# Patient Record
Sex: MALE | Race: WHITE | NOT HISPANIC OR LATINO | Employment: FULL TIME | URBAN - NONMETROPOLITAN AREA
[De-identification: names, ages, dates, MRNs, and addresses within clinical notes are randomized per-mention and may not be internally consistent; named-entity substitution may affect disease eponyms.]

---

## 2017-01-12 ENCOUNTER — OFFICE VISIT (OUTPATIENT)
Dept: PSYCHIATRY | Facility: CLINIC | Age: 47
End: 2017-01-12

## 2017-01-12 VITALS
BODY MASS INDEX: 31.22 KG/M2 | SYSTOLIC BLOOD PRESSURE: 128 MMHG | HEIGHT: 71 IN | HEART RATE: 64 BPM | WEIGHT: 223 LBS | DIASTOLIC BLOOD PRESSURE: 84 MMHG

## 2017-01-12 DIAGNOSIS — F41.1 GENERALIZED ANXIETY DISORDER: ICD-10-CM

## 2017-01-12 DIAGNOSIS — F33.1 MAJOR DEPRESSIVE DISORDER, RECURRENT EPISODE, MODERATE (HCC): Primary | ICD-10-CM

## 2017-01-12 PROCEDURE — 99213 OFFICE O/P EST LOW 20 MIN: CPT

## 2017-01-12 RX ORDER — GABAPENTIN 300 MG/1
300 CAPSULE ORAL 3 TIMES DAILY
Qty: 90 CAPSULE | Refills: 1 | Status: SHIPPED | OUTPATIENT
Start: 2017-01-12 | End: 2017-02-09

## 2017-01-12 RX ORDER — VENLAFAXINE HYDROCHLORIDE 150 MG/1
150 CAPSULE, EXTENDED RELEASE ORAL DAILY
Qty: 30 CAPSULE | Refills: 1 | Status: SHIPPED | OUTPATIENT
Start: 2017-01-12 | End: 2017-03-01 | Stop reason: SDUPTHER

## 2017-01-12 NOTE — MR AVS SNAPSHOT
Amish Win   1/12/2017 3:30 PM   Office Visit    Dept Phone:  [phone]   Encounter #:  [id number]    Provider:  Hero Meléndez MD   Department:  [facility] BE[facility]AVIORAL HEALTH                Your Full Care Plan              Today's Medication Changes          These changes are accurate as of: 1/12/17  4:54 PM.  If you have any questions, ask your nurse or doctor.               New Medication(s)Ordered:     gabapentin 300 MG capsule   Commonly known as:  NEURONTIN   Take 1 capsule by mouth 3 (Three) Times a Day.         Medication(s)that have changed:     venlafaxine  MG 24 hr capsule   Commonly known as:  EFFEXOR XR   Take 1 capsule by mouth Daily.   What changed:    - medication strength  - how much to take  - how to take this  - when to take this  - additional instructions            Where to Get Your Medications      These medications were sent to [facility] [address] [place] [address] [phone] [place] [phone] [place] [id number]     Phone:  [phone]     gabapentin 300 MG capsule    venlafaxine  MG 24 hr capsule                  Your Updated Medication List          This list is accurate as of: 1/12/17  4:54 PM.  Always use your most recent med list.                atenolol 50 MG tablet   Commonly known as:  TENORMIN       gabapentin 300 MG capsule   Commonly known as:  NEURONTIN   Take 1 capsule by mouth 3 (Three) Times a Day.       LORazepam 1 MG tablet   Commonly known as:  ATIVAN   Take 1 tablet by mouth As Needed for anxiety.       simvastatin 20 MG tablet   Commonly known as:  ZOCOR       venlafaxine  MG 24 hr capsule   Commonly known as:  EFFEXOR XR   Take 1 capsule by mouth Daily.               You Were Diagnosed With        Codes Comments    Major depressive disorder, recurrent episode, moderate    -  Primary ICD-10-CM: F33.1  ICD-9-CM: 296.32     Generalized anxiety disorder      ICD-10-CM: F41.1  ICD-9-CM: 300.02       Instructions     None    Patient Instructions History      Upcoming Appointments     Visit Type Date Time Department    MEDICINE CHECK 2017  3:30 PM [facility]    PSYCHOTHERAPY 2017  3:30 PM [facility]    PSYCHOTHERAPY 2017  3:30 PM [facility]    MEDICINE CHECK 2017  1:30 PM [facility]    PSYCHOTHERAPY 2017  7:45 AM [facility]    PSYCHOTHERAPY 3/7/2017  7:45 AM [facility]      MyChart Signup     [facility] [employer] allows you to send messages to your doctor, view your test results, renew your prescriptions, schedule appointments, and more. To sign up, go to [employer] and click on the Sign Up Now link in the New User? box. Enter your [employer] Activation Code exactly as it appears below along with the last four digits of your Social Security Number and your Date of Birth () to complete the sign-up process. If you do not sign up before the expiration date, you must request a new code.    [employer] Activation Code: [email or web address]  Expires: 2017  4:54 PM    If you have questions, you can email [email or web address] or call [phone] to talk to our [employer] staff. Remember, [employer] is NOT to be used for urgent needs. For medical emergencies, dial 911.               Other Info from Your Visit           Your Appointments     2017  3:30 PM EST   Psychotherapy with Izaiah Tinoco LCSW   [facility] (--)    [address]  [place] [address]   [phone]            2017  3:30 PM EST   Psychotherapy with Izaiah Tinoco LCSW   [facility] (--)    [address]  [place] [address]   [phone]            2017  1:30 PM EST   Medicine Check with Hero Meléndez MD   [facility] (--)    [address]  [place] [address]   [phone]            2017  7:45 AM EST  "  Psychotherapy with Izaiah Tinoco LCSW   [facility] (--)    [address] [facility] [address]   [phone]            Mar 07, 2017  7:45 AM EST   [facility]   [facility] (--)    [address] [facility] [address]   [phone]              Allergies     No Known Allergies      Vital Signs     Blood Pressure Pulse Height Weight Body Mass Index       128/84 64 71\" (180.3 cm) 223 lb (101 kg) 31.1 kg/m2       Problems and Diagnoses Noted     Mood problem    -  Primary    Generalized anxiety disorder            "

## 2017-01-12 NOTE — PROGRESS NOTES
"Subjective   Amish Win is a 46 y.o. male who is here today for medication management follow up.    Chief Complaint:  Depression and anxiety    HPI:  History of Present Illness    Since last visit, we have talked on the phone about his anxiety and depression. He says it is affecting his performance at work. He has lost 16 lbs unintentionally from stomach feeling nervous and queasy with the anxiety. Very stressed about his autistic son who is back from residential treatment. He is struggling with depressed mood, occasional crying spells, low energy, hopelessness, severe anxiety, anhedonia, frequent panic attacks (still using Ativan sparingly, but a little more frequently than usual, maybe 4 times a week). He feels like the Effexor is starting to help just a little, but still struggling terribly with anxiety \"the worst month of my life as far as anxiety goes.\" Sleeping probably 6-7 interrupted hours of sleep.     The following portions of the patient's history were reviewed and updated as appropriate: allergies, current medications, past family history, past medical history, past social history, past surgical history and problem list.    Review of Systems   Constitutional: Positive for fatigue. Negative for appetite change, chills, diaphoresis, fever and unexpected weight change.   HENT: Negative for hearing loss, sore throat, trouble swallowing and voice change.    Eyes: Negative for photophobia and visual disturbance.   Respiratory: Negative for cough, chest tightness and shortness of breath.    Cardiovascular: Negative for chest pain and palpitations.   Gastrointestinal: Positive for nausea. Negative for abdominal pain, constipation and vomiting.   Endocrine: Negative for cold intolerance and heat intolerance.   Genitourinary: Negative for dysuria and frequency.   Musculoskeletal: Negative for arthralgias, back pain, joint swelling and neck stiffness.   Skin: Negative for color change and wound. " "  Allergic/Immunologic: Negative for environmental allergies and immunocompromised state.   Neurological: Negative for dizziness, tremors, seizures, syncope, weakness, light-headedness and headaches.   Hematological: Negative for adenopathy. Does not bruise/bleed easily.       Objective   Physical Exam   Constitutional: He appears well-developed and well-nourished. No distress.   Neurological: He is alert. Coordination and gait normal.   Vitals reviewed.    Blood pressure 128/84, pulse 64, height 71\" (180.3 cm), weight 223 lb (101 kg).    No Known Allergies    Current Medications:   Current Outpatient Prescriptions   Medication Sig Dispense Refill   • atenolol (TENORMIN) 50 MG tablet Take 50 mg by mouth daily.     • gabapentin (NEURONTIN) 300 MG capsule Take 1 capsule by mouth 3 (Three) Times a Day. 90 capsule 1   • LORazepam (ATIVAN) 1 MG tablet Take 1 tablet by mouth As Needed for anxiety. 15 tablet 1   • simvastatin (ZOCOR) 20 MG tablet Take 20 mg by mouth daily.     • venlafaxine XR (EFFEXOR-XR) 150 MG 24 hr capsule Take 1 capsule by mouth Daily. 30 capsule 1     No current facility-administered medications for this visit.        Mental Status Exam:   Appearance: Neatly, casually dressed, good hygeine.   Cooperation: Cooperative  Orientation: Ox4  Gait and station stable.   Psychomotor: No psychomotor agitation/retardation, No EPS, No motor tics  Speech: normal rate, amount.  Mood \"stressed\"   Affect: congruent/appropriate.  Thought Content: goal directed, no delusional material present  Thought process: linear, organized.  Suicidality: No SI  Homicidality: No HI  Perception: No AH/VH. No overt psychosis  Memory is intact for recent and remote events  Concentration: good  Impulse control: good  Insight: fair   Judgement: fair    Assessment/Plan   Problems Addressed this Visit     None      Visit Diagnoses     Major depressive disorder, recurrent episode, moderate    -  Primary    Relevant Medications    " venlafaxine XR (EFFEXOR-XR) 150 MG 24 hr capsule    Generalized anxiety disorder        Relevant Medications    venlafaxine XR (EFFEXOR-XR) 150 MG 24 hr capsule          · Start gabapentin 300mg TID for anxiety.   · Continue Effexor XR 150mg daily for depression and anxiety.   · Continue Ativan 1mg PRN anxiety. He still has several so he doesn't need a new Rx today.  · Continue psychotherapy with Izaiah Tinoco.     We discussed risks, benefits, and side effects of the above medication and the patient was agreeable with the plan.    Return in about 4 weeks (around 2/9/2017) for Next scheduled follow up.  The patient was instructed to call clinic as needed or go to ER if in crisis.

## 2017-01-24 ENCOUNTER — OFFICE VISIT (OUTPATIENT)
Dept: PSYCHIATRY | Facility: CLINIC | Age: 47
End: 2017-01-24

## 2017-01-24 DIAGNOSIS — F41.1 GENERALIZED ANXIETY DISORDER: ICD-10-CM

## 2017-01-24 DIAGNOSIS — Z63.0 PARTNER RELATIONAL PROBLEM: ICD-10-CM

## 2017-01-24 DIAGNOSIS — F33.1 MAJOR DEPRESSIVE DISORDER, RECURRENT EPISODE, MODERATE (HCC): Primary | ICD-10-CM

## 2017-01-24 PROCEDURE — 90834 PSYTX W PT 45 MINUTES: CPT | Performed by: SOCIAL WORKER

## 2017-01-24 SDOH — SOCIAL STABILITY - SOCIAL INSECURITY: PROBLEMS IN RELATIONSHIP WITH SPOUSE OR PARTNER: Z63.0

## 2017-01-24 NOTE — MR AVS SNAPSHOT
Amish Azlacey   2017 3:30 PM   Appointment    Dept Phone:  [phone]   Encounter #:  [id number]    Provider:  Izaiah Tinoco LCSW   Department:  [facility]                Your Full Care Plan              Your Updated Medication List          This list is accurate as of: 17  4:54 PM.  Always use your most recent med list.                atenolol 50 MG tablet   Commonly known as:  TENORMIN       gabapentin 300 MG capsule   Commonly known as:  NEURONTIN   Take 1 capsule by mouth 3 (Three) Times a Day.       LORazepam 1 MG tablet   Commonly known as:  ATIVAN   Take 1 tablet by mouth As Needed for anxiety.       simvastatin 20 MG tablet   Commonly known as:  ZOCOR       venlafaxine  MG 24 hr capsule   Commonly known as:  EFFEXOR XR   Take 1 capsule by mouth Daily.               Instructions     None    Patient Instructions History      Upcoming Appointments     Visit Type Date Time Department    PSYCHOTHERAPY 2017  3:30 PM [facility]    PSYCHOTHERAPY 2017  3:30 PM [facility]    MEDICINE CHECK 2017  1:30 PM [facility]    NEW PATIENT 2017  4:00 PM [facility]    PSYCHOTHERAPY 2017  7:45 AM [facility]    PSYCHOTHERAPY 3/7/2017  7:45 AM [facility]      MyChart Signup     [facility] [employer] allows you to send messages to your doctor, view your test results, renew your prescriptions, schedule appointments, and more. To sign up, go to [employer] and click on the Sign Up Now link in the New User? box. Enter your [employer] Activation Code exactly as it appears below along with the last four digits of your Social Security Number and your Date of Birth () to complete the sign-up process. If you do not sign up before the expiration date, you must request a new code.    [employer] Activation Code: [email or web address]  Expires: 2017  4:54 PM    If you have questions, you can  email [email or web address] or call [phone] to talk to our MyChart staff. Remember, MyChart is NOT to be used for urgent needs. For medical emergencies, dial 911.               Other Info from Your Visit           Your Appointments     Feb 07, 2017  3:30 PM EST   [facility]   [facility] (--)    [address]  [place] [address]   [phone]            Feb 09, 2017  1:30 PM EST   Medicine Check with Hero Meléndez MD   [facility] (--)    [address]  [place] [address]   [phone]            Feb 09, 2017  4:00 PM EST   New Patient with Arnulfo Magaña III, MD   [facility] (--)    [address] [facility] [address]  [place] [address]   [phone]           Please bring a list of all current medications and arrive 30 minutes early to complete paperwork.            Feb 21, 2017  7:45 AM EST   [facility]   [facility] (--)    [address]  [place] [address]   [phone]            Mar 07, 2017  7:45 AM EST   [facility]zaiah Tin[facility]   [facility] (--)    [address]  [place] [address]   [phone]              Allergies     No Known Allergies

## 2017-01-26 NOTE — PROGRESS NOTES
"Amish Win : 1970    MULTIDISCIPLINARY PROGRESS NOTE    Date of Service: 2017   Time In: 4:05 PM   Time Out: 4:45 PM     DATA: Patient met 1:1 with Izaiah Tinoco LCSW, LCADC for scheduled individual outpatient psychotherapy session at the [facility] for follow-up.  Patient reports his son, who has autism, has been back in the hospital [facility] due to outbursts at school and his behavior.  Patient reports his wife picked him up on this date and states although he is extremely happy to have him home he is somewhat anxious as to how things will go.  Patient reports he and his wife continue to be extremely distant but states he has decided not to worry about the issue at this time and to focus on his son.  He should also reports he continues to struggle at work and states he has been moved to swing shifts which is making it difficult at work and at home.  Patient also discusses his recent visit with Dr. Meléndez and states he believes his symptoms have decreased since beginning gabapentin.  He states he believes he almost \"crashed\" in December and states he has never been in a state of mind such as this in his life.    HPI: Patient reports he is feeling somewhat better since beginning gabapentin but continues to report symptoms including depressed mood, periodic crying spells, feelings of hopelessness and hopelessness, difficulty concentrating, becoming frustrated, feeling overwhelmed, feeling on edge, becoming agitated, feeling lonely, obsessive worry, and a periodic sense of impending doom. He rates current symptoms of depression at a 7 and anxiety at 7on a scale of 1-10 with 10 being the most severe.  He reports he continues to adhere to medication regimen as prescribed with no side effects. Patient adamantly and convincingly denies current suicidal or homicidal ideation or perceptual disturbance.  Patient denies any illicit substance use including alcohol.    CLNICAL " MANEUVERING/INTERVENTION:  allowed patient to discuss his feelings and fears concerning the recent hospitalization of his son and his fear of how things will go once he returns.  Validated the patient's feelings and worries.  Also allowed the patient to discuss his decision to put his relationship issues to the side for the time being and validated his decision.  Also reminded the patient any relationship requires a participation of both parties and cautioned him not to assume all the responsibility for the difficulties in his marriage.  Discussed the concept of things we can control and things we cannot control and encouraged the patient to make a concerted effort to stop obsessively worrying over things he has little to no control over including his job schedule.  Assisted the patient in identifying and verbalizing coping skills he has found to be effective in the past in reducing frequency and severity of symptoms including avoiding social isolation by remaining physically active, consciously monitoring his attitude, and finding an enjoyable activity he can engage in on a regular basis.  Provided unconditional positive regard in a safe, supportive environment.      Allowed patient to freely discuss issues without interruption or judgment. Provided safe, confidential environment to facilitate the development of positive therapeutic relationship and encourage open, honest communication. Assisted patient in identifying risk factors which would indicate the need for higher level of care including thoughts to harm self or others and/or self-harming behavior and encouraged patient to contact this office, call 911, or present to the nearest emergency room should any of these events occur. Discussed crisis intervention services and means to access.      ASSESSMENT: Patient presents for session on time, clean and casually dressed with depressed  mood and sad affect. No evidence of intoxication, withdrawal, or perceptual  disturbance. Associations intact, abstraction intact. Thought process is linear and logical.  thought content normal.  No psychomotor agitation/retardation.  Speech is clear and coherent. Patient is oriented to person, place, and time. Attention and concentration fair. Insight and judgment fair. Patient reports no current suicidal or homicidal ideation. Patient appears cooperative and agreeable to treatment and appears to continue to develop rapport. Patient does not appear to be malingering.   the patient continues to struggle with significant symptoms of depression and anxiety which appear to currently be exacerbated by the ongoing difficulties of raising a special needs child.  In addition, the patient continues to struggle with other daily stressors including his work schedule.  Patient does appear to be doing somewhat better since medication adjustment but would likely be at significantly increased risk for decompensation without continued treatment.     Diagnosis:   F33.1 Major Depressive Disorder, Recurrent, Moderate   F41.1 Generalized Anxiety Disorder   Z63.0 Marital/Partner Relational Problems    PLAN: Patient will continue in biweekly to monthly individual outpatient psychotherapy sessions and pharmacotherapy as scheduled at the [facility].  Patient will adhere to medication regimen as prescribed and report any side effects. Patient will contact this office, call 911 or present to the nearest emergency room should suicidal ideations occur. Provide Cognitive Behavioral Therapy and Solution Focused Therapy to improve functioning, maintain stability, and avoid decompensation and the need for higher level of care.     Izaiah Tinoco, FAUSTO, [facility]

## 2017-02-07 ENCOUNTER — OFFICE VISIT (OUTPATIENT)
Dept: PSYCHIATRY | Facility: CLINIC | Age: 47
End: 2017-02-07

## 2017-02-07 DIAGNOSIS — F41.1 GENERALIZED ANXIETY DISORDER: ICD-10-CM

## 2017-02-07 DIAGNOSIS — Z63.0 MARITAL/PARTNER RELATIONAL PROBLEM: ICD-10-CM

## 2017-02-07 DIAGNOSIS — F33.1 MAJOR DEPRESSIVE DISORDER, RECURRENT EPISODE, MODERATE (HCC): Primary | ICD-10-CM

## 2017-02-07 PROCEDURE — 90834 PSYTX W PT 45 MINUTES: CPT | Performed by: SOCIAL WORKER

## 2017-02-07 SDOH — SOCIAL STABILITY - SOCIAL INSECURITY: PROBLEMS IN RELATIONSHIP WITH SPOUSE OR PARTNER: Z63.0
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
## 2017-02-09 ENCOUNTER — OFFICE VISIT (OUTPATIENT)
Dept: PSYCHIATRY | Facility: CLINIC | Age: 47
End: 2017-02-09

## 2017-02-09 VITALS
HEART RATE: 67 BPM | WEIGHT: 220 LBS | DIASTOLIC BLOOD PRESSURE: 88 MMHG | SYSTOLIC BLOOD PRESSURE: 145 MMHG | HEIGHT: 71 IN | BODY MASS INDEX: 30.8 KG/M2

## 2017-02-09 DIAGNOSIS — F41.1 GENERALIZED ANXIETY DISORDER: ICD-10-CM

## 2017-02-09 DIAGNOSIS — F33.1 MAJOR DEPRESSIVE DISORDER, RECURRENT EPISODE, MODERATE (HCC): Primary | ICD-10-CM

## 2017-02-09 PROCEDURE — 99214 OFFICE O/P EST MOD 30 MIN: CPT

## 2017-02-09 RX ORDER — GABAPENTIN 600 MG/1
600 TABLET ORAL 3 TIMES DAILY
Qty: 90 TABLET | Refills: 1 | Status: SHIPPED | OUTPATIENT
Start: 2017-02-09 | End: 2017-03-01 | Stop reason: SDUPTHER

## 2017-02-09 NOTE — PROGRESS NOTES
Subjective   Amish Win is a 46 y.o. male who is here today for medication management follow up.    Chief Complaint:  Depression and anxiety    HPI:  History of Present Illness    At last visit, we started gabapentin 300 mg 3 times daily for severe anxiety.  Today he reports that he really didn't feel any affect at all from the gabapentin, except for maybe sleeping a little more soundly and less restlessly at night, about 6-7 hours.  Still feels anxious constantly, in the context of high stress at work and home.  The major stressor since his last visit is that his autistic son was admitted to the hospital again for aggressive behaviors and just came home last night.  The anxiety continues to affect his performance at work. He has lost 19 lbs unintentionally from stomach feeling nervous and queasy associated with the anxiety. He is struggling with depressed mood, occasional crying spells, low energy, hopelessness, severe anxiety, anhedonia, frequent panic attacks (still using Ativan sparingly, only once since his last visit here with me 4 weeks ago).     The following portions of the patient's history were reviewed and updated as appropriate: allergies, current medications, past family history, past medical history, past social history, past surgical history and problem list.    Review of Systems   Constitutional: Positive for fatigue. Negative for appetite change, chills, diaphoresis, fever and unexpected weight change.   HENT: Negative for hearing loss, sore throat, trouble swallowing and voice change.    Eyes: Negative for photophobia and visual disturbance.   Respiratory: Negative for cough, chest tightness and shortness of breath.    Cardiovascular: Negative for chest pain and palpitations.   Gastrointestinal: Positive for nausea. Negative for abdominal pain, constipation and vomiting.   Endocrine: Negative for cold intolerance and heat intolerance.   Genitourinary: Negative for dysuria and frequency.  "  Musculoskeletal: Negative for arthralgias, back pain, joint swelling and neck stiffness.   Skin: Negative for color change and wound.   Allergic/Immunologic: Negative for environmental allergies and immunocompromised state.   Neurological: Negative for dizziness, tremors, seizures, syncope, weakness, light-headedness and headaches.   Hematological: Negative for adenopathy. Does not bruise/bleed easily.       Objective   Physical Exam   Constitutional: He appears well-developed and well-nourished. No distress.   Neurological: He is alert. Coordination and gait normal.   Vitals reviewed.    Blood pressure 145/88, pulse 67, height 71\" (180.3 cm), weight 220 lb (99.8 kg).    Allergies   Allergen Reactions   • Penicillins        Current Medications:   Current Outpatient Prescriptions   Medication Sig Dispense Refill   • atenolol (TENORMIN) 50 MG tablet Take 50 mg by mouth daily.     • LORazepam (ATIVAN) 1 MG tablet Take 1 tablet by mouth As Needed for anxiety. 15 tablet 1   • simvastatin (ZOCOR) 20 MG tablet Take 20 mg by mouth daily.     • venlafaxine XR (EFFEXOR-XR) 150 MG 24 hr capsule Take 1 capsule by mouth Daily. 30 capsule 1   • gabapentin (NEURONTIN) 600 MG tablet Take 1 tablet by mouth 3 (Three) Times a Day. 90 tablet 1     No current facility-administered medications for this visit.        Mental Status Exam:   Appearance: Neatly, casually dressed, good hygeine.   Cooperation: Cooperative  Orientation: Ox4  Gait and station stable.   Psychomotor: No psychomotor agitation/retardation, No EPS, No motor tics  Speech: normal rate, amount.  Mood \"stressed\"   Affect: congruent/appropriate.  Thought Content: goal directed, no delusional material present  Thought process: linear, organized.  Suicidality: No SI  Homicidality: No HI  Perception: No AH/VH. No overt psychosis  Memory is intact for recent and remote events  Concentration: good  Impulse control: good  Insight: fair   Judgement: fair  Language: Intact  No " looseness of associations  Fund of knowledge above average    Assessment/Plan   Problems Addressed this Visit     None      Visit Diagnoses     Major depressive disorder, recurrent episode, moderate    -  Primary    Generalized anxiety disorder              · Increase gabapentin up to 600 mg TID for anxiety.   · Continue Effexor XR 150mg daily for depression and anxiety.   · Continue Ativan 1mg PRN anxiety. He still has several so he doesn't need a new Rx today.  · Continue psychotherapy with Izaiah Tinoco.     We discussed risks, benefits, and side effects of the above medication and the patient was agreeable with the plan.    Return in about 4 weeks (around 3/9/2017) for Next scheduled follow up.  The patient was instructed to call clinic as needed or go to ER if in crisis.

## 2017-02-27 ENCOUNTER — TELEPHONE (OUTPATIENT)
Dept: PSYCHIATRY | Facility: CLINIC | Age: 47
End: 2017-02-27

## 2017-02-27 NOTE — TELEPHONE ENCOUNTER
Patient called to let you know the Gabapentin isn't helping and he feels as though he is in a state of crisis most of the time.  He would like for you to return his call if possible.

## 2017-03-01 ENCOUNTER — TELEPHONE (OUTPATIENT)
Dept: PSYCHIATRY | Facility: CLINIC | Age: 47
End: 2017-03-01

## 2017-03-01 ENCOUNTER — OFFICE VISIT (OUTPATIENT)
Dept: PSYCHIATRY | Facility: CLINIC | Age: 47
End: 2017-03-01

## 2017-03-01 VITALS
DIASTOLIC BLOOD PRESSURE: 86 MMHG | SYSTOLIC BLOOD PRESSURE: 152 MMHG | HEART RATE: 73 BPM | BODY MASS INDEX: 30.1 KG/M2 | WEIGHT: 215 LBS | HEIGHT: 71 IN

## 2017-03-01 DIAGNOSIS — F41.1 GENERALIZED ANXIETY DISORDER: ICD-10-CM

## 2017-03-01 DIAGNOSIS — F33.1 MAJOR DEPRESSIVE DISORDER, RECURRENT EPISODE, MODERATE (HCC): Primary | ICD-10-CM

## 2017-03-01 DIAGNOSIS — F41.0 PANIC DISORDER WITHOUT AGORAPHOBIA: ICD-10-CM

## 2017-03-01 PROCEDURE — 99214 OFFICE O/P EST MOD 30 MIN: CPT

## 2017-03-01 RX ORDER — GABAPENTIN 800 MG/1
800 TABLET ORAL 4 TIMES DAILY PRN
Qty: 120 TABLET | Refills: 1 | Status: SHIPPED | OUTPATIENT
Start: 2017-03-01 | End: 2017-03-15 | Stop reason: SDUPTHER

## 2017-03-01 RX ORDER — LORAZEPAM 1 MG/1
1 TABLET ORAL AS NEEDED
Qty: 15 TABLET | Refills: 1
Start: 2017-03-01 | End: 2017-03-09

## 2017-03-01 RX ORDER — ATENOLOL 50 MG/1
50 TABLET ORAL 2 TIMES DAILY
Qty: 60 TABLET | Refills: 1 | Status: SHIPPED | OUTPATIENT
Start: 2017-03-01 | End: 2017-06-29 | Stop reason: SDUPTHER

## 2017-03-01 RX ORDER — VENLAFAXINE HYDROCHLORIDE 150 MG/1
150 CAPSULE, EXTENDED RELEASE ORAL DAILY
Qty: 30 CAPSULE | Refills: 1 | Status: SHIPPED | OUTPATIENT
Start: 2017-03-01 | End: 2017-03-09 | Stop reason: SDUPTHER

## 2017-03-01 NOTE — PROGRESS NOTES
"Subjective   Amish Win is a 46 y.o. male who is here today for medication management follow up.    Chief Complaint:  Depression and anxiety    HPI:  History of Present Illness    At last visit, we increased gabapentin to 600 mg 3 times daily for severe anxiety.  Today he reports that he noticed a mild decrease in anxiety \"maybe from a 9 down to an 8.\"  He has had a couple of panic attacks over the past week, which responded partially to Ativan.  He came in today for an urgent appointment because this morning he had his second panic attack of the week and he didn't feel he could wait until his regularly scheduled appointment next week.  His blood pressure is up again today, and he has been worried about his blood pressure for the past month or so since it has been elevated at his last couple of visits.  He went to his primary care physician but they did not adjust his blood pressure medicine, he does have a return visit with them next week so they can recheck his blood pressure but he is planning to go in tomorrow for an urgent visit.  I recommended going up on his beta blocker because I think this could help with both anxiety and his blood pressure and he said he would relay this recommendation to his nurse practitioner at his PCP office.      Sleeping restlessly at night, about 6 hours.  Still feels anxious constantly, in the context of high stress at work and home.  The major stressors include caring for his autistic son and stressful work environment.  The anxiety continues to affect his performance at work. He has lost 24 lbs unintentionally from stomach feeling nervous and queasy associated with the anxiety. He is struggling with depressed mood, occasional crying spells, low energy, hopelessness, severe anxiety, anhedonia, frequent panic attacks (but trying to use the Ativan sparingly, still has 10 of the 15 tablets left from his last prescription).     The following portions of the patient's history were " "reviewed and updated as appropriate: allergies, current medications, past family history, past medical history, past social history, past surgical history and problem list.    Review of Systems   Constitutional: Positive for fatigue. Negative for appetite change, chills, diaphoresis, fever and unexpected weight change.   HENT: Negative for hearing loss, sore throat, trouble swallowing and voice change.    Eyes: Negative for photophobia and visual disturbance.   Respiratory: Negative for cough, chest tightness and shortness of breath.    Cardiovascular: Negative for chest pain and palpitations.   Gastrointestinal: Positive for nausea. Negative for abdominal pain, constipation and vomiting.   Endocrine: Negative for cold intolerance and heat intolerance.   Genitourinary: Negative for dysuria and frequency.   Musculoskeletal: Negative for arthralgias, back pain, joint swelling and neck stiffness.   Skin: Negative for color change and wound.   Allergic/Immunologic: Negative for environmental allergies and immunocompromised state.   Neurological: Negative for dizziness, tremors, seizures, syncope, weakness, light-headedness and headaches.   Hematological: Negative for adenopathy. Does not bruise/bleed easily.       Objective   Physical Exam   Constitutional: He appears well-developed and well-nourished. No distress.   Neurological: He is alert. Coordination and gait normal.   Vitals reviewed.    Blood pressure 152/86, pulse 73, height 71\" (180.3 cm), weight 215 lb (97.5 kg).    Allergies   Allergen Reactions   • Penicillins        Current Medications:   Current Outpatient Prescriptions   Medication Sig Dispense Refill   • atenolol (TENORMIN) 50 MG tablet Take 50 mg by mouth daily.     • gabapentin (NEURONTIN) 800 MG tablet Take 1 tablet by mouth 4 (Four) Times a Day As Needed (anxiety). 120 tablet 1   • LORazepam (ATIVAN) 1 MG tablet Take 1 tablet by mouth As Needed for anxiety. 15 tablet 1   • simvastatin (ZOCOR) 20 MG " "tablet Take 20 mg by mouth daily.     • venlafaxine XR (EFFEXOR-XR) 150 MG 24 hr capsule Take 1 capsule by mouth Daily. 30 capsule 1     No current facility-administered medications for this visit.        Mental Status Exam:   Appearance: Neatly, casually dressed, good hygeine.   Cooperation: Cooperative  Orientation: Ox4  Gait and station stable.   Psychomotor: No psychomotor agitation/retardation, No EPS, No motor tics  Speech: normal rate, amount.  Mood \"stressed\"   Affect: congruent/appropriate.  Thought Content: goal directed, no delusional material present  Thought process: linear, organized.  Suicidality: No SI  Homicidality: No HI  Perception: No AH/VH. No overt psychosis  Memory is intact for recent and remote events  Concentration: good  Impulse control: good  Insight: fair   Judgement: fair  Language: Intact  No looseness of associations  Fund of knowledge above average    Assessment/Plan   Problems Addressed this Visit     None      Visit Diagnoses     Major depressive disorder, recurrent episode, moderate    -  Primary    Relevant Medications    venlafaxine XR (EFFEXOR-XR) 150 MG 24 hr capsule    LORazepam (ATIVAN) 1 MG tablet    Generalized anxiety disorder        Relevant Medications    venlafaxine XR (EFFEXOR-XR) 150 MG 24 hr capsule    LORazepam (ATIVAN) 1 MG tablet    Panic disorder without agoraphobia        Relevant Medications    venlafaxine XR (EFFEXOR-XR) 150 MG 24 hr capsule    LORazepam (ATIVAN) 1 MG tablet          · Increase gabapentin up to 800 mg QID for severe anxiety.   · Continue Effexor XR 150mg daily for depression and anxiety.   · Continue Ativan 1mg PRN anxiety, gave 15 tablets for the month.   · Continue psychotherapy with Izaiah Tinoco.   · He called back shortly after he left his visit, saying that he tried to get into see his nurse practitioner but they were not able to work him in.  He wants his blood pressure medicine adjusted right away.  Will increase his atenolol to 50 mg " twice daily.  We'll follow-up next week about this issue.    We discussed risks, benefits, and side effects of the above medication and the patient was agreeable with the plan.    No Follow-up on file.  he already has an appointment on file for next week, 3/9/17 at 3 PM  The patient was instructed to call clinic as needed or go to ER if in crisis.

## 2017-03-01 NOTE — TELEPHONE ENCOUNTER
OK thanks. I have been out of the office since last Thursday so I hadn't seen your message from 2/27 until this morning.

## 2017-03-01 NOTE — TELEPHONE ENCOUNTER
Patient is called with complaints of anxiety and no improved with the increase of his medication.  He requested a return phone call or a office visit. I schedule him to come in today and have tried to return his call several times with no answer and unable to leave voice mail.  I also called his work number and he is no longer working there.   Therefore, I will cancel the appointment I made and try to contact him again later today.

## 2017-03-07 ENCOUNTER — OFFICE VISIT (OUTPATIENT)
Dept: PSYCHIATRY | Facility: CLINIC | Age: 47
End: 2017-03-07

## 2017-03-07 DIAGNOSIS — F41.0 PANIC DISORDER WITHOUT AGORAPHOBIA: ICD-10-CM

## 2017-03-07 DIAGNOSIS — F41.1 GENERALIZED ANXIETY DISORDER: ICD-10-CM

## 2017-03-07 DIAGNOSIS — F33.1 MAJOR DEPRESSIVE DISORDER, RECURRENT EPISODE, MODERATE (HCC): Primary | ICD-10-CM

## 2017-03-07 PROCEDURE — 90834 PSYTX W PT 45 MINUTES: CPT | Performed by: SOCIAL WORKER

## 2017-03-07 NOTE — PROGRESS NOTES
Amish Win : 1970    MULTIDISCIPLINARY PROGRESS NOTE    Date of Service: 3/07/2017   Time In: 7:45 AM   Time Out: 8:30 AM    DATA: Patient met 1:1 with Izaiah Tinoco LCSW, LCADC for scheduled individual outpatient psychotherapy session at the [facility] for follow-up.  Patient reports things continue to be stressful at home as his 16-year-old autistic son continues to act out on a regular basis and requires constant care from the patient and his wife.  In fact, he reports he and his wife recently submitted an application for a long-term treatment facility in [facility] and hopes to hear back from them in the coming days.  The patient reports he simply doesn't feel it is sustainable to continue to care for him at the current levels.  Patient also reports this past weekend he got to the point he almost consider coming to the ER for the purposes of being hospitalized.  Patient reports he saw Dr. Meléndez for an unscheduled appointment and states he feels it was somewhat helpful his atenolol was increased which seems to have helped his hypertension significantly.      HPI: Patient reports  he feels he continues to be in the midst of a 3 month decompensation and continues to report symptoms including depressed mood, periodic crying spells, feelings of hopelessness and hopelessness, difficulty concentrating, becoming frustrated, feeling overwhelmed, feeling on edge,difficulty breathing, a feeling of losing control,  becoming agitated, feeling lonely, obsessive worry, and a periodic sense of impending doom. He rates current symptoms of depression at a 7 and anxiety at 7 on a scale of 1-10 with 10 being the most severe.  however, the patient rates symptoms of anxiety at a 10 at their worst.   He reports he continues to adhere to medication regimen as  prescribed, he reports he feels tired from the increase and atenolol but states he is confident his body will adjust.  Patient adamantly and convincingly  denies current suicidal or homicidal ideation or perceptual disturbance.  Patient denies any illicit substance use including alcohol.    CLNICAL MANEUVERING/INTERVENTION:  Allowed the patient to discuss/vent his feelings and fears concerning the ongoing difficulties in his life including the significant levels of stress at his home and his job.  Also allowed the patient to discuss the decision to consider long-term treatment facility for their son and the emotional difficulty of making such a decision and validated his feelings.  Assisted the patient in identifying and verbalizing positive coping skills he is found to be effective in reducing the severity and frequency of symptoms including positive self talk, relaxation techniques including deep breathing exercises, and finding enjoyable activities he can engage in and look forward to on a regular basis.  Challenged the patient to find an enjoyable hobbies he can engage in on a regular basis to give him something to look forward to and to decrease his stress levels.  Also encouraged the patient to plan an enjoyable activity for his upcoming vacation in April.  Provided unconditional positive regard in a safe, supportive environment.      Allowed patient to freely discuss issues without interruption or judgment. Provided safe, confidential environment to facilitate the development of positive therapeutic relationship and encourage open, honest communication. Assisted patient in identifying risk factors which would indicate the need for higher level of care including thoughts to harm self or others and/or self-harming behavior and encouraged patient to contact this office, call 911, or present to the nearest emergency room should any of these events occur. Discussed crisis intervention services and means to access.      ASSESSMENT: Patient presents for session on time, clean and casually dressed with depressed  mood and sad affect. No evidence of intoxication,  withdrawal, or perceptual disturbance. Associations intact, abstraction intact. Thought process is linear and logical.   thought content normal.  patient presents with psychomotor retardation as he appears to be extremely tired which is likely a side effect of the recent increase of his atenolol.   Speech is clear and coherent. Patient is oriented to person, place, and time. Attention and concentration fair. Insight and judgment fair. Patient reports no current suicidal or homicidal ideation. Patient appears cooperative and agreeable to treatment and appears to continue to develop rapport. Patient does not appear to be malingering.     the patient continues to struggle with significant symptoms of depression and anxiety which appear to continue to be exacerbated by the stress in his home including caring for special needs child and high levels of stress at his workplace.  The patient would likely be at increased risk for decompensation without continued treatment.      PLAN: Patient will continue in biweekly to monthly individual outpatient psychotherapy sessions and pharmacotherapy as scheduled at the [facility].  Patient will adhere to medication regimen as prescribed and report any side effects. Patient will contact this office, call 911 or present to the nearest emergency room should suicidal ideations occur. Provide Cognitive Behavioral Therapy and Solution Focused Therapy to improve functioning, maintain stability, and avoid decompensation and the need for higher level of care.     Izaiah Tinoco, FAUSTO, [facility]

## 2017-03-09 ENCOUNTER — OFFICE VISIT (OUTPATIENT)
Dept: PSYCHIATRY | Facility: CLINIC | Age: 47
End: 2017-03-09

## 2017-03-09 VITALS
HEIGHT: 71 IN | SYSTOLIC BLOOD PRESSURE: 128 MMHG | DIASTOLIC BLOOD PRESSURE: 84 MMHG | HEART RATE: 64 BPM | BODY MASS INDEX: 30.38 KG/M2 | WEIGHT: 217 LBS

## 2017-03-09 DIAGNOSIS — F41.1 GENERALIZED ANXIETY DISORDER: ICD-10-CM

## 2017-03-09 DIAGNOSIS — F41.0 PANIC DISORDER WITHOUT AGORAPHOBIA: ICD-10-CM

## 2017-03-09 DIAGNOSIS — F33.1 MAJOR DEPRESSIVE DISORDER, RECURRENT EPISODE, MODERATE (HCC): Primary | ICD-10-CM

## 2017-03-09 PROCEDURE — 99214 OFFICE O/P EST MOD 30 MIN: CPT

## 2017-03-09 RX ORDER — CLONAZEPAM 0.5 MG/1
0.5 TABLET ORAL DAILY
Qty: 30 TABLET | Refills: 1
Start: 2017-03-09 | End: 2017-03-15 | Stop reason: SDUPTHER

## 2017-03-09 RX ORDER — VENLAFAXINE HYDROCHLORIDE 75 MG/1
225 CAPSULE, EXTENDED RELEASE ORAL DAILY
Qty: 90 CAPSULE | Refills: 1 | Status: SHIPPED | OUTPATIENT
Start: 2017-03-09 | End: 2017-03-30

## 2017-03-09 NOTE — PROGRESS NOTES
Subjective   Amish Win is a 46 y.o. male who is here today for medication management follow up.    Chief Complaint:  Depression and anxiety    HPI:  History of Present Illness    Today he continues to report nearly intractable anxiety.  On a positive note, the physical sensations of anxiety has decreased with the gabapentin and increase in atenolol.  Blood pressure and heart rate look much better today.  However he continues to report that his mind races with constant anxiety which is interfering with his functioning at work.  He is overwhelmed with all the stress of caring for his autistic son who is not doing well in public school and goes in and out of inpatient treatment.  He is actively searching for the placement for him at the current time.  He says over the weekend he seriously thought about checking himself in to the psychiatric hospital because he felt completely overwhelmed and felt like he could not function with his current level of anxiety and depression.  However he decided tried to make it to this outpatient appointment instead of going into the hospital.  Over the past year and a half he has used Ativan very sparingly, oftentimes using only 1 or 2 tablets for the entire month, but he has used it every day over the past week because he has been so overwhelmed with anxiety.  He does find the Ativan effective for anxiety but it only lasts for 3-4 hours.  He did not go up on the gabapentin, continues to be on 600 mg because he wanted to see what effect the increase in atenolol would make over the past week.      Sleeping better at night, about 6 hours.  Still feels anxious constantly, in the context of high stress at work and home.  The major stressors include caring for his autistic son and stressful work environment.  The anxiety continues to affect his performance at work. He has lost 24 lbs unintentionally from stomach feeling nervous and queasy associated with the anxiety. He is struggling  "with depressed mood, occasional crying spells, low energy, hopelessness, severe anxiety, anhedonia, frequent panic attacks.     The following portions of the patient's history were reviewed and updated as appropriate: allergies, current medications, past family history, past medical history, past social history, past surgical history and problem list.    Review of Systems   Constitutional: Positive for fatigue. Negative for appetite change, chills, diaphoresis, fever and unexpected weight change.   HENT: Negative for hearing loss, sore throat, trouble swallowing and voice change.    Eyes: Negative for photophobia and visual disturbance.   Respiratory: Negative for cough, chest tightness and shortness of breath.    Cardiovascular: Negative for chest pain and palpitations.   Gastrointestinal: Positive for nausea. Negative for abdominal pain, constipation and vomiting.   Endocrine: Negative for cold intolerance and heat intolerance.   Genitourinary: Negative for dysuria and frequency.   Musculoskeletal: Negative for arthralgias, back pain, joint swelling and neck stiffness.   Skin: Negative for color change and wound.   Allergic/Immunologic: Negative for environmental allergies and immunocompromised state.   Neurological: Negative for dizziness, tremors, seizures, syncope, weakness, light-headedness and headaches.   Hematological: Negative for adenopathy. Does not bruise/bleed easily.       Objective   Physical Exam   Constitutional: He appears well-developed and well-nourished. No distress.   Neurological: He is alert. Coordination and gait normal.   Vitals reviewed.    Blood pressure 128/84, pulse 64, height 71\" (180.3 cm), weight 217 lb (98.4 kg).    Allergies   Allergen Reactions   • Penicillins        Current Medications:   Current Outpatient Prescriptions   Medication Sig Dispense Refill   • atenolol (TENORMIN) 50 MG tablet Take 1 tablet by mouth 2 (Two) Times a Day. 60 tablet 1   • gabapentin (NEURONTIN) 800 MG " "tablet Take 1 tablet by mouth 4 (Four) Times a Day As Needed (anxiety). 120 tablet 1   • simvastatin (ZOCOR) 20 MG tablet Take 20 mg by mouth daily.     • venlafaxine XR (EFFEXOR-XR) 75 MG 24 hr capsule Take 3 capsules by mouth Daily. 90 capsule 1   • clonazePAM (KLONOPIN) 0.5 MG tablet Take 1 tablet by mouth Daily. 30 tablet 1     No current facility-administered medications for this visit.        Mental Status Exam:   Appearance: Neatly, casually dressed, good hygeine.   Cooperation: Cooperative  Orientation: Ox4  Gait and station stable.   Psychomotor: No psychomotor agitation/retardation, No EPS, No motor tics  Speech: normal rate, amount.  Mood \"stressed\"   Affect: congruent/appropriate.  Thought Content: goal directed, no delusional material present  Thought process: linear, organized.  Suicidality: No SI  Homicidality: No HI  Perception: No AH/VH. No overt psychosis  Memory is intact for recent and remote events  Concentration: good  Impulse control: good  Insight: fair   Judgement: fair  Language: Intact  No looseness of associations  Fund of knowledge above average    Assessment/Plan   Problems Addressed this Visit     None      Visit Diagnoses     Major depressive disorder, recurrent episode, moderate    -  Primary    Relevant Medications    venlafaxine XR (EFFEXOR-XR) 75 MG 24 hr capsule    Generalized anxiety disorder        Relevant Medications    venlafaxine XR (EFFEXOR-XR) 75 MG 24 hr capsule    Panic disorder without agoraphobia        Relevant Medications    venlafaxine XR (EFFEXOR-XR) 75 MG 24 hr capsule          · Increase gabapentin up to 800 mg QID for severe anxiety.   · Increase Effexor XR up to 225mg daily for depression and anxiety.   · Replace Ativan with clonazepam 0.5 mg daily for severe anxiety.  We chose clonazepam because of its longer half-life.   · Continue atenolol 50 mg twice daily for blood pressure and anxiety.  · Continue psychotherapy with Izaiah Tinoco.     We discussed risks, " benefits, and side effects of the above medication and the patient was agreeable with the plan.    Return in about 3 weeks (around 3/30/2017).  he already has an appointment on file for next week, 3/9/17 at 3 PM  The patient was instructed to call clinic as needed or go to ER if in crisis.

## 2017-03-14 ENCOUNTER — OFFICE VISIT (OUTPATIENT)
Dept: PSYCHIATRY | Facility: CLINIC | Age: 47
End: 2017-03-14

## 2017-03-14 DIAGNOSIS — F41.1 GENERALIZED ANXIETY DISORDER: ICD-10-CM

## 2017-03-14 DIAGNOSIS — F41.0 PANIC DISORDER: ICD-10-CM

## 2017-03-14 DIAGNOSIS — F33.1 MAJOR DEPRESSIVE DISORDER, RECURRENT EPISODE, MODERATE (HCC): Primary | ICD-10-CM

## 2017-03-14 PROCEDURE — 90832 PSYTX W PT 30 MINUTES: CPT | Performed by: SOCIAL WORKER

## 2017-03-14 NOTE — PROGRESS NOTES
"Amish Win : 1970    MULTIDISCIPLINARY PROGRESS NOTE    Date of Service: 3/14/2017   Time In: 4:50 PM  Time Out: 5:20 PM    DATA: Patient met 1:1 with Izaiah Tinoco LCSW, LCADC for impromptu individual outpatient psychotherapy session at the [facility] at the request of the patient.  Patient reports he has been struggling more as of late and states he feels his symptoms have increased since his most recent appointment with Dr. Meléndez and his medication adjustment.  The patient states \"I feel like I'm at the end of my rope\".  The patient states he has been considering requesting inpatient treatment for approximately 2-1/2 weeks and requests the undersigned consult with colleagues concerning this matter.    HPI: Patient reports symptoms including feeling extremely overwhelmed, loss of control, increased heart rate, deficiency in immediate and short-term memory, fractured thought process, anergia, decreased motivation, difficulty concentrating, decreased appetite, and a sense of impending doom.  The patient reports periods where he loses focus and states [place].  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  The patient reports he feels he simply cannot longer cope.  He rates current symptoms at 9/10 on a scale of 1-10 with 10 being most severe.  He reports he continues to adhere to medication regimen as prescribed with no side effects.  However, he states he doesn't feel the change from Ativan to Klonopin has been effective.  Patient adamantly and convincingly denies current suicidal or homicidal ideation or perceptual disturbance.  However, he does report he has been nervously hitting and picking at himself and shows the undersigned a bruise on his left chest that appears to be at least a couple of days old.  Patient denies any illicit substance " use.    CLNICAL MANEUVERING/INTERVENTION:  Allowed the patient to discuss/vent his frustration with his ongoing difficulties and validated his feelings.  Encouraged the patient to consider some of his difficulties could be as a result of his medication change and encouraged him to continue to take as prescribed.  Assisted the patient in identifying and verbalizing coping skills he has found to be effective in reducing the severity and frequency of symptoms including positive self talk, relaxation techniques including deep breathing exercises, and reminding himself these feelings will pass.  The undersigned informed the patient that Dr. Meléndez is out for the week and agreed to consult with other colleagues concerning the patient's issues and available options.  Provided positive feedback and unconditional positive regard in a safe, supportive environment.    Allowed patient to freely discuss issues without interruption or judgment. Provided safe, confidential environment to facilitate the development of positive therapeutic relationship and encourage open, honest communication. Assisted patient in identifying risk factors which would indicate the need for higher level of care including thoughts to harm self or others and/or self-harming behavior and encouraged patient to contact this office, call 911, or present to the nearest emergency room should any of these events occur. Discussed crisis intervention services and means to access.      ASSESSMENT: Patient presents for session on time, clean and casually dressed with depressed  mood and sad/blunted affect. No evidence of intoxication, withdrawal, or perceptual disturbance. Associations intact, abstraction intact. Thought process is linear and logical.  thought content normal.  Patient appears to present with psychomotor retardation as he moves slowly.  Speech is clear and coherent. Patient is oriented to person, place, and time. Attention and concentration fair.  Insight and judgment fair. Patient reports no current suicidal or homicidal ideation. Patient appears cooperative and agreeable to treatment and appears to continue to develop rapport. Patient does not appear to be malingering.  Patient appears to be struggling with significantly increased symptoms which continue to cause significant impairment in important areas of functioning.  There is no evidence of imminent danger.  However, the patient would likely be at significantly increased risk for decompensation without ongoing treatment.    PLAN: The undersigned will attempt to contact Dr. Meléndez or will consult with other colleagues concerning the possibility of medication adjustment or inpatient treatment.  Patient will continue in biweekly to monthly individual outpatient psychotherapy sessions and pharmacotherapy as scheduled at the [facility].  Patient will adhere to medication regimen as prescribed and report any side effects. Patient will contact this office, call 911 or present to the nearest emergency room should suicidal ideations occur. Provide Cognitive Behavioral Therapy and Solution Focused Therapy to improve functioning, maintain stability, and avoid decompensation and the need for higher level of care.     Izaiah Tinoco, FAUSTO, [facility]

## 2017-03-15 ENCOUNTER — OFFICE VISIT (OUTPATIENT)
Dept: PSYCHIATRY | Facility: CLINIC | Age: 47
End: 2017-03-15

## 2017-03-15 VITALS
BODY MASS INDEX: 30.38 KG/M2 | HEIGHT: 71 IN | DIASTOLIC BLOOD PRESSURE: 79 MMHG | WEIGHT: 217 LBS | SYSTOLIC BLOOD PRESSURE: 122 MMHG | HEART RATE: 67 BPM

## 2017-03-15 DIAGNOSIS — F33.1 MODERATE EPISODE OF RECURRENT MAJOR DEPRESSIVE DISORDER (HCC): Primary | ICD-10-CM

## 2017-03-15 DIAGNOSIS — F41.1 GENERALIZED ANXIETY DISORDER: ICD-10-CM

## 2017-03-15 PROCEDURE — 99215 OFFICE O/P EST HI 40 MIN: CPT | Performed by: PSYCHIATRY & NEUROLOGY

## 2017-03-15 RX ORDER — GABAPENTIN 800 MG/1
400 TABLET ORAL 4 TIMES DAILY PRN
Qty: 120 TABLET | Refills: 1
Start: 2017-03-15 | End: 2017-05-24

## 2017-03-15 RX ORDER — CLONAZEPAM 0.5 MG/1
1 TABLET ORAL DAILY
Qty: 30 TABLET | Refills: 1
Start: 2017-03-15 | End: 2017-03-30

## 2017-03-15 RX ORDER — MIRTAZAPINE 15 MG/1
15 TABLET, FILM COATED ORAL NIGHTLY
Qty: 30 TABLET | Refills: 0 | Status: SHIPPED | OUTPATIENT
Start: 2017-03-15 | End: 2017-03-30 | Stop reason: SDUPTHER

## 2017-03-15 NOTE — PROGRESS NOTES
"  CC: depression    HX: Amish was seen today as an emergency appointment.  The patient also asked for an emergency department visit with his therapist yesterday.  He is usually seen by Dr. Meléndez who is sick and his therapist Izaiah Tinoco requested that he be seen for possible admission to the hospital.  The patient is complaining of being extremely depressed, decreased sleep, poor concentration, low energy, decreased appetite with a 10-15 pound weight loss.  In terms of sleep he is getting approximately 3-4 hours of continuous sleep.  This has been worsening since December and over the last week, the patient describes being \"at the end of my rope.\"  When asked about suicidal thoughts he said \"I have too much to live for.\"  He denied suicidal or homicidal thoughts and denies a history of suicide attempts.  He has also been extremely anxious, having panic-like symptoms.  He is dealing with a very stressful job as a , working the fourth p.m. to 11 PM shift which usually results in getting home by 2 AM, marital strife, and a severely autistic son.  I have treated his son Gianfranco in the past and he is required multiple hospitalizations lately in and out of the state.  They are now looking to send him to a residential hospital in [place].     The patient has had several recent medication changes and has had varying responses.  He was changed from Ativan 1 mg daily to Klonopin 0.5 mg daily after which he's experienced a slight increase in anxiety.  His Neurontin was increased from 600 mg 4 times a day to 800 mg 4 times a day.  Atenolol was also increased to 50 mg twice a day.  Earlier this year Effexor was increased to 225 mg.  He has seen an improvement in his blood pressure; however otherwise is seeing little improvement in depression and anxiety.  He has also noticed that he is having more difficulty with short-term memory and concentration.  After finishing the shift last week, while [place] " "[place] lost focus and skidded to the point of being perpendicular to the flow of traffic.  Luckily, no other cars were on the road.  The patient has also had an increase of repeatedly rubbing himself in the epigastric area, at times even hitting himself repeatedly in this area as well as the left clavicular area.  He says this is to the point of leaving bruises.  He felt that this was a self soothing technique.      He had a prior history of alcohol use until age 26; however he denies any current alcohol or other substance abuse.    I reviewed the patient's therapy note from yesterday as well as each of Dr. Meléndez's notes from December.  I have reviewed pt's allergies and current medications.     Review of Systems   Constitutional: Positive for appetite change and fatigue.   HENT: Negative.    Eyes: Negative.    Respiratory: Negative.    Cardiovascular: Negative.    Gastrointestinal: Negative.    Musculoskeletal: Negative.    Neurological: Negative.      Visit Vitals   • /79   • Pulse 67   • Ht 71\" (180.3 cm)   • Wt 217 lb (98.4 kg)   • BMI 30.27 kg/m2       EXAM: Casually dressed, good hygeine. Gait and station stable. Slightly restless. No motor tics Speech is normal rate, amount. Associations intact. Mood \"overwhelmed\" affect dysphoric. TC-goal directed.  Denies SI/HI/VH/AH. TP-linear.  Attention and concentration are fair. Memory is intact for recent and remote events. Insight-fair, judgement-fair      Encounter Diagnoses   Name Primary?   • Generalized anxiety disorder    • Moderate episode of recurrent major depressive disorder Yes       Current Outpatient Prescriptions   Medication Sig Dispense Refill   • atenolol (TENORMIN) 50 MG tablet Take 1 tablet by mouth 2 (Two) Times a Day. 60 tablet 1   • clonazePAM (KLONOPIN) 0.5 MG tablet Take 2 tablets by mouth Daily. 30 tablet 1   • gabapentin (NEURONTIN) 800 MG tablet Take 0.5 tablets by mouth 4 (Four) Times a Day As Needed " (anxiety). 120 tablet 1   • simvastatin (ZOCOR) 20 MG tablet Take 20 mg by mouth daily.     • venlafaxine XR (EFFEXOR-XR) 75 MG 24 hr capsule Take 3 capsules by mouth Daily. 90 capsule 1   • mirtazapine (REMERON) 15 MG tablet Take 1 tablet by mouth Every Night. 30 tablet 0     No current facility-administered medications for this visit.      Risk assessment:  Protective factors include no history of suicide attempts, no perceptual disturbances or psychosis, no current suicidal thoughts, employment, , no chronic pain syndrome, future oriented  Risk factors include worsening depression and anxiety, stress with severely impaired child and family conflict, male gender    Plan:  1.  We discussed the possibility of hospitalization; however at this point he does not meet criteria for acute inpatient psychiatric treatment.  We discussed a referral to the local crisis stabilization unit.  He plans on returning to work this afternoon and will discuss with his employer about rearranging the schedule so that he can try to get into the crisis stabilization unit tomorrow.  I have notified our staff to contact the [facility] and make this referral.  We also discussed that if symptoms worsen or he begins having suicidal thoughts to immediately come to the emergency department or call 911.  2.  Begin Remeron 15 mg nightly for sleep.   3.  Increase Klonopin to 1 mg daily.  I reviewed the possibility of increased sedation be cautious before driving or operating machinery  4.  Decrease Neurontin to 400 mg 4 times a day.  I discussed that this may be the culprit for his short-term memory loss as well as may be worsening fatigue  5.  Continue atenolol and Effexor XR at current dose  6.  Follow-up with Dr. Meléndez early next week if possible and follow up with Izaiah Tinoco as scheduled      The risks, benefits, and treatment alternatives were discussed with the patient.     TIME IN:3:35P  TIME OUT:4:23PM

## 2017-03-16 ENCOUNTER — DOCUMENTATION (OUTPATIENT)
Dept: PSYCHIATRY | Facility: HOSPITAL | Age: 47
End: 2017-03-16

## 2017-03-16 NOTE — PROGRESS NOTES
Therapist assisted Dr Blackmon with completing a referral to [facility] on this date. Therapist completed the referral and contacted [facility] who reports that they have availability and will accept him. Therapist phoned the Patient and he reports that he is unable to go at this time, that he is with his autistic son who doesn't have school on this date. Therapist encouraged him to talk with his wife and make an effort to go tomorrow if possible, he was agreeable. Therapist phoned [facility] who reports that they can accept him tomorrow. The Patient plans to phone the Clinic this evening and talk with Gracie to let us know if he will be able to go to [facility] tomorrow.

## 2017-03-20 ENCOUNTER — TELEPHONE (OUTPATIENT)
Dept: PSYCHIATRY | Facility: CLINIC | Age: 47
End: 2017-03-20

## 2017-03-20 NOTE — TELEPHONE ENCOUNTER
Amish has called back stating that his wife does not want him going to [facility] but he is not doing good  so he has decided that he wants to come on in here and be admitted to the [facility].

## 2017-03-21 ENCOUNTER — TELEPHONE (OUTPATIENT)
Dept: PSYCHIATRY | Facility: CLINIC | Age: 47
End: 2017-03-21

## 2017-03-21 ENCOUNTER — DOCUMENTATION (OUTPATIENT)
Dept: PSYCHIATRY | Facility: CLINIC | Age: 47
End: 2017-03-21

## 2017-03-21 NOTE — TELEPHONE ENCOUNTER
Pt called stating that he is not doing well and would like to speak with you. [phone]  See previous messages.

## 2017-03-23 ENCOUNTER — HOSPITAL ENCOUNTER (EMERGENCY)
Facility: HOSPITAL | Age: 47
Discharge: HOME | End: 2017-03-23
Attending: EMERGENCY MEDICINE | Admitting: EMERGENCY MEDICINE

## 2017-03-23 ENCOUNTER — OFFICE VISIT (OUTPATIENT)
Dept: FAMILY MEDICINE CLINIC | Facility: CLINIC | Age: 47
End: 2017-03-23

## 2017-03-23 ENCOUNTER — APPOINTMENT (OUTPATIENT)
Dept: GENERAL RADIOLOGY | Facility: HOSPITAL | Age: 47
End: 2017-03-23

## 2017-03-23 VITALS
WEIGHT: 211 LBS | RESPIRATION RATE: 16 BRPM | BODY MASS INDEX: 29.54 KG/M2 | OXYGEN SATURATION: 99 % | DIASTOLIC BLOOD PRESSURE: 89 MMHG | HEART RATE: 64 BPM | HEIGHT: 71 IN | TEMPERATURE: 97.4 F | SYSTOLIC BLOOD PRESSURE: 143 MMHG

## 2017-03-23 VITALS
DIASTOLIC BLOOD PRESSURE: 89 MMHG | SYSTOLIC BLOOD PRESSURE: 137 MMHG | WEIGHT: 211 LBS | OXYGEN SATURATION: 99 % | BODY MASS INDEX: 30.21 KG/M2 | HEIGHT: 70 IN | HEART RATE: 70 BPM

## 2017-03-23 DIAGNOSIS — K21.9 GASTROESOPHAGEAL REFLUX DISEASE WITHOUT ESOPHAGITIS: ICD-10-CM

## 2017-03-23 DIAGNOSIS — I10 ESSENTIAL HYPERTENSION: ICD-10-CM

## 2017-03-23 DIAGNOSIS — E78.2 MIXED HYPERLIPIDEMIA: ICD-10-CM

## 2017-03-23 DIAGNOSIS — F32.A DEPRESSION, UNSPECIFIED DEPRESSION TYPE: ICD-10-CM

## 2017-03-23 DIAGNOSIS — R94.31 ABNORMAL EKG: Primary | ICD-10-CM

## 2017-03-23 DIAGNOSIS — F41.9 ANXIETY: ICD-10-CM

## 2017-03-23 DIAGNOSIS — R00.2 PALPITATIONS: Primary | ICD-10-CM

## 2017-03-23 DIAGNOSIS — F41.8 DEPRESSION WITH ANXIETY: ICD-10-CM

## 2017-03-23 LAB
A-A DO2: 6.7 MMHG (ref 0–300)
ALBUMIN SERPL-MCNC: 4.5 G/DL (ref 3.5–5)
ALBUMIN/GLOB SERPL: 1.3 G/DL (ref 1.5–2.5)
ALP SERPL-CCNC: 90 U/L (ref 40–129)
ALT SERPL W P-5'-P-CCNC: 23 U/L (ref 10–44)
ANION GAP SERPL CALCULATED.3IONS-SCNC: 7.9 MMOL/L (ref 3.6–11.2)
ARTERIAL PATENCY WRIST A: ABNORMAL
AST SERPL-CCNC: 27 U/L (ref 10–34)
ATMOSPHERIC PRESS: 735 MMHG
BASE EXCESS BLDA CALC-SCNC: 2 MMOL/L
BASOPHILS # BLD AUTO: 0.01 10*3/MM3 (ref 0–0.3)
BASOPHILS NFR BLD AUTO: 0.1 % (ref 0–2)
BDY SITE: ABNORMAL
BILIRUB SERPL-MCNC: 1.8 MG/DL (ref 0.2–1.8)
BODY TEMPERATURE: 98.6 C
BUN BLD-MCNC: 6 MG/DL (ref 7–21)
BUN/CREAT SERPL: 7.3 (ref 7–25)
CALCIUM SPEC-SCNC: 9.5 MG/DL (ref 7.7–10)
CHLORIDE SERPL-SCNC: 99 MMOL/L (ref 99–112)
CO2 SERPL-SCNC: 29.1 MMOL/L (ref 24.3–31.9)
COHGB MFR BLD: 1.3 % (ref 0–5)
CREAT BLD-MCNC: 0.82 MG/DL (ref 0.43–1.29)
DEPRECATED RDW RBC AUTO: 36.4 FL (ref 37–54)
EOSINOPHIL # BLD AUTO: 0.15 10*3/MM3 (ref 0–0.7)
EOSINOPHIL NFR BLD AUTO: 1.6 % (ref 0–5)
ERYTHROCYTE [DISTWIDTH] IN BLOOD BY AUTOMATED COUNT: 12.2 % (ref 11.5–14.5)
GFR SERPL CREATININE-BSD FRML MDRD: 100 ML/MIN/1.73
GLOBULIN UR ELPH-MCNC: 3.4 GM/DL
GLUCOSE BLD-MCNC: 107 MG/DL (ref 70–110)
HCO3 BLDA-SCNC: 25.5 MMOL/L (ref 22–26)
HCT VFR BLD AUTO: 42.5 % (ref 42–52)
HCT VFR BLD CALC: 46 % (ref 42–52)
HGB BLD-MCNC: 14.5 G/DL (ref 14–18)
HGB BLDA-MCNC: 15.6 G/DL (ref 12–16)
HOROWITZ INDEX BLD+IHG-RTO: 21 %
IMM GRANULOCYTES # BLD: 0.02 10*3/MM3 (ref 0–0.03)
IMM GRANULOCYTES NFR BLD: 0.2 % (ref 0–0.5)
LYMPHOCYTES # BLD AUTO: 1.23 10*3/MM3 (ref 1–3)
LYMPHOCYTES NFR BLD AUTO: 13.1 % (ref 21–51)
MCH RBC QN AUTO: 28.7 PG (ref 27–33)
MCHC RBC AUTO-ENTMCNC: 34.1 G/DL (ref 33–37)
MCV RBC AUTO: 84 FL (ref 80–94)
METHGB BLD QL: 0.4 % (ref 0–3)
MODALITY: ABNORMAL
MONOCYTES # BLD AUTO: 0.91 10*3/MM3 (ref 0.1–0.9)
MONOCYTES NFR BLD AUTO: 9.7 % (ref 0–10)
NEUTROPHILS # BLD AUTO: 7.07 10*3/MM3 (ref 1.4–6.5)
NEUTROPHILS NFR BLD AUTO: 75.3 % (ref 30–70)
OSMOLALITY SERPL CALC.SUM OF ELEC: 270 MOSM/KG (ref 273–305)
OXYHGB MFR BLDV: 95.6 % (ref 85–100)
PCO2 BLDA: 36.3 MM HG (ref 35–45)
PH BLDA: 7.46 PH UNITS (ref 7.35–7.45)
PLATELET # BLD AUTO: 188 10*3/MM3 (ref 130–400)
PMV BLD AUTO: 9.1 FL (ref 6–10)
PO2 BLDA: 94.3 MM HG (ref 80–100)
POTASSIUM BLD-SCNC: 4.2 MMOL/L (ref 3.5–5.3)
PROT SERPL-MCNC: 7.9 G/DL (ref 6–8)
RBC # BLD AUTO: 5.06 10*6/MM3 (ref 4.7–6.1)
SAO2 % BLDCOA: 97.3 % (ref 90–100)
SODIUM BLD-SCNC: 136 MMOL/L (ref 135–153)
TROPONIN I SERPL-MCNC: <0.006 NG/ML
TROPONIN I SERPL-MCNC: <0.006 NG/ML
WBC NRBC COR # BLD: 9.39 10*3/MM3 (ref 4.5–12.5)

## 2017-03-23 PROCEDURE — 85025 COMPLETE CBC W/AUTO DIFF WBC: CPT | Performed by: EMERGENCY MEDICINE

## 2017-03-23 PROCEDURE — 93000 ELECTROCARDIOGRAM COMPLETE: CPT | Performed by: FAMILY MEDICINE

## 2017-03-23 PROCEDURE — 36600 WITHDRAWAL OF ARTERIAL BLOOD: CPT | Performed by: EMERGENCY MEDICINE

## 2017-03-23 PROCEDURE — 99205 OFFICE O/P NEW HI 60 MIN: CPT | Performed by: FAMILY MEDICINE

## 2017-03-23 PROCEDURE — 80053 COMPREHEN METABOLIC PANEL: CPT | Performed by: EMERGENCY MEDICINE

## 2017-03-23 PROCEDURE — 36415 COLL VENOUS BLD VENIPUNCTURE: CPT

## 2017-03-23 PROCEDURE — 93005 ELECTROCARDIOGRAM TRACING: CPT | Performed by: EMERGENCY MEDICINE

## 2017-03-23 PROCEDURE — 84484 ASSAY OF TROPONIN QUANT: CPT | Performed by: EMERGENCY MEDICINE

## 2017-03-23 PROCEDURE — 71010 XR CHEST 1 VW: CPT | Performed by: RADIOLOGY

## 2017-03-23 PROCEDURE — 93010 ELECTROCARDIOGRAM REPORT: CPT | Performed by: INTERNAL MEDICINE

## 2017-03-23 PROCEDURE — 71010 HC CHEST PA OR AP: CPT

## 2017-03-23 PROCEDURE — 82375 ASSAY CARBOXYHB QUANT: CPT | Performed by: EMERGENCY MEDICINE

## 2017-03-23 PROCEDURE — 83050 HGB METHEMOGLOBIN QUAN: CPT | Performed by: EMERGENCY MEDICINE

## 2017-03-23 PROCEDURE — 99284 EMERGENCY DEPT VISIT MOD MDM: CPT

## 2017-03-23 PROCEDURE — 82805 BLOOD GASES W/O2 SATURATION: CPT | Performed by: EMERGENCY MEDICINE

## 2017-03-23 RX ORDER — LANSOPRAZOLE 30 MG/1
30 CAPSULE, DELAYED RELEASE ORAL DAILY
COMMUNITY
Start: 2017-02-24 | End: 2017-03-30 | Stop reason: SDUPTHER

## 2017-03-23 RX ORDER — ASPIRIN 81 MG/1
81 TABLET ORAL DAILY
Status: ON HOLD | COMMUNITY
End: 2017-08-15

## 2017-03-23 RX ORDER — ASPIRIN 325 MG
325 TABLET ORAL ONCE
Status: DISCONTINUED | OUTPATIENT
Start: 2017-03-23 | End: 2017-04-12

## 2017-03-23 RX ORDER — SODIUM CHLORIDE 0.9 % (FLUSH) 0.9 %
10 SYRINGE (ML) INJECTION AS NEEDED
Status: DISCONTINUED | OUTPATIENT
Start: 2017-03-23 | End: 2017-03-23 | Stop reason: HOSPADM

## 2017-03-23 RX ORDER — ERGOCALCIFEROL 1.25 MG/1
50000 CAPSULE ORAL WEEKLY
COMMUNITY
End: 2018-06-12 | Stop reason: ALTCHOICE

## 2017-03-23 NOTE — NURSING NOTE
Discharge and follow up informations given. Verbalized understanding and plans to see if Dr Meléndez can see him this week. Pt returned all belongings at this time

## 2017-03-23 NOTE — NURSING NOTE
Intake assessment complete. Pt now stating he feels better and feels safe to go home. Would like to follow up with Dr phelps. Presented clinicals to Dr Sifuentes, new orders to discharge patient and follow up outpatient.

## 2017-03-23 NOTE — ED PROVIDER NOTES
Subjective   History of Present Illness  46-year-old white male sent from [facility] primary care clinic for abnormal EKG.  The patient presented there today after having palpitations last night.  He has a history of severe anxiety and has been working with the psychiatrist on this.  He thinks the symptoms may have been due to panic attack.  He denies any previous cardiac history.  He denied any shortness of breath, nausea, or radiation of the pain.  He states that he did feel hot, but did not have diaphoresis.  He also complains of depression, but is equivocal on any current suicidal ideation.  States she may need to be in the [facility].  Review of Systems   All other systems reviewed and are negative.      Past Medical History:   Diagnosis Date   • Anxiety    • Depression    • GERD (gastroesophageal reflux disease)    • Hyperlipidemia    • Hypertension    • Hypertension    • Low back pain        Allergies   Allergen Reactions   • Penicillins        Past Surgical History:   Procedure Laterality Date   • CLAVICLE SURGERY         Family History   Problem Relation Age of Onset   • Depression Father    • Anxiety disorder Father    • Alcohol abuse Father    • No Known Problems Daughter    • No Known Problems Son        Social History     Social History   • Marital status:      Spouse name: N/A   • Number of children: N/A   • Years of education: N/A     Social History Main Topics   • Smoking status: Never Smoker   • Smokeless tobacco: Never Used   • Alcohol use No   • Drug use: No   • Sexual activity: Not Asked     Other Topics Concern   • None     Social History Narrative           Objective   Physical Exam   Constitutional: He is oriented to person, place, and time. He appears well-developed and well-nourished. No distress.   HENT:   Head: Normocephalic and atraumatic.   Cardiovascular: Normal rate, regular rhythm and normal heart sounds.  Exam reveals no gallop and no friction rub.    No murmur  heard.  Pulmonary/Chest: Effort normal and breath sounds normal. No respiratory distress. He has no wheezes. He has no rales.   Abdominal: Soft. Bowel sounds are normal. He exhibits no distension. There is no tenderness.   Musculoskeletal: Normal range of motion.   Neurological: He is alert and oriented to person, place, and time.   Skin: Skin is warm and dry.   Psychiatric: He has a normal mood and affect.   Vitals reviewed.      Procedures         ED Course  ED Course   Comment By Time   No chest pain at this time.  He does state that he is anxious.  We will plan [facility] intake evaluation. Jhoan Gay MD 03/23 4172   Has been seen by [facility] intake nurse.  Per psychiatrist, patient can have further treatment as outpatient. Jhoan Gay MD 03/23 6334            HEART Score  History: Slightly suspicious (+0)  ECG: Normal (+0)  Age: Less than 45 (+0)  Risk Factors: 1 - 2 risk factors (+1)  Troponin: Normal limit or lower (+0)  Total: 1         MDM  Number of Diagnoses or Management Options  Anxiety:   Depression, unspecified depression type:   Palpitations:      Amount and/or Complexity of Data Reviewed  Clinical lab tests: reviewed and ordered  Tests in the radiology section of CPT®: reviewed and ordered    Risk of Complications, Morbidity, and/or Mortality  Presenting problems: high  Diagnostic procedures: high  Management options: high        Final diagnoses:   Palpitations   Anxiety   Depression, unspecified depression type            Jhoan Gay MD  03/23/17 1136

## 2017-03-24 ENCOUNTER — TELEPHONE (OUTPATIENT)
Dept: FAMILY MEDICINE CLINIC | Facility: CLINIC | Age: 47
End: 2017-03-24

## 2017-03-24 ENCOUNTER — TELEPHONE (OUTPATIENT)
Dept: PSYCHIATRY | Facility: CLINIC | Age: 47
End: 2017-03-24

## 2017-03-24 RX ORDER — CETIRIZINE HYDROCHLORIDE 10 MG/1
10 TABLET ORAL DAILY
Qty: 14 TABLET | Refills: 0 | Status: ON HOLD | OUTPATIENT
Start: 2017-03-24 | End: 2017-08-15

## 2017-03-24 RX ORDER — DOXYCYCLINE HYCLATE 100 MG/1
100 TABLET, DELAYED RELEASE ORAL 2 TIMES DAILY
Qty: 14 TABLET | Refills: 0 | Status: SHIPPED | OUTPATIENT
Start: 2017-03-24 | End: 2017-04-12

## 2017-03-24 NOTE — TELEPHONE ENCOUNTER
Can you call? Any fever or chills? Any rhinorrhea or cough? What color? I may just send in some flonase and/or antihistamine if it sounds viral. Thanks.Let him know I am glad he is feeling better and it was just anxiety. You can never be too sure, but the labs are negative.

## 2017-03-24 NOTE — TELEPHONE ENCOUNTER
Let him know we usually don't do antibiotics over the phone, but since I saw him yesterday, it's ok. Doxycycline 100 mg orally BID x 7 days along with zyrtec daily x 14 days. I already placed in the orders. Just let him know. tim.

## 2017-03-24 NOTE — TELEPHONE ENCOUNTER
Patient called wanting Dr. Cooper and staff to know he appreciated his care yesterday.  He indicated all tests were normal at the hospital and he has returned home and is resting comfortably.  He has decided to take a week off work.    In the meantime, he states he meant to mention to Dr Cooper he has had some sinus congestion and would like something called in to [facility] in [place] for infection.

## 2017-03-24 NOTE — TELEPHONE ENCOUNTER
No fever or chills just a lot of head congestion with cough, he stated that the color was green and yellow.

## 2017-03-24 NOTE — TELEPHONE ENCOUNTER
Amish calls as he was in the ER last night and they wanted him to get in to see you sooner then his appointment next Thursday. There is no place to add him. They thought he needs med increase or change, He went in thinking he was having a heart attack. Please advise.

## 2017-03-27 ENCOUNTER — TELEPHONE (OUTPATIENT)
Dept: FAMILY MEDICINE CLINIC | Facility: CLINIC | Age: 47
End: 2017-03-27

## 2017-03-27 RX ORDER — BUSPIRONE HYDROCHLORIDE 10 MG/1
10 TABLET ORAL 2 TIMES DAILY
Qty: 60 TABLET | Refills: 2 | Status: SHIPPED | OUTPATIENT
Start: 2017-03-27 | End: 2017-04-12 | Stop reason: SDUPTHER

## 2017-03-29 ENCOUNTER — TELEPHONE (OUTPATIENT)
Dept: FAMILY MEDICINE CLINIC | Facility: CLINIC | Age: 47
End: 2017-03-29

## 2017-03-29 DIAGNOSIS — R00.2 PALPITATIONS: Primary | ICD-10-CM

## 2017-03-29 LAB
ALBUMIN SERPL-MCNC: 4.8 G/DL (ref 3.5–5)
ALBUMIN/GLOB SERPL: 1.5 G/DL (ref 1.5–2.5)
ALP SERPL-CCNC: 86 U/L (ref 40–129)
ALT SERPL W P-5'-P-CCNC: 35 U/L (ref 10–44)
ANION GAP SERPL CALCULATED.3IONS-SCNC: 6.6 MMOL/L (ref 3.6–11.2)
AST SERPL-CCNC: 31 U/L (ref 10–34)
BASOPHILS # BLD AUTO: 0.02 10*3/MM3 (ref 0–0.3)
BASOPHILS NFR BLD AUTO: 0.3 % (ref 0–2)
BILIRUB SERPL-MCNC: 0.9 MG/DL (ref 0.2–1.8)
BUN BLD-MCNC: 5 MG/DL (ref 7–21)
BUN/CREAT SERPL: 6.6 (ref 7–25)
CALCIUM SPEC-SCNC: 9.7 MG/DL (ref 7.7–10)
CHLORIDE SERPL-SCNC: 103 MMOL/L (ref 99–112)
CO2 SERPL-SCNC: 32.4 MMOL/L (ref 24.3–31.9)
CREAT BLD-MCNC: 0.76 MG/DL (ref 0.43–1.29)
DEPRECATED RDW RBC AUTO: 36.4 FL (ref 37–54)
EOSINOPHIL # BLD AUTO: 0.23 10*3/MM3 (ref 0–0.7)
EOSINOPHIL NFR BLD AUTO: 3.8 % (ref 0–5)
ERYTHROCYTE [DISTWIDTH] IN BLOOD BY AUTOMATED COUNT: 12 % (ref 11.5–14.5)
GFR SERPL CREATININE-BSD FRML MDRD: 109 ML/MIN/1.73
GLOBULIN UR ELPH-MCNC: 3.3 GM/DL
GLUCOSE BLD-MCNC: 101 MG/DL (ref 70–110)
HCT VFR BLD AUTO: 44.1 % (ref 42–52)
HGB BLD-MCNC: 14.9 G/DL (ref 14–18)
IMM GRANULOCYTES # BLD: 0.01 10*3/MM3 (ref 0–0.03)
IMM GRANULOCYTES NFR BLD: 0.2 % (ref 0–0.5)
LYMPHOCYTES # BLD AUTO: 1.13 10*3/MM3 (ref 1–3)
LYMPHOCYTES NFR BLD AUTO: 18.9 % (ref 21–51)
MAGNESIUM SERPL-MCNC: 2.2 MG/DL (ref 1.7–2.6)
MCH RBC QN AUTO: 28.3 PG (ref 27–33)
MCHC RBC AUTO-ENTMCNC: 33.8 G/DL (ref 33–37)
MCV RBC AUTO: 83.8 FL (ref 80–94)
MONOCYTES # BLD AUTO: 0.38 10*3/MM3 (ref 0.1–0.9)
MONOCYTES NFR BLD AUTO: 6.3 % (ref 0–10)
NEUTROPHILS # BLD AUTO: 4.22 10*3/MM3 (ref 1.4–6.5)
NEUTROPHILS NFR BLD AUTO: 70.5 % (ref 30–70)
OSMOLALITY SERPL CALC.SUM OF ELEC: 280.5 MOSM/KG (ref 273–305)
PLATELET # BLD AUTO: 276 10*3/MM3 (ref 130–400)
PMV BLD AUTO: 9.2 FL (ref 6–10)
POTASSIUM BLD-SCNC: 4.4 MMOL/L (ref 3.5–5.3)
PROT SERPL-MCNC: 8.1 G/DL (ref 6–8)
RBC # BLD AUTO: 5.26 10*6/MM3 (ref 4.7–6.1)
SODIUM BLD-SCNC: 142 MMOL/L (ref 135–153)
T4 FREE SERPL-MCNC: 1.18 NG/DL (ref 0.89–1.76)
TSH SERPL DL<=0.05 MIU/L-ACNC: 1.29 MIU/ML (ref 0.55–4.78)
WBC NRBC COR # BLD: 5.99 10*3/MM3 (ref 4.5–12.5)

## 2017-03-29 PROCEDURE — 36415 COLL VENOUS BLD VENIPUNCTURE: CPT | Performed by: FAMILY MEDICINE

## 2017-03-29 PROCEDURE — 85025 COMPLETE CBC W/AUTO DIFF WBC: CPT | Performed by: FAMILY MEDICINE

## 2017-03-29 PROCEDURE — 84439 ASSAY OF FREE THYROXINE: CPT | Performed by: FAMILY MEDICINE

## 2017-03-29 PROCEDURE — 84443 ASSAY THYROID STIM HORMONE: CPT | Performed by: FAMILY MEDICINE

## 2017-03-29 PROCEDURE — 83735 ASSAY OF MAGNESIUM: CPT | Performed by: FAMILY MEDICINE

## 2017-03-29 PROCEDURE — 80053 COMPREHEN METABOLIC PANEL: CPT | Performed by: FAMILY MEDICINE

## 2017-03-29 NOTE — TELEPHONE ENCOUNTER
"Patient called reported he was having palpatations again more like he feels like it \"skips\" beats denies any chest pain or SOB states he was cleared at the hospital & told it was due to his anxiety & depression. Spoke with Dr. Cooper and relayed above info. She instructed me to inform him that she is going to refer to cardiology for a 30 day monitor & for him to come in for additional labs,also instructed him if palpatations increase or if he has any chest pain to go back to the ER immediately & he verbalized understanding.  "

## 2017-03-30 ENCOUNTER — TELEPHONE (OUTPATIENT)
Dept: FAMILY MEDICINE CLINIC | Facility: CLINIC | Age: 47
End: 2017-03-30

## 2017-03-30 ENCOUNTER — OFFICE VISIT (OUTPATIENT)
Dept: PSYCHIATRY | Facility: CLINIC | Age: 47
End: 2017-03-30

## 2017-03-30 VITALS
HEIGHT: 71 IN | HEART RATE: 73 BPM | BODY MASS INDEX: 28.98 KG/M2 | DIASTOLIC BLOOD PRESSURE: 89 MMHG | WEIGHT: 207 LBS | SYSTOLIC BLOOD PRESSURE: 129 MMHG

## 2017-03-30 DIAGNOSIS — F41.1 GENERALIZED ANXIETY DISORDER: Primary | ICD-10-CM

## 2017-03-30 DIAGNOSIS — F41.0 PANIC DISORDER: ICD-10-CM

## 2017-03-30 DIAGNOSIS — F33.1 MODERATE EPISODE OF RECURRENT MAJOR DEPRESSIVE DISORDER (HCC): ICD-10-CM

## 2017-03-30 PROCEDURE — 99214 OFFICE O/P EST MOD 30 MIN: CPT

## 2017-03-30 RX ORDER — MIRTAZAPINE 15 MG/1
15 TABLET, FILM COATED ORAL NIGHTLY
Qty: 30 TABLET | Refills: 0 | Status: SHIPPED | OUTPATIENT
Start: 2017-03-30 | End: 2017-04-20 | Stop reason: SDUPTHER

## 2017-03-30 RX ORDER — LORAZEPAM 2 MG/1
2 TABLET ORAL 2 TIMES DAILY
Qty: 60 TABLET | Refills: 0
Start: 2017-03-30 | End: 2017-05-24 | Stop reason: SDUPTHER

## 2017-03-30 RX ORDER — ESCITALOPRAM OXALATE 20 MG/1
20 TABLET ORAL DAILY
Qty: 30 TABLET | Refills: 2 | Status: SHIPPED | OUTPATIENT
Start: 2017-03-30 | End: 2017-05-24 | Stop reason: SDUPTHER

## 2017-03-30 RX ORDER — LANSOPRAZOLE 30 MG/1
30 CAPSULE, DELAYED RELEASE ORAL DAILY
Qty: 30 CAPSULE | Refills: 5 | Status: ON HOLD | OUTPATIENT
Start: 2017-03-30 | End: 2017-08-15

## 2017-03-30 NOTE — TELEPHONE ENCOUNTER
----- Message from Dayna Cooper MD sent at 3/29/2017  5:42 PM EDT -----  Please call and let him know labs are good. We will call him to schedule followup with cardiology. Thanks.      Spoke with patient & he verbalized understanding.

## 2017-03-30 NOTE — PROGRESS NOTES
Subjective   Amish Win is a 47 y.o. male who is here today for medication management follow up.    Chief Complaint:  Depression and anxiety    HPI:  History of Present Illness    He continues to feel depressed with almost intractable anxiety. Since his last visit here he had a visit to the emergency room where he thought he was having a heart attack.  Fortunately it turned out that he was not. He also had an emergent visit with Dr. Blackmon while I was out sick, who made the following changes: started Remeron 15mg Qhs, decreased gabapentin in half to 400 mg 4 times daily, and increased clonazepam to 1 mg daily.  He says the Remeron was very helpful for sleep and it makes him feel very relaxed in the evening and the relaxation actually lasts until the morning.  However it wears off midmorning.  He feels that the clonazepam is not effective at all for the nearly daily panic attacks so he stopped taking it and went back to the Ativan which he was taking before.  Dr. Blackmon had recommended that he go to [facility].  However the patient's wife would not agree to him going.  He went to the emergency room to be admitted to the [facility] but the on-call doctor would not admit him because he was not suicidal.  He called earlier this week asking for a medication change and I added BuSpar 10 mg twice daily which has not yet had time to take effect.      The following portions of the patient's history were reviewed and updated as appropriate: allergies, current medications, past family history, past medical history, past social history, past surgical history and problem list.    Review of Systems   Constitutional: Positive for fatigue. Negative for appetite change, chills, diaphoresis, fever and unexpected weight change.   HENT: Negative for hearing loss, sore throat, trouble swallowing and voice change.    Eyes: Negative for photophobia and visual disturbance.   Respiratory: Negative for cough, chest tightness and  "shortness of breath.    Cardiovascular: Negative for chest pain and palpitations.   Gastrointestinal: Positive for nausea. Negative for abdominal pain, constipation and vomiting.   Endocrine: Negative for cold intolerance and heat intolerance.   Genitourinary: Negative for dysuria and frequency.   Musculoskeletal: Negative for arthralgias, back pain, joint swelling and neck stiffness.   Skin: Negative for color change and wound.   Allergic/Immunologic: Negative for environmental allergies and immunocompromised state.   Neurological: Negative for dizziness, tremors, seizures, syncope, weakness, light-headedness and headaches.   Hematological: Negative for adenopathy. Does not bruise/bleed easily.       Objective   Physical Exam   Constitutional: He appears well-developed and well-nourished. No distress.   Neurological: He is alert. Coordination and gait normal.   Vitals reviewed.    Blood pressure 129/89, pulse 73, height 71\" (180.3 cm), weight 207 lb (93.9 kg).    Allergies   Allergen Reactions   • Penicillins        Current Medications:   Current Outpatient Prescriptions   Medication Sig Dispense Refill   • aspirin 81 MG EC tablet Take 81 mg by mouth Daily.     • atenolol (TENORMIN) 50 MG tablet Take 1 tablet by mouth 2 (Two) Times a Day. 60 tablet 1   • busPIRone (BUSPAR) 10 MG tablet Take 1 tablet by mouth 2 (Two) Times a Day. 60 tablet 2   • doxycycline (DORYX) 100 MG enteric coated tablet Take 1 tablet by mouth 2 (Two) Times a Day. 14 tablet 0   • gabapentin (NEURONTIN) 800 MG tablet Take 0.5 tablets by mouth 4 (Four) Times a Day As Needed (anxiety). 120 tablet 1   • lansoprazole (PREVACID) 30 MG capsule Take 1 capsule by mouth Daily. 30 capsule 5   • mirtazapine (REMERON) 15 MG tablet Take 1 tablet by mouth Every Night. 30 tablet 0   • Multiple Vitamin (MULTI VITAMIN PO) Take  by mouth.     • simvastatin (ZOCOR) 20 MG tablet Take 20 mg by mouth daily.     • vitamin D (ERGOCALCIFEROL) 66324 UNITS capsule " "capsule Take 50,000 Units by mouth 1 (One) Time Per Week.     • cetirizine (zyrTEC) 10 MG tablet Take 1 tablet by mouth Daily. 14 tablet 0     Current Facility-Administered Medications   Medication Dose Route Frequency Provider Last Rate Last Dose   • aspirin tablet 325 mg  325 mg Oral Once Dayna Cooper MD           Mental Status Exam:   Appearance: Neatly, casually dressed, good hygeine.   Cooperation: Cooperative  Orientation: Ox4  Gait and station stable.   Psychomotor: No psychomotor agitation/retardation, No EPS, No motor tics  Speech: normal rate, amount.  Mood \"stressed\"   Affect: congruent/appropriate.  Thought Content: goal directed, no delusional material present  Thought process: linear, organized.  Suicidality: No SI  Homicidality: No HI  Perception: No AH/VH. No overt psychosis  Memory is intact for recent and remote events  Concentration: good  Impulse control: good  Insight: fair   Judgement: fair  Language: Intact  No looseness of associations  Fund of knowledge above average    Assessment/Plan   Problems Addressed this Visit     None          · Continue gabapentin 400 mg QID for severe anxiety.   · Switch Effexor to Lexapro 20mg daily for depression and anxiety.   · Continue BuSpar 10 mg twice daily for anxiety.  · Continue Remeron 15 mg at bedtime for sleep.  · Increase Ativan to 2mg up to twice daily for severe anxiety.     · Continue atenolol 50 mg twice daily for blood pressure and anxiety.  · Continue psychotherapy with Izaiah Tinoco.     We discussed risks, benefits, and side effects of the above medication and the patient was agreeable with the plan.    Return in about 2 weeks (around 4/12/2017) for Next scheduled follow up.    The patient was instructed to call clinic as needed or go to ER if in crisis.           "

## 2017-03-31 ENCOUNTER — TELEPHONE (OUTPATIENT)
Dept: PSYCHIATRY | Facility: CLINIC | Age: 47
End: 2017-03-31

## 2017-04-04 ENCOUNTER — OFFICE VISIT (OUTPATIENT)
Dept: PSYCHIATRY | Facility: CLINIC | Age: 47
End: 2017-04-04

## 2017-04-04 DIAGNOSIS — F41.1 GENERALIZED ANXIETY DISORDER: Primary | ICD-10-CM

## 2017-04-04 DIAGNOSIS — F41.0 PANIC DISORDER: ICD-10-CM

## 2017-04-04 DIAGNOSIS — F33.1 MAJOR DEPRESSIVE DISORDER, RECURRENT EPISODE, MODERATE (HCC): ICD-10-CM

## 2017-04-04 PROCEDURE — 90832 PSYTX W PT 30 MINUTES: CPT | Performed by: SOCIAL WORKER

## 2017-04-04 NOTE — PROGRESS NOTES
AzpeterAmish fitch : 1970    MULTIDISCIPLINARY PROGRESS NOTE    Date of Service: 2017  Time In: 7:45 AM  Time Out: 8:20 AM     DATA: Patient met 1:1 with Izaiah Tinoco LCSW, LCADC for scheduled individual outpatient psychotherapy session at the [facility] for follow-up.  The patient reports he has been off work since his recent visit to the emergency room and states he is scheduled to return on 2017.  The patient reports things continue to be hectic at home and states his wife even became frustrated with his increased symptoms and ask him to leave the home this past Saturday.  He states he simply went out and drove around for some time and returned home and things and calm down.  He also reports they continue to struggle with their 15-year-old son who has autism and states no one told him happy birthday on 3/30/2019.     HPI: Patient reports he is feeling somewhat better since his most recent medication adjustment and states he has had decreased crying spells.  He reports he continues to struggle with feeling on edge, trembling, heart palpitations, nausea, sweating, decreased appetite, and a sense of impending doom.  Patient also reports weight loss of approximately 30 pounds over the past couple of months.  She rates current symptoms of anxiety at a 7 on a scale of 1-10 with 10 being most severe.  Patient reports he continues to struggle with depression as evidenced by depressed mood, difficulty concentrating, feelings of hopelessness and helplessness, anhedonia, decreased energy and motivation, low self-esteem, and decreased physical activity.  He rates current symptoms of depression at 6 on a scale of 1-10 with 10 being the most severe.  He reports he continues to adhere to medication regimen as prescribed with no side effects. Patient adamantly and convincingly denies current suicidal or homicidal ideation or perceptual disturbance.  Patient denies any illicit substance use including  alcohol.    CLNICAL MANEUVERING/INTERVENTION: Allowed the patient to discuss/vent his feelings, fears, and frustration with his marriage and the difficulty of caring for his son.  Validated the patient's feelings.  Also encourage the patient to consider it is perfectly normal to feel hurt by the fact no one in his household wished him a happy birthday.  Also encouraged the patient to consider no amount of treatment Will change the environment at his home and encouraged him to ask himself what would need to change for things to improve.  However, discussed things we can control and things we cannot control and encouraged the patient to focus on things he is able to have an impact on to improve his symptoms and outlook.  Assisted the patient in identifying and acknowledging coping skills he is found to be helpful in reducing the severity and frequency of symptoms including positive self talk, relaxation techniques, and finding enjoyable activities he can engage in regular basis.  Allow the patient to discuss/vent his level of anxiety concerning return to work and encouraged him to work on maintaining a positive attitude and remind himself he can only do as much as he can.  Also informed the patient of the possibility of FMLA if necessary.  Provide unconditional positive regard in a safe, supportive environment.    Allowed patient to freely discuss issues without interruption or judgment. Provided safe, confidential environment to facilitate the development of positive therapeutic relationship and encourage open, honest communication. Assisted patient in identifying risk factors which would indicate the need for higher level of care including thoughts to harm self or others and/or self-harming behavior and encouraged patient to contact this office, call 911, or present to the nearest emergency room should any of these events occur. Discussed crisis intervention services and means to access.      ASSESSMENT: Patient  presents for session on time, somewhat disheveled and unshaven with anxious/depressed mood and somewhat blunted affect. No evidence of intoxication, withdrawal, or perceptual disturbance. Associations intact, abstraction intact. Thought process is linear and logical.  Thought content normal.  Patient presents with moderate psychomotor retardation as he appears to be very tired with minimal movement..  Speech is clear and coherent. Patient is oriented to person, place, and time. Attention and concentration fair. Insight and judgment fair. Patient reports no current suicidal or homicidal ideation. Patient appears cooperative and agreeable to treatment and appears to continue to develop rapport. Patient does not appear to be malingering.   The patient continues to struggle with significant mood instability including anxiety and depression which continues to be exacerbated by the high levels of stress in his home environment and at work.  The patient's symptoms continue to cause significant impairment in important areas of functioning and he would likely be at increased risk for decompensation without continued treatment.    PLAN: Patient will continue in biweekly  individual outpatient psychotherapy sessions and pharmacotherapy as scheduled at the [facility].  Patient will adhere to medication regimen as prescribed and report any side effects. Patient will contact this office, call 911 or present to the nearest emergency room should suicidal ideations occur. Provide Cognitive Behavioral Therapy and Solution Focused Therapy to improve functioning, maintain stability, and avoid decompensation and the need for higher level of care.     Izaiah Tinoco LCSW, [facility]

## 2017-04-12 ENCOUNTER — OFFICE VISIT (OUTPATIENT)
Dept: PSYCHIATRY | Facility: CLINIC | Age: 47
End: 2017-04-12

## 2017-04-12 VITALS
HEIGHT: 71 IN | DIASTOLIC BLOOD PRESSURE: 87 MMHG | BODY MASS INDEX: 29.12 KG/M2 | SYSTOLIC BLOOD PRESSURE: 132 MMHG | HEART RATE: 69 BPM | WEIGHT: 208 LBS

## 2017-04-12 DIAGNOSIS — F33.1 MAJOR DEPRESSIVE DISORDER, RECURRENT EPISODE, MODERATE (HCC): ICD-10-CM

## 2017-04-12 DIAGNOSIS — F41.0 PANIC DISORDER: ICD-10-CM

## 2017-04-12 DIAGNOSIS — F41.1 GENERALIZED ANXIETY DISORDER: Primary | ICD-10-CM

## 2017-04-12 PROCEDURE — 99214 OFFICE O/P EST MOD 30 MIN: CPT

## 2017-04-12 PROCEDURE — 90833 PSYTX W PT W E/M 30 MIN: CPT

## 2017-04-12 RX ORDER — BUSPIRONE HYDROCHLORIDE 10 MG/1
10 TABLET ORAL 3 TIMES DAILY
Qty: 90 TABLET | Refills: 2 | Status: SHIPPED | OUTPATIENT
Start: 2017-04-12 | End: 2017-05-24 | Stop reason: SDUPTHER

## 2017-04-12 NOTE — PROGRESS NOTES
"Subjective   Amish Win is a 47 y.o. male who is here today for medication management follow up.    Chief Complaint:  Depression and anxiety    HPI:  History of Present Illness    Today he reports that he has very slight minimal improvement in his anxiety and depression \"maybe 1 or 2% improvement.\"  However, he continues to feel depressed with almost intractable anxiety.  He has his cardiologist appointment on Friday to rule out any problems with his heart.  It seems that right now they're leaning toward anxiety as the cause of his palpitations but we are awaiting results of a full workup.  At last visit we increased his Ativan temporarily up to 2 mg which she can take up to twice daily for anxiety, which has been slightly helpful although he had panic attacks 2 days in a row when he returned to work last week.  He is sleeping well on his current medications.  At last visit we switched over his Effexor to Lexapro, since he never gotten much benefit from the Effexor at all.  He has almost finished the cross taper, he has 2 more days on low-dose Effexor and is already on the full dose of Lexapro 20 mg.  He has had no problems or side effects with the cross taper.  He is noticing now that anxiety seems to be worse around suppertime, which would be about retirement between his morning and evening doses of Ativan.  Nausea is somewhat improved and he is eating better.  His weight is slowly increasing after he lost daily 20 pounds from feeling so depressed and anxious.    ** 25 Minutes spent on psychotherapy today above and beyond time spent on E/M service.** I attempted to use both supportive and CBT techniques to help him deal with his anxiety.      The following portions of the patient's history were reviewed and updated as appropriate: allergies, current medications, past family history, past medical history, past social history, past surgical history and problem list.    Review of Systems   Constitutional: Positive " "for fatigue. Negative for appetite change, chills, diaphoresis, fever and unexpected weight change.   HENT: Negative for hearing loss, sore throat, trouble swallowing and voice change.    Eyes: Negative for photophobia and visual disturbance.   Respiratory: Negative for cough, chest tightness and shortness of breath.    Cardiovascular: Negative for chest pain and palpitations.   Gastrointestinal: Positive for nausea. Negative for abdominal pain, constipation and vomiting.   Endocrine: Negative for cold intolerance and heat intolerance.   Genitourinary: Negative for dysuria and frequency.   Musculoskeletal: Negative for arthralgias, back pain, joint swelling and neck stiffness.   Skin: Negative for color change and wound.   Allergic/Immunologic: Negative for environmental allergies and immunocompromised state.   Neurological: Negative for dizziness, tremors, seizures, syncope, weakness, light-headedness and headaches.   Hematological: Negative for adenopathy. Does not bruise/bleed easily.       Objective   Physical Exam   Constitutional: He appears well-developed and well-nourished. No distress.   Neurological: He is alert. Coordination and gait normal.   Vitals reviewed.    Blood pressure 132/87, pulse 69, height 71\" (180.3 cm), weight 208 lb (94.3 kg).    Allergies   Allergen Reactions   • Penicillins        Current Medications:   Current Outpatient Prescriptions   Medication Sig Dispense Refill   • aspirin 81 MG EC tablet Take 81 mg by mouth Daily.     • atenolol (TENORMIN) 50 MG tablet Take 1 tablet by mouth 2 (Two) Times a Day. 60 tablet 1   • busPIRone (BUSPAR) 10 MG tablet Take 1 tablet by mouth 3 (Three) Times a Day. 90 tablet 2   • cetirizine (zyrTEC) 10 MG tablet Take 1 tablet by mouth Daily. 14 tablet 0   • escitalopram (LEXAPRO) 20 MG tablet Take 1 tablet by mouth Daily. 30 tablet 2   • gabapentin (NEURONTIN) 800 MG tablet Take 0.5 tablets by mouth 4 (Four) Times a Day As Needed (anxiety). 120 tablet 1 " "  • lansoprazole (PREVACID) 30 MG capsule Take 1 capsule by mouth Daily. 30 capsule 5   • LORazepam (ATIVAN) 2 MG tablet Take 1 tablet by mouth 2 (Two) Times a Day. 60 tablet 0   • mirtazapine (REMERON) 15 MG tablet Take 1 tablet by mouth Every Night. 30 tablet 0   • Multiple Vitamin (MULTI VITAMIN PO) Take  by mouth.     • simvastatin (ZOCOR) 20 MG tablet Take 20 mg by mouth daily.     • vitamin D (ERGOCALCIFEROL) 34790 UNITS capsule capsule Take 50,000 Units by mouth 1 (One) Time Per Week.       No current facility-administered medications for this visit.        Mental Status Exam:   Appearance: Neatly, casually dressed, good hygeine.   Cooperation: Cooperative  Orientation: Ox4  Gait and station stable.   Psychomotor: No psychomotor agitation/retardation, No EPS, No motor tics  Speech: normal rate, amount.  Mood \"stressed\"   Affect: congruent/appropriate.  Thought Content: goal directed, no delusional material present  Thought process: linear, organized.  Suicidality: No SI  Homicidality: No HI  Perception: No AH/VH. No overt psychosis  Memory is intact for recent and remote events  Concentration: good  Impulse control: good  Insight: fair   Judgement: fair  Language: Intact  No looseness of associations  Fund of knowledge above average    Assessment/Plan   Problems Addressed this Visit     None          · Start a taper off the gabapentin, should be off it in the next week or so, since it has been ineffective for anxiety. Currently at 400 mg QID for severe anxiety.   · Continue Lexapro 20mg daily for depression and anxiety.   · Increase BuSpar 10 mg, now 3 times daily for anxiety.  · Continue Remeron 15 mg at bedtime for sleep.  · Continue Ativan 2mg up to twice daily for severe anxiety.     · Continue atenolol 50 mg twice daily for blood pressure and anxiety.  · Continue psychotherapy with Izaiah Tinoco.   · ** 25 Minutes spent on psychotherapy today above and beyond time spent on E/M service.**    We discussed " risks, benefits, and side effects of the above medication and the patient was agreeable with the plan.    Return in 2 weeks (on 4/26/2017) for Next scheduled follow up.    The patient was instructed to call clinic as needed or go to ER if in crisis.

## 2017-04-14 ENCOUNTER — OFFICE VISIT (OUTPATIENT)
Dept: CARDIOLOGY | Facility: CLINIC | Age: 47
End: 2017-04-14

## 2017-04-14 DIAGNOSIS — R07.9 CHEST PAIN, UNSPECIFIED TYPE: Primary | ICD-10-CM

## 2017-04-14 DIAGNOSIS — I10 ESSENTIAL HYPERTENSION: ICD-10-CM

## 2017-04-14 DIAGNOSIS — E78.2 MIXED HYPERLIPIDEMIA: ICD-10-CM

## 2017-04-14 DIAGNOSIS — R94.31 ABNORMAL EKG: ICD-10-CM

## 2017-04-14 PROCEDURE — 99204 OFFICE O/P NEW MOD 45 MIN: CPT | Performed by: INTERNAL MEDICINE

## 2017-04-14 NOTE — PROGRESS NOTES
Subjective   Chief Complaint   Patient presents with   • Palpitations   • Hypertension       History of Present Illness  Patient is 47 years old white male who has severe generalized anxiety disorder and depression.  He has been having some panic attacks also.  He presented to the emergency room complaining of palpitations and mild vague chest pain.  Pain was quite atypical.  Not related to exertion a primary felt more like nervousness and palpitations. There was no  shortness of breath nausea or vomiting.  Patient was seen by Dr. Gay.  He was found to have abnormal EKG and was sent here for further evaluation.  Lab work showed normal comprehensive metabolic panel, troponin was normal.  ABGs were also normal with normal chest x-ray.    Patient is here for evaluation of abnormal EKG.  He does have history of hyperlipidemia and mild obesity.    Now patient states that he has no chest discomfort whatsoever.    Patient Active Problem List   Diagnosis   • Abnormal EKG, early repolarization abnormality   • Mixed hyperlipidemia   • GERD (gastroesophageal reflux disease)   • Essential hypertension   • Anxiety and depression         Social History   Substance Use Topics   • Smoking status: Never Smoker   • Smokeless tobacco: Never Used   • Alcohol use No           The following portions of the patient's history were reviewed and updated as appropriate: allergies, current medications, past family history, past medical history, past social history, past surgical history and problem list.    Allergies   Allergen Reactions   • Penicillins          Current Outpatient Prescriptions:   •  aspirin 81 MG EC tablet, Take 81 mg by mouth Daily., Disp: , Rfl:   •  atenolol (TENORMIN) 50 MG tablet, Take 1 tablet by mouth 2 (Two) Times a Day., Disp: 60 tablet, Rfl: 1  •  busPIRone (BUSPAR) 10 MG tablet, Take 1 tablet by mouth 3 (Three) Times a Day., Disp: 90 tablet, Rfl: 2  •  cetirizine (zyrTEC) 10 MG tablet, Take 1 tablet by mouth  "Daily., Disp: 14 tablet, Rfl: 0  •  escitalopram (LEXAPRO) 20 MG tablet, Take 1 tablet by mouth Daily., Disp: 30 tablet, Rfl: 2  •  gabapentin (NEURONTIN) 800 MG tablet, Take 0.5 tablets by mouth 4 (Four) Times a Day As Needed (anxiety)., Disp: 120 tablet, Rfl: 1  •  lansoprazole (PREVACID) 30 MG capsule, Take 1 capsule by mouth Daily., Disp: 30 capsule, Rfl: 5  •  mirtazapine (REMERON) 15 MG tablet, Take 1 tablet by mouth Every Night., Disp: 30 tablet, Rfl: 0  •  Multiple Vitamin (MULTI VITAMIN PO), Take  by mouth., Disp: , Rfl:   •  simvastatin (ZOCOR) 20 MG tablet, Take 20 mg by mouth daily., Disp: , Rfl:   •  vitamin D (ERGOCALCIFEROL) 07770 UNITS capsule capsule, Take 50,000 Units by mouth 1 (One) Time Per Week., Disp: , Rfl:   •  LORazepam (ATIVAN) 2 MG tablet, Take 1 tablet by mouth 2 (Two) Times a Day., Disp: 60 tablet, Rfl: 0    Review of Systems   Constitution: Negative.   HENT: Negative.    Eyes: Negative.    Cardiovascular: Negative.    Respiratory: Negative.    Hematologic/Lymphatic: Negative.    Musculoskeletal: Negative.    Gastrointestinal: Positive for heartburn.   Neurological: Negative.    Psychiatric/Behavioral: Positive for depression. The patient is nervous/anxious.         Objective      /90  Pulse 61  Ht 71\" (180.3 cm)  Wt 207 lb 3.2 oz (94 kg)  SpO2 97%  BMI 28.9 kg/m2    Physical Exam   Constitutional: He appears well-developed and well-nourished.   HENT:   Head: Normocephalic and atraumatic.   Mouth/Throat: Oropharynx is clear and moist.   Eyes: Conjunctivae and EOM are normal. Pupils are equal, round, and reactive to light. No scleral icterus.   Neck: Normal range of motion. Neck supple. No JVD present. No tracheal deviation present. No thyromegaly present.   Cardiovascular: Normal rate, regular rhythm, normal heart sounds and intact distal pulses.  Exam reveals no friction rub.    No murmur heard.  Pulmonary/Chest: Effort normal and breath sounds normal. No respiratory " distress. He has no wheezes. He has no rales. He exhibits no tenderness.   Abdominal: Soft. Bowel sounds are normal. He exhibits no distension and no mass. There is no tenderness. There is no rebound and no guarding.   Musculoskeletal: Normal range of motion. He exhibits no edema, tenderness or deformity.   Lymphadenopathy:     He has no cervical adenopathy.   Neurological: He is alert. He has normal reflexes. No cranial nerve deficit. He exhibits normal muscle tone. Coordination normal.   Skin: Skin is warm and dry.   Psychiatric: He has a normal mood and affect. His behavior is normal. Judgment and thought content normal.       Lab Review:    Lab Results   Component Value Date     03/29/2017    K 4.4 03/29/2017     03/29/2017    BUN 5 (L) 03/29/2017    CREATININE 0.76 03/29/2017    GLUCOSE 101 03/29/2017    CALCIUM 9.7 03/29/2017    ALT 35 03/29/2017    ALKPHOS 86 03/29/2017    LABIL2 1.5 03/29/2017     No results found for: CKTOTAL  Lab Results   Component Value Date    WBC 5.99 03/29/2017    HGB 14.9 03/29/2017    HCT 44.1 03/29/2017     03/29/2017     No results found for: INR  Lab Results   Component Value Date    MG 2.2 03/29/2017     No results found for: CHLPL, TRIG, HDL, VLDL, LDL  No results found for: PSA, CHOL, TRIG, HDL, LDLCALC, VLDL, LDLHDL  No results found for: BNP      ECG 12 Lead  Date/Time: 4/15/2017 2:12 PM  Performed by: AMAN LY  Authorized by: AMAN LY   Comparison: compared with previous ECG from 3/23/2017  Rhythm: sinus rhythm  Rate: normal  Conduction: conduction normal  QRS axis: normal  Other findings: early repolarization  Clinical impression: normal ECG  Comments: Normal EKG with early repolarization abnormality            I reviewed the patient's new clinical results.  I personally viewed and interpreted the patient's EKG/lab data        Assessment:   Diagnosis Plan   1. Chest pain, unspecified type  ECG 12 Lead   2. Abnormal EKG, early  repolarization abnormality     3. Essential hypertension     4. Mixed hyperlipidemia            Plan:  Lengthy discussion with the patient regarding the options.  He does have multiple cardiac risk factors and further cardiac workup should be done.  A stress test was recommended and possible Holter monitor because of palpitations.    She is primarily concerned with abnormality of the EKG.  It was explained to him the EKG abnormality is insignificant .  Hyperlipidemia repolarization abnormality is a normal variant.  No further workup is needed just for this abnormality.    Patient feels that this information is satisfactory to him and he does not wish any further workup.  He will is an aggressive risk factor modification which was discussed with him.    He feels that if he has any further symptoms he'll be willing to undergo stress test but at this time he does not wish to pursue.  He will follow closely with his physician      Return if symptoms worsen or fail to improve.

## 2017-04-15 VITALS
SYSTOLIC BLOOD PRESSURE: 140 MMHG | HEIGHT: 71 IN | DIASTOLIC BLOOD PRESSURE: 90 MMHG | BODY MASS INDEX: 29.01 KG/M2 | WEIGHT: 207.2 LBS | OXYGEN SATURATION: 97 % | HEART RATE: 61 BPM

## 2017-04-15 PROBLEM — K21.9 GERD (GASTROESOPHAGEAL REFLUX DISEASE): Status: ACTIVE | Noted: 2017-04-15

## 2017-04-15 PROBLEM — F41.9 ANXIETY AND DEPRESSION: Status: ACTIVE | Noted: 2017-04-15

## 2017-04-15 PROBLEM — R94.31 ABNORMAL EKG: Status: ACTIVE | Noted: 2017-04-15

## 2017-04-15 PROBLEM — E78.2 MIXED HYPERLIPIDEMIA: Status: ACTIVE | Noted: 2017-04-15

## 2017-04-15 PROBLEM — F32.A ANXIETY AND DEPRESSION: Status: ACTIVE | Noted: 2017-04-15

## 2017-04-15 PROBLEM — I10 ESSENTIAL HYPERTENSION: Status: ACTIVE | Noted: 2017-04-15

## 2017-04-15 PROCEDURE — 93000 ELECTROCARDIOGRAM COMPLETE: CPT | Performed by: INTERNAL MEDICINE

## 2017-04-18 ENCOUNTER — OFFICE VISIT (OUTPATIENT)
Dept: PSYCHIATRY | Facility: CLINIC | Age: 47
End: 2017-04-18

## 2017-04-18 DIAGNOSIS — F41.0 PANIC DISORDER: ICD-10-CM

## 2017-04-18 DIAGNOSIS — F41.1 GENERALIZED ANXIETY DISORDER: Primary | ICD-10-CM

## 2017-04-18 DIAGNOSIS — F33.1 MAJOR DEPRESSIVE DISORDER, RECURRENT EPISODE, MODERATE (HCC): ICD-10-CM

## 2017-04-18 PROCEDURE — 90834 PSYTX W PT 45 MINUTES: CPT | Performed by: SOCIAL WORKER

## 2017-04-18 NOTE — PROGRESS NOTES
"Amish Win : 1970    MULTIDISCIPLINARY PROGRESS NOTE    Date of Service: 2017  Time In: 7:50 AM   Time Out: 8:30 AM     DATA: Patient met 1:1 with Izaiah Tinoco LCSW, LCADC for scheduled individual outpatient psychotherapy session at the [facility] for follow-up.  Patient reports his home life continues to be stressful primarily due to the difficulty of caring for his special needs child and the significant strain in his marriage.  In addition, he states he continues to struggle at work as he is working the late shift and struggles to get adequate sleep which results in fatigue.  The patient reports he continues to have significant struggles with his marriage as his wife continues to be very cold and continues to make derogatory remarks.  Patient states \"I know things will never change and my marriage\".  He also reports he has reached a place where he realizes there are no longer able to care for their 15-year-old autistic son but states his wife continues to resist looking at other options.  Patient states he is routinely reminded by his wife he is not important in the family and everything is about his son.    HPI: Patient reports he feels he is \"on the upswing\" but states he continues to struggle with depressed mood, difficulty concentrating, feelings of hopelessness and helplessness, anhedonia, significant fatigue, low self-esteem, and decreased physical activity.  He rates current symptoms of depression at 5 on a scale of 1-10 with 10 being the most severe.  He also reports he continues to struggle with feeling on edge, feeling overwhelmed, difficulty concentrating, increased heart rate, muscle tension, periods of nausea, and periods of sense of impending doom.  He rates current symptoms of anxiety at a 5 on a scale of 1-10 with 10 being most severe.  He reports he continues to adhere to medication regimen as prescribed with no side effects. Patient adamantly and convincingly denies current " suicidal or homicidal ideation or perceptual disturbance.  Patient denies any illicit substance use including alcohol.    CLNICAL MANEUVERING/INTERVENTION: Allowed the patient to discuss/vent his feelings, frustration, and pain concerning ongoing strain in his relationship and the difficulty of caring for his special needs son and validated the patient's feelings.  Also assisted the patient in identifying and acknowledging the progress he has made over the past weeks and encouraged him to continue to build on his momentum.  Assisted the patient in identifying steps he has taken to decrease severity and frequency of his symptoms including increasing his physical activity by taking a walk 3-4 days weekly, getting adequate sleep, and becoming determined to improve his life and be happy.  Also validated the patient's belief he has a right to be happy and challenged his feelings he is being selfish.  Challenge the patient to acknowledge he cannot repair his marriage if his wife is not willing and discussed the concept of things we can't change and things we cannot change.  Also challenged the patient to consider the situation will likely not change unless he takes definite steps to make positive changes in his life.  Discussed the concept of radical acceptance and encourage the patient to consider whether it would be prudent to except the fact things will likely not change in his marriage and consider his options going forward.  Provided unconditional positive regard safe, supportive environment.    Allowed patient to freely discuss issues without interruption or judgment. Provided safe, confidential environment to facilitate the development of positive therapeutic relationship and encourage open, honest communication. Assisted patient in identifying risk factors which would indicate the need for higher level of care including thoughts to harm self or others and/or self-harming behavior and encouraged patient to contact  this office, call 911, or present to the nearest emergency room should any of these events occur. Discussed crisis intervention services and means to access.      ASSESSMENT: Patient presents for session on time, somewhat disheveled and unshaven with anxious/depressed mood and congruent affect. No evidence of intoxication, withdrawal, or perceptual disturbance. Associations intact, abstraction intact. Thought process is linear and logical.  Thought content normal.  Patient presents with moderate psychomotor retardation as he appears to be very lethargic with minimal movement..  Speech is clear and coherent. Patient is oriented to person, place, and time. Attention and concentration fair. Insight and judgment fair. Patient reports no current suicidal or homicidal ideation. Patient appears cooperative and agreeable to treatment and appears to continue to develop rapport. Patient does not appear to be malingering.  Patient appears to have achieved minimal progress but continues to struggle with significant symptoms of depression and anxiety which continue to be exacerbated by the strain in his marriage and the difficulty of caring for a special needs child.  The patient's symptoms continue to cause significant impairment in important areas of functioning and he would likely be at increased risk for decompensation without continued treatment.    PLAN: Patient will continue in biweekly  individual outpatient psychotherapy sessions and pharmacotherapy as scheduled at the [facility].  Patient will adhere to medication regimen as prescribed and report any side effects. Patient will contact this office, call 911 or present to the nearest emergency room should suicidal ideations occur. Provide Cognitive Behavioral Therapy and Solution Focused Therapy to improve functioning, maintain stability, and avoid decompensation and the need for higher level of care.     Izaiah Tinoco LCSW, [facility]

## 2017-04-20 RX ORDER — MIRTAZAPINE 15 MG/1
15 TABLET, FILM COATED ORAL NIGHTLY
Qty: 30 TABLET | Refills: 0 | Status: SHIPPED | OUTPATIENT
Start: 2017-04-20 | End: 2017-05-02 | Stop reason: SDUPTHER

## 2017-05-02 ENCOUNTER — OFFICE VISIT (OUTPATIENT)
Dept: PSYCHIATRY | Facility: CLINIC | Age: 47
End: 2017-05-02

## 2017-05-02 VITALS
DIASTOLIC BLOOD PRESSURE: 85 MMHG | HEIGHT: 71 IN | BODY MASS INDEX: 28.98 KG/M2 | SYSTOLIC BLOOD PRESSURE: 132 MMHG | WEIGHT: 207 LBS | HEART RATE: 57 BPM

## 2017-05-02 DIAGNOSIS — F41.0 PANIC DISORDER: ICD-10-CM

## 2017-05-02 DIAGNOSIS — F41.1 GENERALIZED ANXIETY DISORDER: Primary | ICD-10-CM

## 2017-05-02 DIAGNOSIS — F33.1 MAJOR DEPRESSIVE DISORDER, RECURRENT EPISODE, MODERATE (HCC): ICD-10-CM

## 2017-05-02 PROCEDURE — 99214 OFFICE O/P EST MOD 30 MIN: CPT

## 2017-05-02 RX ORDER — MIRTAZAPINE 30 MG/1
30 TABLET, FILM COATED ORAL NIGHTLY
Qty: 30 TABLET | Refills: 1 | Status: SHIPPED | OUTPATIENT
Start: 2017-05-02 | End: 2017-05-24 | Stop reason: SDUPTHER

## 2017-05-10 ENCOUNTER — OFFICE VISIT (OUTPATIENT)
Dept: PSYCHIATRY | Facility: CLINIC | Age: 47
End: 2017-05-10

## 2017-05-10 DIAGNOSIS — F41.0 PANIC DISORDER: ICD-10-CM

## 2017-05-10 DIAGNOSIS — F41.1 GENERALIZED ANXIETY DISORDER: Primary | ICD-10-CM

## 2017-05-10 DIAGNOSIS — F33.1 MAJOR DEPRESSIVE DISORDER, RECURRENT EPISODE, MODERATE (HCC): ICD-10-CM

## 2017-05-10 PROCEDURE — 90834 PSYTX W PT 45 MINUTES: CPT | Performed by: SOCIAL WORKER

## 2017-05-16 ENCOUNTER — OFFICE VISIT (OUTPATIENT)
Dept: PSYCHIATRY | Facility: CLINIC | Age: 47
End: 2017-05-16

## 2017-05-16 DIAGNOSIS — F41.1 GENERALIZED ANXIETY DISORDER: Primary | ICD-10-CM

## 2017-05-16 DIAGNOSIS — F41.0 PANIC DISORDER: ICD-10-CM

## 2017-05-16 DIAGNOSIS — F33.1 MAJOR DEPRESSIVE DISORDER, RECURRENT EPISODE, MODERATE (HCC): ICD-10-CM

## 2017-05-16 PROCEDURE — 90834 PSYTX W PT 45 MINUTES: CPT | Performed by: SOCIAL WORKER

## 2017-05-24 ENCOUNTER — OFFICE VISIT (OUTPATIENT)
Dept: PSYCHIATRY | Facility: CLINIC | Age: 47
End: 2017-05-24

## 2017-05-24 DIAGNOSIS — F33.1 MAJOR DEPRESSIVE DISORDER, RECURRENT EPISODE, MODERATE (HCC): ICD-10-CM

## 2017-05-24 DIAGNOSIS — F41.1 GENERALIZED ANXIETY DISORDER: Primary | ICD-10-CM

## 2017-05-24 DIAGNOSIS — F41.0 PANIC DISORDER: ICD-10-CM

## 2017-05-24 PROCEDURE — 90833 PSYTX W PT W E/M 30 MIN: CPT

## 2017-05-24 PROCEDURE — 99214 OFFICE O/P EST MOD 30 MIN: CPT

## 2017-05-24 RX ORDER — BUSPIRONE HYDROCHLORIDE 10 MG/1
10 TABLET ORAL 3 TIMES DAILY
Qty: 90 TABLET | Refills: 1 | Status: SHIPPED | OUTPATIENT
Start: 2017-05-24 | End: 2017-06-29 | Stop reason: SDUPTHER

## 2017-05-24 RX ORDER — ESCITALOPRAM OXALATE 20 MG/1
20 TABLET ORAL DAILY
Qty: 30 TABLET | Refills: 1 | Status: SHIPPED | OUTPATIENT
Start: 2017-05-24 | End: 2017-06-29 | Stop reason: SDUPTHER

## 2017-05-24 RX ORDER — MIRTAZAPINE 30 MG/1
30 TABLET, FILM COATED ORAL NIGHTLY
Qty: 30 TABLET | Refills: 1 | Status: SHIPPED | OUTPATIENT
Start: 2017-05-24 | End: 2017-06-29 | Stop reason: SDUPTHER

## 2017-05-24 RX ORDER — LORAZEPAM 2 MG/1
2 TABLET ORAL 2 TIMES DAILY
Qty: 60 TABLET | Refills: 1
Start: 2017-05-24 | End: 2017-06-29 | Stop reason: SDUPTHER

## 2017-05-24 RX ORDER — ATENOLOL 25 MG/1
TABLET ORAL
COMMUNITY
Start: 2017-05-08 | End: 2017-05-24 | Stop reason: DRUGHIGH

## 2017-06-13 ENCOUNTER — TELEPHONE (OUTPATIENT)
Dept: PSYCHIATRY | Facility: CLINIC | Age: 47
End: 2017-06-13

## 2017-06-27 ENCOUNTER — OFFICE VISIT (OUTPATIENT)
Dept: PSYCHIATRY | Facility: CLINIC | Age: 47
End: 2017-06-27

## 2017-06-27 DIAGNOSIS — F41.0 PANIC DISORDER: ICD-10-CM

## 2017-06-27 DIAGNOSIS — F33.1 MAJOR DEPRESSIVE DISORDER, RECURRENT EPISODE, MODERATE (HCC): ICD-10-CM

## 2017-06-27 DIAGNOSIS — F41.1 GENERALIZED ANXIETY DISORDER: Primary | ICD-10-CM

## 2017-06-27 PROCEDURE — 90834 PSYTX W PT 45 MINUTES: CPT | Performed by: SOCIAL WORKER
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
## 2017-06-29 ENCOUNTER — OFFICE VISIT (OUTPATIENT)
Dept: PSYCHIATRY | Facility: CLINIC | Age: 47
End: 2017-06-29

## 2017-06-29 VITALS
DIASTOLIC BLOOD PRESSURE: 84 MMHG | SYSTOLIC BLOOD PRESSURE: 128 MMHG | HEIGHT: 71 IN | HEART RATE: 58 BPM | WEIGHT: 204 LBS | BODY MASS INDEX: 28.56 KG/M2

## 2017-06-29 DIAGNOSIS — F41.1 GENERALIZED ANXIETY DISORDER: Primary | ICD-10-CM

## 2017-06-29 DIAGNOSIS — F40.01 PANIC DISORDER WITH AGORAPHOBIA: ICD-10-CM

## 2017-06-29 DIAGNOSIS — F33.1 MAJOR DEPRESSIVE DISORDER, RECURRENT EPISODE, MODERATE (HCC): ICD-10-CM

## 2017-06-29 PROCEDURE — 90833 PSYTX W PT W E/M 30 MIN: CPT | Performed by: NURSE PRACTITIONER

## 2017-06-29 PROCEDURE — 99213 OFFICE O/P EST LOW 20 MIN: CPT | Performed by: NURSE PRACTITIONER

## 2017-06-29 RX ORDER — LORAZEPAM 2 MG/1
2 TABLET ORAL 2 TIMES DAILY
Qty: 60 TABLET | Refills: 1 | Status: SHIPPED | OUTPATIENT
Start: 2017-06-29 | End: 2017-07-26

## 2017-06-29 RX ORDER — ATENOLOL 50 MG/1
50 TABLET ORAL 2 TIMES DAILY
Qty: 60 TABLET | Refills: 1 | Status: ON HOLD | OUTPATIENT
Start: 2017-06-29 | End: 2017-08-15

## 2017-06-29 RX ORDER — MIRTAZAPINE 30 MG/1
30 TABLET, FILM COATED ORAL NIGHTLY
Qty: 30 TABLET | Refills: 1 | Status: SHIPPED | OUTPATIENT
Start: 2017-06-29 | End: 2017-07-26 | Stop reason: SDUPTHER

## 2017-06-29 RX ORDER — ATENOLOL 25 MG/1
TABLET ORAL
COMMUNITY
Start: 2017-06-07 | End: 2017-06-29 | Stop reason: DRUGHIGH

## 2017-06-29 RX ORDER — BUSPIRONE HYDROCHLORIDE 15 MG/1
15 TABLET ORAL 3 TIMES DAILY
Qty: 90 TABLET | Refills: 1 | Status: SHIPPED | OUTPATIENT
Start: 2017-06-29 | End: 2017-07-26 | Stop reason: SDUPTHER

## 2017-06-29 RX ORDER — ESCITALOPRAM OXALATE 20 MG/1
20 TABLET ORAL DAILY
Qty: 30 TABLET | Refills: 1 | Status: SHIPPED | OUTPATIENT
Start: 2017-06-29 | End: 2017-07-26 | Stop reason: SDUPTHER

## 2017-06-29 NOTE — PROGRESS NOTES
"Subjective   Amish Win is a 47 y.o. male who is here today for medication management follow up.    Chief Complaint:  Depression and anxiety    History of Present Illness He states that he doesn't believe that the ativan is working as well as it had previously, he believes that he has been on it too long.  He states that he had been at the ER in March for a suspected hear attack but there is no indication of any damage.  He shares that he is not having any SE or problems with the medications, he states that he has indigestion has caused him problems.  He states that he rates his depression 7/10 with 10 being the worse because he relates it to his son and work.  He shares that he rates his anxiety 7/10, shares that he tries to leave work there.  He states that he sees pattern in kyler but no hallucinations, denies any SI/HI.  He states that he wants to be back where he was \"happy\".       The following portions of the patient's history were reviewed and updated as appropriate: allergies, current medications, past family history, past medical history, past social history, past surgical history and problem list.    Review of Systems   Constitutional: Positive for fatigue. Negative for appetite change, chills, diaphoresis, fever and unexpected weight change.   HENT: Negative for hearing loss, sore throat, trouble swallowing and voice change.    Eyes: Negative for photophobia and visual disturbance.   Respiratory: Negative for cough, chest tightness and shortness of breath.    Cardiovascular: Negative for chest pain and palpitations.   Gastrointestinal: Positive for nausea. Negative for abdominal pain, constipation and vomiting.   Endocrine: Negative for cold intolerance and heat intolerance.   Genitourinary: Negative for dysuria and frequency.   Musculoskeletal: Negative for arthralgias, back pain, joint swelling and neck stiffness.   Skin: Negative for color change and wound.   Allergic/Immunologic: Negative for " "environmental allergies and immunocompromised state.   Neurological: Negative for dizziness, tremors, seizures, syncope, weakness, light-headedness and headaches.   Hematological: Negative for adenopathy. Does not bruise/bleed easily.       Objective   Physical Exam   Constitutional: He appears well-developed and well-nourished. No distress.   Neurological: He is alert. Coordination and gait normal.   Vitals reviewed.    Blood pressure 128/84, pulse 58, height 71\" (180.3 cm), weight 204 lb (92.5 kg).    Allergies   Allergen Reactions   • Penicillins        Current Medications:   Current Outpatient Prescriptions   Medication Sig Dispense Refill   • aspirin 81 MG EC tablet Take 81 mg by mouth Daily.     • atenolol (TENORMIN) 50 MG tablet Take 1 tablet by mouth 2 (Two) Times a Day. 60 tablet 1   • busPIRone (BUSPAR) 15 MG tablet Take 1 tablet by mouth 3 (Three) Times a Day. 90 tablet 1   • cetirizine (zyrTEC) 10 MG tablet Take 1 tablet by mouth Daily. 14 tablet 0   • escitalopram (LEXAPRO) 20 MG tablet Take 1 tablet by mouth Daily. 30 tablet 1   • lansoprazole (PREVACID) 30 MG capsule Take 1 capsule by mouth Daily. 30 capsule 5   • LORazepam (ATIVAN) 2 MG tablet Take 1 tablet by mouth 2 (Two) Times a Day. 60 tablet 1   • mirtazapine (REMERON) 30 MG tablet Take 1 tablet by mouth Every Night. 30 tablet 1   • Multiple Vitamin (MULTI VITAMIN PO) Take  by mouth.     • simvastatin (ZOCOR) 20 MG tablet Take 20 mg by mouth daily.     • vitamin D (ERGOCALCIFEROL) 57058 UNITS capsule capsule Take 50,000 Units by mouth 1 (One) Time Per Week.       No current facility-administered medications for this visit.        Mental Status Exam:   Appearance: Neatly, casually dressed, good hygeine.   Cooperation: Cooperative  Orientation: Ox4  Gait and station stable.   Psychomotor: No psychomotor agitation/retardation, No EPS, No motor tics  Speech: normal rate, amount.  Mood \"a little bit better\"   Affect: congruent/appropriate.  Thought " Content: goal directed, no delusional material present  Thought process: linear, organized.  Suicidality: No SI  Homicidality: No HI  Perception: No AH/VH. No overt psychosis  Memory is intact for recent and remote events  Concentration: good  Impulse control: good  Insight: fair   Judgement: fair  Language: Intact  No looseness of associations  Fund of knowledge above average    Assessment/Plan   Problems Addressed this Visit     None      Visit Diagnoses     Generalized anxiety disorder    -  Primary    Relevant Medications    busPIRone (BUSPAR) 15 MG tablet    escitalopram (LEXAPRO) 20 MG tablet    LORazepam (ATIVAN) 2 MG tablet    mirtazapine (REMERON) 30 MG tablet    Major depressive disorder, recurrent episode, moderate        Relevant Medications    busPIRone (BUSPAR) 15 MG tablet    escitalopram (LEXAPRO) 20 MG tablet    LORazepam (ATIVAN) 2 MG tablet    mirtazapine (REMERON) 30 MG tablet    Panic disorder with agoraphobia        Relevant Medications    busPIRone (BUSPAR) 15 MG tablet    escitalopram (LEXAPRO) 20 MG tablet    LORazepam (ATIVAN) 2 MG tablet    mirtazapine (REMERON) 30 MG tablet          · Continue Lexapro 20mg daily for depression and anxiety.   · Increase  BuSpar 15 mg 3 times daily for anxiety.   · Continue Remeron 30 mg at bedtime for sleep and depression.  · Continue Ativan 2mg up to twice daily for severe anxiety.     · Continue atenolol 50 mg twice daily for blood pressure and anxiety.  · Continue psychotherapy with Izaiah Tinoco.   · Return to clinic in 4-6 weeks for medication management with one of our nurse practitioners.  *34 Minutes spent on psychotherapy today above and beyond time spent on E/M service.     We discussed risks, benefits, and side effects of the above medication and the patient was agreeable with the plan.    No Follow-up on file.    The patient was instructed to call clinic as needed or go to ER if in crisis.

## 2017-07-18 ENCOUNTER — TELEPHONE (OUTPATIENT)
Dept: PSYCHIATRY | Facility: CLINIC | Age: 47
End: 2017-07-18

## 2017-07-18 NOTE — TELEPHONE ENCOUNTER
Amish called to let you know his medications aren't working.  He is having panic attacks and they seem to be worsening.  Can you make medication changes?

## 2017-07-18 NOTE — TELEPHONE ENCOUNTER
He is already on ativan, please make sure he knows to take it only as needed and he is suppose to use that when he believes that he is going to have one. Otherwise, we can increase buspar 30mg bid.  Thank you

## 2017-07-20 NOTE — TELEPHONE ENCOUNTER
Tried again today to contact patient.  I called both numbers, there was no voice mail to leave a message.  If he calls back we will address at that time.

## 2017-07-26 ENCOUNTER — OFFICE VISIT (OUTPATIENT)
Dept: PSYCHIATRY | Facility: CLINIC | Age: 47
End: 2017-07-26

## 2017-07-26 VITALS
HEART RATE: 52 BPM | WEIGHT: 202 LBS | SYSTOLIC BLOOD PRESSURE: 123 MMHG | BODY MASS INDEX: 28.28 KG/M2 | HEIGHT: 71 IN | DIASTOLIC BLOOD PRESSURE: 81 MMHG

## 2017-07-26 DIAGNOSIS — F40.01 PANIC DISORDER WITH AGORAPHOBIA: ICD-10-CM

## 2017-07-26 DIAGNOSIS — F33.1 MAJOR DEPRESSIVE DISORDER, RECURRENT EPISODE, MODERATE (HCC): ICD-10-CM

## 2017-07-26 DIAGNOSIS — F41.1 GENERALIZED ANXIETY DISORDER: Primary | ICD-10-CM

## 2017-07-26 PROCEDURE — 99213 OFFICE O/P EST LOW 20 MIN: CPT | Performed by: NURSE PRACTITIONER

## 2017-07-26 RX ORDER — MIRTAZAPINE 30 MG/1
30 TABLET, FILM COATED ORAL NIGHTLY
Qty: 30 TABLET | Refills: 1 | Status: ON HOLD | OUTPATIENT
Start: 2017-07-26 | End: 2017-08-21

## 2017-07-26 RX ORDER — DIAZEPAM 5 MG/1
5 TABLET ORAL 2 TIMES DAILY
Qty: 60 TABLET | Refills: 0 | Status: SHIPPED | OUTPATIENT
Start: 2017-07-26 | End: 2017-08-23 | Stop reason: SDUPTHER

## 2017-07-26 RX ORDER — ESCITALOPRAM OXALATE 20 MG/1
20 TABLET ORAL DAILY
Qty: 30 TABLET | Refills: 1 | Status: SHIPPED | OUTPATIENT
Start: 2017-07-26 | End: 2017-09-20 | Stop reason: SDUPTHER

## 2017-07-26 RX ORDER — BUSPIRONE HYDROCHLORIDE 30 MG/1
30 TABLET ORAL 2 TIMES DAILY
Qty: 60 TABLET | Refills: 1 | Status: SHIPPED | OUTPATIENT
Start: 2017-07-26 | End: 2017-08-02 | Stop reason: DRUGHIGH

## 2017-07-26 NOTE — PROGRESS NOTES
"Subjective   Amish Win is a 47 y.o. male who is here today for medication management follow up.    Chief Complaint:  Depression and anxiety    History of Present Illness He states that he is doing ok but not doing \"great\", he feels like symptoms of his depression and anxiety have not improved.  He feels worried, sick at stomach, worries about his job security.  Son has been home tearing up stuff and it hasn't gotten better.  He shares that he is having lower energy levels; has a fear of doing too much or too little.  Worries about his health especially his heart, went to cardiologist and that he was cleared.  He shares that his sleep is good with the remeron, getting at least 6 hours of sleep with no NM.  Decreased appetite with another 2 pound weight loss.  Denies any new illness but shares that he continues to have nervous stomach.  Denies any AV hallucination but shares that he sees patterns, denies any SI/HI- tic with hitting his shoulder when stressed.       The following portions of the patient's history were reviewed and updated as appropriate: allergies, current medications, past family history, past medical history, past social history, past surgical history and problem list.    Review of Systems   Constitutional: Positive for fatigue. Negative for appetite change, chills, diaphoresis, fever and unexpected weight change.   HENT: Negative for hearing loss, sore throat, trouble swallowing and voice change.    Eyes: Negative for photophobia and visual disturbance.   Respiratory: Negative for cough, chest tightness and shortness of breath.    Cardiovascular: Negative for chest pain and palpitations.   Gastrointestinal: Positive for nausea. Negative for abdominal pain, constipation and vomiting.   Endocrine: Negative for cold intolerance and heat intolerance.   Genitourinary: Negative for dysuria and frequency.   Musculoskeletal: Negative for arthralgias, back pain, joint swelling and neck stiffness. " "  Skin: Negative for color change and wound.   Allergic/Immunologic: Negative for environmental allergies and immunocompromised state.   Neurological: Negative for dizziness, tremors, seizures, syncope, weakness, light-headedness and headaches.   Hematological: Negative for adenopathy. Does not bruise/bleed easily.       Objective   Physical Exam   Constitutional: He appears well-developed and well-nourished. No distress.   Neurological: He is alert. Coordination and gait normal.   Vitals reviewed.    Blood pressure 123/81, pulse 52, height 71\" (180.3 cm), weight 202 lb (91.6 kg).    Allergies   Allergen Reactions   • Penicillins        Current Medications:   Current Outpatient Prescriptions   Medication Sig Dispense Refill   • aspirin 81 MG EC tablet Take 81 mg by mouth Daily.     • atenolol (TENORMIN) 50 MG tablet Take 1 tablet by mouth 2 (Two) Times a Day. 60 tablet 1   • busPIRone (BUSPAR) 30 MG tablet Take 1 tablet by mouth 2 (Two) Times a Day. 60 tablet 1   • cetirizine (zyrTEC) 10 MG tablet Take 1 tablet by mouth Daily. 14 tablet 0   • escitalopram (LEXAPRO) 20 MG tablet Take 1 tablet by mouth Daily. 30 tablet 1   • lansoprazole (PREVACID) 30 MG capsule Take 1 capsule by mouth Daily. 30 capsule 5   • mirtazapine (REMERON) 30 MG tablet Take 1 tablet by mouth Every Night. 30 tablet 1   • Multiple Vitamin (MULTI VITAMIN PO) Take  by mouth.     • simvastatin (ZOCOR) 20 MG tablet Take 20 mg by mouth daily.     • vitamin D (ERGOCALCIFEROL) 39074 UNITS capsule capsule Take 50,000 Units by mouth 1 (One) Time Per Week.     • diazePAM (VALIUM) 5 MG tablet Take 1 tablet by mouth 2 (Two) Times a Day. 60 tablet 0     No current facility-administered medications for this visit.        Mental Status Exam:   Appearance: Neatly, casually dressed, good hygeine.   Cooperation: Cooperative  Orientation: Ox4  Gait and station stable.   Psychomotor: No psychomotor agitation/retardation, No EPS, No motor tics  Speech: normal rate, " "amount.  Mood \"a little bit better\"   Affect: congruent/appropriate.  Thought Content: goal directed, no delusional material present  Thought process: linear, organized.  Suicidality: No SI  Homicidality: No HI  Perception: No AH/VH. No overt psychosis  Memory is intact for recent and remote events  Concentration: good  Impulse control: good  Insight: fair   Judgement: fair  Language: Intact  No looseness of associations  Fund of knowledge above average    Assessment/Plan   Problems Addressed this Visit     None      Visit Diagnoses     Generalized anxiety disorder    -  Primary    Relevant Medications    mirtazapine (REMERON) 30 MG tablet    escitalopram (LEXAPRO) 20 MG tablet    busPIRone (BUSPAR) 30 MG tablet    diazePAM (VALIUM) 5 MG tablet    Major depressive disorder, recurrent episode, moderate        Relevant Medications    mirtazapine (REMERON) 30 MG tablet    escitalopram (LEXAPRO) 20 MG tablet    busPIRone (BUSPAR) 30 MG tablet    diazePAM (VALIUM) 5 MG tablet    Panic disorder with agoraphobia        Relevant Medications    mirtazapine (REMERON) 30 MG tablet    escitalopram (LEXAPRO) 20 MG tablet    busPIRone (BUSPAR) 30 MG tablet    diazePAM (VALIUM) 5 MG tablet          · Continue Lexapro 20mg daily for depression and anxiety.   · Increase  BuSpar 30 mg 3 times daily for anxiety.   · Continue Remeron 30 mg at bedtime for sleep and depression.  · Discontinue the ativan, begin diazepam 5mg bid prn  · Discontinue atenolol- recommended to get RX from PCP for HTN  · Continue psychotherapy with Izaiah Tinoco.   · Return to clinic in 4 weeks or as needed.       We discussed risks, benefits, and side effects of the above medication and the patient was agreeable with the plan.    No Follow-up on file.    The patient was instructed to call clinic as needed or go to ER if in crisis.           "

## 2017-07-28 ENCOUNTER — TELEPHONE (OUTPATIENT)
Dept: PSYCHIATRY | Facility: CLINIC | Age: 47
End: 2017-07-28

## 2017-07-29 ENCOUNTER — HOSPITAL ENCOUNTER (EMERGENCY)
Facility: HOSPITAL | Age: 47
Discharge: HOME | End: 2017-07-29
Attending: EMERGENCY MEDICINE | Admitting: EMERGENCY MEDICINE

## 2017-07-29 VITALS
RESPIRATION RATE: 18 BRPM | HEART RATE: 50 BPM | TEMPERATURE: 98 F | DIASTOLIC BLOOD PRESSURE: 78 MMHG | BODY MASS INDEX: 28.28 KG/M2 | SYSTOLIC BLOOD PRESSURE: 170 MMHG | WEIGHT: 202 LBS | OXYGEN SATURATION: 99 % | HEIGHT: 71 IN

## 2017-07-29 DIAGNOSIS — F41.1 GAD (GENERALIZED ANXIETY DISORDER): Primary | ICD-10-CM

## 2017-07-29 DIAGNOSIS — F33.1 MODERATE EPISODE OF RECURRENT MAJOR DEPRESSIVE DISORDER (HCC): ICD-10-CM

## 2017-07-29 LAB
6-ACETYL MORPHINE: NEGATIVE
ALBUMIN SERPL-MCNC: 4.9 G/DL (ref 3.5–5)
ALBUMIN/GLOB SERPL: 1.6 G/DL (ref 1.5–2.5)
ALP SERPL-CCNC: 71 U/L (ref 40–129)
ALT SERPL W P-5'-P-CCNC: 25 U/L (ref 10–44)
AMPHET+METHAMPHET UR QL: NEGATIVE
ANION GAP SERPL CALCULATED.3IONS-SCNC: 7 MMOL/L (ref 3.6–11.2)
AST SERPL-CCNC: 30 U/L (ref 10–34)
BACTERIA UR QL AUTO: ABNORMAL /HPF
BARBITURATES UR QL SCN: NEGATIVE
BASOPHILS # BLD AUTO: 0.03 10*3/MM3 (ref 0–0.3)
BASOPHILS NFR BLD AUTO: 0.4 % (ref 0–2)
BENZODIAZ UR QL SCN: POSITIVE
BILIRUB SERPL-MCNC: 1.9 MG/DL (ref 0.2–1.8)
BILIRUB UR QL STRIP: NEGATIVE
BUN BLD-MCNC: 7 MG/DL (ref 7–21)
BUN/CREAT SERPL: 7.8 (ref 7–25)
BUPRENORPHINE SERPL-MCNC: NEGATIVE NG/ML
CALCIUM SPEC-SCNC: 9.6 MG/DL (ref 7.7–10)
CANNABINOIDS SERPL QL: NEGATIVE
CHLORIDE SERPL-SCNC: 103 MMOL/L (ref 99–112)
CLARITY UR: CLEAR
CO2 SERPL-SCNC: 31 MMOL/L (ref 24.3–31.9)
COCAINE UR QL: NEGATIVE
COLOR UR: YELLOW
CREAT BLD-MCNC: 0.9 MG/DL (ref 0.43–1.29)
DEPRECATED RDW RBC AUTO: 36.9 FL (ref 37–54)
EOSINOPHIL # BLD AUTO: 0.16 10*3/MM3 (ref 0–0.7)
EOSINOPHIL NFR BLD AUTO: 2.3 % (ref 0–5)
ERYTHROCYTE [DISTWIDTH] IN BLOOD BY AUTOMATED COUNT: 12.1 % (ref 11.5–14.5)
ETHANOL BLD-MCNC: <10 MG/DL
ETHANOL UR QL: <0.01 %
GFR SERPL CREATININE-BSD FRML MDRD: 90 ML/MIN/1.73
GLOBULIN UR ELPH-MCNC: 3 GM/DL
GLUCOSE BLD-MCNC: 105 MG/DL (ref 70–110)
GLUCOSE UR STRIP-MCNC: NEGATIVE MG/DL
HCT VFR BLD AUTO: 42.7 % (ref 42–52)
HGB BLD-MCNC: 14.8 G/DL (ref 14–18)
HGB UR QL STRIP.AUTO: ABNORMAL
HYALINE CASTS UR QL AUTO: ABNORMAL /LPF
IMM GRANULOCYTES # BLD: 0 10*3/MM3 (ref 0–0.03)
IMM GRANULOCYTES NFR BLD: 0 % (ref 0–0.5)
KETONES UR QL STRIP: NEGATIVE
LEUKOCYTE ESTERASE UR QL STRIP.AUTO: NEGATIVE
LYMPHOCYTES # BLD AUTO: 1.23 10*3/MM3 (ref 1–3)
LYMPHOCYTES NFR BLD AUTO: 17.3 % (ref 21–51)
MCH RBC QN AUTO: 29 PG (ref 27–33)
MCHC RBC AUTO-ENTMCNC: 34.7 G/DL (ref 33–37)
MCV RBC AUTO: 83.7 FL (ref 80–94)
METHADONE UR QL SCN: NEGATIVE
MONOCYTES # BLD AUTO: 0.63 10*3/MM3 (ref 0.1–0.9)
MONOCYTES NFR BLD AUTO: 8.9 % (ref 0–10)
NEUTROPHILS # BLD AUTO: 5.04 10*3/MM3 (ref 1.4–6.5)
NEUTROPHILS NFR BLD AUTO: 71.1 % (ref 30–70)
NITRITE UR QL STRIP: NEGATIVE
OPIATES UR QL: NEGATIVE
OSMOLALITY SERPL CALC.SUM OF ELEC: 279.6 MOSM/KG (ref 273–305)
OXYCODONE UR QL SCN: NEGATIVE
PCP UR QL SCN: NEGATIVE
PH UR STRIP.AUTO: 6.5 [PH] (ref 5–8)
PLATELET # BLD AUTO: 172 10*3/MM3 (ref 130–400)
PMV BLD AUTO: 9.9 FL (ref 6–10)
POTASSIUM BLD-SCNC: 4.4 MMOL/L (ref 3.5–5.3)
PROT SERPL-MCNC: 7.9 G/DL (ref 6–8)
PROT UR QL STRIP: NEGATIVE
RBC # BLD AUTO: 5.1 10*6/MM3 (ref 4.7–6.1)
RBC # UR: ABNORMAL /HPF
REF LAB TEST METHOD: ABNORMAL
SODIUM BLD-SCNC: 141 MMOL/L (ref 135–153)
SP GR UR STRIP: 1.01 (ref 1–1.03)
SQUAMOUS #/AREA URNS HPF: ABNORMAL /HPF
UROBILINOGEN UR QL STRIP: ABNORMAL
WBC NRBC COR # BLD: 7.09 10*3/MM3 (ref 4.5–12.5)
WBC UR QL AUTO: ABNORMAL /HPF

## 2017-07-29 PROCEDURE — 80307 DRUG TEST PRSMV CHEM ANLYZR: CPT | Performed by: PHYSICIAN ASSISTANT

## 2017-07-29 PROCEDURE — 80053 COMPREHEN METABOLIC PANEL: CPT | Performed by: PHYSICIAN ASSISTANT

## 2017-07-29 PROCEDURE — 85025 COMPLETE CBC W/AUTO DIFF WBC: CPT | Performed by: PHYSICIAN ASSISTANT

## 2017-07-29 PROCEDURE — 99284 EMERGENCY DEPT VISIT MOD MDM: CPT

## 2017-07-29 PROCEDURE — 81001 URINALYSIS AUTO W/SCOPE: CPT | Performed by: PHYSICIAN ASSISTANT

## 2017-07-29 RX ORDER — MIRTAZAPINE 15 MG/1
15 TABLET, FILM COATED ORAL NIGHTLY
Qty: 30 TABLET | Refills: 0 | Status: ON HOLD | OUTPATIENT
Start: 2017-07-29 | End: 2017-08-15

## 2017-07-31 ENCOUNTER — TELEPHONE (OUTPATIENT)
Dept: PSYCHIATRY | Facility: HOSPITAL | Age: 47
End: 2017-07-31

## 2017-08-01 ENCOUNTER — OFFICE VISIT (OUTPATIENT)
Dept: PSYCHIATRY | Facility: CLINIC | Age: 47
End: 2017-08-01

## 2017-08-01 DIAGNOSIS — F41.1 GENERALIZED ANXIETY DISORDER: Primary | ICD-10-CM

## 2017-08-01 DIAGNOSIS — F41.0 PANIC DISORDER: ICD-10-CM

## 2017-08-01 DIAGNOSIS — F33.1 MAJOR DEPRESSIVE DISORDER, RECURRENT EPISODE, MODERATE (HCC): ICD-10-CM

## 2017-08-01 PROCEDURE — 90837 PSYTX W PT 60 MINUTES: CPT | Performed by: SOCIAL WORKER

## 2017-08-02 ENCOUNTER — TELEPHONE (OUTPATIENT)
Dept: PSYCHIATRY | Facility: CLINIC | Age: 47
End: 2017-08-02

## 2017-08-02 ENCOUNTER — OFFICE VISIT (OUTPATIENT)
Dept: PSYCHIATRY | Facility: CLINIC | Age: 47
End: 2017-08-02

## 2017-08-02 VITALS
SYSTOLIC BLOOD PRESSURE: 151 MMHG | HEART RATE: 62 BPM | WEIGHT: 192 LBS | BODY MASS INDEX: 26.88 KG/M2 | HEIGHT: 71 IN | DIASTOLIC BLOOD PRESSURE: 92 MMHG

## 2017-08-02 DIAGNOSIS — F41.9 ANXIETY AND DEPRESSION: Primary | ICD-10-CM

## 2017-08-02 DIAGNOSIS — F32.A ANXIETY AND DEPRESSION: Primary | ICD-10-CM

## 2017-08-02 PROCEDURE — 99213 OFFICE O/P EST LOW 20 MIN: CPT | Performed by: NURSE PRACTITIONER

## 2017-08-02 RX ORDER — BUSPIRONE HYDROCHLORIDE 15 MG/1
30 TABLET ORAL 2 TIMES DAILY
COMMUNITY
Start: 2017-07-29 | End: 2017-08-15

## 2017-08-02 RX ORDER — PANTOPRAZOLE SODIUM 40 MG/1
40 TABLET, DELAYED RELEASE ORAL DAILY
COMMUNITY
Start: 2017-07-29 | End: 2020-03-30 | Stop reason: SDUPTHER

## 2017-08-02 RX ORDER — RISPERIDONE 1 MG/1
TABLET ORAL
Qty: 30 TABLET | Refills: 0 | Status: SHIPPED | OUTPATIENT
Start: 2017-08-02 | End: 2017-08-21 | Stop reason: HOSPADM

## 2017-08-02 NOTE — PROGRESS NOTES
"PROGRESS NOTE  Data:  Amish Win is a 47 y.o. male who met 1:1 with Izaiah Tinoco LCSW,JOAQUIN for impromptu psychotherapy session per the patient's request at the [facility] on 08/01/2017 from 5:10 PM to 6:00 PM.  Patient reports he experienced significantly increased symptoms of anxiety which resulted in him taking a week's vacation from work.  He also reports he presented at the emergency room on 7/29/2017 but states he was not admitted.  Patient reports ongoing significant stress at home and states his teenage son is scheduled to be released from [facility] the day following this session from and admittance due to the patient's ongoing agitation and violent behavior.  The patient states he is fearful his life will unravel and states he will likely lose his job if he is unable to return the following week.    HPI: Patient states \"I feel like I'm losing it\".  Patient continues to struggle with significant anxiety and panic disorder and has had a recent exacerbation of symptoms.  The patient reports he continues to lose weight and states he has lost approximately 40 pounds in the last 6-8 months.  He reports he continues to have multiple panic attacks weekly and states on his last day of work he had one every hour.  Patient also reports significant obsessive worry concerning his health and states he is always worried about having a heart attack although he was recently cleared by a cardiologist.  Patient also presents with self harming behavior as he impulsively hits himself and picks at his skin including pulling hair out of his arms and legs.  Patient states he also reports he His Collarbone Which in Some Way Reassures Him He Is Okay.  The patient reveals a relatively large bruise in the Center-left part of his chest and states this is due to repeatedly hitting himself.  Patient states he has a specific fear of having a heart attack.  Patient also reports frequent crying spells and " becomes tearful multiple times throughout the session.  The patient also reports he fears having to tell his wife he has an appointment today following this session and she will become angry that he is unable to travel with her to [place] to  her son.  The patient also continues to struggle with depressed mood, anhedonia, anergia, feeling hopeless and helpless, feeling useless, low self-esteem, and increased social isolation.  Patient reports he continues to feel extremely overwhelmed, increased heart rate, feeling lightheaded, mind goes blank, muscle tension, a sense of being hot, sweating, and ongoing crying spells.  The patient states he feels he is losing it which is reinforced by his wife's comments on a regular basis.  Patient rates current symptoms of depression at 7 and anxiety at a 9 on a scale of 1-10 with 10 being most severe.  Patient reports he continues to adhere to medication regimen as prescribed.  The patient denies any overt suicidal ideation, however, he continues to compulsively engage in self harming behavior.  The patient vehemently denies any substance use.    Clinical Maneuvering/Intervention:   Assisted patient in processing above session content; acknowledged and normalized patient’s thoughts, feelings, and concerns.  Allowed the patient to discuss/vent his feelings and fears concerning his current issues and validated his feelings.  Also assisted the patient in identifying and utilizing positive coping mechanisms to decrease severity and frequency of symptoms including deep breathing exercises, relaxation techniques, thought blocking techniques, and finding activities he can engage in to avoid obsessive thoughts.  Also utilize motivational interviewing techniques including complex reflections to assist the patient in recognizing there is likely fundamental structural change in his life that is required to make substantial positive impact.  Encouraged the patient to keep his  appointment the following day with KEAGAN Vasquez.  Also assisted the patient in developing specific plan to decrease his symptoms throughout the night until his appointment including spending time with his family, finding and activity he can engage in to keep his mind occupied including playing a game on his phone, and trying to get as much sleep as possible.  The undersigned also sent a message to KEAGAN Vasquez concerning the patient's current condition.  Provided unconditional positive regard in a safe, supportive environment.    Allowed patient to freely discuss issues without interruption or judgment. Provided safe, confidential environment to facilitate the development of positive therapeutic relationship and encourage open, honest communication. Assisted patient in identifying risk factors which would indicate the need for higher level of care including thoughts to harm self or others and/or self-harming behavior and encouraged patient to contact this office, call 911, or present to the nearest emergency room should any of these events occur. Discussed crisis intervention services and means to access.  Patient adamantly and convincingly denies current suicidal or homicidal ideation or perceptual disturbance.    Assessment     Diagnoses and all orders for this visit:    Generalized anxiety disorder    Panic disorder    Major depressive disorder, recurrent episode, moderate                 Mental Status Exam  Hygiene:  fair  Dress:  disheveled  Attitude:  Cooperative  Motor Activity:  Restless  Speech:  Rambling  Mood:  anxious and depressed  Affect:  depressed, anxious and tearful  Thought Processes:  Obsessive   Thought Content:  tangential  Suicidal Thoughts:  denies  Homicidal Thoughts:  denies  Crisis Safety Plan: yes, to come to the emergency room.  Hallucinations:  denies    Patient's Support Network Includes:  significant other and children    Functional assessment: Patient is struggling  with significantly increased symptoms which is causing significant impairment in important areas of functioning including work, interpersonal, and social domains.  As a result, the patient would likely be at increased risk for decompensation and further negative outcomes without ongoing treatment.    Prognosis: Fair with ongoing treatment    Plan         The patient will keep his appointment on 8/2/2017 with KEAGAN Vasquez and follow recommendations.  Patient will continue in individual outpatient psychotherapy sessions every 1-2 weeks at the [facility].  Patient will adhere to medication regimen as prescribed and report any side effects. Patient will contact this office, call 911 or present to the nearest emergency room should suicidal or homicidal ideations occur. Provide Cognitive Behavioral Therapy and Solution Focused Therapy to improve functioning, maintain stability, and avoid decompensation and the need for higher level of care.          Return in about 1 week (around 08/08/2017) for Next scheduled follow up.    Izaiah Tinoco, FAUSTO,[facility]

## 2017-08-02 NOTE — PROGRESS NOTES
"  Subjective   Amish Win is a 47 y.o. male is here for an urgent visit after being referred by his therapist for increased anxiety.     Chief Complaint: Anxiety      History of Present Illness  He states he has been beating and biting self, he states that he hasn't been sleeping.  He states that he has had a bunch of phobias, afraid that he is dying with heart attack although he has had a cardiac workup with no significant issues reported.  He states that he feels at a loss, don't want to watch TV anymore, picking at self.  He states that he keeps seeing in his subconscious a casket moving by him, he had a friend dye over the weekend.  He states that has more anxiety than crying spells.  He has not been able to eat, lost 10 pounds since last week.  He states that his stomach and indigestion has been so bad that he can't eat.  He states that he took off work because he hade been hitting self in front of customers. He states that he feels like he has gotten to a breaking point.  He shares that he has been having problems with sleep, his remeron was decreased but he has not been able to stay asleep, getting between 4-6 hours of sleep per night, reports NM of past events.  He states that he has been seeing images in the carpet.  He shares that he is taking the diazepam but is short-lived, feels ok for about an hour but then anxiety has returned.  Maximized dosing of buspar, lexapro, - shares that his taking the lower dose of remeron.  He states that he has anxiety 9/10 and depression 7/10 with 10 being the worse.  No history of suicide attempts, no family history of suicide.  He denies any active SI/HI, shares that he wants to just hurt self to make self feel better.  He states that he is afraid of what he will do to himself \"beyond this\" (displaying his arms) but again denies any current SI/HI.  Discussed case with Dr Blackmon who recommended that patient be referred to [facility] as the less restrictive " "environment.     The following portions of the patient's history were reviewed and updated as appropriate: allergies, current medications, past family history, past medical history, past social history, past surgical history and problem list.    Review of Systems   Constitutional: Positive for appetite change. Negative for chills, diaphoresis, fatigue, fever and unexpected weight change.   HENT: Negative for hearing loss, sore throat, trouble swallowing and voice change.    Eyes: Negative for photophobia and visual disturbance.   Respiratory: Negative for cough, chest tightness and shortness of breath.    Cardiovascular: Negative for chest pain and palpitations.   Gastrointestinal: Negative for abdominal pain, constipation, nausea and vomiting.        \"gastric distress, rumbling, grumbling\".    Endocrine: Negative for cold intolerance and heat intolerance.   Genitourinary: Negative for dysuria and frequency.   Musculoskeletal: Negative for arthralgias, back pain, joint swelling and neck stiffness.   Skin: Negative for color change and wound.        Self inflicted bite marks/lacs to left arm. Self inflicted bruising to chest area.   Allergic/Immunologic: Negative for environmental allergies and immunocompromised state.   Neurological: Negative for dizziness, tremors, seizures, syncope, weakness, light-headedness and headaches.   Hematological: Negative for adenopathy. Does not bruise/bleed easily.   Psychiatric/Behavioral: Positive for decreased concentration, dysphoric mood, hallucinations, self-injury, sleep disturbance and suicidal ideas. The patient is nervous/anxious.        Objective   Physical Exam   Constitutional: He appears well-developed and well-nourished. No distress.   Neurological: He is alert. Coordination and gait normal.   Vitals reviewed.    Blood pressure 151/92, pulse 62, height 71\" (180.3 cm), weight 192 lb (87.1 kg).    Medication List:   Current Outpatient Prescriptions   Medication Sig " Dispense Refill   • aspirin 81 MG EC tablet Take 81 mg by mouth Daily.     • atenolol (TENORMIN) 50 MG tablet Take 1 tablet by mouth 2 (Two) Times a Day. 60 tablet 1   • busPIRone (BUSPAR) 15 MG tablet 30 mg 2 (Two) Times a Day.     • cetirizine (zyrTEC) 10 MG tablet Take 1 tablet by mouth Daily. 14 tablet 0   • diazePAM (VALIUM) 5 MG tablet Take 1 tablet by mouth 2 (Two) Times a Day. 60 tablet 0   • escitalopram (LEXAPRO) 20 MG tablet Take 1 tablet by mouth Daily. 30 tablet 1   • lansoprazole (PREVACID) 30 MG capsule Take 1 capsule by mouth Daily. 30 capsule 5   • mirtazapine (REMERON) 15 MG tablet Take 1 tablet by mouth Every Night. 30 tablet 0   • mirtazapine (REMERON) 30 MG tablet Take 1 tablet by mouth Every Night. 30 tablet 1   • Multiple Vitamin (MULTI VITAMIN PO) Take  by mouth.     • pantoprazole (PROTONIX) 40 MG EC tablet      • simvastatin (ZOCOR) 20 MG tablet Take 20 mg by mouth daily.     • vitamin D (ERGOCALCIFEROL) 59835 UNITS capsule capsule Take 50,000 Units by mouth 1 (One) Time Per Week.     • risperiDONE (RISPERDAL) 1 MG tablet Take 1/2 tablet by mouth twice daily 30 tablet 0     No current facility-administered medications for this visit.        Mental Status Exam:   Hygiene:   fair  Cooperation:  Cooperative  Eye Contact:  Fair  Psychomotor Behavior:  Restless  Affect:  Restricted  Hopelessness: 7  Speech:  Monotone and Rambling  Thought Process:  Goal directed and Linear  Thought Content:  Mood congurent  Suicidal:  Death wish  Homicidal:  None  Hallucinations:  Visual  Delusion:  Paranoid  Memory:  Intact  Orientation:  Person, Place, Time and Situation  Reliability:  poor  Insight:  Poor  Judgement:  Poor  Impulse Control:  Poor  Physical/Medical Issues:  Yes GERD, self inflicted markings    Assessment/Plan   Diagnoses and all orders for this visit:    Anxiety and depression    Other orders  -     risperiDONE (RISPERDAL) 1 MG tablet; Take 1/2 tablet by mouth twice daily      Discussed course  of treatment with Dr. Blackmon who feels admission to hospital is not appropriate at this time. Contacted [facility] for possible evaluation and has opening for a bed tomorrow morning and is available to meet with patient tomorrow morning at 10am to be admitted. Discussed medication options. Continue all prescribed medications and will add low dose of Risperdal to help with anxiety related symptoms and hallucinations. Reviewed the risks, benefits, and side effects of the medications; patient acknowledged and verbally consented.  Patient is agreeable to call the [facility].  Patient is aware to call 911 or go to the nearest ER should begin having SI/HI.

## 2017-08-07 ENCOUNTER — HOSPITAL ENCOUNTER (EMERGENCY)
Facility: HOSPITAL | Age: 47
Discharge: HOME | End: 2017-08-07
Admitting: EMERGENCY MEDICINE

## 2017-08-07 ENCOUNTER — TELEPHONE (OUTPATIENT)
Dept: PSYCHIATRY | Facility: CLINIC | Age: 47
End: 2017-08-07

## 2017-08-07 VITALS
HEART RATE: 70 BPM | TEMPERATURE: 97.8 F | OXYGEN SATURATION: 99 % | WEIGHT: 197 LBS | BODY MASS INDEX: 27.58 KG/M2 | SYSTOLIC BLOOD PRESSURE: 152 MMHG | DIASTOLIC BLOOD PRESSURE: 98 MMHG | RESPIRATION RATE: 16 BRPM | HEIGHT: 71 IN

## 2017-08-07 DIAGNOSIS — R07.9 CHEST PAIN, UNSPECIFIED TYPE: Primary | ICD-10-CM

## 2017-08-07 LAB
ALBUMIN SERPL-MCNC: 4.7 G/DL (ref 3.5–5)
ALBUMIN/GLOB SERPL: 1.4 G/DL (ref 1.5–2.5)
ALP SERPL-CCNC: 73 U/L (ref 40–129)
ALT SERPL W P-5'-P-CCNC: 20 U/L (ref 10–44)
ANION GAP SERPL CALCULATED.3IONS-SCNC: 2.7 MMOL/L (ref 3.6–11.2)
AST SERPL-CCNC: 28 U/L (ref 10–34)
BACTERIA UR QL AUTO: ABNORMAL /HPF
BASOPHILS # BLD AUTO: 0.01 10*3/MM3 (ref 0–0.3)
BASOPHILS NFR BLD AUTO: 0.1 % (ref 0–2)
BILIRUB SERPL-MCNC: 0.9 MG/DL (ref 0.2–1.8)
BILIRUB UR QL STRIP: NEGATIVE
BUN BLD-MCNC: 9 MG/DL (ref 7–21)
BUN/CREAT SERPL: 10.6 (ref 7–25)
CALCIUM SPEC-SCNC: 9.9 MG/DL (ref 7.7–10)
CHLORIDE SERPL-SCNC: 103 MMOL/L (ref 99–112)
CLARITY UR: CLEAR
CO2 SERPL-SCNC: 34.3 MMOL/L (ref 24.3–31.9)
COLOR UR: YELLOW
CREAT BLD-MCNC: 0.85 MG/DL (ref 0.43–1.29)
DEPRECATED RDW RBC AUTO: 37.3 FL (ref 37–54)
EOSINOPHIL # BLD AUTO: 0.23 10*3/MM3 (ref 0–0.7)
EOSINOPHIL NFR BLD AUTO: 3.2 % (ref 0–5)
ERYTHROCYTE [DISTWIDTH] IN BLOOD BY AUTOMATED COUNT: 12.1 % (ref 11.5–14.5)
GFR SERPL CREATININE-BSD FRML MDRD: 96 ML/MIN/1.73
GLOBULIN UR ELPH-MCNC: 3.3 GM/DL
GLUCOSE BLD-MCNC: 115 MG/DL (ref 70–110)
GLUCOSE UR STRIP-MCNC: NEGATIVE MG/DL
HCT VFR BLD AUTO: 43.5 % (ref 42–52)
HGB BLD-MCNC: 14.8 G/DL (ref 14–18)
HGB UR QL STRIP.AUTO: ABNORMAL
HYALINE CASTS UR QL AUTO: ABNORMAL /LPF
IMM GRANULOCYTES # BLD: 0.02 10*3/MM3 (ref 0–0.03)
IMM GRANULOCYTES NFR BLD: 0.3 % (ref 0–0.5)
KETONES UR QL STRIP: NEGATIVE
LEUKOCYTE ESTERASE UR QL STRIP.AUTO: NEGATIVE
LYMPHOCYTES # BLD AUTO: 1.36 10*3/MM3 (ref 1–3)
LYMPHOCYTES NFR BLD AUTO: 18.9 % (ref 21–51)
MCH RBC QN AUTO: 28.9 PG (ref 27–33)
MCHC RBC AUTO-ENTMCNC: 34 G/DL (ref 33–37)
MCV RBC AUTO: 85 FL (ref 80–94)
MONOCYTES # BLD AUTO: 0.63 10*3/MM3 (ref 0.1–0.9)
MONOCYTES NFR BLD AUTO: 8.8 % (ref 0–10)
NEUTROPHILS # BLD AUTO: 4.93 10*3/MM3 (ref 1.4–6.5)
NEUTROPHILS NFR BLD AUTO: 68.7 % (ref 30–70)
NITRITE UR QL STRIP: NEGATIVE
OSMOLALITY SERPL CALC.SUM OF ELEC: 279 MOSM/KG (ref 273–305)
PH UR STRIP.AUTO: 6.5 [PH] (ref 5–8)
PLATELET # BLD AUTO: 164 10*3/MM3 (ref 130–400)
PMV BLD AUTO: 9.8 FL (ref 6–10)
POTASSIUM BLD-SCNC: 3.8 MMOL/L (ref 3.5–5.3)
PROT SERPL-MCNC: 8 G/DL (ref 6–8)
PROT UR QL STRIP: NEGATIVE
RBC # BLD AUTO: 5.12 10*6/MM3 (ref 4.7–6.1)
RBC # UR: ABNORMAL /HPF
REF LAB TEST METHOD: ABNORMAL
SODIUM BLD-SCNC: 140 MMOL/L (ref 135–153)
SP GR UR STRIP: 1.01 (ref 1–1.03)
SQUAMOUS #/AREA URNS HPF: ABNORMAL /HPF
TROPONIN I SERPL-MCNC: <0.006 NG/ML
UROBILINOGEN UR QL STRIP: ABNORMAL
WBC NRBC COR # BLD: 7.18 10*3/MM3 (ref 4.5–12.5)
WBC UR QL AUTO: ABNORMAL /HPF

## 2017-08-07 PROCEDURE — 93010 ELECTROCARDIOGRAM REPORT: CPT | Performed by: INTERNAL MEDICINE

## 2017-08-07 PROCEDURE — 85025 COMPLETE CBC W/AUTO DIFF WBC: CPT | Performed by: NURSE PRACTITIONER

## 2017-08-07 PROCEDURE — 93005 ELECTROCARDIOGRAM TRACING: CPT | Performed by: NURSE PRACTITIONER

## 2017-08-07 PROCEDURE — 81003 URINALYSIS AUTO W/O SCOPE: CPT | Performed by: NURSE PRACTITIONER

## 2017-08-07 PROCEDURE — 36415 COLL VENOUS BLD VENIPUNCTURE: CPT

## 2017-08-07 PROCEDURE — 84484 ASSAY OF TROPONIN QUANT: CPT | Performed by: NURSE PRACTITIONER

## 2017-08-07 PROCEDURE — 99283 EMERGENCY DEPT VISIT LOW MDM: CPT

## 2017-08-07 PROCEDURE — 81001 URINALYSIS AUTO W/SCOPE: CPT | Performed by: NURSE PRACTITIONER

## 2017-08-07 PROCEDURE — 80053 COMPREHEN METABOLIC PANEL: CPT | Performed by: NURSE PRACTITIONER

## 2017-08-07 NOTE — TELEPHONE ENCOUNTER
I have only seen this patient twice, I really don't know his history.  I did write him an excuse for 2 days last week and sent him to [facility].  Please see if Izaiah is agreeable to write an excuse since he is more familiar with the case.

## 2017-08-07 NOTE — ED PROVIDER NOTES
Subjective   Patient is a 47 y.o. male presenting with chest pain.   History provided by:  Patient   used: No    Chest Pain   Chest pain location: Patient denies pain. States he has had some episodes of what feels like palpitations.  Pain quality: not aching, not burning, not crushing, not dull, not hot, no pressure, not radiating, not sharp, not shooting, not stabbing, not tearing, not throbbing and no tightness    Pain radiates to:  Does not radiate  Pain severity:  Mild  Onset quality:  Sudden  Duration:  1 day  Timing:  Constant  Progression:  Waxing and waning  Chronicity:  New  Context: not breathing, not drug use, not eating, not intercourse, not lifting, not movement, not at rest, not stress and not trauma    Relieved by:  Nothing  Worsened by:  Nothing  Ineffective treatments:  None tried  Associated symptoms: no abdominal pain, no AICD problem, no altered mental status, no anorexia, no back pain, no claudication, no cough, no diaphoresis, no dizziness, no dysphagia, no fatigue, no fever, no heartburn, no lower extremity edema, no near-syncope, no numbness, no orthopnea, no palpitations, no shortness of breath, no syncope, no vomiting and no weakness    Risk factors: no aortic disease, no birth control, no high cholesterol, no hypertension, not obese and not pregnant        Review of Systems   Constitutional: Negative.  Negative for diaphoresis, fatigue and fever.   HENT: Negative.  Negative for trouble swallowing.    Eyes: Negative.    Respiratory: Negative.  Negative for cough and shortness of breath.    Cardiovascular: Positive for chest pain. Negative for palpitations, orthopnea, claudication, syncope and near-syncope.   Gastrointestinal: Negative.  Negative for abdominal pain, anorexia, heartburn and vomiting.   Endocrine: Negative.    Genitourinary: Negative.    Musculoskeletal: Negative.  Negative for back pain.   Skin: Negative.    Allergic/Immunologic: Negative.    Neurological:  Negative.  Negative for dizziness, weakness and numbness.   Hematological: Negative.    Psychiatric/Behavioral: Negative.        Past Medical History:   Diagnosis Date   • Anxiety    • Depression    • GERD (gastroesophageal reflux disease)    • Hyperlipidemia    • Hypertension    • Hypertension    • Low back pain    • Self-injurious behavior        Allergies   Allergen Reactions   • Penicillins        Past Surgical History:   Procedure Laterality Date   • CLAVICLE SURGERY         Family History   Problem Relation Age of Onset   • Depression Father    • Anxiety disorder Father    • Alcohol abuse Father    • No Known Problems Daughter    • No Known Problems Son        Social History     Social History   • Marital status:      Spouse name: N/A   • Number of children: N/A   • Years of education: N/A     Social History Main Topics   • Smoking status: Never Smoker   • Smokeless tobacco: Never Used   • Alcohol use No   • Drug use: No   • Sexual activity: Yes     Partners: Female     Other Topics Concern   • None     Social History Narrative           Objective   Physical Exam   Constitutional: He is oriented to person, place, and time. He appears well-developed and well-nourished.   HENT:   Head: Normocephalic.   Right Ear: External ear normal.   Left Ear: External ear normal.   Mouth/Throat: Oropharynx is clear and moist.   Eyes: EOM are normal. Pupils are equal, round, and reactive to light.   Neck: Normal range of motion. Neck supple.   Cardiovascular: Normal rate and regular rhythm.    Pulmonary/Chest: Effort normal and breath sounds normal.   Abdominal: Soft. Bowel sounds are normal.   Musculoskeletal: Normal range of motion.   Neurological: He is alert and oriented to person, place, and time.   Skin: Skin is warm and dry.   Psychiatric: He has a normal mood and affect. His behavior is normal.   Nursing note and vitals reviewed.      Procedures         ED Course  ED Course                  MDM    Final  diagnoses:   Chest pain, unspecified type            Neville Fernandez, APRN  08/13/17 [id number]

## 2017-08-09 ENCOUNTER — OFFICE VISIT (OUTPATIENT)
Dept: PSYCHIATRY | Facility: CLINIC | Age: 47
End: 2017-08-09

## 2017-08-09 DIAGNOSIS — F33.1 MAJOR DEPRESSIVE DISORDER, RECURRENT EPISODE, MODERATE (HCC): ICD-10-CM

## 2017-08-09 DIAGNOSIS — F41.0 PANIC DISORDER: Primary | ICD-10-CM

## 2017-08-09 DIAGNOSIS — F41.1 GENERALIZED ANXIETY DISORDER: ICD-10-CM

## 2017-08-09 PROCEDURE — 90837 PSYTX W PT 60 MINUTES: CPT | Performed by: SOCIAL WORKER

## 2017-08-09 NOTE — PROGRESS NOTES
"PROGRESS NOTE  Data:  Amish Win is a 47 y.o. male who met 1:1 [facility] scheduled [facility]otherapy session at the [facility] on 08/09/2017 from 7:45 AM to 8:40 AM.  Patient reports he was released from the [facility] 2 days ago and states he feels although it was somewhat helpful he has not improved significantly.  He reports ongoing stress at home and states his wife told him if he was not able to return to work he needed to leave.  Patient also reports his son was recently released from [facility] in [place] and was immediately readmitted to a facility in [facility] due to significant autism.  Patient reports although he isn't sure he is able to return to work he feels he needs to attend to return this coming Saturday, 8/12/2017.     HPI: Patient reports he continues to struggle with significant symptoms of anxiety including feeling on edge, increased heart rate, palpitations, trembling, a general feeling of fear, crying spells, muscle tension, and a sense of impending doom.  The patient also reports he has a distinct fear of having a heart attack and states 2 days ago his primary care provider referred him to the emergency department as a result of an irregularity in his EKG.  However, he states he was told there was no indication of cardiac issues and sent home.  Patient reports he struggles with obsessive worry concerning his health and is adamant his blood pressure is fluctuating from normal to what he describes as \"very high\".  Patient also continues to struggle with nervous tics and taps himself on his collarbone she states his to reassure himself he is okay.  Patient also reports he continues to struggle with insomnia and states he normally wakes in the early hours of the morning and is unable to fall back asleep due to racing thoughts and a general sense of fear.  Patient reports he continues to adhere to medication regimen as prescribed.  The " patient adamantly and convincingly denies suicidal ideation and vehemently denies any substance use.      Clinical Maneuvering/Intervention:  Assisted patient in processing above session content; acknowledged and normalized patient’s thoughts, feelings, and concerns.  Allow the patient to discuss/vent his feelings and fears concerning his physical health and validated his feelings.  However, utilize rational thought process to assist the patient in identifying and acknowledging significant evidence disputing is consistent worry concerning his cardiac health including recently bleeding cleared by a local cardiologist and 2 emergency room visits with no significant results.  Also assisted the patient in identifying appropriate coping mechanisms to decrease symptoms of anxiety including positive self talk, relaxation techniques, thought blocking techniques, and finding activities he can engage in to reduce idle time and social isolation.  Also challenged the patient to consider the possibility that a great deal of distress is caused by his home environment.  Also encouraged the patient to contact his primary care provider to assess and address his hypertension.  Provided unconditional positive regard a safe, supportive environment.    Allowed patient to freely discuss issues without interruption or judgment. Provided safe, confidential environment to facilitate the development of positive therapeutic relationship and encourage open, honest communication. Assisted patient in identifying risk factors which would indicate the need for higher level of care including thoughts to harm self or others and/or self-harming behavior and encouraged patient to contact this office, call 911, or present to the nearest emergency room should any of these events occur. Discussed crisis intervention services and means to access.  Patient adamantly and convincingly denies current suicidal or homicidal ideation or perceptual  disturbance.    Assessment     Diagnoses and all orders for this visit:    Panic disorder    Generalized anxiety disorder    Major depressive disorder, recurrent episode, moderate               Mental Status Exam  Hygiene:  fair  Dress:  disheveled  Attitude:  Cooperative  Motor Activity:  Restless  Speech:  Normal  Mood:  anxious  Affect:  very anxious  Thought Processes:  Obsessive  Thought Content:  normal  Suicidal Thoughts:  denies  Homicidal Thoughts:  denies  Crisis Safety Plan: yes, to come to the emergency room.  Hallucinations:  denies    Patient's Support Network Includes:  children    Progress toward goal: The patient continues to struggle with significant symptoms as evidence by his inability to work at this time    Functional assessment: The patient's symptomology continues to cause significant impairment important areas of functioning as evidenced by his inability to maintain employment at this time.  As a result, the patient would likely be at increased risk for decompensation without ongoing treatment.    Prognosis: Guarded    Plan         The patient will continue in individual outpatient psychotherapy sessions at [facility] every 2-3 weeks and pharmacotherapy as scheduled.  Patient will contact his primary care provider to schedule appointment to assess and treat his ongoing perceived hypertension.  Patient will adhere to medication regimen as prescribed and report any side effects. Patient will contact this office, call 911 or present to the nearest emergency room should suicidal or homicidal ideations occur. Provide Cognitive Behavioral Therapy and Solution Focused Therapy to improve functioning, maintain stability, and avoid decompensation and the need for higher level of care.          Return in about 3 weeks (around 08/30/2017) for Next scheduled follow up.    Izaiah Tinoco LCSW,[facility]

## 2017-08-15 ENCOUNTER — HOSPITAL ENCOUNTER (INPATIENT)
Facility: HOSPITAL | Age: 47
LOS: 6 days | Discharge: HOME | End: 2017-08-21
Attending: PSYCHIATRY & NEUROLOGY | Admitting: PSYCHIATRY & NEUROLOGY

## 2017-08-15 ENCOUNTER — HOSPITAL ENCOUNTER (EMERGENCY)
Facility: HOSPITAL | Age: 47
Discharge: PSYCHIATRIC OR SUBSTANCE USE FACILITY | End: 2017-08-15
Attending: FAMILY MEDICINE | Admitting: FAMILY MEDICINE

## 2017-08-15 VITALS
HEART RATE: 66 BPM | TEMPERATURE: 97.4 F | RESPIRATION RATE: 16 BRPM | OXYGEN SATURATION: 100 % | DIASTOLIC BLOOD PRESSURE: 81 MMHG | HEIGHT: 71 IN | WEIGHT: 192 LBS | BODY MASS INDEX: 26.88 KG/M2 | SYSTOLIC BLOOD PRESSURE: 141 MMHG

## 2017-08-15 DIAGNOSIS — R45.851 DEPRESSION WITH SUICIDAL IDEATION: Primary | ICD-10-CM

## 2017-08-15 DIAGNOSIS — F32.A DEPRESSION WITH SUICIDAL IDEATION: Primary | ICD-10-CM

## 2017-08-15 PROBLEM — F32.9 MDD (MAJOR DEPRESSIVE DISORDER): Status: ACTIVE | Noted: 2017-08-15

## 2017-08-15 LAB
6-ACETYL MORPHINE: NEGATIVE
ALBUMIN SERPL-MCNC: 4.7 G/DL (ref 3.5–5)
ALBUMIN/GLOB SERPL: 1.4 G/DL (ref 1.5–2.5)
ALP SERPL-CCNC: 77 U/L (ref 40–129)
ALT SERPL W P-5'-P-CCNC: 24 U/L (ref 10–44)
AMPHET+METHAMPHET UR QL: NEGATIVE
ANION GAP SERPL CALCULATED.3IONS-SCNC: 6.7 MMOL/L (ref 3.6–11.2)
AST SERPL-CCNC: 25 U/L (ref 10–34)
BACTERIA UR QL AUTO: ABNORMAL /HPF
BARBITURATES UR QL SCN: NEGATIVE
BASOPHILS # BLD AUTO: 0.03 10*3/MM3 (ref 0–0.3)
BASOPHILS NFR BLD AUTO: 0.5 % (ref 0–2)
BENZODIAZ UR QL SCN: POSITIVE
BILIRUB SERPL-MCNC: 1.3 MG/DL (ref 0.2–1.8)
BILIRUB UR QL STRIP: NEGATIVE
BUN BLD-MCNC: 7 MG/DL (ref 7–21)
BUN/CREAT SERPL: 8.2 (ref 7–25)
BUPRENORPHINE SERPL-MCNC: NEGATIVE NG/ML
CALCIUM SPEC-SCNC: 9.8 MG/DL (ref 7.7–10)
CANNABINOIDS SERPL QL: NEGATIVE
CHLORIDE SERPL-SCNC: 100 MMOL/L (ref 99–112)
CLARITY UR: CLEAR
CO2 SERPL-SCNC: 31.3 MMOL/L (ref 24.3–31.9)
COCAINE UR QL: NEGATIVE
COLOR UR: YELLOW
CREAT BLD-MCNC: 0.85 MG/DL (ref 0.43–1.29)
DEPRECATED RDW RBC AUTO: 36.3 FL (ref 37–54)
EOSINOPHIL # BLD AUTO: 0.43 10*3/MM3 (ref 0–0.7)
EOSINOPHIL NFR BLD AUTO: 7.7 % (ref 0–5)
ERYTHROCYTE [DISTWIDTH] IN BLOOD BY AUTOMATED COUNT: 12 % (ref 11.5–14.5)
ETHANOL BLD-MCNC: <10 MG/DL
ETHANOL UR QL: <0.01 %
GFR SERPL CREATININE-BSD FRML MDRD: 96 ML/MIN/1.73
GLOBULIN UR ELPH-MCNC: 3.3 GM/DL
GLUCOSE BLD-MCNC: 205 MG/DL (ref 70–110)
GLUCOSE UR STRIP-MCNC: NEGATIVE MG/DL
HCT VFR BLD AUTO: 41.9 % (ref 42–52)
HGB BLD-MCNC: 14.2 G/DL (ref 14–18)
HGB UR QL STRIP.AUTO: ABNORMAL
HYALINE CASTS UR QL AUTO: ABNORMAL /LPF
IMM GRANULOCYTES # BLD: 0.01 10*3/MM3 (ref 0–0.03)
IMM GRANULOCYTES NFR BLD: 0.2 % (ref 0–0.5)
KETONES UR QL STRIP: NEGATIVE
LEUKOCYTE ESTERASE UR QL STRIP.AUTO: NEGATIVE
LYMPHOCYTES # BLD AUTO: 1.13 10*3/MM3 (ref 1–3)
LYMPHOCYTES NFR BLD AUTO: 20.1 % (ref 21–51)
MCH RBC QN AUTO: 28.5 PG (ref 27–33)
MCHC RBC AUTO-ENTMCNC: 33.9 G/DL (ref 33–37)
MCV RBC AUTO: 84 FL (ref 80–94)
METHADONE UR QL SCN: NEGATIVE
MONOCYTES # BLD AUTO: 0.28 10*3/MM3 (ref 0.1–0.9)
MONOCYTES NFR BLD AUTO: 5 % (ref 0–10)
NEUTROPHILS # BLD AUTO: 3.74 10*3/MM3 (ref 1.4–6.5)
NEUTROPHILS NFR BLD AUTO: 66.5 % (ref 30–70)
NITRITE UR QL STRIP: NEGATIVE
OPIATES UR QL: NEGATIVE
OSMOLALITY SERPL CALC.SUM OF ELEC: 279.6 MOSM/KG (ref 273–305)
OXYCODONE UR QL SCN: NEGATIVE
PCP UR QL SCN: NEGATIVE
PH UR STRIP.AUTO: 7.5 [PH] (ref 5–8)
PLATELET # BLD AUTO: 180 10*3/MM3 (ref 130–400)
PMV BLD AUTO: 9.5 FL (ref 6–10)
POTASSIUM BLD-SCNC: 3.7 MMOL/L (ref 3.5–5.3)
PROT SERPL-MCNC: 8 G/DL (ref 6–8)
PROT UR QL STRIP: NEGATIVE
RBC # BLD AUTO: 4.99 10*6/MM3 (ref 4.7–6.1)
RBC # UR: ABNORMAL /HPF
REF LAB TEST METHOD: ABNORMAL
SODIUM BLD-SCNC: 138 MMOL/L (ref 135–153)
SP GR UR STRIP: 1.01 (ref 1–1.03)
SQUAMOUS #/AREA URNS HPF: ABNORMAL /HPF
UROBILINOGEN UR QL STRIP: ABNORMAL
WBC NRBC COR # BLD: 5.62 10*3/MM3 (ref 4.5–12.5)
WBC UR QL AUTO: ABNORMAL /HPF

## 2017-08-15 RX ORDER — ALUMINA, MAGNESIA, AND SIMETHICONE 2400; 2400; 240 MG/30ML; MG/30ML; MG/30ML
15 SUSPENSION ORAL EVERY 6 HOURS PRN
Status: DISCONTINUED | OUTPATIENT
Start: 2017-08-15 | End: 2017-08-21 | Stop reason: HOSPADM

## 2017-08-15 RX ORDER — DIAZEPAM 5 MG/1
5 TABLET ORAL 2 TIMES DAILY
Status: DISCONTINUED | OUTPATIENT
Start: 2017-08-15 | End: 2017-08-21 | Stop reason: HOSPADM

## 2017-08-15 RX ORDER — IBUPROFEN 400 MG/1
400 TABLET ORAL EVERY 6 HOURS PRN
Status: DISCONTINUED | OUTPATIENT
Start: 2017-08-15 | End: 2017-08-21 | Stop reason: HOSPADM

## 2017-08-15 RX ORDER — BUSPIRONE HYDROCHLORIDE 15 MG/1
15 TABLET ORAL 3 TIMES DAILY
COMMUNITY
End: 2017-09-20 | Stop reason: SDUPTHER

## 2017-08-15 RX ORDER — THIAMINE HCL 50 MG
100 TABLET ORAL DAILY
Status: DISCONTINUED | OUTPATIENT
Start: 2017-08-15 | End: 2017-08-21 | Stop reason: HOSPADM

## 2017-08-15 RX ORDER — BENZONATATE 100 MG/1
100 CAPSULE ORAL 3 TIMES DAILY PRN
Status: DISCONTINUED | OUTPATIENT
Start: 2017-08-15 | End: 2017-08-21 | Stop reason: HOSPADM

## 2017-08-15 RX ORDER — ECHINACEA PURPUREA EXTRACT 125 MG
2 TABLET ORAL AS NEEDED
Status: DISCONTINUED | OUTPATIENT
Start: 2017-08-15 | End: 2017-08-21 | Stop reason: HOSPADM

## 2017-08-15 RX ORDER — ASPIRIN 81 MG/1
81 TABLET ORAL 2 TIMES DAILY
COMMUNITY

## 2017-08-15 RX ORDER — RISPERIDONE 0.25 MG/1
0.5 TABLET ORAL 2 TIMES DAILY
Status: DISCONTINUED | OUTPATIENT
Start: 2017-08-15 | End: 2017-08-16

## 2017-08-15 RX ORDER — ATORVASTATIN CALCIUM 10 MG/1
10 TABLET, FILM COATED ORAL DAILY
Status: DISCONTINUED | OUTPATIENT
Start: 2017-08-15 | End: 2017-08-15

## 2017-08-15 RX ORDER — ONDANSETRON 4 MG/1
4 TABLET, FILM COATED ORAL EVERY 6 HOURS PRN
Status: DISCONTINUED | OUTPATIENT
Start: 2017-08-15 | End: 2017-08-21 | Stop reason: HOSPADM

## 2017-08-15 RX ORDER — LOPERAMIDE HYDROCHLORIDE 2 MG/1
2 CAPSULE ORAL 4 TIMES DAILY PRN
Status: DISCONTINUED | OUTPATIENT
Start: 2017-08-15 | End: 2017-08-21 | Stop reason: HOSPADM

## 2017-08-15 RX ORDER — TRAZODONE HYDROCHLORIDE 50 MG/1
50 TABLET ORAL NIGHTLY PRN
Status: DISCONTINUED | OUTPATIENT
Start: 2017-08-15 | End: 2017-08-21 | Stop reason: HOSPADM

## 2017-08-15 RX ORDER — PANTOPRAZOLE SODIUM 40 MG/1
40 TABLET, DELAYED RELEASE ORAL EVERY MORNING
Status: DISCONTINUED | OUTPATIENT
Start: 2017-08-16 | End: 2017-08-21 | Stop reason: HOSPADM

## 2017-08-15 RX ORDER — ASPIRIN 81 MG/1
81 TABLET ORAL 2 TIMES DAILY
Status: DISCONTINUED | OUTPATIENT
Start: 2017-08-15 | End: 2017-08-21 | Stop reason: HOSPADM

## 2017-08-15 RX ORDER — MIRTAZAPINE 15 MG/1
30 TABLET, FILM COATED ORAL NIGHTLY
Status: DISCONTINUED | OUTPATIENT
Start: 2017-08-15 | End: 2017-08-16

## 2017-08-15 RX ORDER — ESCITALOPRAM OXALATE 10 MG/1
20 TABLET ORAL DAILY
Status: DISCONTINUED | OUTPATIENT
Start: 2017-08-15 | End: 2017-08-21 | Stop reason: HOSPADM

## 2017-08-15 RX ORDER — METOPROLOL TARTRATE 50 MG/1
50 TABLET, FILM COATED ORAL 2 TIMES DAILY WITH MEALS
COMMUNITY
End: 2020-03-30 | Stop reason: SDUPTHER

## 2017-08-15 RX ORDER — HYDROXYZINE 50 MG/1
50 TABLET, FILM COATED ORAL EVERY 6 HOURS PRN
Status: DISCONTINUED | OUTPATIENT
Start: 2017-08-15 | End: 2017-08-21 | Stop reason: HOSPADM

## 2017-08-15 RX ORDER — FAMOTIDINE 20 MG/1
20 TABLET, FILM COATED ORAL 2 TIMES DAILY PRN
Status: DISCONTINUED | OUTPATIENT
Start: 2017-08-15 | End: 2017-08-21 | Stop reason: HOSPADM

## 2017-08-15 RX ORDER — ATORVASTATIN CALCIUM 10 MG/1
10 TABLET, FILM COATED ORAL NIGHTLY
Status: DISCONTINUED | OUTPATIENT
Start: 2017-08-15 | End: 2017-08-21 | Stop reason: HOSPADM

## 2017-08-15 RX ORDER — METOPROLOL TARTRATE 50 MG/1
50 TABLET, FILM COATED ORAL EVERY 12 HOURS SCHEDULED
Status: DISCONTINUED | OUTPATIENT
Start: 2017-08-15 | End: 2017-08-21 | Stop reason: HOSPADM

## 2017-08-15 RX ORDER — ATENOLOL 50 MG/1
50 TABLET ORAL 2 TIMES DAILY
Status: CANCELLED | OUTPATIENT
Start: 2017-08-15 | End: 2018-06-29

## 2017-08-15 RX ADMIN — BUSPIRONE HYDROCHLORIDE 15 MG: 10 TABLET ORAL at 21:07

## 2017-08-15 RX ADMIN — ESCITALOPRAM 20 MG: 10 TABLET, FILM COATED ORAL at 17:12

## 2017-08-15 RX ADMIN — BUSPIRONE HYDROCHLORIDE 15 MG: 10 TABLET ORAL at 17:12

## 2017-08-15 RX ADMIN — ATORVASTATIN CALCIUM 10 MG: 10 TABLET, FILM COATED ORAL at 21:07

## 2017-08-15 RX ADMIN — MIRTAZAPINE 30 MG: 15 TABLET, FILM COATED ORAL at 21:07

## 2017-08-15 RX ADMIN — OFLOXACIN 50000 UNITS: 300 TABLET, COATED ORAL at 17:13

## 2017-08-15 RX ADMIN — THIAMINE HCL (VITAMIN B1) 50 MG TABLET 100 MG: at 17:12

## 2017-08-15 RX ADMIN — RISPERIDONE 0.5 MG: 0.25 TABLET ORAL at 21:07

## 2017-08-15 RX ADMIN — ASPIRIN 81 MG: 81 TABLET ORAL at 21:07

## 2017-08-15 RX ADMIN — DIAZEPAM 5 MG: 5 TABLET ORAL at 21:07

## 2017-08-15 RX ADMIN — METOPROLOL TARTRATE 50 MG: 50 TABLET, FILM COATED ORAL at 21:07

## 2017-08-15 NOTE — NURSING NOTE
Intake assessment completed. Patient reports anxiety and depression both a 10/10. Patient reports that he suffers from extreme panic disorder and has tried outpt, meds and therapy and is not getting better and is at the point that it is so extreme that he is suicidial. When asked about a plan pt reports that today he contemplated just taking all his valium and ending it all. Patient also reports to staff that it is a good thing he does not have guns or knives at home because he would have used them. (tearfully). Pt reports that he called the hotline today when he kept having these overwhelming feelings over and over in his mind. Emotional support provided to patient. Security present and asst with pt search pt denies any etoh or drug use hx patient reports poor sleep, no desire to do anything, and says he is so bad that he cant work or be around people and is just tired of being like this. Patient pockets emptied. Search completed with two staff members present. Items logged and placed in cabinet in intake area. Pt placed in safe room for evaluation. Will continue to monitor pt status. Waiting for ED provider to clear pt medically.

## 2017-08-15 NOTE — DISCHARGE INSTRUCTIONS

## 2017-08-15 NOTE — ED PROVIDER NOTES
Subjective   Patient is a 47 y.o. male presenting with mental health disorder.   Mental Health Problem   Presenting symptoms: depression and suicidal thoughts    Degree of incapacity (severity):  Severe  Onset quality:  Sudden  Duration:  2 hours  Timing:  Constant  Progression:  Worsening  Chronicity:  Recurrent  Context: not alcohol use and not drug abuse    Relieved by:  Nothing  Worsened by:  Nothing  Associated symptoms: anxiety and feelings of worthlessness    Associated symptoms: no abdominal pain and no chest pain        Review of Systems   Constitutional: Negative.  Negative for fever.   HENT: Negative.    Respiratory: Negative.    Cardiovascular: Negative.  Negative for chest pain.   Gastrointestinal: Negative.  Negative for abdominal pain.   Endocrine: Negative.    Genitourinary: Negative.  Negative for dysuria.   Skin: Negative.    Neurological: Negative.    Psychiatric/Behavioral: Positive for suicidal ideas. The patient is nervous/anxious.    All other systems reviewed and are negative.      Past Medical History:   Diagnosis Date   • Anxiety    • Depression    • GERD (gastroesophageal reflux disease)    • Hyperlipidemia    • Hypertension    • Hypertension    • Low back pain    • Panic disorder    • Self-injurious behavior        Allergies   Allergen Reactions   • Penicillins        Past Surgical History:   Procedure Laterality Date   • CLAVICLE SURGERY         Family History   Problem Relation Age of Onset   • Depression Father    • Anxiety disorder Father    • Alcohol abuse Father    • No Known Problems Daughter    • No Known Problems Son        Social History     Social History   • Marital status:      Spouse name: N/A   • Number of children: N/A   • Years of education: N/A     Social History Main Topics   • Smoking status: Never Smoker   • Smokeless tobacco: Never Used   • Alcohol use No   • Drug use: No      Comment: pt says that he dont do anything wrong.    • Sexual activity: Yes      Partners: Female     Other Topics Concern   • Not on file     Social History Narrative           Objective   Physical Exam   Constitutional: He is oriented to person, place, and time. He appears well-developed and well-nourished. No distress.   HENT:   Head: Normocephalic and atraumatic.   Right Ear: External ear normal.   Left Ear: External ear normal.   Nose: Nose normal.   Eyes: Conjunctivae and EOM are normal. Pupils are equal, round, and reactive to light.   Neck: Normal range of motion. Neck supple. No JVD present. No tracheal deviation present.   Cardiovascular: Normal rate, regular rhythm and normal heart sounds.    No murmur heard.  Pulmonary/Chest: Effort normal and breath sounds normal. No respiratory distress. He has no wheezes.   Abdominal: Soft. Bowel sounds are normal. There is no tenderness.   Musculoskeletal: Normal range of motion. He exhibits no edema or deformity.   Neurological: He is alert and oriented to person, place, and time. No cranial nerve deficit.   Skin: Skin is warm and dry. No rash noted. He is not diaphoretic. No erythema. No pallor.   Psychiatric: He has a normal mood and affect. His behavior is normal. Thought content normal.   Nursing note and vitals reviewed.      Procedures         ED Course  ED Course                  MDM  Number of Diagnoses or Management Options  established and worsening     Amount and/or Complexity of Data Reviewed  Clinical lab tests: ordered and reviewed  Discuss the patient with other providers: yes    Risk of Complications, Morbidity, and/or Mortality  Presenting problems: moderate        Final diagnoses:   Depression with suicidal ideation            PAUL Tripathi  08/15/17 7369

## 2017-08-16 PROCEDURE — 99223 1ST HOSP IP/OBS HIGH 75: CPT | Performed by: PSYCHIATRY & NEUROLOGY

## 2017-08-16 RX ORDER — ARIPIPRAZOLE 10 MG/1
5 TABLET ORAL DAILY
Status: DISCONTINUED | OUTPATIENT
Start: 2017-08-16 | End: 2017-08-21 | Stop reason: HOSPADM

## 2017-08-16 RX ORDER — MIRTAZAPINE 15 MG/1
15 TABLET, FILM COATED ORAL NIGHTLY
Status: DISCONTINUED | OUTPATIENT
Start: 2017-08-16 | End: 2017-08-21 | Stop reason: HOSPADM

## 2017-08-16 RX ADMIN — BUSPIRONE HYDROCHLORIDE 15 MG: 10 TABLET ORAL at 16:46

## 2017-08-16 RX ADMIN — ASPIRIN 81 MG: 81 TABLET ORAL at 08:45

## 2017-08-16 RX ADMIN — MIRTAZAPINE 15 MG: 15 TABLET, FILM COATED ORAL at 20:30

## 2017-08-16 RX ADMIN — BUSPIRONE HYDROCHLORIDE 15 MG: 10 TABLET ORAL at 08:46

## 2017-08-16 RX ADMIN — ATORVASTATIN CALCIUM 10 MG: 10 TABLET, FILM COATED ORAL at 20:30

## 2017-08-16 RX ADMIN — METOPROLOL TARTRATE 50 MG: 50 TABLET, FILM COATED ORAL at 20:30

## 2017-08-16 RX ADMIN — METOPROLOL TARTRATE 50 MG: 50 TABLET, FILM COATED ORAL at 08:46

## 2017-08-16 RX ADMIN — DIAZEPAM 5 MG: 5 TABLET ORAL at 08:45

## 2017-08-16 RX ADMIN — ARIPIPRAZOLE 5 MG: 10 TABLET ORAL at 18:17

## 2017-08-16 RX ADMIN — PANTOPRAZOLE SODIUM 40 MG: 40 TABLET, DELAYED RELEASE ORAL at 06:05

## 2017-08-16 RX ADMIN — ASPIRIN 81 MG: 81 TABLET ORAL at 20:34

## 2017-08-16 RX ADMIN — THIAMINE HCL (VITAMIN B1) 50 MG TABLET 100 MG: at 08:46

## 2017-08-16 RX ADMIN — DIAZEPAM 5 MG: 5 TABLET ORAL at 20:34

## 2017-08-16 RX ADMIN — BUSPIRONE HYDROCHLORIDE 15 MG: 10 TABLET ORAL at 20:30

## 2017-08-16 RX ADMIN — ESCITALOPRAM 20 MG: 10 TABLET, FILM COATED ORAL at 08:46

## 2017-08-16 RX ADMIN — RISPERIDONE 0.5 MG: 0.25 TABLET ORAL at 08:46

## 2017-08-16 NOTE — PLAN OF CARE
Problem: BH Patient Care Overview (Adult)  Goal: Plan of Care Review  Outcome: Ongoing (interventions implemented as appropriate)  PT. SLEPT 6 HOURS RATES ANX/.DEP 6 DENIES WANTING TO HURT SELF TODAY VERBALIZES HAD YESTERDAY TO TAKE VALIUM CALM COOPERATIVE MANNER     08/16/17 1721   Coping/Psychosocial Response Interventions   Plan Of Care Reviewed With patient   Coping/Psychosocial   Patient Agreement with Plan of Care agrees   Patient Care Overview   Progress improving         Problem: BH Overarching Goals  Goal: Adheres to Safety Considerations for Self and Others  Outcome: Ongoing (interventions implemented as appropriate)  Goal: Optimized Coping Skills in Response to Life Stressors  Outcome: Ongoing (interventions implemented as appropriate)  Goal: Develops/Participates in Therapeutic [place] to Support Successful Transition  Outcome: Ongoing (interventions implemented as appropriate)

## 2017-08-16 NOTE — PLAN OF CARE
Problem: BH Patient Care Overview (Adult)  Goal: Plan of Care Review  Outcome: Ongoing (interventions implemented as appropriate)    08/15/17 2352   Coping/Psychosocial Response Interventions   Plan Of Care Reviewed With patient   Coping/Psychosocial   Patient Agreement with Plan of Care agrees   Patient Care Overview   Progress no change       Goal: Individualization and Mutuality  Outcome: Ongoing (interventions implemented as appropriate)    08/16/17 1420   Behavioral Health Screens   Patient Personal Strengths expressive of emotions;expressive of needs;motivated for treatment;intellectual cognitive skills   Patient Vulnerabilities anxiety, depression       Goal: Discharge Needs Assessment  Outcome: Ongoing (interventions implemented as appropriate)    08/16/17 1431   Discharge Needs Assessment   Concerns To Be Addressed coping/stress concerns;mental health concerns;suicidal concerns   Readmission Within The Last 30 Days no previous admission in last 30 days   Community Agency [facility]   Current Discharge Risk psychiatric illness   Discharge Planning Comments Patient has insurance for medication, is employed and reports no issues with transportation.   Discharge Needs Assessment   Outpatient/Agency/Support Group Needs outpatient counseling;outpatient medication management;outpatient psychiatric care (specify)   Anticipated Discharge Disposition home with family      DATA: Met with patient initially to complete initial assessment, social history, integrated summary, review care planning and disposition discussion. Patient is a 47 year old ,  male residing near [facility]. Patient presents with suicidal ideation and thoughts to overdose.  Patient reports having obsessive thinking, excessive anxiety and depression.  Patient is a manager at [employer] and reports that when he is feeling well enjoys his job but has been unable to maintain recently at work.  He reports that he went to the  [facility] and reports that there were no medication changes so he did well while he was there but then tried to attend work on Monday which was a struggle all day.  He reported an acute increase in his panic attacks and inability to return to work.  Patient reports that his wife has been a patient here in the past and was leveled on medications.  He reports that he believes that he can feel better possibly with some medication adjustments.  He reports that he felt ok in the past when he was seeing Dr. Melédnez and is hopeful that he can return to that level of functioning.  Patient is a patient in the [facility] and sees Izaiah Tinoco LCSW and Caridad Schwartz for medication management.  Patient reports that he is hopeful to stabilize and return home with his wife as well as return to work with [employer].     ASSESSMENT:  Patient presents with suicidal ideation and thoughts to overdose.   Patient reports acute increase in depression and anxiety.  Patient is a danger to self and requires further hospitalization for stabilization of symptoms.     PLAN:  Patient will continue stabilization.  Patient will engage in individual and group therapy to address coping and review crisis safety planning as well as appropriate disposition.  Patient is verbalizing a plan currently to return home upon stabilization.  Patient will follow up in the [facility] where patient is established with Izaiah Tinoco LCSW and with Caridad BOOGIE.
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
## 2017-08-17 PROBLEM — F41.1 GAD (GENERALIZED ANXIETY DISORDER): Status: ACTIVE | Noted: 2017-04-15

## 2017-08-17 PROCEDURE — 99233 SBSQ HOSP IP/OBS HIGH 50: CPT | Performed by: PSYCHIATRY & NEUROLOGY

## 2017-08-17 RX ADMIN — BUSPIRONE HYDROCHLORIDE 15 MG: 10 TABLET ORAL at 08:21

## 2017-08-17 RX ADMIN — DIAZEPAM 5 MG: 5 TABLET ORAL at 20:20

## 2017-08-17 RX ADMIN — BUSPIRONE HYDROCHLORIDE 15 MG: 10 TABLET ORAL at 16:06

## 2017-08-17 RX ADMIN — THIAMINE HCL (VITAMIN B1) 50 MG TABLET 100 MG: at 08:21

## 2017-08-17 RX ADMIN — MIRTAZAPINE 15 MG: 15 TABLET, FILM COATED ORAL at 22:35

## 2017-08-17 RX ADMIN — DIAZEPAM 5 MG: 5 TABLET ORAL at 08:20

## 2017-08-17 RX ADMIN — ESCITALOPRAM 20 MG: 10 TABLET, FILM COATED ORAL at 08:20

## 2017-08-17 RX ADMIN — METOPROLOL TARTRATE 50 MG: 50 TABLET, FILM COATED ORAL at 20:20

## 2017-08-17 RX ADMIN — METOPROLOL TARTRATE 50 MG: 50 TABLET, FILM COATED ORAL at 08:21

## 2017-08-17 RX ADMIN — ASPIRIN 81 MG: 81 TABLET ORAL at 08:21

## 2017-08-17 RX ADMIN — ATORVASTATIN CALCIUM 10 MG: 10 TABLET, FILM COATED ORAL at 20:20

## 2017-08-17 RX ADMIN — BUSPIRONE HYDROCHLORIDE 15 MG: 10 TABLET ORAL at 20:20

## 2017-08-17 RX ADMIN — ASPIRIN 81 MG: 81 TABLET ORAL at 20:20

## 2017-08-17 RX ADMIN — PANTOPRAZOLE SODIUM 40 MG: 40 TABLET, DELAYED RELEASE ORAL at 07:32

## 2017-08-17 RX ADMIN — ARIPIPRAZOLE 5 MG: 10 TABLET ORAL at 08:20

## 2017-08-17 NOTE — PLAN OF CARE
Problem:  Patient Care Overview (Adult)  Goal: Plan of Care Review  Outcome: Ongoing (interventions implemented as appropriate)    08/17/17 0339   Coping/Psychosocial Response Interventions   Plan Of Care Reviewed With patient   Coping/Psychosocial   Patient Agreement with Plan of Care agrees   Patient Care Overview   Progress improving   Outcome Evaluation   Outcome Summary/Follow up Plan Patient calm and cooperative this shift. Rates anxiety and depression both a 5/10. Denies SI/HI or hallucinations.          Problem:  Overarching Goals  Goal: Adheres to Safety Considerations for Self and Others  Outcome: Ongoing (interventions implemented as appropriate)  Goal: Optimized Coping Skills in Response to Life Stressors  Outcome: Ongoing (interventions implemented as appropriate)  Goal: Develops/Participates in Therapeutic [place] to Support Successful Transition  Outcome: Ongoing (interventions implemented as appropriate)

## 2017-08-17 NOTE — PLAN OF CARE
Problem:  Patient Care Overview (Adult)  Goal: Interdisciplinary Rounds/Family Conference  Outcome: Ongoing (interventions implemented as appropriate)    08/17/17 1049   Interdisciplinary Rounds/Family Conf   Summary Discussed patient during staffing with Dr. Sifuentes and met with patient out in the day room   Participants social work;patient;psychiatrist;nursing      DATA: Discussed patient during staffing with Dr. Sifuentes, discussed patients history as well as medication changes.  Discussed that patient is seen in the [facility] for medication management and outpatient behavioral health therapy.  Discussed that patients medication was changed yesterday.  Discussed that patient reported this morning that he is hopeful that the medication change will help him to begin feeling much better soon.  Patient reported that the first night he was here that he slept all night but last night he did not sleep well.     ASSESSMENT:  Patient is denying suicidal ideation today and denying homicidal ideation today.  Patient reports ongoing depression and anxiety today.     PLAN:  Patient will continue stabilization.  Patient will return home upon stabilization.  Patient will follow up in the [facility].

## 2017-08-17 NOTE — PROGRESS NOTES
"      Inpatient Psy Progress Note     Admission Date: 8/15/2017   08/17/17    Behavioral Health Treatment Plan and Problem List: I have reviewed and approved the Behavioral Health Treatment Plan and Problem list.    Allergies  Allergies   Allergen Reactions   • Penicillins Hives       Hospital Day: 2 days      Assessment completed within view of staff    History  CC: inpatient followup  Interval HPI: Patient seen and evaluated by me.  Chart reviewed. The patient states that he is not feeling good today. States that he woke up feeling bad but he doesn't know why he is feeling like this. He acknowledged that he doesn't do well when things are uncertain, and at this time he doesn't know what will happen. He states that his biggest fear is having a heart attack although he has been cleared by his cardiologist and that he doesn't have any heart problems.    Interval Review of Systems:   General ROS: negative for - fever or malaise  Endocrine ROS: negative for - palpitations  Respiratory ROS: no cough, shortness of breath, or wheezing  Cardiovascular ROS: no chest pain or dyspnea on exertion  Gastrointestinal ROS: no abdominal pain,no black or bloody stools    /72  Pulse 61  Temp 98.2 °F (36.8 °C) (Temporal Artery )   Resp 18  Ht 71\" (180.3 cm)  Wt 189 lb 8 oz (86 kg)  SpO2 100%  BMI 26.43 kg/m2    Mental Status Exam  Mood: depressed  Affect: constricted  Thought Processes: linear, logical, and goal directed  Thought Content:  Normal  Hallucinations: no  Suicidal Thoughts:  none  Suicidal Plan/Intent:none  Hopelesness: no  Homicidal Thoughts:  absent      Medical Decision Making:   Labs:     Lab Results (last 24 hours)     ** No results found for the last 24 hours. **            Radiology:     Imaging Results (last 24 hours)     ** No results found for the last 24 hours. **            EKG:     ECG/EMG Results (most recent)     None           Medications:     ARIPiprazole 5 mg Oral Daily   aspirin 81 mg Oral " BID   atorvastatin 10 mg Oral Nightly   busPIRone 15 mg Oral TID   cholecalciferol 50,000 Units Oral Weekly   diazePAM 5 mg Oral BID   escitalopram 20 mg Oral Daily   metoprolol tartrate 50 mg Oral Q12H   mirtazapine 15 mg Oral Nightly   pantoprazole 40 mg Oral QAM   vitamin B-1 100 mg Oral Daily          All medications reviewed.      Assessment/Plan:    Active Problems:       MDD (major depressive disorder): Continue Lexapro, Abilify       BOZENA: Continue Diazepam, Buspar      GERD: Continue Protonix      HTN: Continue Metoprolol      Hyperlipidemia: Continue Lipitor      Provided supportive and insight oriented psychotherapy. Encouraged patient to address his fears and insecurities. He admitted that he never held a grudge against anyone, but in the process blamed himself for everything that went wrong around him, and has been his own worst critic. He acknowledged that he hid his hurt and pain from the world and tried to make everyone happy around him, and he started tearing up talking about it, and was apologetic about his tears, but was encouraged to express himself freely and not be embarrassed about his feelings. He agreed to make an effort to make his expectations more realistic and not be afraid to say no, if he feels he cannot do. Advised him that initially addressing these feelings will make him feel worse before it gets better.

## 2017-08-18 PROCEDURE — 99232 SBSQ HOSP IP/OBS MODERATE 35: CPT | Performed by: PSYCHIATRY & NEUROLOGY

## 2017-08-18 RX ADMIN — METOPROLOL TARTRATE 50 MG: 50 TABLET, FILM COATED ORAL at 21:17

## 2017-08-18 RX ADMIN — ARIPIPRAZOLE 5 MG: 10 TABLET ORAL at 08:04

## 2017-08-18 RX ADMIN — METOPROLOL TARTRATE 50 MG: 50 TABLET, FILM COATED ORAL at 08:05

## 2017-08-18 RX ADMIN — MIRTAZAPINE 15 MG: 15 TABLET, FILM COATED ORAL at 21:17

## 2017-08-18 RX ADMIN — DIAZEPAM 5 MG: 5 TABLET ORAL at 21:17

## 2017-08-18 RX ADMIN — BUSPIRONE HYDROCHLORIDE 15 MG: 10 TABLET ORAL at 08:04

## 2017-08-18 RX ADMIN — DIAZEPAM 5 MG: 5 TABLET ORAL at 10:42

## 2017-08-18 RX ADMIN — ATORVASTATIN CALCIUM 10 MG: 10 TABLET, FILM COATED ORAL at 21:17

## 2017-08-18 RX ADMIN — THIAMINE HCL (VITAMIN B1) 50 MG TABLET 100 MG: at 08:04

## 2017-08-18 RX ADMIN — PANTOPRAZOLE SODIUM 40 MG: 40 TABLET, DELAYED RELEASE ORAL at 07:14

## 2017-08-18 RX ADMIN — ASPIRIN 81 MG: 81 TABLET ORAL at 08:05

## 2017-08-18 RX ADMIN — ASPIRIN 81 MG: 81 TABLET ORAL at 21:17

## 2017-08-18 RX ADMIN — BUSPIRONE HYDROCHLORIDE 15 MG: 10 TABLET ORAL at 21:17

## 2017-08-18 RX ADMIN — ESCITALOPRAM 20 MG: 10 TABLET, FILM COATED ORAL at 08:04

## 2017-08-18 RX ADMIN — BUSPIRONE HYDROCHLORIDE 15 MG: 10 TABLET ORAL at 16:02

## 2017-08-18 NOTE — PLAN OF CARE
Problem:  Patient Care Overview (Adult)  Goal: Discharge Needs Assessment  Outcome: Ongoing (interventions implemented as appropriate)    08/15/17 1421 08/16/17 1431 08/18/17 1511   Discharge Needs Assessment   Concerns To Be Addressed --  --  coping/stress concerns;mental health concerns   Readmission Within The Last 30 Days --  no previous admission in last 30 days --    Community Agency Name(S) --  [facility] --    Current Discharge Risk --  psychiatric illness --    Discharge Planning Comments --  Patient has insurance for medication, is employed and reports no issues with transportation. --    Discharge Needs Assessment   Outpatient/Agency/Support Group Needs --  outpatient counseling;outpatient medication management;outpatient psychiatric care (specify) --    Anticipated Discharge Disposition --  home with family --    Discharge Disposition --  --  home with family   Living Environment   Transportation Available car  (2 cars) --  --       DATA: Met with patient briefly today and he reported that he may be discharged tomorrow or Sunday.  He reported that the new medication is making him feel a bit sleepy but not to the point of sedation.  He reports that he will return home and hopefully return to work.  Patient reports that he is working on decreasing his anxiety by breaking things down and changing the way he thinks about things.  He is also doing some deep breathing to help him with his anxiety.     ASSESSMENT:  Patient is denying suicidal ideation today but reports he does not feel safe today to return home.       PLAN: Patient is planned for discharge tomorrow if his symptoms have improved.  Patient is scheduled on Wednesday 8/23/2017 in the [facility] for aftercare.

## 2017-08-18 NOTE — PLAN OF CARE
Problem:  Patient Care Overview (Adult)  Goal: Plan of Care Review  Outcome: Ongoing (interventions implemented as appropriate)    08/18/17 0329   Coping/Psychosocial Response Interventions   Plan Of Care Reviewed With patient   Coping/Psychosocial   Patient Agreement with Plan of Care agrees   Patient Care Overview   Progress no change   Outcome Evaluation   Outcome Summary/Follow up Plan Patient calm and cooperative this shift. Rates anxiety and depression both a 6/10. Denies SI/HI.          Problem:  Overarching Goals  Goal: Adheres to Safety Considerations for Self and Others  Outcome: Ongoing (interventions implemented as appropriate)  Goal: Optimized Coping Skills in Response to Life Stressors  Outcome: Ongoing (interventions implemented as appropriate)  Goal: Develops/Participates in Therapeutic [place] to Support Successful Transition  Outcome: Ongoing (interventions implemented as appropriate)

## 2017-08-18 NOTE — PLAN OF CARE
Problem: BH Patient Care Overview (Adult)  Goal: Plan of Care Review  Outcome: Ongoing (interventions implemented as appropriate)  Rates anxiety 7 and depression 6. Denies suicidal thoughts. Denies hallucinations.   Goal: Interdisciplinary Rounds/Family Conference  Outcome: Ongoing (interventions implemented as appropriate)  Goal: Individualization and Mutuality  Outcome: Ongoing (interventions implemented as appropriate)  Goal: Discharge Needs Assessment  Outcome: Ongoing (interventions implemented as appropriate)    Problem:  Overarching Goals  Goal: Adheres to Safety Considerations for Self and Others  Outcome: Ongoing (interventions implemented as appropriate)  Goal: Optimized Coping Skills in Response to Life Stressors  Outcome: Ongoing (interventions implemented as appropriate)  Goal: Develops/Participates in Therapeutic [place] to Support Successful Transition  Outcome: Ongoing (interventions implemented as appropriate)    Problem: Coping, Compromised Individual (Adult,Obstetrics,Pediatric)  Goal: Effective Coping  Outcome: Ongoing (interventions implemented as appropriate)    Problem: Depression  Goal: Refrain from harming self  Outcome: Ongoing (interventions implemented as appropriate)    Problem: Social Isolation (Adult)  Goal: Socialization Enhancement  Outcome: Ongoing (interventions implemented as appropriate)

## 2017-08-18 NOTE — PROGRESS NOTES
"      Inpatient Psy Progress Note     Admission Date: 8/15/2017   08/18/17    Behavioral Health Treatment Plan and Problem List: I have reviewed and approved the Behavioral Health Treatment Plan and Problem list.    Allergies  Allergies   Allergen Reactions   • Penicillins Hives       Hospital Day: 3 days      Assessment completed within view of staff    History  CC: inpatient followup  Interval HPI: Patient seen and evaluated by me.  Chart reviewed. The patient states that he had a rough night last night and was feeling very shaky this morning. He states that he is not feeling good today and doesn't feel ready to go home.    Interval Review of Systems:   General ROS: negative for - fever or malaise  Endocrine ROS: negative for - palpitations  Respiratory ROS: no cough, shortness of breath, or wheezing  Cardiovascular ROS: no chest pain or dyspnea on exertion  Gastrointestinal ROS: no abdominal pain,no black or bloody stools    /91  Pulse 68  Temp 97.2 °F (36.2 °C) (Temporal Artery )   Resp 18  Ht 71\" (180.3 cm)  Wt 189 lb 8 oz (86 kg)  SpO2 98%  BMI 26.43 kg/m2    Mental Status Exam  Mood: depressed  Affect: constricted  Thought Processes: linear, logical, and goal directed  Thought Content:  Normal  Hallucinations: no  Suicidal Thoughts:  none  Suicidal Plan/Intent:none  Hopelesness: no  Homicidal Thoughts:  absent      Medical Decision Making:   Labs:     Lab Results (last 24 hours)     ** No results found for the last 24 hours. **            Radiology:     Imaging Results (last 24 hours)     ** No results found for the last 24 hours. **            EKG:     ECG/EMG Results (most recent)     None           Medications:       ARIPiprazole 5 mg Oral Daily   aspirin 81 mg Oral BID   atorvastatin 10 mg Oral Nightly   busPIRone 15 mg Oral TID   cholecalciferol 50,000 Units Oral Weekly   diazePAM 5 mg Oral BID   escitalopram 20 mg Oral Daily   metoprolol tartrate 50 mg Oral Q12H   mirtazapine 15 mg Oral " Nightly   pantoprazole 40 mg Oral QAM   vitamin B-1 100 mg Oral Daily          All medications reviewed.      Assessment/Plan:    Active Problems:       MDD (major depressive disorder): Continue Lexapro, Abilify       BOZENA: Continue Diazepam, Buspar      GERD: Continue Protonix      HTN: Continue Metoprolol      Hyperlipidemia: Continue Lipitor      The patient is making progress but is still feeling very anxious. He may be able to go home tomorrow if he feels better. He states that he is making an effort to set more realistic goals and not give up at the first sign of trouble.

## 2017-08-19 PROCEDURE — 99232 SBSQ HOSP IP/OBS MODERATE 35: CPT | Performed by: PSYCHIATRY & NEUROLOGY

## 2017-08-19 RX ADMIN — BUSPIRONE HYDROCHLORIDE 15 MG: 10 TABLET ORAL at 16:03

## 2017-08-19 RX ADMIN — BUSPIRONE HYDROCHLORIDE 15 MG: 10 TABLET ORAL at 20:43

## 2017-08-19 RX ADMIN — DIAZEPAM 5 MG: 5 TABLET ORAL at 08:22

## 2017-08-19 RX ADMIN — THIAMINE HCL (VITAMIN B1) 50 MG TABLET 100 MG: at 08:22

## 2017-08-19 RX ADMIN — ATORVASTATIN CALCIUM 10 MG: 10 TABLET, FILM COATED ORAL at 20:43

## 2017-08-19 RX ADMIN — ARIPIPRAZOLE 5 MG: 10 TABLET ORAL at 08:22

## 2017-08-19 RX ADMIN — ASPIRIN 81 MG: 81 TABLET ORAL at 08:22

## 2017-08-19 RX ADMIN — DIAZEPAM 5 MG: 5 TABLET ORAL at 17:41

## 2017-08-19 RX ADMIN — ESCITALOPRAM 20 MG: 10 TABLET, FILM COATED ORAL at 08:22

## 2017-08-19 RX ADMIN — BUSPIRONE HYDROCHLORIDE 15 MG: 10 TABLET ORAL at 08:22

## 2017-08-19 RX ADMIN — METOPROLOL TARTRATE 50 MG: 50 TABLET, FILM COATED ORAL at 20:43

## 2017-08-19 RX ADMIN — METOPROLOL TARTRATE 50 MG: 50 TABLET, FILM COATED ORAL at 08:22

## 2017-08-19 RX ADMIN — ASPIRIN 81 MG: 81 TABLET ORAL at 17:41

## 2017-08-19 RX ADMIN — MIRTAZAPINE 15 MG: 15 TABLET, FILM COATED ORAL at 20:43

## 2017-08-19 RX ADMIN — PANTOPRAZOLE SODIUM 40 MG: 40 TABLET, DELAYED RELEASE ORAL at 06:29

## 2017-08-19 NOTE — PLAN OF CARE
Problem: BH Overarching Goals  Goal: Adheres to Safety Considerations for Self and Others  Outcome: Ongoing (interventions implemented as appropriate)  Goal: Optimized Coping Skills in Response to Life Stressors  Outcome: Ongoing (interventions implemented as appropriate)  Goal: Develops/Participates in Therapeutic [place] to Support Successful Transition  Outcome: Ongoing (interventions implemented as appropriate)

## 2017-08-19 NOTE — PLAN OF CARE
Problem: BH Patient Care Overview (Adult)  Goal: Plan of Care Review  Outcome: Ongoing (interventions implemented as appropriate)  Rates anxiety 7 and depression 7. Denies suicidal thoughts.   Goal: Interdisciplinary Rounds/Family Conference  Outcome: Ongoing (interventions implemented as appropriate)  Goal: Individualization and Mutuality  Outcome: Ongoing (interventions implemented as appropriate)  Goal: Discharge Needs Assessment  Outcome: Ongoing (interventions implemented as appropriate)    Problem:  Overarching Goals  Goal: Adheres to Safety Considerations for Self and Others  Outcome: Ongoing (interventions implemented as appropriate)  Goal: Optimized Coping Skills in Response to Life Stressors  Outcome: Ongoing (interventions implemented as appropriate)  Goal: Develops/Participates in Therapeutic [place] to Support Successful Transition  Outcome: Ongoing (interventions implemented as appropriate)    Problem: Depression  Goal: Refrain from harming self  Outcome: Ongoing (interventions implemented as appropriate)

## 2017-08-19 NOTE — PLAN OF CARE
Problem:  Patient Care Overview (Adult)  Goal: Plan of Care Review  Outcome: Ongoing (interventions implemented as appropriate)  Patient states was glad to have room change because hopes to sleep better tonight.  Out watching TV some and went to bed.  Anxiety 6, depression 7, denies S/I, H/I & A/V/H.  States doing some better but still having concerns with having heart problems.    08/18/17 4307   Coping/Psychosocial Response Interventions   Plan Of Care Reviewed With patient   Coping/Psychosocial   Patient Agreement with Plan of Care agrees   Patient Care Overview   Progress improving

## 2017-08-19 NOTE — PROGRESS NOTES
"      Inpatient Psy Progress Note   Clinician: Sacha Villareal MD  Admission Date: 8/15/2017  10:37 AM 08/19/17    Behavioral Health Treatment Plan and Problem List: I have reviewed and approved the Behavioral Health Treatment Plan and Problem list.    Allergies  Allergies   Allergen Reactions   • Penicillins Hives       Hospital Day: 4 days      Assessment completed within view of staff    History  CC: inpatient followup  Interval HPI: Patient seen and evaluated by me.  Chart reviewed.  He has taken a step back in terms of how he is feeling physically and mentally.  Depression has worsened somewhat.       Interval Review of Systems:   General ROS: negative for - fever or malaise. Poor appetite.   Endocrine ROS: negative for - palpitations  Respiratory ROS: no cough, shortness of breath, or wheezing  Cardiovascular ROS: no chest pain or dyspnea on exertion  Gastrointestinal ROS: no abdominal pain,no black or bloody stools. Stomach cramps.    /80 (BP Location: Left arm, Patient Position: Lying)  Pulse 55  Temp 98.2 °F (36.8 °C) (Temporal Artery )   Resp 16  Ht 71\" (180.3 cm)  Wt 189 lb 8 oz (86 kg)  SpO2 98%  BMI 26.43 kg/m2    Mental Status Exam  Mood: depressed  Affect: constricted  Thought Processes: linear, logical, and goal directed  Thought Content:  Negativistic  Hallucinations: no  Suicidal Thoughts:  slight  Suicidal Plan/Intent:none  Hopelesness: yes  Homicidal Thoughts:  absent      Medical Decision Making:   Labs:     Lab Results (last 24 hours)     ** No results found for the last 24 hours. **            Radiology:     Imaging Results (last 24 hours)     ** No results found for the last 24 hours. **            EKG:     ECG/EMG Results (most recent)     None           Medications:     ARIPiprazole 5 mg Oral Daily   aspirin 81 mg Oral BID   atorvastatin 10 mg Oral Nightly   busPIRone 15 mg Oral TID   cholecalciferol 50,000 Units Oral Weekly   diazePAM 5 mg Oral BID   escitalopram 20 mg Oral " Daily   metoprolol tartrate 50 mg Oral Q12H   mirtazapine 15 mg Oral Nightly   pantoprazole 40 mg Oral QAM   vitamin B-1 100 mg Oral Daily          All medications reviewed.      MDD (major depressive disorder): Continue Lexapro, Abilify        BOZENA: Continue Diazepam, Buspar       GERD: Continue Protonix       HTN: Continue Metoprolol       Hyperlipidemia: Continue Lipitor    Patient does not feel like he is ready for discharge, so will continue admission through the day today and possibly through the weekend for patient safety.

## 2017-08-20 PROCEDURE — 99232 SBSQ HOSP IP/OBS MODERATE 35: CPT | Performed by: PSYCHIATRY & NEUROLOGY

## 2017-08-20 RX ADMIN — BUSPIRONE HYDROCHLORIDE 15 MG: 10 TABLET ORAL at 20:37

## 2017-08-20 RX ADMIN — ATORVASTATIN CALCIUM 10 MG: 10 TABLET, FILM COATED ORAL at 20:37

## 2017-08-20 RX ADMIN — THIAMINE HCL (VITAMIN B1) 50 MG TABLET 100 MG: at 08:35

## 2017-08-20 RX ADMIN — ARIPIPRAZOLE 5 MG: 10 TABLET ORAL at 08:35

## 2017-08-20 RX ADMIN — BUSPIRONE HYDROCHLORIDE 15 MG: 10 TABLET ORAL at 08:35

## 2017-08-20 RX ADMIN — PANTOPRAZOLE SODIUM 40 MG: 40 TABLET, DELAYED RELEASE ORAL at 06:14

## 2017-08-20 RX ADMIN — METOPROLOL TARTRATE 50 MG: 50 TABLET, FILM COATED ORAL at 08:35

## 2017-08-20 RX ADMIN — ASPIRIN 81 MG: 81 TABLET ORAL at 20:37

## 2017-08-20 RX ADMIN — ASPIRIN 81 MG: 81 TABLET ORAL at 08:35

## 2017-08-20 RX ADMIN — DIAZEPAM 5 MG: 5 TABLET ORAL at 20:36

## 2017-08-20 RX ADMIN — DIAZEPAM 5 MG: 5 TABLET ORAL at 08:34

## 2017-08-20 RX ADMIN — MIRTAZAPINE 15 MG: 15 TABLET, FILM COATED ORAL at 20:37

## 2017-08-20 RX ADMIN — BUSPIRONE HYDROCHLORIDE 15 MG: 10 TABLET ORAL at 16:16

## 2017-08-20 RX ADMIN — ESCITALOPRAM 20 MG: 10 TABLET, FILM COATED ORAL at 08:35

## 2017-08-20 RX ADMIN — METOPROLOL TARTRATE 50 MG: 50 TABLET, FILM COATED ORAL at 20:37

## 2017-08-20 NOTE — PROGRESS NOTES
"      Inpatient Psy Progress Note   Clinician: Sacha Villareal MD  Admission Date: 8/15/2017  11:24 AM 08/20/17    Behavioral Health Treatment Plan and Problem List: I have reviewed and approved the Behavioral Health Treatment Plan and Problem list.    Allergies  Allergies   Allergen Reactions   • Penicillins Hives       Hospital Day: 5 days      Assessment completed within view of staff    History  CC: inpatient followup  Interval HPI: Patient seen and evaluated by me.  Chart reviewed. \"I woke up and was not feeling well at all, but starting to feel better after took my meds.\"  He is still hoping to go home tomorrow.  He rates his current level of depression at a 5-6/10.   Denies SI.    Interval Review of Systems:   General ROS: negative for - fever or malaise  Endocrine ROS: negative for - palpitations  Respiratory ROS: no cough, shortness of breath, or wheezing  Cardiovascular ROS: no chest pain or dyspnea on exertion  Gastrointestinal ROS: no abdominal pain,no black or bloody stools    /95  Pulse 92  Temp 97.6 °F (36.4 °C) (Temporal Artery )   Resp 18  Ht 71\" (180.3 cm)  Wt 189 lb 8 oz (86 kg)  SpO2 99%  BMI 26.43 kg/m2    Mental Status Exam  Mood: depressed  Affect: mood-congruent  Thought Processes: linear, logical, and goal directed  Thought Content:  Normal  Hallucinations: no  Suicidal Thoughts:  none  Suicidal Plan/Intent:none  Hopelesness: no  Homicidal Thoughts:  absent      Medical Decision Making:   Labs:     Lab Results (last 24 hours)     ** No results found for the last 24 hours. **            Radiology:     Imaging Results (last 24 hours)     ** No results found for the last 24 hours. **            EKG:     ECG/EMG Results (most recent)     None           Medications:     ARIPiprazole 5 mg Oral Daily   aspirin 81 mg Oral BID   atorvastatin 10 mg Oral Nightly   busPIRone 15 mg Oral TID   cholecalciferol 50,000 Units Oral Weekly   diazePAM 5 mg Oral BID   escitalopram 20 mg Oral Daily "   metoprolol tartrate 50 mg Oral Q12H   mirtazapine 15 mg Oral Nightly   pantoprazole 40 mg Oral QAM   vitamin B-1 100 mg Oral Daily          All medications reviewed.      Assessment:    Active Problems:    MDD (major depressive disorder)    Mixed hyperlipidemia    GERD (gastroesophageal reflux disease)    Essential hypertension            Treatment Plan: Continue hospitalization for safety and stabilization.  Continue current level of Special Precautions.   Continue lexapro 20mg and Abilify 5mg for depression. Anticipate discharge tomorrow.

## 2017-08-20 NOTE — PLAN OF CARE
Problem: BH Patient Care Overview (Adult)  Goal: Plan of Care Review  Outcome: Ongoing (interventions implemented as appropriate)  Rates anxiety 7 and depression 5 1/2- 6. Denies suicidal thoughts. Denies hallucinations.   Goal: Interdisciplinary Rounds/Family Conference  Outcome: Ongoing (interventions implemented as appropriate)  Goal: Individualization and Mutuality  Outcome: Ongoing (interventions implemented as appropriate)  Goal: Discharge Needs Assessment  Outcome: Ongoing (interventions implemented as appropriate)    Problem: BH Overarching Goals  Goal: Adheres to Safety Considerations for Self and Others  Outcome: Ongoing (interventions implemented as appropriate)  Goal: Optimized Coping Skills in Response to Life Stressors  Outcome: Ongoing (interventions implemented as appropriate)  Goal: Develops/Participates in Therapeutic [place] to Support Successful Transition  Outcome: Ongoing (interventions implemented as appropriate)    Problem: Depression  Goal: Verbalize thoughts and feelings associated with:  Outcome: Ongoing (interventions implemented as appropriate)

## 2017-08-21 VITALS
BODY MASS INDEX: 26.53 KG/M2 | WEIGHT: 189.5 LBS | OXYGEN SATURATION: 98 % | HEART RATE: 79 BPM | HEIGHT: 71 IN | TEMPERATURE: 97.4 F | DIASTOLIC BLOOD PRESSURE: 87 MMHG | RESPIRATION RATE: 18 BRPM | SYSTOLIC BLOOD PRESSURE: 141 MMHG

## 2017-08-21 PROCEDURE — 99238 HOSP IP/OBS DSCHRG MGMT 30/<: CPT | Performed by: PSYCHIATRY & NEUROLOGY

## 2017-08-21 RX ORDER — MIRTAZAPINE 15 MG/1
15 TABLET, FILM COATED ORAL NIGHTLY
Qty: 30 TABLET | Refills: 0 | Status: SHIPPED | OUTPATIENT
Start: 2017-08-21 | End: 2017-09-20 | Stop reason: SDUPTHER

## 2017-08-21 RX ORDER — ARIPIPRAZOLE 5 MG/1
5 TABLET ORAL DAILY
Qty: 30 TABLET | Refills: 0 | Status: SHIPPED | OUTPATIENT
Start: 2017-08-21 | End: 2017-09-20 | Stop reason: SDUPTHER

## 2017-08-21 RX ADMIN — BUSPIRONE HYDROCHLORIDE 15 MG: 10 TABLET ORAL at 09:28

## 2017-08-21 RX ADMIN — PANTOPRAZOLE SODIUM 40 MG: 40 TABLET, DELAYED RELEASE ORAL at 06:00

## 2017-08-21 RX ADMIN — METOPROLOL TARTRATE 50 MG: 50 TABLET, FILM COATED ORAL at 09:28

## 2017-08-21 RX ADMIN — ARIPIPRAZOLE 5 MG: 10 TABLET ORAL at 09:27

## 2017-08-21 RX ADMIN — DIAZEPAM 5 MG: 5 TABLET ORAL at 09:27

## 2017-08-21 RX ADMIN — ESCITALOPRAM 20 MG: 10 TABLET, FILM COATED ORAL at 09:28

## 2017-08-21 RX ADMIN — ASPIRIN 81 MG: 81 TABLET ORAL at 09:28

## 2017-08-21 RX ADMIN — THIAMINE HCL (VITAMIN B1) 50 MG TABLET 100 MG: at 09:28

## 2017-08-21 NOTE — DISCHARGE SUMMARY
Date of Discharge:  8/21/2017    Discharge Diagnosis:Active Problems:       MDD (major depressive disorder)   BOZENA (generalized anxiety disorder)   Mixed hyperlipidemia   GERD (gastroesophageal reflux disease)   Essential hypertension        Presenting Problem/History of Present Illness  MDD (major depressive disorder) [F32.9]     Hospital Course  Patient is a 47 y.o. male presented with severe depression and suicidal ideations. The patient was admitted to the [facility] unit for safety, further evaluation and treatment.  The patient reported feeling overwhelmed by all the stressors in his life. His medications were adjusted. Risperdal was stopped and Abilify was added. The patient reported that the medication change was helpful.  The patient was also able to take part in individual and group counseling sessions and work on appropriate coping skills. He was encouraged to make an effort to let go of things he didn't have any control over. He was able to process some of the worries and fears and he admitted that he was his own worse critic and was afraid of making a mistake or failing and didn't want to look bad in front of any one and harder he tried to please people and make everyone happy, the more inadequate he felt and he got to a point in his life where he couldn't go on feeling like that. He was encouraged to consider that he was not responsible for other people's happiness and all he could do was to do the right thing and some may like it and others may not and that would be fine.  The patient made steady improvement in his mood and expressed feeling more positive and hopeful about future. Sleep and appetite were improved.  The day of discharge the patient was calm, cooperative and pleasant. Mood was reported to be good, and denied SI/HI/AVH. Also reported no medication side effects.        Procedures Performed         Consults:   Consults     No orders found from 7/17/2017 to 8/16/2017.           Pertinent Test Results:   Lab Results (last 7 days)     ** No results found for the last 168 hours. **            Condition on Discharge:  stable    Vital Signs  Temp:  [97 °F (36.1 °C)-97.7 °F (36.5 °C)] 97.4 °F (36.3 °C)  Heart Rate:  [58-79] 79  Resp:  [18] 18  BP: (121-141)/(79-87) 141/87      Discharge Disposition  Home or Self Care    Discharge Medications   Amish Win   Home Medication Instructions GREG:[id number]    Printed on:08/21/17 7618   Medication Information                      ARIPiprazole (ABILIFY) 5 MG tablet  Take 1 tablet by mouth Daily.             aspirin 81 MG EC tablet  Take 81 mg by mouth 2 (Two) Times a Day.             busPIRone (BUSPAR) 15 MG tablet  Take 15 mg by mouth 3 (Three) Times a Day.             diazePAM (VALIUM) 5 MG tablet  Take 1 tablet by mouth 2 (Two) Times a Day.             escitalopram (LEXAPRO) 20 MG tablet  Take 1 tablet by mouth Daily.             metoprolol tartrate (LOPRESSOR) 50 MG tablet  Take 50 mg by mouth 2 (Two) Times a Day With Meals.             mirtazapine (REMERON) 15 MG tablet  Take 1 tablet by mouth Every Night.             pantoprazole (PROTONIX) 40 MG EC tablet  Take 40 mg by mouth Daily.             simvastatin (ZOCOR) 20 MG tablet  Take 20 mg by mouth daily.             vitamin D (ERGOCALCIFEROL) 53484 UNITS capsule capsule  Take 50,000 Units by mouth 1 (One) Time Per Week.                 Discharge Diet: Regular    Activity at Discharge: As tolerated    Follow-up Appointments  Future Appointments  Date Time Provider Department Center   8/23/2017 7:45 AM Izaiah Tinoco LCSW [facility] None   8/23/2017 8:30 AM KEAGAN Katz [facility] None   9/5/2017 7:45 AM Izaiah Tinoco LCSW [facility] None   9/19/2017 7:45 AM Izaiah Tinoco LCSW [facility] None   10/3/2017 7:45 AM Izaiah Tinoco LCSW [facility] None         Test Results Pending at Discharge       Shelbi Sifuentes MD  08/21/17  12:53 PM

## 2017-08-21 NOTE — PLAN OF CARE
Problem: BH Patient Care Overview (Adult)  Goal: Plan of Care Review  Outcome: Ongoing (interventions implemented as appropriate)  NO PROBLEMS OR NEW ORDERS NOTED    08/20/17 7395   Coping/Psychosocial Response Interventions   Plan Of Care Reviewed With patient   Coping/Psychosocial   Patient Agreement with Plan of Care agrees   Patient Care Overview   Progress no change       Goal: Interdisciplinary Rounds/Family Conference  Outcome: Ongoing (interventions implemented as appropriate)  Goal: Individualization and Mutuality  Outcome: Ongoing (interventions implemented as appropriate)  Goal: Discharge Needs Assessment  Outcome: Ongoing (interventions implemented as appropriate)    Problem: BH Overarching Goals  Goal: Adheres to Safety Considerations for Self and Others  Outcome: Ongoing (interventions implemented as appropriate)  Goal: Optimized Coping Skills in Response to Life Stressors  Outcome: Ongoing (interventions implemented as appropriate)  Goal: Develops/Participates in Therapeutic [place] to Support Successful Transition  Outcome: Ongoing (interventions implemented as appropriate)

## 2017-08-23 ENCOUNTER — OFFICE VISIT (OUTPATIENT)
Dept: PSYCHIATRY | Facility: CLINIC | Age: 47
End: 2017-08-23

## 2017-08-23 VITALS
HEIGHT: 71 IN | WEIGHT: 202 LBS | HEART RATE: 57 BPM | DIASTOLIC BLOOD PRESSURE: 91 MMHG | SYSTOLIC BLOOD PRESSURE: 138 MMHG | BODY MASS INDEX: 28.28 KG/M2

## 2017-08-23 DIAGNOSIS — F33.1 MAJOR DEPRESSIVE DISORDER, RECURRENT EPISODE, MODERATE (HCC): Primary | ICD-10-CM

## 2017-08-23 DIAGNOSIS — F41.1 GENERALIZED ANXIETY DISORDER: ICD-10-CM

## 2017-08-23 PROCEDURE — 90834 PSYTX W PT 45 MINUTES: CPT | Performed by: SOCIAL WORKER

## 2017-08-23 PROCEDURE — 99213 OFFICE O/P EST LOW 20 MIN: CPT | Performed by: NURSE PRACTITIONER

## 2017-08-23 RX ORDER — DIAZEPAM 5 MG/1
5 TABLET ORAL 2 TIMES DAILY
Qty: 60 TABLET | Refills: 0 | Status: SHIPPED | OUTPATIENT
Start: 2017-08-23 | End: 2017-09-20 | Stop reason: SDUPTHER

## 2017-08-23 NOTE — PROGRESS NOTES
Date of Service: 8/23/2017  Time In: 7:40 AM              Time Out: 8:30 AM    IDENTIFYING  INFORMATION:   The patient is a 47 y.o. male meeting 1:1 with Izaiah Tinoco LCSW, LCADC at the [facility] for follow-up following his recent psychiatric hospitalization.  The patient is currently off work until 8/20/7/2017 and reports he continues to live in the home with his wife and children.    The undersigned agrees with history and physical completed on 8/15/2017.     INTERVAL HISTORY: The patient returned home following his release from the [facility] on 8/21/2017.  He reports he feels the addition of Abilify has been helpful.  However, the patient reports ongoing symptoms of depression including feeling hopeless, anhedonia, anergia, decreased motivation, a general sense of uncertainty, insomnia, and social isolation.  Patient rates current symptoms of depression at a 5 on a scale of 1-10 with 10 being most severe.  Patient also reports ongoing symptoms of anxiety including feeling on edge, a distinct fear of cardiac failure, trembling, feeling extremely overwhelmed, sweating, increased heart rate, a feeling of losing control, and a distinct sense of impending doom.  Patient states symptoms have improved somewhat but continues to rate symptoms at 6 on a scale 1-10 with 10 being most severe.  Patient reports he continues to adhere to medication regimen as prescribed with no side effects.  The patient adamantly and convincingly denies suicidal ideation and vehemently denies any substance use.    Clinical Maneuvering/Intervention: Assisted patient in processing his recent hospitalization and validated his feelings.  Also utilized cognitive behavioral therapy to assist the patient in identifying in recognizing his tendency toward automatic negative thoughts and catastrophize them.  Assisted the patient in developing appropriate coping mechanisms to decrease the severity and frequency of symptoms of depression  and anxiety including positive self talk, daily affirmations, relaxation techniques, regular physical activity, and actively seeking enjoyable activities he can engage in a regular basis to reduce idle time and social isolation.  Also challenged the patient to focus on the task at hand and to avoid obsessive worry about multiple life issues which could lead to increased feelings of being overwhelmed.  Also discussed healthy skills of daily living including eating a healthy diet, regular physical activity, and getting adequate sleep.  Provide unconditional positive regard in a safe, supportive environment.    Allowed patient to freely discuss issues without interruption or judgment. Provided safe, confidential environment to facilitate the development and maintenance of positive therapeutic relationship. Assisted patient in identifying increased risk factors which would indicate the need for higher level of care including thoughts to harm self or others and/or self-harming behavior and encouraged patient to contact this office, call 911, or present to nearest emergency room should either event occur. Discussed crisis intervention services and means to access.      STRENGTHS: Willingness to seek treatment; Positive therapeutic relationship; sense of responsibility to family    WEAKNESSES: Chronic mental illness; Current stress at home including having a special needs child      PROBLEM LIST:   Depression  Anxiety    SHORT-TERM GOALS:   Patient will keep all scheduled appointments.   Patient will adhere to medication regimen as prescribed and report any side effects.  Patient will report decrease in severity and frequency of symptoms and will be able to successfully return to work on 8/27/2017.  Patient will maintain safety and will call 911 or present to the nearest emergency room if suicidal ideation occur.      LONG-TERM GOALS: Patient will have cessation of symptoms and be able to function at optimal levels without  the need for continued treatment.     PLAN: Patient will continue in weekly outpatient psychotherapy sessions and pharmacotherapy as scheduled.       Izaiah Tinoco, FAUSTO, [facility]

## 2017-08-23 NOTE — PROGRESS NOTES
"  Subjective   Amish Win is a 47 y.o. male who is here today for medication management follow up.     Chief Complaint: Anxiety      History of Present Illness He states that he is doing better since being discharged from the hospital, he was started on Abilify and shares that he is doing slightly better.  He was admitted because of increased anxiety and had negative thoughts of what he would do about his anxiety and depression.  He states that his depression 5/10 and anxiety 6/10 with 10 being worse.  He shares that he continues to have worry but is no longer hitting self.  He is not currently working and has been taken off by the hospital until next week.  He shares that he hasn't been sleeping that well, he woke up throughout the night and received about 5 hours per night with no current NM- states that he did have while hospitalized.  He shares that he has been eating better with weight increase.  He shares that he is not having any physical health issues.  He worries about going back to work.  Denies any AV hallucinations, denies any SI/HI.      The following portions of the patient's history were reviewed and updated as appropriate: allergies, current medications, past family history, past medical history, past social history, past surgical history and problem list.    Review of Systems   Constitutional: Positive for appetite change. Negative for chills, diaphoresis, fatigue, fever and unexpected weight change.   HENT: Negative for hearing loss, sore throat, trouble swallowing and voice change.    Eyes: Negative for photophobia and visual disturbance.   Respiratory: Negative for cough, chest tightness and shortness of breath.    Cardiovascular: Negative for chest pain and palpitations.   Gastrointestinal: Negative for abdominal pain, constipation, nausea and vomiting.        \"gastric distress, rumbling, grumbling\".    Endocrine: Negative for cold intolerance and heat intolerance.   Genitourinary: Negative " "for dysuria and frequency.   Musculoskeletal: Negative for arthralgias, back pain, joint swelling and neck stiffness.   Skin: Negative for color change and wound.        Self inflicted bite marks/lacs to left arm. Self inflicted bruising to chest area.   Allergic/Immunologic: Negative for environmental allergies and immunocompromised state.   Neurological: Negative for dizziness, tremors, seizures, syncope, weakness, light-headedness and headaches.   Hematological: Negative for adenopathy. Does not bruise/bleed easily.   Psychiatric/Behavioral: Positive for decreased concentration, dysphoric mood, hallucinations, self-injury, sleep disturbance and suicidal ideas. The patient is nervous/anxious.        Objective   Physical Exam   Constitutional: He appears well-developed and well-nourished. No distress.   Neurological: He is alert. Coordination and gait normal.   Vitals reviewed.    Blood pressure 138/91, pulse 57, height 71\" (180.3 cm), weight 202 lb (91.6 kg).    Medication List:   Current Outpatient Prescriptions   Medication Sig Dispense Refill   • ARIPiprazole (ABILIFY) 5 MG tablet Take 1 tablet by mouth Daily. 30 tablet 0   • aspirin 81 MG EC tablet Take 81 mg by mouth 2 (Two) Times a Day.     • busPIRone (BUSPAR) 15 MG tablet Take 15 mg by mouth 3 (Three) Times a Day.     • diazePAM (VALIUM) 5 MG tablet Take 1 tablet by mouth 2 (Two) Times a Day. 60 tablet 0   • escitalopram (LEXAPRO) 20 MG tablet Take 1 tablet by mouth Daily. 30 tablet 1   • metoprolol tartrate (LOPRESSOR) 50 MG tablet Take 50 mg by mouth 2 (Two) Times a Day With Meals.     • mirtazapine (REMERON) 15 MG tablet Take 1 tablet by mouth Every Night. 30 tablet 0   • pantoprazole (PROTONIX) 40 MG EC tablet Take 40 mg by mouth Daily.     • simvastatin (ZOCOR) 20 MG tablet Take 20 mg by mouth daily.     • vitamin D (ERGOCALCIFEROL) 99511 UNITS capsule capsule Take 50,000 Units by mouth 1 (One) Time Per Week.       No current facility-administered " medications for this visit.        Mental Status Exam:   Hygiene:   fair  Cooperation:  Cooperative  Eye Contact:  Fair  Psychomotor Behavior:  Restless  Affect:  Restricted  Hopelessness: 7  Speech:  Monotone and Rambling  Thought Process:  Goal directed and Linear  Thought Content:  Mood congurent  Suicidal:  Death wish  Homicidal:  None  Hallucinations:  Visual  Delusion:  Paranoid  Memory:  Intact  Orientation:  Person, Place, Time and Situation  Reliability:  poor  Insight:  Poor  Judgement:  Poor  Impulse Control:  Poor  Physical/Medical Issues:  Yes GERD, self inflicted markings    Assessment/Plan   Diagnoses and all orders for this visit:    Major depressive disorder, recurrent episode, moderate    Generalized anxiety disorder    Other orders  -     diazePAM (VALIUM) 5 MG tablet; Take 1 tablet by mouth 2 (Two) Times a Day.    Continue buspar for anxiety, remeron for depression and sleep, and lexapro 20mg for depression and anxiety.        Discussed medication options. Continue all prescribed medications. to help with anxiety related symptoms and hallucinations. Reviewed the risks, benefits, and side effects of the medications; patient acknowledged and verbally consented.  Patient is agreeable to call the [facility].  Patient is aware to call 911 or go to the nearest ER should begin having SI/HI.     Return in 4 weeks

## 2017-09-05 ENCOUNTER — OFFICE VISIT (OUTPATIENT)
Dept: PSYCHIATRY | Facility: CLINIC | Age: 47
End: 2017-09-05

## 2017-09-05 DIAGNOSIS — F41.1 GENERALIZED ANXIETY DISORDER: Primary | ICD-10-CM

## 2017-09-05 DIAGNOSIS — F33.1 MAJOR DEPRESSIVE DISORDER, RECURRENT EPISODE, MODERATE (HCC): ICD-10-CM

## 2017-09-05 DIAGNOSIS — F41.0 PANIC DISORDER: ICD-10-CM

## 2017-09-05 PROCEDURE — 90837 PSYTX W PT 60 MINUTES: CPT | Performed by: SOCIAL WORKER

## 2017-09-05 NOTE — PROGRESS NOTES
"Date of Service: September 5, 2017  Time In: 7:50 AM  Time Out: 8:45 AM      PROGRESS NOTE  Data:  Amish Win is a 47 y.o. male who met 1:1 with Izaiah Tinoco LCSW,JOAQUIN for regularly scheduled individual outpatient psychotherapy session at the [facility] for follow-up of depression and and anxiety.  Patient reports he returned to work approximately one week ago and states it was a struggle.  The patient states \"I'm just not 100%\".  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  The patient states a coworker had text to his manager and told him it was taking approximately 30 minutes to get the food to the customer which upset the manager.  The patient states the manager was also upset that the patient had been seen eating a [employer] salazar earlier in the day.  He also reports the manager told him to \"do whatever you need to to get straightened out\".  He further reports the manager stated \"either get off the pills or get some that will fix you\".  The patient states he doesn't get lunch break and has to find various times throughout the day for snacking.  Patient also reports ongoing stressors at home and states his wife told him he either needed to be at the [facility] or at work.  Patient reports he and his wife have agreed to allowing the state to take guardianship of their son who has autism so he will have a better traits of being placed in an assisted-living home.  He states they have agonized over this decision but are confident it is the right thing to do for their son.     HPI: The patient continues to struggle with significant symptoms of anxiety as evidenced by feeling on edge, feeling overwhelmed, feeling vulnerable, increased heart rate, trembling, feeling of weakness, and a sense of impending doom.  Patient also reports he has begun pinching and hitting himself over the past few days and agrees this is increased during times of increased " stress.  Patient shows the undersigned to small bruises on his left wrist and forearm and one significant bruise on his sternum.  Patient rates current symptoms of anxiety at a 10 on a scale of 1-10 with 10 being most severe.  He also reports ongoing symptoms of depression as evidenced by depressed mood, hopelessness and helplessness, anhedonia, anergia, low self-esteem, feeling useless, obsessive worry, and ongoing social isolation.  Patient rates current symptoms of depression at an 8 on a scale of 1-10 with 10 being most severe.  The patient adamantly convincingly denies suicidal ideation and vehemently denies any substance use.      Clinical Maneuvering/Intervention:  Assisted patient in processing above session content; acknowledged and normalized patient’s thoughts, feelings, and concerns.  Allow the patient to discuss/vent his feelings, fears, and frustration concerning his ongoing difficulty in the negative impact on his life and validated his feelings.  Utilize motivational interviewing techniques including complex reflections to assist the patient in recognizing significant stress associated with his job and challenged him to ask himself if this job is sustainable long-term.  The undersigned agreed to take the patient off work the following week and encouraged him to remind himself he is not simply doing this for no reason.  Also challenged him to attempt not to allow his wife's behavior and statements to bother him although this is not completely possible.  Utilize cognitive behavioral therapy to assist the patient in challenging faulty, irrational fears concerning his health and reminded him he has been given a clean bill of health by several providers.  Also assisted patient in developing appropriate coping mechanisms to decrease severity and frequency of symptoms and his likelihood to self-harm including positive self talk, deep breathing exercises, guided imagery, and actively seeking enjoyable  activities he can engage in a regular basis to reduce idle time and social isolation.  Also challenged the patient to begin considering looking for other employment opportunities.  Provided unconditional positive regard in a safe, supportive environment.    Allowed patient to freely discuss issues without interruption or judgment. Provided safe, confidential environment to facilitate the development of positive therapeutic relationship and encourage open, honest communication. Assisted patient in identifying risk factors which would indicate the need for higher level of care including thoughts to harm self or others and/or self-harming behavior and encouraged patient to contact this office, call 911, or present to the nearest emergency room should any of these events occur. Discussed crisis intervention services and means to access.  Patient adamantly and convincingly denies current suicidal or homicidal ideation or perceptual disturbance.    Assessment    Patient continues to struggle with significant symptoms of depression, anxiety, and panic which continues to be exacerbated by ongoing employment stressors and ongoing stress at home.  As a result, the patient can be reasonably expected to continue to benefit from treatment and would likely be at increased risk for decompensation otherwise including the possibility of higher level of care.    Diagnoses and all orders for this visit:    Generalized anxiety disorder    Panic disorder    Major depressive disorder, recurrent episode, moderate               Mental Status Exam  Hygiene:  fair  Dress:  disheveled  Attitude:  Cooperative  Motor Activity:  Restless  Speech:  Normal  Mood:  anxious and depressed  Affect:  very depressed  Thought Processes:  Goal directed  Thought Content:  normal  Suicidal Thoughts:  denies  Homicidal Thoughts:  denies  Crisis Safety Plan: yes, to come to the emergency room.  Hallucinations:  denies    Patient's Support Network Includes:   wife and children    Progress toward goal: Not at goal    Functional Status: Moderate impairment     Prognosis: Fair with Ongoing Treatment     Plan         Patient will continue in individual outpatient psychotherapy sessions weekly at the [facility].  Patient will continue in pharmacotherapy with KEAGAN Vasquez and adhere to medication regimen as prescribed and report any side effects. Patient will contact this office, call 911 or present to the nearest emergency room should suicidal or homicidal ideations occur. Provide Cognitive Behavioral Therapy and Solution Focused Therapy along with motivational interviewing techniques to improve functioning, maintain stability, and avoid decompensation and the need for higher level of care.          Return in about 1 week (around 09/12/2017) for Next scheduled follow up.    Izaiah Tinoco, FAUSTO,[facility]

## 2017-09-12 ENCOUNTER — OFFICE VISIT (OUTPATIENT)
Dept: PSYCHIATRY | Facility: CLINIC | Age: 47
End: 2017-09-12

## 2017-09-12 DIAGNOSIS — F41.0 PANIC DISORDER: ICD-10-CM

## 2017-09-12 DIAGNOSIS — F33.1 MAJOR DEPRESSIVE DISORDER, RECURRENT EPISODE, MODERATE (HCC): ICD-10-CM

## 2017-09-12 DIAGNOSIS — F41.1 GENERALIZED ANXIETY DISORDER: Primary | ICD-10-CM

## 2017-09-12 PROCEDURE — 90834 PSYTX W PT 45 MINUTES: CPT | Performed by: SOCIAL WORKER
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
## 2017-09-20 ENCOUNTER — OFFICE VISIT (OUTPATIENT)
Dept: PSYCHIATRY | Facility: CLINIC | Age: 47
End: 2017-09-20

## 2017-09-20 VITALS
DIASTOLIC BLOOD PRESSURE: 82 MMHG | HEART RATE: 57 BPM | SYSTOLIC BLOOD PRESSURE: 132 MMHG | WEIGHT: 193 LBS | BODY MASS INDEX: 27.02 KG/M2 | HEIGHT: 71 IN

## 2017-09-20 DIAGNOSIS — F33.1 MAJOR DEPRESSIVE DISORDER, RECURRENT EPISODE, MODERATE (HCC): Primary | ICD-10-CM

## 2017-09-20 DIAGNOSIS — F41.1 GENERALIZED ANXIETY DISORDER: ICD-10-CM

## 2017-09-20 PROCEDURE — 99213 OFFICE O/P EST LOW 20 MIN: CPT | Performed by: NURSE PRACTITIONER

## 2017-09-20 RX ORDER — MIRTAZAPINE 15 MG/1
15 TABLET, FILM COATED ORAL NIGHTLY
Qty: 30 TABLET | Refills: 0 | Status: SHIPPED | OUTPATIENT
Start: 2017-09-20 | End: 2017-11-14 | Stop reason: SDUPTHER

## 2017-09-20 RX ORDER — DIAZEPAM 5 MG/1
5 TABLET ORAL 2 TIMES DAILY
Qty: 60 TABLET | Refills: 0 | Status: SHIPPED | OUTPATIENT
Start: 2017-09-20 | End: 2017-11-01 | Stop reason: SDUPTHER

## 2017-09-20 RX ORDER — ESCITALOPRAM OXALATE 20 MG/1
20 TABLET ORAL DAILY
Qty: 30 TABLET | Refills: 0 | Status: SHIPPED | OUTPATIENT
Start: 2017-09-20 | End: 2017-10-17 | Stop reason: HOSPADM

## 2017-09-20 RX ORDER — ARIPIPRAZOLE 10 MG/1
10 TABLET ORAL DAILY
Qty: 30 TABLET | Refills: 0 | Status: SHIPPED | OUTPATIENT
Start: 2017-09-20 | End: 2017-10-17 | Stop reason: HOSPADM

## 2017-09-20 RX ORDER — BUSPIRONE HYDROCHLORIDE 15 MG/1
15 TABLET ORAL 3 TIMES DAILY
Qty: 90 TABLET | Refills: 0 | Status: SHIPPED | OUTPATIENT
Start: 2017-09-20 | End: 2017-11-06 | Stop reason: SDUPTHER

## 2017-09-20 NOTE — PROGRESS NOTES
Subjective   Amish Win is a 47 y.o. male who is here today for medication management follow up at the [facility], he presents to his appointment on time.     Chief Complaint: Anxiety      History of Present Illness He states that he is doing fair since he discharge from the [facility] last month.  Actually, he states that he has not seen much improvement.  States that the last 7 days has been horrific because he has been working, states that he doesn't like the way he feels and feels like he is not performing per standards at work.  He states that he isn't feeling any worse, rates his depression 8/10, anxiety 7/10 but rates that is is worse when he has to work.  He shares that his relationship with his wife is ok, conflictual when he doesn't work.  He states that he just wants to get back to self.  He shares that he hasn't been taking the remeron because he has to wake up only a couple of hours from taking it to get the children off to school, shares that he has trouble sleeping during the day- he works until 2am in the morning.  Recommended that he start taking it again, and try to nap during the day when his children are at school and his wife is working.  He states that he is stressed about not being able to perform at work.  He shares that he almost had to call the crisis line because of anxiety related to worries about his heart.  He is aware that his heart is healthy but still gets concerned when other people have heart problems.  He denies any other health issues.  Appetite is decreased with no significant weight changes.  Denies any AV hallucinations, denies any SI/HI.     He states that he is doing better since being discharged from the hospital, he was started on Abilify and shares that he is doing slightly better.  He was admitted because of increased anxiety and had negative thoughts of what he would do about his anxiety and depression.  He states that his depression 5/10 and anxiety 6/10  with 10 being worse.  He shares that he continues to have worry but is no longer hitting self.  He is not currently working and has been taken off by the hospital until next week.  He shares that he hasn't been sleeping that well, he woke up throughout the night and received about 5 hours per night with no current NM- states that he did have while hospitalized.  He shares that he has been eating better with weight increase.  He shares that he is not having any physical health issues.  He worries about going back to work.  Denies any AV hallucinations, denies any SI/HI.      The following portions of the patient's history were reviewed and updated as appropriate: allergies, current medications, past family history, past medical history, past social history, past surgical history and problem list.    Review of Systems   Constitutional: Negative for appetite change, chills, diaphoresis, fatigue, fever and unexpected weight change.   HENT: Negative for hearing loss, sore throat, trouble swallowing and voice change.    Eyes: Negative for photophobia and visual disturbance.   Respiratory: Negative for cough, chest tightness and shortness of breath.    Cardiovascular: Negative for chest pain and palpitations.   Gastrointestinal: Negative for abdominal pain, constipation, nausea and vomiting.   Endocrine: Negative for cold intolerance and heat intolerance.   Genitourinary: Negative for dysuria and frequency.   Musculoskeletal: Negative for arthralgias, back pain, joint swelling and neck stiffness.   Skin: Negative for color change and wound.   Allergic/Immunologic: Negative for environmental allergies and immunocompromised state.   Neurological: Negative for dizziness, tremors, seizures, syncope, weakness, light-headedness and headaches.   Hematological: Negative for adenopathy. Does not bruise/bleed easily.   Psychiatric/Behavioral: Negative for decreased concentration, dysphoric mood, hallucinations, self-injury, sleep  "disturbance and suicidal ideas. The patient is not nervous/anxious.        Objective   Physical Exam   Constitutional: He appears well-developed and well-nourished. No distress.   Neurological: He is alert. Coordination and gait normal.   Vitals reviewed.    Blood pressure 132/82, pulse 57, height 71\" (180.3 cm), weight 193 lb (87.5 kg).    Medication List:   Current Outpatient Prescriptions   Medication Sig Dispense Refill   • ARIPiprazole (ABILIFY) 10 MG tablet Take 1 tablet by mouth Daily. 30 tablet 0   • aspirin 81 MG EC tablet Take 81 mg by mouth 2 (Two) Times a Day.     • busPIRone (BUSPAR) 15 MG tablet Take 1 tablet by mouth 3 (Three) Times a Day. 90 tablet 0   • diazePAM (VALIUM) 5 MG tablet Take 1 tablet by mouth 2 (Two) Times a Day. 60 tablet 0   • escitalopram (LEXAPRO) 20 MG tablet Take 1 tablet by mouth Daily. 30 tablet 0   • metoprolol tartrate (LOPRESSOR) 50 MG tablet Take 50 mg by mouth 2 (Two) Times a Day With Meals.     • mirtazapine (REMERON) 15 MG tablet Take 1 tablet by mouth Every Night. 30 tablet 0   • pantoprazole (PROTONIX) 40 MG EC tablet Take 40 mg by mouth Daily.     • simvastatin (ZOCOR) 20 MG tablet Take 20 mg by mouth daily.     • vitamin D (ERGOCALCIFEROL) 77407 UNITS capsule capsule Take 50,000 Units by mouth 1 (One) Time Per Week.       No current facility-administered medications for this visit.        Mental Status Exam:   Hygiene:   fair  Cooperation:  Cooperative  Eye Contact:  Fair  Psychomotor Behavior:  Appropriate  Affect:  Blunted  Hopelessness: 3  Speech:  Monotone  Thought Process:  Goal directed and Linear  Thought Content:  Mood congurent  Suicidal:  None and Death wish  Homicidal:  None  Hallucinations:  None  Delusion:  None  Memory:  Intact  Orientation:  Person, Place, Time and Situation  Reliability:  poor  Insight:  Poor  Judgement:  Poor  Impulse Control:  Poor  Physical/Medical Issues:  No     Assessment/Plan   Diagnoses and all orders for this visit:    Major " depressive disorder, recurrent episode, moderate    Generalized anxiety disorder    Other orders  -     mirtazapine (REMERON) 15 MG tablet; Take 1 tablet by mouth Every Night.  -     escitalopram (LEXAPRO) 20 MG tablet; Take 1 tablet by mouth Daily.  -     diazePAM (VALIUM) 5 MG tablet; Take 1 tablet by mouth 2 (Two) Times a Day.  -     busPIRone (BUSPAR) 15 MG tablet; Take 1 tablet by mouth 3 (Three) Times a Day.  -     ARIPiprazole (ABILIFY) 10 MG tablet; Take 1 tablet by mouth Daily.    Discussed medication options. Continue remeron for sleep and depression, lexapro for depression and anxiety, diazepam for anxiety, busapr for anxiety, and increase abilify to further stabilize mood.  Reviewed the risks, benefits, and side effects of the medications; patient acknowledged and verbally consented.  Patient is agreeable to call the [facility].  Patient is aware to call 911 or go to the nearest ER should begin having SI/HI.  Continue therapy.     Return in 4 weeks

## 2017-09-27 ENCOUNTER — OFFICE VISIT (OUTPATIENT)
Dept: PSYCHIATRY | Facility: CLINIC | Age: 47
End: 2017-09-27

## 2017-09-27 DIAGNOSIS — F41.1 GENERALIZED ANXIETY DISORDER: ICD-10-CM

## 2017-09-27 DIAGNOSIS — F33.1 MAJOR DEPRESSIVE DISORDER, RECURRENT EPISODE, MODERATE (HCC): Primary | ICD-10-CM

## 2017-09-27 DIAGNOSIS — F41.0 PANIC DISORDER: ICD-10-CM

## 2017-09-27 PROCEDURE — 90834 PSYTX W PT 45 MINUTES: CPT | Performed by: SOCIAL WORKER

## 2017-09-27 NOTE — PROGRESS NOTES
Date of Service: September 27, 2017  Time In: 3:15 PM  Time Out: 4:00 PM      PROGRESS NOTE  Data:  Amish Win is a 47 y.o. male who met 1:1 with Izaiah Tinoco LCSW, JOAQUIN  for regularly scheduled individual outpatient psychotherapy session at the [facility] for follow-up of depression and anxiety.     HPI: Patient reports he has been unable to maintain his work schedule for the past 2-1/2 weeks but states he continues to struggle with significant symptoms while on the job.  He continues to report depressed mood, anhedonia, anergia, feeling hopeless and helpless, decreased motivation, and an increase in social isolation.  Patient rates current symptoms of depression at 7/8 on a scale of 1-10 with 10 being most severe.  Patient also reports symptoms of anxiety/panic including feeling on edge, feeling overwhelmed, obsessive worry, and urged to escape the situation, increased heart rate, mind goes blank, and a significant sense of impending doom.  Patient rates current symptoms of anxiety/panic had a total scale of 1-10 with 10 being most severe.  Patient also reports he has returned to the compulsive behavior of hitting himself and tapping his collarbone as a way to calm himself and his fears of medical issues.  Patient also reports he continues to struggle with sleep and states he is sleeping approximately 4 hours nightly.  He reports he continues to adhere to medication regimen as prescribed.  The patient adamantly and convincingly denies suicidal ideation and vehemently denies any substance use.      Clinical Maneuvering/Intervention:  Assisted patient in processing above session content; acknowledged and normalized patient’s thoughts, feelings, and concerns.  Utilize cognitive behavioral therapy to focus on thought blocking techniques including putting a rubber band on his wrist and snapping it when he feels himself becoming overwhelmed in attempt to decrease his tendency to engage in self harming  behaviors.  Also utilized cognitive behavioral therapy to assist the patient in becoming consciously aware of when he is catastrophize ending and to refocus himself on the immediate task at hand.  Also discussed the mutually self-perpetuating nature of social isolation and the patient's issues and encouraged him to actively seek enjoyable activities she can engage in a regular basis to decrease idle time and social isolation.  The patient demonstrates some insight and states he will engage in some activity on his next day off and avoid spending all of his time sitting at home.  Provided unconditional positive regard a safe, supportive environment.    Allowed patient to freely discuss issues without interruption or judgment. Provided safe, confidential environment to facilitate the development of positive therapeutic relationship and encourage open, honest communication. Assisted patient in identifying risk factors which would indicate the need for higher level of care including thoughts to harm self or others and/or self-harming behavior and encouraged patient to contact this office, call 911, or present to the nearest emergency room should any of these events occur. Discussed crisis intervention services and means to access.  Patient adamantly and convincingly denies current suicidal or homicidal ideation or perceptual disturbance.    Assessment    The patient continues to struggle with significant symptoms of depression and anxiety which continue to be exacerbated by various social stressors.  As a result, the patient to be reasonably expected to continue to benefit from treatment and would likely be at increased risk for decompensation otherwise.    Diagnoses and all orders for this visit:    Major depressive disorder, recurrent episode, moderate    Generalized anxiety disorder    Panic disorder               Mental Status Exam  Hygiene:  good  Dress:  casual  Attitude:  Cooperative  Motor Activity:   Slow  Speech:  Monotone  Mood:  depressed  Affect:  depressed, anxious and tearful  Thought Processes:  Linear  Thought Content:  normal  Suicidal Thoughts:  denies  Homicidal Thoughts:  denies  Crisis Safety Plan: yes, to come to the emergency room.  Hallucinations:  denies    Patient's Support Network Includes:  wife and children    Progress toward goal: Not at goal    Functional Status: Moderate impairment     Prognosis: Fair with Ongoing Treatment     Plan         Patient will continue in individual outpatient psychotherapy session at the [facility] every 2 weeks and pharmacotherapy as scheduled with KEAGAN Vasquez.  Patient will adhere to medication regimen as prescribed and report any side effects. Patient will contact this office, call 911 or present to the nearest emergency room should suicidal or homicidal ideations occur. Provide Cognitive Behavioral Therapy and Solution Focused Therapy to improve functioning, maintain stability, and avoid decompensation and the need for higher level of care.          Return in about 2 weeks (around 10/11/2017) for Next scheduled follow up.    Izaiah Tinoco, FAUSTO, [facility]

## 2017-10-03 ENCOUNTER — OFFICE VISIT (OUTPATIENT)
Dept: PSYCHIATRY | Facility: CLINIC | Age: 47
End: 2017-10-03

## 2017-10-03 DIAGNOSIS — F33.1 MAJOR DEPRESSIVE DISORDER, RECURRENT EPISODE, MODERATE (HCC): Primary | ICD-10-CM

## 2017-10-03 DIAGNOSIS — F41.1 GENERALIZED ANXIETY DISORDER: ICD-10-CM

## 2017-10-03 DIAGNOSIS — Z63.0 MARITAL/PARTNER RELATIONAL PROBLEM: ICD-10-CM

## 2017-10-03 DIAGNOSIS — F41.0 PANIC DISORDER: ICD-10-CM

## 2017-10-03 PROCEDURE — 90834 PSYTX W PT 45 MINUTES: CPT | Performed by: SOCIAL WORKER

## 2017-10-03 SDOH — SOCIAL STABILITY - SOCIAL INSECURITY: PROBLEMS IN RELATIONSHIP WITH SPOUSE OR PARTNER: Z63.0
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
## 2017-10-13 ENCOUNTER — HOSPITAL ENCOUNTER (INPATIENT)
Facility: HOSPITAL | Age: 47
LOS: 4 days | Discharge: HOME | End: 2017-10-17
Attending: PSYCHIATRY & NEUROLOGY | Admitting: PSYCHIATRY & NEUROLOGY

## 2017-10-13 ENCOUNTER — HOSPITAL ENCOUNTER (EMERGENCY)
Facility: HOSPITAL | Age: 47
Discharge: PSYCHIATRIC OR SUBSTANCE USE FACILITY | End: 2017-10-13
Attending: EMERGENCY MEDICINE | Admitting: EMERGENCY MEDICINE

## 2017-10-13 VITALS
TEMPERATURE: 98.2 F | HEART RATE: 62 BPM | RESPIRATION RATE: 18 BRPM | OXYGEN SATURATION: 95 % | HEIGHT: 71 IN | DIASTOLIC BLOOD PRESSURE: 86 MMHG | WEIGHT: 185 LBS | SYSTOLIC BLOOD PRESSURE: 141 MMHG | BODY MASS INDEX: 25.9 KG/M2

## 2017-10-13 DIAGNOSIS — F33.9 RECURRENT MAJOR DEPRESSIVE DISORDER, REMISSION STATUS UNSPECIFIED (HCC): ICD-10-CM

## 2017-10-13 DIAGNOSIS — F32.A DEPRESSION, UNSPECIFIED DEPRESSION TYPE: Primary | ICD-10-CM

## 2017-10-13 PROBLEM — R45.851 DEPRESSION WITH SUICIDAL IDEATION: Status: ACTIVE | Noted: 2017-10-13

## 2017-10-13 LAB
6-ACETYL MORPHINE: NEGATIVE
ALBUMIN SERPL-MCNC: 4.5 G/DL (ref 3.5–5)
ALBUMIN/GLOB SERPL: 1.5 G/DL (ref 1.5–2.5)
ALP SERPL-CCNC: 68 U/L (ref 40–129)
ALT SERPL W P-5'-P-CCNC: 33 U/L (ref 10–44)
AMPHET+METHAMPHET UR QL: NEGATIVE
ANION GAP SERPL CALCULATED.3IONS-SCNC: 4.1 MMOL/L (ref 3.6–11.2)
AST SERPL-CCNC: 34 U/L (ref 10–34)
BACTERIA UR QL AUTO: NORMAL /HPF
BARBITURATES UR QL SCN: NEGATIVE
BASOPHILS # BLD AUTO: 0.02 10*3/MM3 (ref 0–0.3)
BASOPHILS NFR BLD AUTO: 0.3 % (ref 0–2)
BENZODIAZ UR QL SCN: POSITIVE
BILIRUB SERPL-MCNC: 1.2 MG/DL (ref 0.2–1.8)
BILIRUB UR QL STRIP: NEGATIVE
BUN BLD-MCNC: 7 MG/DL (ref 7–21)
BUN/CREAT SERPL: 8.5 (ref 7–25)
BUPRENORPHINE SERPL-MCNC: NEGATIVE NG/ML
CALCIUM SPEC-SCNC: 9.6 MG/DL (ref 7.7–10)
CANNABINOIDS SERPL QL: NEGATIVE
CHLORIDE SERPL-SCNC: 105 MMOL/L (ref 99–112)
CLARITY UR: CLEAR
CO2 SERPL-SCNC: 32.9 MMOL/L (ref 24.3–31.9)
COCAINE UR QL: NEGATIVE
COLOR UR: YELLOW
CREAT BLD-MCNC: 0.82 MG/DL (ref 0.43–1.29)
DEPRECATED RDW RBC AUTO: 37.7 FL (ref 37–54)
EOSINOPHIL # BLD AUTO: 0.25 10*3/MM3 (ref 0–0.7)
EOSINOPHIL NFR BLD AUTO: 3.8 % (ref 0–5)
ERYTHROCYTE [DISTWIDTH] IN BLOOD BY AUTOMATED COUNT: 12.4 % (ref 11.5–14.5)
ETHANOL BLD-MCNC: <10 MG/DL
ETHANOL UR QL: <0.01 %
GFR SERPL CREATININE-BSD FRML MDRD: 100 ML/MIN/1.73
GLOBULIN UR ELPH-MCNC: 3.1 GM/DL
GLUCOSE BLD-MCNC: 94 MG/DL (ref 70–110)
GLUCOSE UR STRIP-MCNC: NEGATIVE MG/DL
HCT VFR BLD AUTO: 41.3 % (ref 42–52)
HGB BLD-MCNC: 14.1 G/DL (ref 14–18)
HGB UR QL STRIP.AUTO: ABNORMAL
HYALINE CASTS UR QL AUTO: NORMAL /LPF
IMM GRANULOCYTES # BLD: 0 10*3/MM3 (ref 0–0.03)
IMM GRANULOCYTES NFR BLD: 0 % (ref 0–0.5)
KETONES UR QL STRIP: NEGATIVE
LEUKOCYTE ESTERASE UR QL STRIP.AUTO: NEGATIVE
LYMPHOCYTES # BLD AUTO: 1.35 10*3/MM3 (ref 1–3)
LYMPHOCYTES NFR BLD AUTO: 20.5 % (ref 21–51)
MCH RBC QN AUTO: 29.1 PG (ref 27–33)
MCHC RBC AUTO-ENTMCNC: 34.1 G/DL (ref 33–37)
MCV RBC AUTO: 85.3 FL (ref 80–94)
METHADONE UR QL SCN: NEGATIVE
MONOCYTES # BLD AUTO: 0.54 10*3/MM3 (ref 0.1–0.9)
MONOCYTES NFR BLD AUTO: 8.2 % (ref 0–10)
NEUTROPHILS # BLD AUTO: 4.41 10*3/MM3 (ref 1.4–6.5)
NEUTROPHILS NFR BLD AUTO: 67.2 % (ref 30–70)
NITRITE UR QL STRIP: NEGATIVE
OPIATES UR QL: NEGATIVE
OSMOLALITY SERPL CALC.SUM OF ELEC: 280.8 MOSM/KG (ref 273–305)
OXYCODONE UR QL SCN: NEGATIVE
PCP UR QL SCN: NEGATIVE
PH UR STRIP.AUTO: 7.5 [PH] (ref 5–8)
PLATELET # BLD AUTO: 191 10*3/MM3 (ref 130–400)
PMV BLD AUTO: 9.3 FL (ref 6–10)
POTASSIUM BLD-SCNC: 4 MMOL/L (ref 3.5–5.3)
PROT SERPL-MCNC: 7.6 G/DL (ref 6–8)
PROT UR QL STRIP: NEGATIVE
RBC # BLD AUTO: 4.84 10*6/MM3 (ref 4.7–6.1)
RBC # UR: NORMAL /HPF
REF LAB TEST METHOD: NORMAL
SODIUM BLD-SCNC: 142 MMOL/L (ref 135–153)
SP GR UR STRIP: 1.01 (ref 1–1.03)
SQUAMOUS #/AREA URNS HPF: NORMAL /HPF
UROBILINOGEN UR QL STRIP: ABNORMAL
WBC NRBC COR # BLD: 6.57 10*3/MM3 (ref 4.5–12.5)
WBC UR QL AUTO: NORMAL /HPF

## 2017-10-13 PROCEDURE — G0008 ADMIN INFLUENZA VIRUS VAC: HCPCS | Performed by: PSYCHIATRY & NEUROLOGY

## 2017-10-13 PROCEDURE — 90674 CCIIV4 VAC NO PRSV 0.5 ML IM: CPT | Performed by: PSYCHIATRY & NEUROLOGY

## 2017-10-13 PROCEDURE — 93010 ELECTROCARDIOGRAM REPORT: CPT | Performed by: INTERNAL MEDICINE

## 2017-10-13 PROCEDURE — 93005 ELECTROCARDIOGRAM TRACING: CPT | Performed by: PSYCHIATRY & NEUROLOGY

## 2017-10-13 PROCEDURE — 25010000002 INFLUENZA VAC SPLIT QUAD 0.5 ML SUSPENSION PREFILLED SYRINGE: Performed by: PSYCHIATRY & NEUROLOGY

## 2017-10-13 RX ORDER — ALUMINA, MAGNESIA, AND SIMETHICONE 2400; 2400; 240 MG/30ML; MG/30ML; MG/30ML
15 SUSPENSION ORAL EVERY 6 HOURS PRN
Status: DISCONTINUED | OUTPATIENT
Start: 2017-10-13 | End: 2017-10-17 | Stop reason: HOSPADM

## 2017-10-13 RX ORDER — ATORVASTATIN CALCIUM 10 MG/1
10 TABLET, FILM COATED ORAL NIGHTLY
Status: DISCONTINUED | OUTPATIENT
Start: 2017-10-13 | End: 2017-10-17 | Stop reason: HOSPADM

## 2017-10-13 RX ORDER — FAMOTIDINE 20 MG/1
20 TABLET, FILM COATED ORAL 2 TIMES DAILY PRN
Status: DISCONTINUED | OUTPATIENT
Start: 2017-10-13 | End: 2017-10-17 | Stop reason: HOSPADM

## 2017-10-13 RX ORDER — ESCITALOPRAM OXALATE 10 MG/1
20 TABLET ORAL DAILY
Status: DISCONTINUED | OUTPATIENT
Start: 2017-10-14 | End: 2017-10-16

## 2017-10-13 RX ORDER — DIAZEPAM 5 MG/1
5 TABLET ORAL EVERY 12 HOURS
Status: DISCONTINUED | OUTPATIENT
Start: 2017-10-13 | End: 2017-10-17 | Stop reason: HOSPADM

## 2017-10-13 RX ORDER — ERGOCALCIFEROL 1.25 MG/1
50000 CAPSULE ORAL WEEKLY
Status: DISCONTINUED | OUTPATIENT
Start: 2017-10-13 | End: 2017-10-13 | Stop reason: SDUPTHER

## 2017-10-13 RX ORDER — ECHINACEA PURPUREA EXTRACT 125 MG
2 TABLET ORAL AS NEEDED
Status: DISCONTINUED | OUTPATIENT
Start: 2017-10-13 | End: 2017-10-17 | Stop reason: HOSPADM

## 2017-10-13 RX ORDER — HYDROXYZINE 50 MG/1
50 TABLET, FILM COATED ORAL EVERY 6 HOURS PRN
Status: DISCONTINUED | OUTPATIENT
Start: 2017-10-13 | End: 2017-10-17 | Stop reason: HOSPADM

## 2017-10-13 RX ORDER — ASPIRIN 81 MG/1
81 TABLET ORAL EVERY 12 HOURS
Status: DISCONTINUED | OUTPATIENT
Start: 2017-10-13 | End: 2017-10-17 | Stop reason: HOSPADM

## 2017-10-13 RX ORDER — TRAZODONE HYDROCHLORIDE 50 MG/1
100 TABLET ORAL NIGHTLY PRN
Status: DISCONTINUED | OUTPATIENT
Start: 2017-10-13 | End: 2017-10-17 | Stop reason: HOSPADM

## 2017-10-13 RX ORDER — MIRTAZAPINE 15 MG/1
15 TABLET, FILM COATED ORAL NIGHTLY
Status: DISCONTINUED | OUTPATIENT
Start: 2017-10-13 | End: 2017-10-17 | Stop reason: HOSPADM

## 2017-10-13 RX ORDER — BENZTROPINE MESYLATE 1 MG/ML
0.5 INJECTION INTRAMUSCULAR; INTRAVENOUS DAILY PRN
Status: DISCONTINUED | OUTPATIENT
Start: 2017-10-13 | End: 2017-10-17 | Stop reason: HOSPADM

## 2017-10-13 RX ORDER — METOPROLOL TARTRATE 50 MG/1
50 TABLET, FILM COATED ORAL EVERY 12 HOURS SCHEDULED
Status: DISCONTINUED | OUTPATIENT
Start: 2017-10-13 | End: 2017-10-17 | Stop reason: HOSPADM

## 2017-10-13 RX ORDER — PANTOPRAZOLE SODIUM 40 MG/1
40 TABLET, DELAYED RELEASE ORAL DAILY
Status: DISCONTINUED | OUTPATIENT
Start: 2017-10-14 | End: 2017-10-17 | Stop reason: HOSPADM

## 2017-10-13 RX ORDER — BENZONATATE 100 MG/1
100 CAPSULE ORAL 3 TIMES DAILY PRN
Status: DISCONTINUED | OUTPATIENT
Start: 2017-10-13 | End: 2017-10-17 | Stop reason: HOSPADM

## 2017-10-13 RX ORDER — IBUPROFEN 600 MG/1
600 TABLET ORAL EVERY 6 HOURS PRN
Status: DISCONTINUED | OUTPATIENT
Start: 2017-10-13 | End: 2017-10-17 | Stop reason: HOSPADM

## 2017-10-13 RX ORDER — ONDANSETRON 4 MG/1
4 TABLET, FILM COATED ORAL EVERY 6 HOURS PRN
Status: DISCONTINUED | OUTPATIENT
Start: 2017-10-13 | End: 2017-10-17 | Stop reason: HOSPADM

## 2017-10-13 RX ORDER — ARIPIPRAZOLE 10 MG/1
10 TABLET ORAL DAILY
Status: DISCONTINUED | OUTPATIENT
Start: 2017-10-14 | End: 2017-10-14

## 2017-10-13 RX ORDER — LOPERAMIDE HYDROCHLORIDE 2 MG/1
2 CAPSULE ORAL 4 TIMES DAILY PRN
Status: DISCONTINUED | OUTPATIENT
Start: 2017-10-13 | End: 2017-10-17 | Stop reason: HOSPADM

## 2017-10-13 RX ORDER — BENZTROPINE MESYLATE 1 MG/1
1 TABLET ORAL DAILY PRN
Status: DISCONTINUED | OUTPATIENT
Start: 2017-10-13 | End: 2017-10-17 | Stop reason: HOSPADM

## 2017-10-13 RX ADMIN — BUSPIRONE HYDROCHLORIDE 15 MG: 10 TABLET ORAL at 21:42

## 2017-10-13 RX ADMIN — ASPIRIN 81 MG: 81 TABLET ORAL at 21:41

## 2017-10-13 RX ADMIN — OFLOXACIN 50000 UNITS: 300 TABLET, COATED ORAL at 21:42

## 2017-10-13 RX ADMIN — DIAZEPAM 5 MG: 5 TABLET ORAL at 21:41

## 2017-10-13 RX ADMIN — MIRTAZAPINE 15 MG: 15 TABLET, FILM COATED ORAL at 21:42

## 2017-10-13 RX ADMIN — METOPROLOL TARTRATE 50 MG: 50 TABLET, FILM COATED ORAL at 21:42

## 2017-10-13 RX ADMIN — ATORVASTATIN CALCIUM 10 MG: 10 TABLET, FILM COATED ORAL at 21:41

## 2017-10-13 RX ADMIN — INFLUENZA VIRUS VACCINE 0.5 ML: 15; 15; 15; 15 SUSPENSION INTRAMUSCULAR at 18:08

## 2017-10-13 NOTE — NURSING NOTE
Patient presents after he called the help like. Has been struggling with Depression and anxiety and is Afraid he might do something impulsive to hurt himself. This morning he thought of taking more that prescribed Valium but called the help line and was told to go to the ER for an evaluation. Denies Drug and Alcohol abuse. Rates Depression and anxiety both 9/10. Feels his medications has not been working. Poor appetite and sleep. 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 No new stressors but has a continuous stressor of a son that has Autism and currently hospitalized at [facility]. No prior suicide attempts. Has had 1 past hospitalization for Mental illness on 8/15/17 to 8/21/17.

## 2017-10-13 NOTE — PLAN OF CARE
Problem: BH Patient Care Overview (Adult)  Goal: Plan of Care Review  Outcome: Ongoing (interventions implemented as appropriate)  Goal: Interdisciplinary Rounds/Family Conference  Outcome: Ongoing (interventions implemented as appropriate)  Goal: Individualization and Mutuality  Outcome: Ongoing (interventions implemented as appropriate)  Goal: Discharge Needs Assessment  Outcome: Ongoing (interventions implemented as appropriate)    Problem:  Overarching Goals  Goal: Adheres to Safety Considerations for Self and Others  Outcome: Ongoing (interventions implemented as appropriate)  Goal: Optimized Coping Skills in Response to Life Stressors  Outcome: Ongoing (interventions implemented as appropriate)  Goal: Develops/Participates in Therapeutic [place] to Support Successful Transition  Outcome: Ongoing (interventions implemented as appropriate)    Problem: Risk for Self Injury/Neglect  Goal: Treatment Goal: Remain safe during length of stay, learn and adopt new coping skills, and be free of self-injurious ideation, impulses and acts at the time of discharge  Outcome: Ongoing (interventions implemented as appropriate)  Goal: Refrain from harming self  Outcome: Ongoing (interventions implemented as appropriate)

## 2017-10-13 NOTE — ED PROVIDER NOTES
Subjective   Patient is a 47 y.o. male presenting with mental health disorder.   History provided by:  Patient   used: No    Mental Health Problem   Presenting symptoms: depression (Patient reports he was admitted in August and is scrared that he is a danger to himself)    Degree of incapacity (severity):  Moderate  Onset quality:  Gradual  Duration:  2 months  Timing:  Constant  Progression:  Worsening  Chronicity:  Recurrent  Context: recent medication change    Context: not medication, not noncompliant and not stressful life event    Treatment compliance:  All of the time  Time since last dose of psychoactive medication: had meds today.  Relieved by:  Nothing  Worsened by:  Nothing  Ineffective treatments:  None tried  Associated symptoms: anxiety, appetite change and hypersomnia    Associated symptoms: no abdominal pain, no anhedonia, no chest pain, no decreased need for sleep, no feelings of worthlessness, no headaches, no hyperventilation, no insomnia, no irritability and no poor judgment    Risk factors: family hx of mental illness    Risk factors: no hx of mental illness, no hx of suicide attempts, no neurological disease and no recent psychiatric admission        Review of Systems   Constitutional: Positive for appetite change. Negative for irritability.   HENT: Negative.    Eyes: Negative.    Respiratory: Negative.    Cardiovascular: Negative.  Negative for chest pain.   Gastrointestinal: Negative.  Negative for abdominal pain.   Endocrine: Negative.    Genitourinary: Negative.    Musculoskeletal: Negative.    Skin: Negative.    Allergic/Immunologic: Negative.    Neurological: Negative.  Negative for headaches.   Hematological: Negative.    Psychiatric/Behavioral: The patient is nervous/anxious. The patient does not have insomnia.        Past Medical History:   Diagnosis Date   • Anxiety    • Depression    • BOZENA (generalized anxiety disorder)    • GERD (gastroesophageal reflux disease)     • Hyperlipidemia    • Hypertension    • Hypertension    • Low back pain    • Major depressive disorder    • Panic disorder    • Panic disorder    • Self-injurious behavior     slapping self   • Suicidal thoughts        Allergies   Allergen Reactions   • Penicillins Hives       Past Surgical History:   Procedure Laterality Date   • CLAVICLE SURGERY         Family History   Problem Relation Age of Onset   • Depression Father    • Anxiety disorder Father    • Alcohol abuse Father    • No Known Problems Daughter    • No Known Problems Son        Social History     Social History   • Marital status:      Spouse name: N/A   • Number of children: N/A   • Years of education: N/A     Social History Main Topics   • Smoking status: Never Smoker   • Smokeless tobacco: Never Used   • Alcohol use No   • Drug use: No      Comment: pt says that he dont do anything wrong. denies abusing any medicines   • Sexual activity: Yes     Partners: Female     Other Topics Concern   • None     Social History Narrative           Objective   Physical Exam   Constitutional: He is oriented to person, place, and time. He appears well-developed and well-nourished.   HENT:   Head: Normocephalic.   Right Ear: External ear normal.   Left Ear: External ear normal.   Mouth/Throat: Oropharynx is clear and moist.   Eyes: EOM are normal. Pupils are equal, round, and reactive to light.   Neck: Normal range of motion. Neck supple.   Cardiovascular: Normal rate.    Pulmonary/Chest: Effort normal and breath sounds normal.   Abdominal: Soft. Bowel sounds are normal.   Musculoskeletal: Normal range of motion.   Neurological: He is alert and oriented to person, place, and time.   Skin: Skin is warm and dry.   Psychiatric: He exhibits a depressed mood.   Nursing note and vitals reviewed.      Procedures         ED Course  ED Course                  MDM    Final diagnoses:   Depression, unspecified depression type   Recurrent major depressive disorder,  remission status unspecified            Neville Fernandez, APRN  10/13/17 [id number]

## 2017-10-14 PROCEDURE — 99223 1ST HOSP IP/OBS HIGH 75: CPT | Performed by: PSYCHIATRY & NEUROLOGY

## 2017-10-14 RX ORDER — QUETIAPINE FUMARATE 25 MG/1
50 TABLET, FILM COATED ORAL NIGHTLY
Status: DISCONTINUED | OUTPATIENT
Start: 2017-10-14 | End: 2017-10-17 | Stop reason: HOSPADM

## 2017-10-14 RX ADMIN — MIRTAZAPINE 15 MG: 15 TABLET, FILM COATED ORAL at 21:27

## 2017-10-14 RX ADMIN — BUSPIRONE HYDROCHLORIDE 15 MG: 10 TABLET ORAL at 08:19

## 2017-10-14 RX ADMIN — ARIPIPRAZOLE 10 MG: 10 TABLET ORAL at 08:19

## 2017-10-14 RX ADMIN — METOPROLOL TARTRATE 50 MG: 50 TABLET, FILM COATED ORAL at 21:27

## 2017-10-14 RX ADMIN — METOPROLOL TARTRATE 50 MG: 50 TABLET, FILM COATED ORAL at 08:19

## 2017-10-14 RX ADMIN — QUETIAPINE FUMARATE 50 MG: 25 TABLET, FILM COATED ORAL at 21:27

## 2017-10-14 RX ADMIN — BUSPIRONE HYDROCHLORIDE 15 MG: 10 TABLET ORAL at 21:27

## 2017-10-14 RX ADMIN — BUSPIRONE HYDROCHLORIDE 15 MG: 10 TABLET ORAL at 17:00

## 2017-10-14 RX ADMIN — ESCITALOPRAM 20 MG: 10 TABLET, FILM COATED ORAL at 08:19

## 2017-10-14 RX ADMIN — DIAZEPAM 5 MG: 5 TABLET ORAL at 08:19

## 2017-10-14 RX ADMIN — DIAZEPAM 5 MG: 5 TABLET ORAL at 21:27

## 2017-10-14 RX ADMIN — ASPIRIN 81 MG: 81 TABLET ORAL at 21:27

## 2017-10-14 RX ADMIN — ASPIRIN 81 MG: 81 TABLET ORAL at 08:19

## 2017-10-14 RX ADMIN — ATORVASTATIN CALCIUM 10 MG: 10 TABLET, FILM COATED ORAL at 21:27

## 2017-10-14 RX ADMIN — PANTOPRAZOLE SODIUM 40 MG: 40 TABLET, DELAYED RELEASE ORAL at 08:19

## 2017-10-14 NOTE — PLAN OF CARE
Problem: BH Patient Care Overview (Adult)  Goal: Plan of Care Review  Outcome: Ongoing (interventions implemented as appropriate)    10/14/17 0302   Coping/Psychosocial Response Interventions   Plan Of Care Reviewed With patient   Coping/Psychosocial   Patient Agreement with Plan of Care agrees   Patient Care Overview   Progress no change   Outcome Evaluation   Outcome Summary/Follow up Plan Rated anxiety 7 and depression 7. Denies SI/HI, and hallucinations. No questions or concerns.

## 2017-10-14 NOTE — H&P
"      INITIAL PSYCHIATRIC HISTORY & PHYSICAL    Patient Identification:  Name:  Amish Win  Age:  47 y.o.  Sex:  male  :  1970  MRN:  [id number]   Visit Number:  [id number]  Primary Care Physician:  KEAGAN Lyons    SUBJECTIVE    CC: Increased Depression, Suicidal Ideation    HPI: Amish Win is a 47 y.o. male who was admitted on 10/13/2017 with complaints of Increased Depression and Suicidal Ideation.   Patient presented to [facility] after calling the helpline stating he felt he was a danger to himself.  He denies any past suicide attempts. Patient reports struggling with anxiety at work. He reports he experienced racing, intrusive thoughts and anxiousness. Patient has history of self injurious behaviors, pinching self, digging in to skin to relieve stress.  Reports he is often anxious, nervous, feels irritable, restless and \" on edge\". He reports these symptoms are causing problems in his daily life. State he reports he's unable to work or function. Reports worsening low mood, low motivation, constant worry and anhedonia.  He has experienced difficulty with sleep and decreased appetite. He's experienced depressed mood, feelings of hopelessness and helplessness, difficulty concentrating, becoming frustrated, ,feeling overwhelmed, on edge, becoming agitated easily,  feeling lonely, and a periodic sense of impending doom. He admits to seeing faces in the floor at times but denies any auditory hallucinations.  He feels episodes of paranoia.  His 15 y/o son is currently a patient at [facility], has severe autism and has been hospitalized a total of 9 other times, and is very destructive and violent.  The patient has been admitted to the [facility] for safety and symptom stabilization. The patient has been given routine orders and placed on special precautions. The patient will be assigned a Master Level Therapist. The patient will be assessed daily and work with the treatment " team to develop a plan of care.       PAST PSYCHIATRIC HX:  Patient has 1 previous inpatient hospitalization here at the [facility] in August, 2017.  He is treated at the [facility] by Caridad CAMPBELL for outpatient treatment.  He denies any past suicide attempts.     SUBSTANCE USE HX:  UDS is positive for Benzodiazepine.  He is prescribed Valium twice a day and denies misusing this prescription.  He denies any other illicit substance, alcohol, or tobacco use.       SOCIAL HX: Patient was born and raised in [place]. Moved to this area in 1999. He is  or 17 years 3 children 15 y/o son, who has severe autism and has been hospitalized a total of 9 times this year as the son at times is very destructive and violent. He also has 15 y/o son and 11 y/o daughter.  He is currently on leave from [employer].  [employer] in [employer]. Denies history of legal issues. Denies .      Past Medical History:   Diagnosis Date   • Anxiety    • Depression    • BOZENA (generalized anxiety disorder)    • GERD (gastroesophageal reflux disease)    • Hyperlipidemia    • Hypertension    • Hypertension    • Low back pain    • Major depressive disorder    • Panic disorder    • Panic disorder    • Self-injurious behavior     slapping self   • Suicidal thoughts           Past Surgical History:   Procedure Laterality Date   • CLAVICLE SURGERY         Family History   Problem Relation Age of Onset   • Depression Father    • Anxiety disorder Father    • Alcohol abuse Father    • No Known Problems Daughter    • No Known Problems Son          Prescriptions Prior to Admission   Medication Sig Dispense Refill Last Dose   • ARIPiprazole (ABILIFY) 10 MG tablet Take 1 tablet by mouth Daily. 30 tablet 0 10/13/2017 at 1000   • aspirin 81 MG EC tablet Take 81 mg by mouth 2 (Two) Times a Day.   10/13/2017 at 1000   • busPIRone (BUSPAR) 15 MG tablet Take 1 tablet by mouth 3 (Three) Times a Day. 90 tablet 0 10/13/2017 at 1000   •  diazePAM (VALIUM) 5 MG tablet Take 1 tablet by mouth 2 (Two) Times a Day. 60 tablet 0 10/13/2017 at 1000   • escitalopram (LEXAPRO) 20 MG tablet Take 1 tablet by mouth Daily. 30 tablet 0 10/13/2017 at 1000   • metoprolol tartrate (LOPRESSOR) 50 MG tablet Take 50 mg by mouth 2 (Two) Times a Day With Meals.   10/13/2017 at 1000   • mirtazapine (REMERON) 15 MG tablet Take 1 tablet by mouth Every Night. 30 tablet 0 10/12/2017 at PM   • pantoprazole (PROTONIX) 40 MG EC tablet Take 40 mg by mouth Daily.   10/13/2017 at 1000   • simvastatin (ZOCOR) 20 MG tablet Take 20 mg by mouth Every Night.   10/12/2017 at Evening   • vitamin D (ERGOCALCIFEROL) 65361 UNITS capsule capsule Take 50,000 Units by mouth 1 (One) Time Per Week.   10/4/2017 at Unknown         ALLERGIES:  Penicillins    Temp:  [97.1 °F (36.2 °C)-98.2 °F (36.8 °C)] 97.7 °F (36.5 °C)  Heart Rate:  [53-78] 61  Resp:  [18] 18  BP: (128-171)/() 137/89    REVIEW OF SYSTEMS:  Review of Systems   Constitutional: Positive for activity change and fatigue.   HENT: Negative.  Negative for congestion, dental problem, facial swelling, hearing loss and sinus pressure.    Eyes: Negative.  Negative for photophobia, discharge, redness, itching and visual disturbance.   Respiratory: Negative.  Negative for apnea, cough, chest tightness and stridor.    Cardiovascular: Negative for leg swelling.   Gastrointestinal: Negative.  Negative for abdominal distention, abdominal pain, diarrhea and nausea.   Endocrine: Negative for cold intolerance, polydipsia, polyphagia and polyuria.   Genitourinary: Negative.  Negative for enuresis and hematuria.   Musculoskeletal: Negative.  Negative for back pain, myalgias and neck pain.   Skin: Negative.    Allergic/Immunologic: Negative.    Neurological: Negative.  Negative for tremors, seizures, speech difficulty, light-headedness and numbness.   Hematological: Negative.    Psychiatric/Behavioral: Positive for agitation, decreased concentration,  dysphoric mood, hallucinations, self-injury, sleep disturbance and suicidal ideas. The patient is nervous/anxious.         OBJECTIVE    PHYSICAL EXAM:  Physical Exam   Constitutional: He is oriented to person, place, and time. He appears well-developed and well-nourished. No distress.   HENT:   Head: Normocephalic and atraumatic.   Eyes: EOM are normal. Pupils are equal, round, and reactive to light.   Neck: Normal range of motion. No tracheal deviation present. No thyromegaly present.   Cardiovascular: Normal rate, regular rhythm and normal heart sounds.  Exam reveals no gallop and no friction rub.    No murmur heard.  Pulmonary/Chest: Effort normal. No respiratory distress. He has no wheezes. He has no rales. He exhibits no tenderness.   Abdominal: Soft. He exhibits no distension and no mass. There is no tenderness. There is no rebound and no guarding.   Musculoskeletal: He exhibits no edema, tenderness or deformity.   Neurological: He is alert and oriented to person, place, and time. He displays normal reflexes. No cranial nerve deficit. He exhibits normal muscle tone. Coordination normal.   Skin: Skin is warm and dry. No rash noted. He is not diaphoretic. No erythema. No pallor.       MENTAL STATUS EXAM:    Hygiene:   fair  Cooperation:  Cooperative  Eye Contact:  Fair  Psychomotor Behavior:  Restless  Affect:  Blunted  Hopelessness: 7  Speech:  Minimal and Pressured  Thought Progress:  Pressured  Thought Content:  Mood congurent  Suicidal:  None  Homicidal:  None  Hallucinations:  Visual  Delusion:  Paranoid  Memory:  Intact  Orientation:  Person, Place and Situation  Reliability:  fair  Insight:  Fair  Judgement:  Fair  Impulse Control:  Fair  Physical/Medical Issues:  Yes SEE MEDICAL HISTORY      Imaging Results (last 24 hours)     ** No results found for the last 24 hours. **           ECG/EMG Results (most recent)     Procedure Component Value Units Date/Time    ECG 12 Lead [[id number]] Collected:  10/13/17  1640     Updated:  10/13/17 2034    Narrative:       Test Reason : Potential adverse reaction to medications.  Blood Pressure : **/** mmHG  Vent. Rate : 055 BPM     Atrial Rate : 055 BPM     P-R Int : 124 ms          QRS Dur : 122 ms      QT Int : 434 ms       P-R-T Axes : 075 068 071 degrees     QTc Int : 415 ms    Sinus bradycardia with premature atrial complexes  Nonspecific intraventricular conduction delay  ST elevation, consider early repolarization, pericarditis, or injury  Abnormal ECG  When compared with ECG of 07-AUG-2017 17:43,  premature atrial complexes are now present  ST elevation now present in Anterior leads  Confirmed by Grabiel Simeon ([id number]) on 10/13/2017 8:34:44 PM    Referred By:  LESTER           Confirmed By:Grabile Simeon           Lab Results   Component Value Date    GLUCOSE 94 10/13/2017    BUN 7 10/13/2017    CREATININE 0.82 10/13/2017    EGFRIFNONA 100 10/13/2017    BCR 8.5 10/13/2017    CO2 32.9 (H) 10/13/2017    CALCIUM 9.6 10/13/2017    ALBUMIN 4.50 10/13/2017    LABIL2 1.5 10/13/2017    AST 34 10/13/2017    ALT 33 10/13/2017       Lab Results   Component Value Date    WBC 6.57 10/13/2017    HGB 14.1 10/13/2017    HCT 41.3 (L) 10/13/2017    MCV 85.3 10/13/2017     10/13/2017       Last Urine Toxicity     LAST URINE TOXICITY RESULTS Latest Ref Rng & Units 10/13/2017 8/15/2017    AMPHETAMINES SCREEN, URINE Negative Negative Negative    BARBITURATES SCREEN Negative Negative Negative    BENZODIAZEPINE SCREEN, URINE Negative Positive(A) Positive(A)    BUPRENORPHINE Negative Negative Negative    COCAINE SCREEN, URINE Negative Negative Negative    METHADONE SCREEN, URINE Negative Negative Negative          Brief Urine Lab Results  (Last result in the past 365 days)      Color   Clarity   Blood   Leuk Est   Nitrite   Protein   CREAT   Urine HCG        10/13/17 1235 Yellow Clear Trace(A) Negative Negative Negative                   ASSESSMENT & PLAN:      Patient Active Problem List    Diagnosis Code   • Abnormal EKG, early repolarization abnormality R94.31   • Mixed hyperlipidemia E78.2   • GERD (gastroesophageal reflux disease) K21.9   • Essential hypertension I10   • BOZENA (generalized anxiety disorder) F41.1   • MDD (major depressive disorder) F32.9   • Depression with suicidal ideation F32.9, R45.856         The patient has been admitted for safety and stabilization.  Patient will be monitored for suicidality daily and maintained on Suicide precaution Level 3 (q15 min checks) .  The patient will have individual and group therapy with a master's level therapist. A master treatment plan will be developed and agreed upon by the patient and his/her treatment team.  The patient's estimated length of stay in the hospital is 5-7 days.       D/C Abilify as Reporting that he doesn't believe he is benefited much from the medication and there is a very slight possibility that he could be experiencing some symptoms of akathisia contributing to the increased anxiety.  Start trial of Seroquel 50 mg at bedtime with goal of titrating to effect for benefit for depression, anxiety.  Monitor for a.m. Sedation.    Check fasting lipid profile and fasting a.m. BMP.      This note was generated using a scribe, Zahira Dalton RN.  The work documented in this note was completed, reviewed, and approved by the attending psychiatrist as designated Dr. SACHIN Villareal electronic signature.

## 2017-10-14 NOTE — PLAN OF CARE
Problem: BH Patient Care Overview (Adult)  Goal: Plan of Care Review  Outcome: Ongoing (interventions implemented as appropriate)  Rates anxiety 7 and depression 6. Denies hallucinations. Denies suicidal thoughts.   Goal: Interdisciplinary Rounds/Family Conference  Outcome: Ongoing (interventions implemented as appropriate)  Goal: Individualization and Mutuality  Outcome: Ongoing (interventions implemented as appropriate)  Goal: Discharge Needs Assessment  Outcome: Ongoing (interventions implemented as appropriate)    Problem:  Overarching Goals  Goal: Adheres to Safety Considerations for Self and Others  Outcome: Ongoing (interventions implemented as appropriate)  Goal: Optimized Coping Skills in Response to Life Stressors  Outcome: Ongoing (interventions implemented as appropriate)  Goal: Develops/Participates in Therapeutic [place] to Support Successful Transition  Outcome: Ongoing (interventions implemented as appropriate)    Problem: Risk for Self Injury/Neglect  Goal: Treatment Goal: Remain safe during length of stay, learn and adopt new coping skills, and be free of self-injurious ideation, impulses and acts at the time of discharge  Outcome: Ongoing (interventions implemented as appropriate)  Goal: Refrain from harming self  Outcome: Ongoing (interventions implemented as appropriate)  Goal: Attend and participate in unit activities, including therapeutic, recreational, and educational groups  Outcome: Ongoing (interventions implemented as appropriate)

## 2017-10-14 NOTE — PLAN OF CARE
Problem: BH Patient Care Overview (Adult)  Goal: Individualization and Mutuality  Outcome: Ongoing (interventions implemented as appropriate)    10/14/17 1611   Behavioral Health Screens   Patient Personal Strengths expressive of emotions;expressive of needs;family/social support;positive attitude;motivated for recovery;motivated for treatment   Patient Personal Strengths Comment willing to seek treatment   Patient Vulnerabilities ineffective coping    Individualization   Patient Specific Preferences none   Patient Specific Goals mood stabilization    Patient Specific Interventions Individual and group therapy to focus on healthy coping skills and safe disposition planning and schedule follow up care    Mutuality/Individual Preferences   What Anxieties, Fears or Concerns Do You Have About Your Health or Care? none   What Questions Do You Have About Your Health or Care? none   What Information Would Help Us Give You More Personalized Care? none       Goal: Discharge Needs Assessment  Outcome: Ongoing (interventions implemented as appropriate)    10/14/17 1611   Discharge Needs Assessment   Concerns To Be Addressed medication concerns;mental health concerns   Readmission Within The Last 30 Days no previous admission in last 30 days   Community Agency Name(S) [facility], Caridad BOOGIE    Current Discharge Risk psychiatric illness   Discharge Planning Comments Pt is able to obtain his medications    Discharge Needs Assessment   Outpatient/Agency/Support Group Needs outpatient psychiatric care (specify);outpatient medication management;outpatient counseling   Anticipated Discharge Disposition home with family   Discharge Disposition home with family      Met with patient for initial assessment and treatment planning. Introduced self as assigned therapist he was agreeable. Completed PSA treatment plan and integrated summary. Discussed treatment objectives and briefly discussed disposition plans. He reports he is  seen in the [facility] by KEAGAN Vasquez     The patient reports a increase in anxiety and depression for the past week, he feels like his medications aren't working and he was afraid he was going to harm himself. He was having thoughts of hitting himself on the arms. He has been taking more than prescribed valium. Patient has been having thoughts about dying however does not want to die and is afraid to die. Patient lives with his wife of 17 yrs and his 15 yr old son who has Autism. Patients son is currently at [facility], he says he is gone for treatment most of the time and their home is very stressful. He reports poor appetite and sleeping 12 hrs per day.      Treatment team to stabilize patients symptoms provide 24 hr nursing supervision, daily psychiatric evaluation and provide individual and group therapy to focus on healthy coping and safe disposition and schedule follow up care. He will follow up in the [facility] with Caridad BOOGIE

## 2017-10-15 LAB
ANION GAP SERPL CALCULATED.3IONS-SCNC: 4.6 MMOL/L (ref 3.6–11.2)
BUN BLD-MCNC: <5 MG/DL (ref 7–21)
BUN/CREAT SERPL: ABNORMAL (ref 7–25)
CALCIUM SPEC-SCNC: 9.6 MG/DL (ref 7.7–10)
CHLORIDE SERPL-SCNC: 105 MMOL/L (ref 99–112)
CHOLEST SERPL-MCNC: 134 MG/DL (ref 0–200)
CO2 SERPL-SCNC: 33.4 MMOL/L (ref 24.3–31.9)
CREAT BLD-MCNC: 0.92 MG/DL (ref 0.43–1.29)
GFR SERPL CREATININE-BSD FRML MDRD: 87 ML/MIN/1.73
GLUCOSE BLD-MCNC: 90 MG/DL (ref 70–110)
HDLC SERPL-MCNC: 36 MG/DL (ref 60–100)
LDLC SERPL CALC-MCNC: 74 MG/DL (ref 0–100)
LDLC/HDLC SERPL: 2.05 {RATIO}
OSMOLALITY SERPL CALC.SUM OF ELEC: NORMAL MOSM/KG (ref 273–305)
POTASSIUM BLD-SCNC: 4 MMOL/L (ref 3.5–5.3)
SODIUM BLD-SCNC: 143 MMOL/L (ref 135–153)
TRIGL SERPL-MCNC: 121 MG/DL (ref 0–150)
VLDLC SERPL-MCNC: 24.2 MG/DL

## 2017-10-15 PROCEDURE — 99232 SBSQ HOSP IP/OBS MODERATE 35: CPT | Performed by: PSYCHIATRY & NEUROLOGY

## 2017-10-15 PROCEDURE — 80061 LIPID PANEL: CPT | Performed by: PSYCHIATRY & NEUROLOGY

## 2017-10-15 PROCEDURE — 80048 BASIC METABOLIC PNL TOTAL CA: CPT | Performed by: PSYCHIATRY & NEUROLOGY

## 2017-10-15 RX ADMIN — DIAZEPAM 5 MG: 5 TABLET ORAL at 21:20

## 2017-10-15 RX ADMIN — MIRTAZAPINE 15 MG: 15 TABLET, FILM COATED ORAL at 21:20

## 2017-10-15 RX ADMIN — BUSPIRONE HYDROCHLORIDE 15 MG: 10 TABLET ORAL at 16:00

## 2017-10-15 RX ADMIN — DIAZEPAM 5 MG: 5 TABLET ORAL at 08:07

## 2017-10-15 RX ADMIN — METOPROLOL TARTRATE 50 MG: 50 TABLET, FILM COATED ORAL at 08:06

## 2017-10-15 RX ADMIN — ATORVASTATIN CALCIUM 10 MG: 10 TABLET, FILM COATED ORAL at 21:20

## 2017-10-15 RX ADMIN — METOPROLOL TARTRATE 50 MG: 50 TABLET, FILM COATED ORAL at 21:20

## 2017-10-15 RX ADMIN — PANTOPRAZOLE SODIUM 40 MG: 40 TABLET, DELAYED RELEASE ORAL at 08:06

## 2017-10-15 RX ADMIN — ESCITALOPRAM 20 MG: 10 TABLET, FILM COATED ORAL at 08:06

## 2017-10-15 RX ADMIN — QUETIAPINE FUMARATE 50 MG: 25 TABLET, FILM COATED ORAL at 21:20

## 2017-10-15 RX ADMIN — ASPIRIN 81 MG: 81 TABLET ORAL at 08:06

## 2017-10-15 RX ADMIN — BUSPIRONE HYDROCHLORIDE 15 MG: 10 TABLET ORAL at 21:20

## 2017-10-15 RX ADMIN — BUSPIRONE HYDROCHLORIDE 15 MG: 10 TABLET ORAL at 08:07

## 2017-10-15 RX ADMIN — ASPIRIN 81 MG: 81 TABLET ORAL at 21:20

## 2017-10-15 NOTE — PLAN OF CARE
Problem:  Patient Care Overview (Adult)  Goal: Plan of Care Review  Outcome: Ongoing (interventions implemented as appropriate)    10/15/17 0310   Coping/Psychosocial Response Interventions   Plan Of Care Reviewed With patient   Coping/Psychosocial   Patient Agreement with Plan of Care agrees   Patient Care Overview   Progress improving   Outcome Evaluation   Outcome Summary/Follow up Plan Pt cooperative for nursing assessment pm shift 10/14/17. Rates anxiety 7/10, depression 6/10, denies SI/HI/CORNELIO. 10/15/17 0302         Problem:  Overarching Goals  Goal: Adheres to Safety Considerations for Self and Others  Outcome: Ongoing (interventions implemented as appropriate)  Goal: Optimized Coping Skills in Response to Life Stressors  Outcome: Ongoing (interventions implemented as appropriate)  Goal: Develops/Participates in Therapeutic [place] to Support Successful Transition  Outcome: Ongoing (interventions implemented as appropriate)

## 2017-10-15 NOTE — PROGRESS NOTES
"      Inpatient Psy Progress Note   Clinician: Sacha Villareal MD  Admission Date: 10/13/2017  10:02 AM 10/15/17    Behavioral Health Treatment Plan and Problem List: I have reviewed and approved the Behavioral Health Treatment Plan and Problem list.    Allergies  Allergies   Allergen Reactions   • Penicillins Hives       Hospital Day: 2 days      Assessment completed within view of staff    History  CC: inpatient followup  Interval HPI: Patient seen and evaluated by me.  Chart reviewed. He slept well last night until morning labs, then had trouble falling back to sleep.  After that he felt drowsy this morning - unclear if due to side effect of Seroquel vs. trouble going back to sleep.  He rates his current level of depression at a 6/10.  Anxiety 6/10.  Denies SI this morning.    Interval Review of Systems:   General ROS: negative for - fever or malaise  Endocrine ROS: negative for - palpitations  Respiratory ROS: no cough, shortness of breath, or wheezing  Cardiovascular ROS: no chest pain or dyspnea on exertion  Gastrointestinal ROS: no abdominal pain,no black or bloody stools    /82 (BP Location: Right arm, Patient Position: Lying)  Pulse 72  Temp 98.2 °F (36.8 °C) (Temporal Artery )   Resp 18  Ht 71\" (180.3 cm)  Wt 191 lb (86.6 kg)  SpO2 98%  BMI 26.64 kg/m2    Mental Status Exam  Mood: depressed  Affect: mood-congruent  Thought Processes: linear, logical, and goal directed  Thought Content:  Negativistic  Hallucinations: no  Suicidal Thoughts:  denies  Suicidal Plan/Intent:none  Hopelesness: yes  Homicidal Thoughts:  absent      Medical Decision Making:   Labs:     Lab Results (last 24 hours)     Procedure Component Value Units Date/Time    Lipid Panel [[id number]]  (Abnormal) Collected:  10/15/17 0524    Specimen:  Blood Updated:  10/15/17 0624     Total Cholesterol 134 mg/dL      Triglycerides 121 mg/dL      HDL Cholesterol 36 (L) mg/dL      LDL Cholesterol  74 mg/dL      VLDL Cholesterol 24.2 " mg/dL      LDL/HDL Ratio 2.05    Narrative:       Cholesterol Reference Ranges  (U.S. Department of Health and Human Services ATP III Classifications)    Desirable          <200 mg/dL  Borderline High    200-239 mg/dL  High Risk          >240 mg/dL      Triglyceride Reference Ranges  (U.S. Department of Health and Human Services ATP III Classifications)    Normal           <150 mg/dL  Borderline High  150-199 mg/dL  High             200-499 mg/dL  Very High        >500 mg/dL    HDL Reference Ranges  (U.S. Department of Health and Human Services ATP III Classifcations)    Low     <40 mg/dl (major risk factor for CHD)  High    >60 mg/dl ('negative' risk factor for CHD)        LDL Reference Ranges  (U.S. Department of Health and Human Services ATP III Classifcations)    Optimal          <100 mg/dL  Near Optimal     100-129 mg/dL  Borderline High  130-159 mg/dL  High             160-189 mg/dL  Very High        >189 mg/dL    Basic Metabolic Panel [[id number]]  (Abnormal) Collected:  10/15/17 0524    Specimen:  Blood Updated:  10/15/17 0635     Glucose 90 mg/dL      BUN <5 (L) mg/dL      Creatinine 0.92 mg/dL      Sodium 143 mmol/L      Potassium 4.0 mmol/L      Chloride 105 mmol/L      CO2 33.4 (H) mmol/L      Calcium 9.6 mg/dL      eGFR Non African Amer 87 mL/min/1.73      BUN/Creatinine Ratio --      Unable to calculate Bun/Crea Ratio.        Anion Gap 4.6 mmol/L     Narrative:       GFR Normal >60  Chronic Kidney Disease <60  Kidney Failure <15    Osmolality, Calculated [[id number]] Collected:  10/15/17 0524    Specimen:  Blood Updated:  10/15/17 0635     Osmolality Calc -- mOsm/kg       Unable to calculate.               Radiology:     Imaging Results (last 24 hours)     ** No results found for the last 24 hours. **            EKG:     ECG/EMG Results (most recent)     Procedure Component Value Units Date/Time    ECG 12 Lead [[id number]] Collected:  10/13/17 1640     Updated:  10/13/17 2034    Narrative:       Test  Reason : Potential adverse reaction to medications.  Blood Pressure : **/** mmHG  Vent. Rate : 055 BPM     Atrial Rate : 055 BPM     P-R Int : 124 ms          QRS Dur : 122 ms      QT Int : 434 ms       P-R-T Axes : 075 068 071 degrees     QTc Int : 415 ms    Sinus bradycardia with premature atrial complexes  Nonspecific intraventricular conduction delay  ST elevation, consider early repolarization, pericarditis, or injury  Abnormal ECG  When compared with ECG of 07-AUG-2017 17:43,  premature atrial complexes are now present  ST elevation now present in Anterior leads  Confirmed by Grabiel Simeon ([id number]) on 10/13/2017 8:34:44 PM    Referred By:  LESTER           Confirmed By:Grabiel Simeon           Medications:     aspirin 81 mg Oral Q12H   atorvastatin 10 mg Oral Nightly   busPIRone 15 mg Oral TID   cholecalciferol 50,000 Units Oral Weekly   diazePAM 5 mg Oral Q12H   escitalopram 20 mg Oral Daily   metoprolol tartrate 50 mg Oral Q12H   mirtazapine 15 mg Oral Nightly   pantoprazole 40 mg Oral Daily   QUEtiapine 50 mg Oral Nightly          All medications reviewed.      Assessment and Plan:    Active Problems:    Depression with suicidal ideation      - Continue lexapro 20mg Daily and Remeron 15mg QHS. Increase Seroquel to 100mg QHS.  Monitor for AM sedation.      Hypertension      - Continue Metoprolol    Dyslipidemia     - continue atorvastatin      Continue hospitalization for safety and stabilization.  Continue current level of Special Precautions (q15 minute checks).

## 2017-10-15 NOTE — PLAN OF CARE
Problem: BH Patient Care Overview (Adult)  Goal: Plan of Care Review  Outcome: Ongoing (interventions implemented as appropriate)    10/15/17 1508   Coping/Psychosocial Response Interventions   Plan Of Care Reviewed With patient   Coping/Psychosocial   Patient Agreement with Plan of Care agrees   Patient Care Overview   Progress improving   Outcome Evaluation   Outcome Summary/Follow up Plan PT RATES ANXIETY AND DEPRESSION 6/10. DENIES ANY SI, HI, OR A/V HALLUCINATIONS. PT IS CALM AND COOPERATIVE.         Problem:  Overarching Goals  Goal: Adheres to Safety Considerations for Self and Others  Outcome: Ongoing (interventions implemented as appropriate)  Goal: Optimized Coping Skills in Response to Life Stressors  Outcome: Ongoing (interventions implemented as appropriate)  Goal: Develops/Participates in Therapeutic [place] to Support Successful Transition  Outcome: Ongoing (interventions implemented as appropriate)

## 2017-10-16 PROBLEM — F33.2 SEVERE EPISODE OF RECURRENT MAJOR DEPRESSIVE DISORDER, WITHOUT PSYCHOTIC FEATURES (HCC): Status: ACTIVE | Noted: 2017-08-15

## 2017-10-16 PROCEDURE — 99232 SBSQ HOSP IP/OBS MODERATE 35: CPT | Performed by: PSYCHIATRY & NEUROLOGY

## 2017-10-16 RX ADMIN — METOPROLOL TARTRATE 50 MG: 50 TABLET, FILM COATED ORAL at 08:32

## 2017-10-16 RX ADMIN — BUSPIRONE HYDROCHLORIDE 15 MG: 10 TABLET ORAL at 20:48

## 2017-10-16 RX ADMIN — METOPROLOL TARTRATE 50 MG: 50 TABLET, FILM COATED ORAL at 20:47

## 2017-10-16 RX ADMIN — PANTOPRAZOLE SODIUM 40 MG: 40 TABLET, DELAYED RELEASE ORAL at 08:32

## 2017-10-16 RX ADMIN — DIAZEPAM 5 MG: 5 TABLET ORAL at 08:34

## 2017-10-16 RX ADMIN — ESCITALOPRAM 20 MG: 10 TABLET, FILM COATED ORAL at 08:32

## 2017-10-16 RX ADMIN — ASPIRIN 81 MG: 81 TABLET ORAL at 08:32

## 2017-10-16 RX ADMIN — QUETIAPINE FUMARATE 50 MG: 25 TABLET, FILM COATED ORAL at 20:48

## 2017-10-16 RX ADMIN — BUSPIRONE HYDROCHLORIDE 15 MG: 10 TABLET ORAL at 08:32

## 2017-10-16 RX ADMIN — ASPIRIN 81 MG: 81 TABLET ORAL at 20:47

## 2017-10-16 RX ADMIN — BUSPIRONE HYDROCHLORIDE 15 MG: 10 TABLET ORAL at 16:03

## 2017-10-16 RX ADMIN — ATORVASTATIN CALCIUM 10 MG: 10 TABLET, FILM COATED ORAL at 20:47

## 2017-10-16 RX ADMIN — DIAZEPAM 5 MG: 5 TABLET ORAL at 20:47

## 2017-10-16 RX ADMIN — MIRTAZAPINE 15 MG: 15 TABLET, FILM COATED ORAL at 20:47

## 2017-10-16 NOTE — PROGRESS NOTES
1300  Met with patient briefly for individual, he reports feeling some better today, he continues to have difficulty with anxiety. He discussed medications changes and adjustments made today by Dr. Sifuentes. Reviewed Dr. Cooper note today as well. Possible discharge home tomorrow if he continues to improve. Follow up will be in the [facility].      Patient denies suicidal thoughts, he rates depression at 6/10 and anxiety at 7/10.    Continue stabilization, individual and group therapy to focus on healthy coping skills and schedule follow up care.

## 2017-10-16 NOTE — PLAN OF CARE
Problem: BH Patient Care Overview (Adult)  Goal: Plan of Care Review  Outcome: Ongoing (interventions implemented as appropriate)    10/16/17 6033   Coping/Psychosocial Response Interventions   Plan Of Care Reviewed With patient   Coping/Psychosocial   Patient Agreement with Plan of Care agrees   Patient Care Overview   Progress improving   Outcome Evaluation   Outcome Summary/Follow up Plan PT RATES ANXIETY AND DEPRESSION 5-6/10. DENIES ANY SI, HI, OR A/V HALLUCINATIONS.          Problem:  Overarching Goals  Goal: Adheres to Safety Considerations for Self and Others  Outcome: Ongoing (interventions implemented as appropriate)  Goal: Optimized Coping Skills in Response to Life Stressors  Outcome: Ongoing (interventions implemented as appropriate)  Goal: Develops/Participates in Therapeutic [place] to Support Successful Transition  Outcome: Ongoing (interventions implemented as appropriate)

## 2017-10-16 NOTE — PROGRESS NOTES
"INPATIENT PSYCHIATRIC PROGRESS NOTE    Name:  Amish Win  :  1970  MRN:  [id number]  Visit Number:  [id number]  Length of stay:  3    SUBJECTIVE  CC: depression    INTERVAL HISTORY: The patient states that he was feeling very depressed and decided to seek help. His Abilify has been changed to Seroquel, and he states that it is helping. He is also on Lexapro, and there is a drug interaction between Seroquel and Lexapro, and given his history of abnormal EKG, it would be better if the combination is avoided. He agreed to stop Lexapro and start Zoloft tonight and if he does well, he may be discharged tomorrow.    Depression rating 6/10  Anxiety rating 7/10  Sleep: Fair      Review of Systems   Constitutional: Negative.    HENT: Negative.    Eyes: Negative.    Respiratory: Negative.    Cardiovascular: Negative.    Gastrointestinal: Negative.        OBJECTIVE    Temp:  [97.4 °F (36.3 °C)-98.1 °F (36.7 °C)] 97.4 °F (36.3 °C)  Heart Rate:  [56-79] 79  Resp:  [18] 18  BP: ()/(59-89) 164/89    MENTAL STATUS EXAM:  Appearance:Casually dressed, good hygeine.   Cooperation:Cooperative  Psychomotor: No psychomotor agitation/retardation, No EPS, No motor tics  Speech-normal rate, amount.  Mood \"depressed\"   Affect-congruent, appropriate, stable  Thought Content-goal directed, no delusional material present  Thought process-linear, organized.  Suicidality: No SI  Homicidality: No HI  Perception: No AH/VH  Insight-fair   Judgement-fair    Lab Results (last 24 hours)     ** No results found for the last 24 hours. **             Imaging Results (last 24 hours)     ** No results found for the last 24 hours. **             ECG/EMG Results (most recent)     Procedure Component Value Units Date/Time    ECG 12 Lead [[id number]] Collected:  10/13/17 1640     Updated:  10/13/17 2034    Narrative:       Test Reason : Potential adverse reaction to medications.  Blood Pressure : **/** mmHG  Vent. Rate : 055 BPM     Atrial " Rate : 055 BPM     P-R Int : 124 ms          QRS Dur : 122 ms      QT Int : 434 ms       P-R-T Axes : 075 068 071 degrees     QTc Int : 415 ms    Sinus bradycardia with premature atrial complexes  Nonspecific intraventricular conduction delay  ST elevation, consider early repolarization, pericarditis, or injury  Abnormal ECG  When compared with ECG of 07-AUG-2017 17:43,  premature atrial complexes are now present  ST elevation now present in Anterior leads  Confirmed by Grabiel Simeon ([id number]) on 10/13/2017 8:34:44 PM    Referred By:  LESTER           Confirmed By:Grabiel Simeon           ALLERGIES: Penicillins      Current Facility-Administered Medications:   •  aluminum-magnesium hydroxide-simethicone (MAALOX MAX) 400-400-40 MG/5ML suspension 15 mL, 15 mL, Oral, Q6H PRN, Shelbi Sifuentes MD  •  aspirin EC tablet 81 mg, 81 mg, Oral, Q12H, Sacha Villareal MD, 81 mg at 10/16/17 0832  •  atorvastatin (LIPITOR) tablet 10 mg, 10 mg, Oral, Nightly, Sacha Villareal MD, 10 mg at 10/15/17 2120  •  benzonatate (TESSALON) capsule 100 mg, 100 mg, Oral, TID PRN, Shelbi Sifuentes MD  •  benztropine (COGENTIN) tablet 1 mg, 1 mg, Oral, Daily PRN **OR** benztropine (COGENTIN) injection 0.5 mg, 0.5 mg, Intramuscular, Daily PRN, Shelbi Sifuentes MD  •  busPIRone (BUSPAR) tablet 15 mg, 15 mg, Oral, TID, Sacha Villareal MD, 15 mg at 10/16/17 0832  •  cholecalciferol (VITAMIN D3) capsule 50,000 Units, 50,000 Units, Oral, Weekly, Shelbi Sifuentes MD, 50,000 Units at 10/13/17 2142  •  diazePAM (VALIUM) tablet 5 mg, 5 mg, Oral, Q12H, Sacha Villareal MD, 5 mg at 10/16/17 0834  •  escitalopram (LEXAPRO) tablet 20 mg, 20 mg, Oral, Daily, Sacha Villareal MD, 20 mg at 10/16/17 0832  •  famotidine (PEPCID) tablet 20 mg, 20 mg, Oral, BID PRN, Shelbi Sifuentes MD  •  hydrOXYzine (ATARAX) tablet 50 mg, 50 mg, Oral, Q6H PRN, Shelbi Sifuentes MD  •  ibuprofen (ADVIL,MOTRIN) tablet 600 mg, 600 mg, Oral, Q6H PRN, Shelbi Sifuentes MD  •  loperamide (IMODIUM) capsule 2 mg, 2  mg, Oral, 4x Daily PRN, Shelbi Sifuentes MD  •  magnesium hydroxide (MILK OF MAGNESIA) suspension 2400 mg/10mL 10 mL, 10 mL, Oral, Daily PRN, Shelbi Sifuentes MD  •  metoprolol tartrate (LOPRESSOR) tablet 50 mg, 50 mg, Oral, Q12H, Sacha Villareal MD, 50 mg at 10/16/17 0832  •  mirtazapine (REMERON) tablet 15 mg, 15 mg, Oral, Nightly, Sacha Villareal MD, 15 mg at 10/15/17 2120  •  ondansetron (ZOFRAN) tablet 4 mg, 4 mg, Oral, Q6H PRN, Shelbi Sifuentes MD  •  pantoprazole (PROTONIX) EC tablet 40 mg, 40 mg, Oral, Daily, Sacha Villareal MD, 40 mg at 10/16/17 0832  •  QUEtiapine (SEROquel) tablet 50 mg, 50 mg, Oral, Nightly, Sacha Villareal MD, 50 mg at 10/15/17 2120  •  sodium chloride (OCEAN) nasal spray 2 spray, 2 spray, Each Nare, PRN, Shelbi Sifuentes MD  •  traZODone (DESYREL) tablet 100 mg, 100 mg, Oral, Nightly PRN, Shelbi Sifuentes MD    ASSESSMENT & PLAN:    Active Problems:    Mixed hyperlipidemia  Plan: Lipitor      GERD (gastroesophageal reflux disease)  Plan: Protonix      Essential hypertension  Plan: Lopressor      BOZENA (generalized anxiety disorder)  Plan: Stop Lexapro. Start Zoloft 50 mg daily, continue Valium 5 mg bid, Buspar 15 mg tid      Severe episode of recurrent major depressive disorder, without psychotic features  Plan: Stop Lexapro. Start Zoloft 50 mg daily at bedtime, continue Seroquel 50 mg hs, Remeron 15 mg hs        Suicide precautions: Suicide precaution Level 3 (q15 min checks)     Behavioral Health Treatment Plan and Problem List: I have reviewed and approved the Behavioral Health Treatment Plan and Problem list.  The patient has had a chance to review and agrees with the treatment plan.     Clinician:  Shelbi Sifuentes MD  10/16/17  12:05 PM

## 2017-10-16 NOTE — PLAN OF CARE
Problem: BH Patient Care Overview (Adult)  Goal: Plan of Care Review  Outcome: Ongoing (interventions implemented as appropriate)    10/16/17 0409   Coping/Psychosocial Response Interventions   Plan Of Care Reviewed With patient   Coping/Psychosocial   Patient Agreement with Plan of Care agrees   Patient Care Overview   Progress improving   Outcome Evaluation   Outcome Summary/Follow up Plan Patient rated anxiety and depression 6 and 6. Denied SI/HI, Hallucinations. Calm and cooperative through shift. Denied cravings.

## 2017-10-17 ENCOUNTER — TELEPHONE (OUTPATIENT)
Dept: PSYCHIATRY | Facility: CLINIC | Age: 47
End: 2017-10-17

## 2017-10-17 VITALS
RESPIRATION RATE: 18 BRPM | OXYGEN SATURATION: 100 % | WEIGHT: 191 LBS | BODY MASS INDEX: 26.74 KG/M2 | HEART RATE: 60 BPM | TEMPERATURE: 97.7 F | DIASTOLIC BLOOD PRESSURE: 82 MMHG | HEIGHT: 71 IN | SYSTOLIC BLOOD PRESSURE: 126 MMHG

## 2017-10-17 PROCEDURE — 99238 HOSP IP/OBS DSCHRG MGMT 30/<: CPT | Performed by: PSYCHIATRY & NEUROLOGY

## 2017-10-17 RX ORDER — QUETIAPINE FUMARATE 50 MG/1
50 TABLET, FILM COATED ORAL NIGHTLY
Qty: 30 TABLET | Refills: 0 | Status: SHIPPED | OUTPATIENT
Start: 2017-10-17 | End: 2017-11-14 | Stop reason: SDUPTHER

## 2017-10-17 RX ADMIN — PANTOPRAZOLE SODIUM 40 MG: 40 TABLET, DELAYED RELEASE ORAL at 08:18

## 2017-10-17 RX ADMIN — ASPIRIN 81 MG: 81 TABLET ORAL at 08:18

## 2017-10-17 RX ADMIN — DIAZEPAM 5 MG: 5 TABLET ORAL at 08:48

## 2017-10-17 RX ADMIN — BUSPIRONE HYDROCHLORIDE 15 MG: 10 TABLET ORAL at 08:18

## 2017-10-17 RX ADMIN — METOPROLOL TARTRATE 50 MG: 50 TABLET, FILM COATED ORAL at 08:18

## 2017-10-17 NOTE — PROGRESS NOTES
"1100  Met with patient briefly for individual, he states he is discharged but wants to wait until he eats lunch before he leaves. He reports feeling \"a little anxious today about leaving.\" He denies suicidal thoughts , we discussed healthy coping and safety and follow up care. Patient drove to the hospital and will drive himself home.   "

## 2017-10-17 NOTE — DISCHARGE SUMMARY
"      PSYCHIATRIC DISCHARGE SUMMARY     Patient Identification:  Name:  Amish Win  Age:  47 y.o.  Sex:  male  :  1970  MRN:  [id number]  Visit Number:  [id number]      Date of Admission:10/13/2017   Date of Discharge:  10/17/2017    Discharge Diagnosis:  Active Problems:    Mixed hyperlipidemia    GERD (gastroesophageal reflux disease)    Essential hypertension    BOZENA (generalized anxiety disorder)    Severe episode of recurrent major depressive disorder, without psychotic features        Admission Diagnosis:  Depression with suicidal ideation [F32.9, R45.851]     Hospital Course  Patient is a 47 y.o. male presented with severe depression and suicidal ideations. The patient reported that everything piled on him and he had an overwhelming feeling, and didn't know how to deal with it. The patient was admitted to the [facility] unit for safety, further evaluation and treatment.  His medications were changed, Abilify was stopped and Seroquel was started, Zoloft was added in place of Lexapro. The patient reported feeling some better. He reported ongoing struggle with anxiety and felt comfortable being in the hospital but agreed to go back to his life and face the challenges the best he could, as running away from problems doesn't help any.  The patient was also able to take part in individual and group counseling sessions and work on appropriate coping skills.  The patient made steady improvement in his mood and expressed feeling more positive and hopeful about future. Sleep and appetite were stable.  The day of discharge the patient was calm, cooperative and pleasant. Mood was reported to be good, and denied SI/HI/AVH. Also reported no medication side effects.        Mental Status Exam upon discharge:   Mood \"anxious\"   Affect-congruent, appropriate, stable  Thought Content-goal directed, no delusional material present  Thought process-linear, organized.  Suicidality: No SI  Homicidality: No " HI  Perception: No AH/VH    Procedures Performed         Consults:   Consults     No orders found from 9/14/2017 to 10/14/2017.          Pertinent Test Results: CBC, CMP, UDS were unremarkable. Lipid profile was normal except HDL was low at 36. UA showed trace amount of blood and the patient was informed about it and to followup with his pcp and to monitor and do further workup if needed.    Condition on Discharge:  improved    Vital Signs  Temp:  [97.6 °F (36.4 °C)-98.3 °F (36.8 °C)] 98 °F (36.7 °C)  Heart Rate:  [56-66] 56  Resp:  [18] 18  BP: (112-173)/(63-98) 138/97      Discharge Disposition:  Home or Self Care    Discharge Medications:   Amish Win   Home Medication Instructions GREG:[id number]    Printed on:10/17/17 0901   Medication Information                      aspirin 81 MG EC tablet  Take 81 mg by mouth 2 (Two) Times a Day.             busPIRone (BUSPAR) 15 MG tablet  Take 1 tablet by mouth 3 (Three) Times a Day.             diazePAM (VALIUM) 5 MG tablet  Take 1 tablet by mouth 2 (Two) Times a Day.             metoprolol tartrate (LOPRESSOR) 50 MG tablet  Take 50 mg by mouth 2 (Two) Times a Day With Meals.             mirtazapine (REMERON) 15 MG tablet  Take 1 tablet by mouth Every Night.             pantoprazole (PROTONIX) 40 MG EC tablet  Take 40 mg by mouth Daily.             QUEtiapine (SEROquel) 50 MG tablet  Take 1 tablet by mouth Every Night.             sertraline (ZOLOFT) 50 MG tablet  Take 1 tablet by mouth Every Night.             simvastatin (ZOCOR) 20 MG tablet  Take 20 mg by mouth Every Night.             vitamin D (ERGOCALCIFEROL) 48668 UNITS capsule capsule  Take 50,000 Units by mouth 1 (One) Time Per Week.                 Discharge Diet: Regular    Activity at Discharge: As tolerated    Follow-up Appointments  Future Appointments  Date Time Provider Department Center   10/19/2017 3:00 PM KEAGAN Katz  [facility] None   10/24/2017 7:45 AM FAUSTO Caba  [facility] None   10/31/2017 7:45 AM Izaiah Tinoco LCSW [facility] None   11/7/2017 7:45 AM Izaiah Tinoco LCSW [facility] None   11/14/2017 7:45 AM Izaiah Tinoco LCSW [facility] None           Clinician:   Shelbi Sifuentes MD  10/17/17  9:01 AM

## 2017-10-17 NOTE — PLAN OF CARE
Problem: BH Patient Care Overview (Adult)  Goal: Plan of Care Review  Outcome: Ongoing (interventions implemented as appropriate)    10/17/17 0357   Coping/Psychosocial Response Interventions   Plan Of Care Reviewed With patient   Coping/Psychosocial   Patient Agreement with Plan of Care agrees   Patient Care Overview   Progress no change   Outcome Evaluation   Outcome Summary/Follow up Plan PT RATED ANXIETY 7, DEPRESSION 6, DENIED SI/HI/HALLUCINATIONS, OUT IN THE DAYROOM WATCHING TV WITH PEERS INTERACTING WELL.         Problem:  Overarching Goals  Goal: Adheres to Safety Considerations for Self and Others  Outcome: Ongoing (interventions implemented as appropriate)  Goal: Optimized Coping Skills in Response to Life Stressors  Outcome: Ongoing (interventions implemented as appropriate)  Goal: Develops/Participates in Therapeutic [place] to Support Successful Transition  Outcome: Ongoing (interventions implemented as appropriate)

## 2017-10-17 NOTE — TELEPHONE ENCOUNTER
Amish Called the Office today requesting a letter for his employer stating that he didn't have any  Restrictions  He was inpatient from 10/13/17 to 10/17/17

## 2017-10-17 NOTE — PLAN OF CARE
Problem:  Patient Care Overview (Adult)  Goal: Plan of Care Review  Outcome: Outcome(s) achieved Date Met:  10/17/17    10/17/17 0935   Coping/Psychosocial Response Interventions   Plan Of Care Reviewed With patient   Coping/Psychosocial   Patient Agreement with Plan of Care agrees   Patient Care Overview   Progress improving   Outcome Evaluation   Outcome Summary/Follow up Plan Ready for discharge.       Goal: Interdisciplinary Rounds/Family Conference  Outcome: Outcome(s) achieved Date Met:  10/17/17  Goal: Individualization and Mutuality  Outcome: Outcome(s) achieved Date Met:  10/17/17  Goal: Discharge Needs Assessment  Outcome: Outcome(s) achieved Date Met:  10/17/17    Problem:  Overarching Goals  Goal: Adheres to Safety Considerations for Self and Others  Outcome: Outcome(s) achieved Date Met:  10/17/17  Goal: Optimized Coping Skills in Response to Life Stressors  Outcome: Outcome(s) achieved Date Met:  10/17/17  Goal: Develops/Participates in Therapeutic [place] to Support Successful Transition  Outcome: Outcome(s) achieved Date Met:  10/17/17    Problem: Coping, Compromised Individual (Adult,Obstetrics,Pediatric)  Goal: Identify Related Risk Factors and Signs and Symptoms  Outcome: Outcome(s) achieved Date Met:  10/17/17  Goal: Effective Coping  Outcome: Outcome(s) achieved Date Met:  10/17/17    Problem: Risk for Self Injury/Neglect  Goal: Treatment Goal: Remain safe during length of stay, learn and adopt new coping skills, and be free of self-injurious ideation, impulses and acts at the time of discharge  Outcome: Outcome(s) achieved Date Met:  10/17/17  Goal: Verbalize thoughts and feelings associated with:  Outcome: Outcome(s) achieved Date Met:  10/17/17  Goal: Refrain from harming self  Outcome: Outcome(s) achieved Date Met:  10/17/17  Goal: Attend and participate in unit activities, including therapeutic, recreational, and educational groups  Outcome: Outcome(s) achieved Date Met:   10/17/17  Goal: Recognize maladaptive responses and adopt new coping mechanisms  Outcome: Outcome(s) achieved Date Met:  10/17/17  Goal: Complete daily ADLs, including personal hygiene independently, as able  Outcome: Outcome(s) achieved Date Met:  10/17/17

## 2017-10-18 NOTE — TELEPHONE ENCOUNTER
I am not available to write a letter for him at this time, maybe his therapist has a moment to write a letter that Dr Sifuentes may sign.  Thank you.

## 2017-10-19 ENCOUNTER — OFFICE VISIT (OUTPATIENT)
Dept: PSYCHIATRY | Facility: CLINIC | Age: 47
End: 2017-10-19

## 2017-10-19 VITALS
SYSTOLIC BLOOD PRESSURE: 118 MMHG | BODY MASS INDEX: 27.09 KG/M2 | WEIGHT: 194.25 LBS | DIASTOLIC BLOOD PRESSURE: 82 MMHG | HEART RATE: 70 BPM

## 2017-10-19 DIAGNOSIS — F41.1 GENERALIZED ANXIETY DISORDER: ICD-10-CM

## 2017-10-19 DIAGNOSIS — F33.1 MAJOR DEPRESSIVE DISORDER, RECURRENT EPISODE, MODERATE (HCC): Primary | ICD-10-CM

## 2017-10-19 PROCEDURE — 99213 OFFICE O/P EST LOW 20 MIN: CPT | Performed by: NURSE PRACTITIONER

## 2017-10-19 NOTE — PROGRESS NOTES
"  Subjective   Amish Win is a 47 y.o. male who is here today for medication management follow up at the [facility], he presents to his appointment on time.     Chief Complaint: Anxiety      History of Present Illness   Shares that he got out of the hospital and stayed about 5 days before he did \"something stupid.\" Continues to have overwhelmed anxiety. He went back to work today and is in the middle of the work day and has to go back to work today. Depression is rated at a 5/10 with 10 being the worst. Anxiety is rated at a 8/10 with 10 being the worst. I am always been about my \"silly false pretenses about my health. I am always concerned about my health. Every test known to man to eliminate things. I wake up shivering in the morning, not from being cold but from getting up shivers. Sleep is much better. Sleeping 6 to 8 hours and denies NMs. Appetite has been \"fair\". Denies any SI/HI/AVH. His son is \"not doing too good.\" He is currently at [facility] and he is rasing hell over there.\" Wife is very supportive and is busy trying to manage their son. Blood pressure has been elevated. Continues to see Izaiah weekly.     The following portions of the patient's history were reviewed and updated as appropriate: allergies, current medications, past family history, past medical history, past social history, past surgical history and problem list.    Review of Systems   Constitutional: Negative for appetite change, chills, diaphoresis, fatigue, fever and unexpected weight change.   HENT: Negative for hearing loss, sore throat, trouble swallowing and voice change.    Eyes: Negative for photophobia and visual disturbance.   Respiratory: Negative for cough, chest tightness and shortness of breath.    Cardiovascular: Negative for chest pain and palpitations.   Gastrointestinal: Negative for abdominal pain, constipation, nausea and vomiting.   Endocrine: Negative for cold intolerance and heat intolerance.   Genitourinary: " Negative for dysuria and frequency.   Musculoskeletal: Negative for arthralgias, back pain, joint swelling and neck stiffness.   Skin: Negative for color change and wound.   Allergic/Immunologic: Negative for environmental allergies and immunocompromised state.   Neurological: Negative for dizziness, tremors, seizures, syncope, weakness, light-headedness and headaches.   Hematological: Negative for adenopathy. Does not bruise/bleed easily.   Psychiatric/Behavioral: Negative for decreased concentration, dysphoric mood, hallucinations, self-injury, sleep disturbance and suicidal ideas. The patient is not nervous/anxious.        Objective   Physical Exam   Constitutional: He appears well-developed and well-nourished. No distress.   Neurological: He is alert. Coordination and gait normal.   Vitals reviewed.    Blood pressure 118/82, pulse 70, weight 194 lb 4 oz (88.1 kg).    Medication List:   Current Outpatient Prescriptions   Medication Sig Dispense Refill   • aspirin 81 MG EC tablet Take 81 mg by mouth 2 (Two) Times a Day.     • busPIRone (BUSPAR) 15 MG tablet Take 1 tablet by mouth 3 (Three) Times a Day. 90 tablet 0   • diazePAM (VALIUM) 5 MG tablet Take 1 tablet by mouth 2 (Two) Times a Day. 60 tablet 0   • metoprolol tartrate (LOPRESSOR) 50 MG tablet Take 50 mg by mouth 2 (Two) Times a Day With Meals.     • mirtazapine (REMERON) 15 MG tablet Take 1 tablet by mouth Every Night. 30 tablet 0   • pantoprazole (PROTONIX) 40 MG EC tablet Take 40 mg by mouth Daily.     • QUEtiapine (SEROquel) 50 MG tablet Take 1 tablet by mouth Every Night. 30 tablet 0   • sertraline (ZOLOFT) 50 MG tablet Take 1 tablet by mouth Every Night. 30 tablet 0   • simvastatin (ZOCOR) 20 MG tablet Take 20 mg by mouth Every Night.     • vitamin D (ERGOCALCIFEROL) 12208 UNITS capsule capsule Take 50,000 Units by mouth 1 (One) Time Per Week.       No current facility-administered medications for this visit.        Mental Status Exam:   Hygiene:    fair  Cooperation:  Cooperative  Eye Contact:  Fair  Psychomotor Behavior:  Appropriate  Affect:  Blunted  Hopelessness: 3  Speech:  Monotone  Thought Process:  Goal directed and Linear  Thought Content:  Mood congurent  Suicidal:  None and Death wish  Homicidal:  None  Hallucinations:  None  Delusion:  None  Memory:  Intact  Orientation:  Person, Place, Time and Situation  Reliability:  poor  Insight:  Poor  Judgement:  Poor  Impulse Control:  Poor  Physical/Medical Issues:  No     Assessment/Plan   Diagnoses and all orders for this visit:    Major depressive disorder, recurrent episode, moderate    Generalized anxiety disorder      Discussed medication options. Abilify and lexapro were discontinued by Dr. Sifuentes. Continue Zoloft and Seroquel that was initiated by Dr. Sifuentes, to address anxiety and depression. Continue remeron for sleep and depression and anxiety, diazepam for anxiety, busapr for anxiety, . No recent RX was given because of recent discharge.   Reviewed the risks, benefits, and side effects of the medications; patient acknowledged and verbally consented.  Patient is agreeable to call the [facility].  Patient is aware to call 911 or go to the nearest ER should begin having SI/HI.  Continue therapy.     Return in 3 weeks

## 2017-10-24 ENCOUNTER — OFFICE VISIT (OUTPATIENT)
Dept: PSYCHIATRY | Facility: CLINIC | Age: 47
End: 2017-10-24

## 2017-10-24 DIAGNOSIS — F33.2 SEVERE EPISODE OF RECURRENT MAJOR DEPRESSIVE DISORDER, WITHOUT PSYCHOTIC FEATURES (HCC): ICD-10-CM

## 2017-10-24 DIAGNOSIS — F41.1 GENERALIZED ANXIETY DISORDER: Primary | ICD-10-CM

## 2017-10-24 PROCEDURE — 90834 PSYTX W PT 45 MINUTES: CPT | Performed by: SOCIAL WORKER

## 2017-10-24 NOTE — PROGRESS NOTES
"Date of Service: October 24, 2017  Time In: 7:45 AM  Time Out: 8:30 AM      PROGRESS NOTE  Data:  Amish Win is a 47 y.o. male who met with the undersigned for a regularly scheduled individual outpatient psychotherapy session at the [facility] for follow-up of depression and anxiety.  The patient was recently hospitalized at the [facility] from 10/13/2017 through 10/17/2017due to increased depression and suicidal ideation.  The patient reports he returned home to live with his wife and children and states he is also attempting to maintain his regular work schedule.  Patient reports he continues to primarily work the late shift and normally gets home between 2 and 3 AM.  The patient also reports his 16-year-old son who has autism is currently in a facility in [place] and will be returning home this Friday.  The patient reports he and his wife both realizes they're no longer able to provide appropriate care and continue to work with the state to look for long-term treatment facility.  Patient also reports he and his wife continue to have a strained relationship and states his wife isn't willing to provide any emotional support.  The patient states \"she thinks I should just suck it up\".     HPI: The patient reports he feels medication change has been somewhat helpful but reports he continues to feel significant depression including depressed mood, anhedonia, anergia, decreased motivation, hopelessness and helplessness, periods of hypersomnia, and ongoing periods of social isolation when not working.  However, he also states he fears being alone.  Patient also struggles with feeling on edge, feeling overwhelmed, increased heart rate, shortness of breath, mind goes blank, trembling, and a distinct sense of impending doom.  Patient also reports he continues to struggle with obsessive worry concerning his medical issues and states her recent discovery of a minute amount of blood in his urine has caused " significant distress.  Patient rates current symptoms of depression and anxiety at an 8 on a scale of 1-10 with 10 being most severe.  Patient reports he continues to adhere to medication regimen as prescribed.  The patient adamantly and convincingly denies current suicidal ideation and vehemently denies any substance use.      Clinical Maneuvering/Intervention:  Assisted patient in processing above session content; acknowledged and normalized patient’s thoughts, feelings, and concerns.  Allow the patient to discuss/vent his feelings and fears concerning his ongoing issues and validated his feelings.  Also utilized motivational interviewing techniques including complex reflections to assist the patient in recognizing he is dealing with a great deal of stress and encouraged him to remind himself he can take steps to bring about positive change.  Utilized cognitive behavioral therapy to assist the patient in identifying and developing appropriate coping mechanisms to decrease severity and frequency of symptoms of depression and anxiety including positive self talk, relaxation techniques, and continuing to actively seek enjoyable activities he can engage in on a regular basis to decrease idle time and social isolation.  Also encouraged the patient to remind himself to focus on the next thing and to resist catastrophize.  Encouraged the patient to keep appointment with primary care provider following this session for assessment of recent urinalysis was discovered small amounts of blood.  Provided unconditional positive regard in a safe, supportive environment.    Allowed patient to freely discuss issues without interruption or judgment. Provided safe, confidential environment to facilitate the development of positive therapeutic relationship and encourage open, honest communication. Assisted patient in identifying risk factors which would indicate the need for higher level of care including thoughts to harm self or  others and/or self-harming behavior and encouraged patient to contact this office, call 911, or present to the nearest emergency room should any of these events occur. Discussed crisis intervention services and means to access.  Patient adamantly and convincingly denies current suicidal or homicidal ideation or perceptual disturbance.    Assessment    Patient continues to struggle with significant symptoms of depression and anxiety which appears to continue to be exacerbated by various social stressors including his special needs son and ongoing strain in his marriage.  The patient's stressful work environment also appears to contribute to his symptomology.  As a result, the patient to be reasonably expected to continue to benefit from treatment and would likely be at increased risk for decompensation otherwise.    Diagnoses and all orders for this visit:    Generalized anxiety disorder    Severe episode of recurrent major depressive disorder, without psychotic features               Mental Status Exam  Hygiene:  good  Dress:  casual  Attitude:  Cooperative  Motor Activity:  Slow  Speech:  Monotone  Mood:  anxious and depressed  Affect:  depressed  Thought Processes:  Goal directed  Thought Content:  normal  Suicidal Thoughts:  denies  Homicidal Thoughts:  denies  Crisis Safety Plan: yes, to come to the emergency room.  Hallucinations:  denies    Patient's Support Network Includes:  children    Progress toward goal: Not at goal    Functional Status: Moderate impairment     Prognosis: Fair with Ongoing Treatment     Plan         Patient will continue in individual outpatient psychotherapy session at the [facility] weekly and pharmacotherapy as scheduled.  Patient will adhere to medication regimen as prescribed and report any side effects.  Patient will keep appointments with primary care provider as scheduled and follow all recommendations.  Patient will contact this office, call 911 or present to the nearest  emergency room should suicidal or homicidal ideations occur. Provide Cognitive Behavioral Therapy and Solution Focused Therapy to improve functioning, maintain stability, and avoid decompensation and the need for higher level of care.          Return in about 1 week (around 10/31/2017) for Next scheduled follow up.    Izaaih Tinoco LCSW, [facility] October 24, 2017    This document signed by Izaiah Tinoco LCSW, [facility] October 24, 2017 1:12 PM

## 2017-10-24 NOTE — TREATMENT PLAN
Multi-Disciplinary Problems (from Behavioral Health Treatment Plan)    Active Problems     Problem: Anxiety (Priority: --)  (Start Date: 10/24/17) (Resolve Date: --)    Problem Details:  The patient self-scales this problem as a 8 with 10 being the worst.         Goal Start Date End Date    Patient will develop and implement behavioral and cognitive strategies to reduce anxiety and irrational fears. 10/24/17 --    Goal Details:  Progress toward goal:  The patient self-scales their progress related to this goal as a 8 with 10 being the worst.         Goal Intervention Frequency Start Date End Date    Help patient explore past emotional issues in relation to present anxiety. Weekly 10/24/17 10/24/18    Intervention Details:  Duration of treatment until remission of symptoms.         Goal Intervention Frequency Start Date End Date    Help patient develop an awareness of their cognitive and physical responses to anxiety. Weekly 10/24/17 10/24/18    Intervention Details:  Duration of treatment until remission of symptoms.               Problem: Depression (Priority: --)  (Start Date: 10/24/17) (Resolve Date: --)    Problem Details:  The patient self-scales this problem as a 8 with 10 being the worst.         Goal Start Date End Date    Patient will demonstrate the ability to initiate new constructive life skills outside of sessions on a consistent basis. 10/24/17 --    Goal Details:  Progress toward goal:  The patient self-scales their progress related to this goal as a 8 with 10 being the worst.         Goal Intervention Frequency Start Date End Date    Assist patient in setting attainable activities of daily living goals. Weekly 10/24/17 10/24/18    Intervention Details:  Patient will keep all scheduled appointments and follow recommendations.  Patient will also maintain a regular routine daily including regular bedtimes, regular wake times, engaging household chores, and continuing to seek enjoyable activities he can  engage in a regular basis to decrease idle time and social isolation.  Patient will also focus on eating a healthy diet, getting regular physical activity, and getting adequate sleep.       Goal Intervention Frequency Start Date End Date    Provide education about depression Weekly 10/24/17 10/24/18    Intervention Details:  Duration of treatment until remission of symptoms.         Goal Intervention Frequency Start Date End Date    Assist patient in developing healthy coping strategies. Weekly 10/24/17 10/24/18    Intervention Details:  Duration of treatment until remission of symptoms.                            I have discussed and reviewed this treatment plan with the patient.  It has been printed for signatures.       This document signed by Izaiah Tinoco LCSW, [facility] October 24, 2017 1:09 PM

## 2017-10-31 ENCOUNTER — OFFICE VISIT (OUTPATIENT)
Dept: PSYCHIATRY | Facility: CLINIC | Age: 47
End: 2017-10-31

## 2017-10-31 DIAGNOSIS — F33.2 SEVERE EPISODE OF RECURRENT MAJOR DEPRESSIVE DISORDER, WITHOUT PSYCHOTIC FEATURES (HCC): ICD-10-CM

## 2017-10-31 DIAGNOSIS — F41.1 GENERALIZED ANXIETY DISORDER: Primary | ICD-10-CM

## 2017-10-31 PROCEDURE — 90834 PSYTX W PT 45 MINUTES: CPT | Performed by: SOCIAL WORKER

## 2017-10-31 NOTE — PROGRESS NOTES
"Date of Service: October 31, 2017  Time In: 7:50 AM  Time Out: 8:30 AM      PROGRESS NOTE  Data:  Amish Win is a 47 y.o. male who met with the undersigned for a regularly scheduled individual outpatient psychotherapy session at  the [facility] for follow-up of anxiety and depression.  The patient reports he has been able to maintain employment although he continues to struggle.  He also reports ongoing strained relationship with the home which has been exacerbated by the recent return of his 15-year-old autistic son from treatment.  Patient reports his wife continues to become upset with him and states she told him \"you need to toughen up\".  Patient reports he and his wife has not slept in the same room for many years and states they never have a positive interaction.     HPI: Patient continues to present with significant mood instability including depression as evidenced by depressed mood, hopelessness and helplessness, anhedonia, anergia, decreased motivation, periods of insomnia and hypersomnia, tendency to catastrophize, and ongoing social isolation.  The patient rates current symptoms of depression at 6 on a scale of 1-10 with 10 being most severe.  He also reports ongoing continuous anxiety including anxious mood, feeling on edge, feeling overwhelmed, general sense of fear, mind goes blank, and periodic sense of impending doom.  Patient rates current symptoms of anxiety at 8 scale of 1-10 with 10 being most severe.  Patient also reports symptoms of panic nearly on a daily basis, especially at work.  Symptoms of panic include increased heart rate, sweating, and extreme feeling of fear, heart palpitations, feeling extremely overwhelmed, difficulty concentrating, and a distinct sense of impending doom.  Patient rates symptoms of panic at a 10 on a scale of 1-10 with 10 being the most severe during episodes of increased symptoms.  Patient also reports he continues to struggle with obsessive worry " "concerning his health and states his primary care provider also to take a minute amount of blood in his urine and his most recent visit.  However, he states his provider did not seem overly concerned and plans to watch this issue over the coming month.  Patient reports he continues to adhere to medication regimen as prescribed.  The patient adamantly and convincingly denies suicidal ideation and vehemently denies any substance use.    Clinical Maneuvering/Intervention:  Assisted patient in processing above session content; acknowledged and normalized patient’s thoughts, feelings, and concerns.   Allow patient to continue to discuss/vent his feelings and fears concerning the negative impact of his symptoms on his life and validated his feelings.  However, also utilize motivational techniques including complex reflections to assist the patient in identifying and acknowledging the fact he has been able to maintain employment and cope with various stressors.  Utilize cognitive behavioral therapy to assist the patient in developing appropriate coping mechanisms to decrease severity and frequency of symptoms including challenging her irrational, faulty thoughts with factual counter arguments.  Also provided the patient with handouts on countering anxiety and counteracting automatic negative thoughts and instructed him to complete and return in next session.  Also discussed a mutual self-perpetuating nature of social isolation and the patient's symptoms and encouraged him to continue to actively seek enjoyable activities he can be engaged in on a regular basis to decrease time and social isolation.  Also discussed the potential negative impact of strained relationship with his wife and confronted him with the fact no amount of medication or treatment will change.  Encouraged the patient to remind himself of the mantra \"nothing changes if nothing changes\".    Allowed patient to freely discuss issues without interruption or " judgment. Provided safe, confidential environment to facilitate the development of positive therapeutic relationship and encourage open, honest communication. Assisted patient in identifying risk factors which would indicate the need for higher level of care including thoughts to harm self or others and/or self-harming behavior and encouraged patient to contact this office, call 911, or present to the nearest emergency room should any of these events occur. Discussed crisis intervention services and means to access.  Patient adamantly and convincingly denies current suicidal or homicidal ideation or perceptual disturbance.    Assessment    Patient continues to struggle with significant symptoms of depression and anxiety which continue to cause significant impairment in important areas of functioning including social, interpersonal, and vocational domains.  As a result, the patient can be recently expected to continue to benefit from treatment and likely be at increased risk for decompensation and possibly higher level of care without ongoing treatment.      Diagnoses and all orders for this visit:    Generalized anxiety disorder    Severe episode of recurrent major depressive disorder, without psychotic features               Mental Status Exam  Hygiene:  good  Dress:  casual  Attitude:  Cooperative  Motor Activity:  Slow  Speech:  Monotone  Mood:  anxious and depressed  Affect:  depressed  Thought Processes:  Goal directed  Thought Content:  normal  Suicidal Thoughts:  denies  Homicidal Thoughts:  denies  Crisis Safety Plan: yes, to come to the emergency room.  Hallucinations:  denies    Patient's Support Network Includes:  children    Progress toward goal: Not at goal    Functional Status: Moderate impairment     Prognosis: Fair with Ongoing Treatment     Plan         The patient will continue in individual outpatient psychotherapy session at the [facility] every 1-2 weeks and pharmacotherapy as scheduled with  KEAGAN Vasquez.Patient will adhere to medication regimen as prescribed and report any side effects. Patient will contact this office, call 911 or present to the nearest emergency room should suicidal or homicidal ideations occur. Provide Cognitive Behavioral Therapy and Solution Focused Therapy to improve functioning, maintain stability, and avoid decompensation and the need for higher level of care.          Return in about 1 week (around 11/07/2017) for Next scheduled follow up.    Izaiah Tinoco LCSW, [facility] October 31, 2017    This document signed by Izaiah Tinoco LCSW, [facility] October 31, 2017 11:51 AM

## 2017-11-01 RX ORDER — DIAZEPAM 5 MG/1
5 TABLET ORAL 2 TIMES DAILY
Qty: 60 TABLET | Refills: 0 | Status: SHIPPED | OUTPATIENT
Start: 2017-11-01 | End: 2017-11-14 | Stop reason: SDUPTHER

## 2017-11-02 ENCOUNTER — TELEPHONE (OUTPATIENT)
Dept: PSYCHIATRY | Facility: CLINIC | Age: 47
End: 2017-11-02

## 2017-11-06 RX ORDER — BUSPIRONE HYDROCHLORIDE 15 MG/1
15 TABLET ORAL 3 TIMES DAILY
Qty: 90 TABLET | Refills: 0 | Status: SHIPPED | OUTPATIENT
Start: 2017-11-06 | End: 2017-11-14 | Stop reason: SDUPTHER

## 2017-11-14 ENCOUNTER — OFFICE VISIT (OUTPATIENT)
Dept: PSYCHIATRY | Facility: CLINIC | Age: 47
End: 2017-11-14

## 2017-11-14 VITALS
SYSTOLIC BLOOD PRESSURE: 148 MMHG | HEART RATE: 50 BPM | WEIGHT: 193 LBS | BODY MASS INDEX: 27.02 KG/M2 | HEIGHT: 71 IN | DIASTOLIC BLOOD PRESSURE: 97 MMHG

## 2017-11-14 DIAGNOSIS — F60.9 PERSONALITY DISORDER (HCC): ICD-10-CM

## 2017-11-14 DIAGNOSIS — F41.1 GENERALIZED ANXIETY DISORDER: Primary | ICD-10-CM

## 2017-11-14 DIAGNOSIS — F33.1 MAJOR DEPRESSIVE DISORDER, RECURRENT EPISODE, MODERATE (HCC): Primary | ICD-10-CM

## 2017-11-14 DIAGNOSIS — F33.1 MAJOR DEPRESSIVE DISORDER, RECURRENT EPISODE, MODERATE (HCC): ICD-10-CM

## 2017-11-14 PROCEDURE — 99213 OFFICE O/P EST LOW 20 MIN: CPT | Performed by: NURSE PRACTITIONER

## 2017-11-14 PROCEDURE — 90834 PSYTX W PT 45 MINUTES: CPT | Performed by: SOCIAL WORKER

## 2017-11-14 RX ORDER — BUSPIRONE HYDROCHLORIDE 15 MG/1
15 TABLET ORAL 3 TIMES DAILY
Qty: 90 TABLET | Refills: 0 | Status: SHIPPED | OUTPATIENT
Start: 2017-11-14 | End: 2017-12-07 | Stop reason: SDUPTHER

## 2017-11-14 RX ORDER — MIRTAZAPINE 15 MG/1
15 TABLET, FILM COATED ORAL NIGHTLY
Qty: 30 TABLET | Refills: 0 | Status: SHIPPED | OUTPATIENT
Start: 2017-11-14 | End: 2017-12-15 | Stop reason: SDUPTHER

## 2017-11-14 RX ORDER — SERTRALINE HYDROCHLORIDE 100 MG/1
100 TABLET, FILM COATED ORAL NIGHTLY
Qty: 30 TABLET | Refills: 0 | Status: SHIPPED | OUTPATIENT
Start: 2017-11-14 | End: 2017-12-15 | Stop reason: SDUPTHER

## 2017-11-14 RX ORDER — DIAZEPAM 5 MG/1
5 TABLET ORAL 2 TIMES DAILY
Qty: 60 TABLET | Refills: 0 | Status: SHIPPED | OUTPATIENT
Start: 2017-11-14 | End: 2017-12-15 | Stop reason: SDUPTHER

## 2017-11-14 RX ORDER — QUETIAPINE FUMARATE 100 MG/1
100 TABLET, FILM COATED ORAL NIGHTLY
Qty: 30 TABLET | Refills: 0 | Status: SHIPPED | OUTPATIENT
Start: 2017-11-14 | End: 2017-12-15 | Stop reason: SDUPTHER

## 2017-11-14 NOTE — PROGRESS NOTES
"  Subjective   Amish Win is a 47 y.o. male who is here today for medication management follow up at the [facility], he presents to his appointment on time.     Chief Complaint: Anxiety      History of Present Illness He states that he is doing fair, he shares that he is feeling a little nervous today.  He states that he saw his therapist this morning and is continuing to struggle.   He states that he is having difficulty going to work, states that home is not as bad because there is a dynamic at home he can tolerate.  He shares that he feels like he has become immune to the valium, discussed how that will not be raised because of the worsening of depression.  He rates his depression 7/10 which he relates to his fears of his health.  He rates his anxiety 6/10 with 10 being the worse.  He shares that he is sleeping \"pretty good\" with at least 6 hours per night with no NM.  He states that he is eating good with no significant weight changes.  He states that he has started to \"dig at myself again\"- encouraged him to continued to wear long sleeve clothing. He shares that he hasn't be able to come with a plan to avoid \"digging\".  He  Is unable to identify any new health issues but concerned that his BP is slightly elevated.  He states that he continues to see things in the floor.  He states that he is not having any SI/HI- he shares that he is having periods of hitting self like he did before- recommended behavior modification for actions.     The following portions of the patient's history were reviewed and updated as appropriate: allergies, current medications, past family history, past medical history, past social history, past surgical history and problem list.    Review of Systems   Constitutional: Negative for appetite change, chills, diaphoresis, fatigue, fever and unexpected weight change.   HENT: Negative for hearing loss, sore throat, trouble swallowing and voice change.    Eyes: Negative for " "photophobia and visual disturbance.   Respiratory: Negative for cough, chest tightness and shortness of breath.    Cardiovascular: Negative for chest pain and palpitations.   Gastrointestinal: Negative for abdominal pain, constipation, nausea and vomiting.   Endocrine: Negative for cold intolerance and heat intolerance.   Genitourinary: Negative for dysuria and frequency.   Musculoskeletal: Negative for arthralgias, back pain, joint swelling and neck stiffness.   Skin: Negative for color change and wound.   Allergic/Immunologic: Negative for environmental allergies and immunocompromised state.   Neurological: Negative for dizziness, tremors, seizures, syncope, weakness, light-headedness and headaches.   Hematological: Negative for adenopathy. Does not bruise/bleed easily.   Psychiatric/Behavioral: Negative for decreased concentration, dysphoric mood, hallucinations, self-injury, sleep disturbance and suicidal ideas. The patient is not nervous/anxious.        Objective   Physical Exam   Constitutional: He appears well-developed and well-nourished. No distress.   Neurological: He is alert. Coordination and gait normal.   Vitals reviewed.    Blood pressure 148/97, pulse 50, height 71\" (180.3 cm), weight 193 lb (87.5 kg).    Medication List:   Current Outpatient Prescriptions   Medication Sig Dispense Refill   • aspirin 81 MG EC tablet Take 81 mg by mouth 2 (Two) Times a Day.     • busPIRone (BUSPAR) 15 MG tablet Take 1 tablet by mouth 3 (Three) Times a Day. 90 tablet 0   • diazePAM (VALIUM) 5 MG tablet Take 1 tablet by mouth 2 (Two) Times a Day. 60 tablet 0   • metoprolol tartrate (LOPRESSOR) 50 MG tablet Take 50 mg by mouth 2 (Two) Times a Day With Meals.     • mirtazapine (REMERON) 15 MG tablet Take 1 tablet by mouth Every Night. 30 tablet 0   • pantoprazole (PROTONIX) 40 MG EC tablet Take 40 mg by mouth Daily.     • QUEtiapine (SEROquel) 100 MG tablet Take 1 tablet by mouth Every Night. 30 tablet 0   • sertraline " (ZOLOFT) 100 MG tablet Take 1 tablet by mouth Every Night. 30 tablet 0   • simvastatin (ZOCOR) 20 MG tablet Take 20 mg by mouth Every Night.     • vitamin D (ERGOCALCIFEROL) 78734 UNITS capsule capsule Take 50,000 Units by mouth 1 (One) Time Per Week.       No current facility-administered medications for this visit.        Mental Status Exam:   Hygiene:   fair  Cooperation:  Cooperative  Eye Contact:  Fair  Psychomotor Behavior:  Appropriate  Affect:  Blunted  Hopelessness: 3  Speech:  Monotone  Thought Process:  Goal directed and Linear  Thought Content:  Mood congurent  Suicidal:  None and Death wish  Homicidal:  None  Hallucinations:  None  Delusion:  None  Memory:  Intact  Orientation:  Person, Place, Time and Situation  Reliability:  poor  Insight:  Poor  Judgement:  Poor  Impulse Control:  Poor  Physical/Medical Issues:  No     Assessment/Plan   Diagnoses and all orders for this visit:    Major depressive disorder, recurrent episode, moderate    Personality disorder    Other orders  -     busPIRone (BUSPAR) 15 MG tablet; Take 1 tablet by mouth 3 (Three) Times a Day.  -     diazePAM (VALIUM) 5 MG tablet; Take 1 tablet by mouth 2 (Two) Times a Day.  -     mirtazapine (REMERON) 15 MG tablet; Take 1 tablet by mouth Every Night.  -     QUEtiapine (SEROquel) 100 MG tablet; Take 1 tablet by mouth Every Night.  -     sertraline (ZOLOFT) 100 MG tablet; Take 1 tablet by mouth Every Night.    Discussed medication options.  Buspar and valium for anixety, remeron for depression and sleep, increase seroquel for thoughts, and increase zoloft for depression and anxiety. Reviewed the risks, benefits, and side effects of the medications; patient acknowledged and verbally consented.  Patient is agreeable to call the [facility].  Patient is aware to call 911 or go to the nearest ER should begin having SI/HI.  Continue therapy.     Return in 4 weeks

## 2017-11-14 NOTE — PROGRESS NOTES
Date of Service: November 14, 2017  Time In: 7:45 AM  Time Out: 8:30 AM      PROGRESS NOTE  Data:  Amish Win is a 47 y.o. male who met with the undersigned for a regularly scheduled individual outpatient psychotherapy session at  the [facility] for follow-up of anxiety and depression.  Patient reports he is teenage son who has severe autism was recently rehospitalized in [place].  The patient also reports he will not be allowed to return to school for approximately 2 months.  Patient reports this continues to cause a great deal of stress in the home.  The patient also reports he and his wife have a court date this Thursday concerning their son but states his manager is not willing to allow him to miss work.  Patient reports he continues to work was essentially second she often states he continues to struggle with maintaining a regular routine.     HPI: Patient reports he continues to struggle with depressed mood, anhedonia, anergia, decreased motivation, obsessive worry, significant decrease in physical activity, and ongoing social isolation.Patient rates current symptoms of depression at a total scale of 1-10 with 10 being most severe.  Patient reports he feels he is sleeping relatively well but states he feels as though he has to say all this energy for work which results in living a relatively sedentary life.  Patient also continues to struggle with significant anxiety including feeling on edge, feeling overwhelmed, increased heart rate, a feeling of losing control, difficulty concentrating, muscle tension, and a sense of impending doom.  The patient also reports he has returned to slapping himself and picking at himself and states he feels this is significantly increased while at work.  Patient rates current symptoms of anxiety at a 9 on a scale of 1-10 with 10 being most severe.  Patient reports he continues to adhere to medication regimen as prescribed.  The patient adamantly convincingly denies  suicidal ideation and vehemently denies any substance use.      Clinical Maneuvering/Intervention:  Assisted patient in processing above session content; acknowledged and normalized patient’s thoughts, feelings, and concerns.  Allow the patient to discuss/process his feelings and fears concerning his son's ongoing issues and validated his feelings.  Also utilized motivational interviewing techniques including complex reflect is to assist the patient in recognizing he and his wife have coped with her son's significant issues relatively well and have been unable to provide for all his needs.  Also assisted the patient in developing strategy to be able to attend court hearing on Thursday while also fulfilling his job duties by attending a meeting in [place] on Wednesday.  Utilize cognitive behavioral therapy to assist the patient in developing coping mechanisms to decrease severity and frequency of symptoms of depression and the patient's tendency to nervously peek at and slap himself.  Discussed and demonstrated the use of thought blocking techniques, positive self talk, challenging faulty, irrational thoughts with factual counter arguments, and encouraged the patient to utilize a rubber band on his wrist to snap whenever he feels the urge to pick at or slap himself.  Also continued to discuss the reality of the significant stress in the patient's life and challenged him to remind himself all he can do is the best he can.  Provided unconditional positive regard in a safe, supportive environment.    Allowed patient to freely discuss issues without interruption or judgment. Provided safe, confidential environment to facilitate the development of positive therapeutic relationship and encourage open, honest communication. Assisted patient in identifying risk factors which would indicate the need for higher level of care including thoughts to harm self or others and/or self-harming behavior and encouraged patient to  contact this office, call 911, or present to the nearest emergency room should any of these events occur. Discussed crisis intervention services and means to access.  Patient adamantly and convincingly denies current suicidal or homicidal ideation or perceptual disturbance.    Assessment    Patient continues to struggle with significant depression and anxiety which continues to be exacerbated by significant family stress.  The totality of the patient's symptomology continues to cause significant impairment in important areas of functioning including interpersonal, social, and vocational domains.  As a result, the patient can be reasonably expected to continue to benefit from treatment and will likely be at significant risk for decompensation otherwise.    Diagnoses and all orders for this visit:    Generalized anxiety disorder    Major depressive disorder, recurrent episode, moderate               Mental Status Exam  Hygiene:  fair  Dress:  casual  Attitude:  Cooperative  Motor Activity:  Slow  Speech:  Monotone  Mood:  anxious and depressed  Affect:  depressed  Thought Processes:  Linear  Thought Content:  normal  Suicidal Thoughts:  denies  Homicidal Thoughts:  denies  Crisis Safety Plan: yes, to come to the emergency room.  Hallucinations:  denies    Patient's Support Network Includes:  wife and children    Progress toward goal: Not at goal    Functional Status: Moderate impairment     Prognosis: Fair with Ongoing Treatment     Plan         The patient will continue in individual outpatient psychotherapy sessions at the [facility] weekly and pharmacotherapy as scheduled with KEAGAN Vasquez at [facility].  Patient will adhere to medication regimen as prescribed and report any side effects. Patient will contact this office, call 911 or present to the nearest emergency room should suicidal or homicidal ideations occur. Provide Cognitive Behavioral Therapy and Integrative Therapy to  improve functioning, maintain stability, and avoid decompensation and the need for higher level of care.          Return in about 1 week (around 11/21/2017) for Next scheduled follow up.      This document signed by Izaiah Tinoco LCSW, November 14, 2017 2:38 PM

## 2017-11-29 ENCOUNTER — OFFICE VISIT (OUTPATIENT)
Dept: PSYCHIATRY | Facility: CLINIC | Age: 47
End: 2017-11-29

## 2017-11-29 DIAGNOSIS — F41.0 PANIC DISORDER WITHOUT AGORAPHOBIA: ICD-10-CM

## 2017-11-29 DIAGNOSIS — F33.1 MAJOR DEPRESSIVE DISORDER, RECURRENT EPISODE, MODERATE (HCC): Primary | ICD-10-CM

## 2017-11-29 PROCEDURE — 90834 PSYTX W PT 45 MINUTES: CPT | Performed by: SOCIAL WORKER

## 2017-11-29 NOTE — PROGRESS NOTES
"Date of Service: November 29, 2017  Time In: 1:30 PM  Time Out: 2:15 PM      PROGRESS NOTE  Data:  Amish Win is a 47 y.o. male who met with the undersigned for a regularly scheduled individual outpatient psychotherapy session at  the [facility] for follow-up of depression and panic disorder.  Patient reports he and his wife been upset as of late as they finally finalized state guardianship of their teenage son who has severe autism.     HPI: Patient continues to struggle with depression including depressed mood, anhedonia, anergia, feeling hopeless, feeling useless, periods of insomnia and hypersomnia, and ongoing social isolation.  The patient rates current symptoms of depression at an 8 on a scale of 1-10 with 10 being most severe.  Patient states \"I'm just not doing well and almost called the crisis line\".  Patient also continues to struggle with significant symptoms of anxiety/panic including feeling on edge, feeling overwhelmed, muscle tension, trembling, heart palpitations, feeling lightheaded, nausea, and a distinct sense of impending doom.  Patient also reports he continues to struggle with obsessive worry which is primarily focused on his health and work.  Patient states he is in constant fear of having a heart attack.  He reports he continues to struggle with obsessive behaviors including \"picking at himself\" and randomly tapping himself in various places including his chest and collarbone.  Patient reports this behavior is increased while at work and somewhat decreased although not extinguished at home.  Patient states he feels as though he is at the \"end of his rope\" but adamantly and convincingly denies any suicidal ideation.  The patient reports he continues to adhere to medication regimen as prescribed.  The patient vehemently denies any substance use.      Clinical Maneuvering/Intervention:  Assisted patient in processing above session content; acknowledged and normalized patient’s thoughts, " feelings, and concerns.  Allowed patient to discuss/vent his feelings and frustrations concerning his ongoing symptomology and the negative impact on his life and validated his feelings.  However, utilize motivational interviewing techniques including complex reflections to assist the patient in recognizing and acknowledging there is likely no medication which will suddenly saw his issues.  Utilize cognitive behavioral therapy to assist the patient in developing appropriate coping mechanisms including thought blocking techniques, challenging thought the, irrational thoughts with factual And arguments, and utilizing appropriate relaxation techniques including guided imagery.  Also challenged the patient to consider making a change to his daily routine including the possibility of joining a local gym and working out 2-3 days weekly or finding some other activity he can engage in to reduce his idle time and social isolation.  Provided the patient with handouts on countering anxiety, counting automatic negative thoughts, and relaxation techniques including guided imagery.  Guided the patient through a brief session and guided imagery with the peach being the focal point and encouraged him to begin to practice on a daily basis.  Provided unconditional positive regard in a safe, supportive environment.    Allowed patient to freely discuss issues without interruption or judgment. Provided safe, confidential environment to facilitate the development of positive therapeutic relationship and encourage open, honest communication. Assisted patient in identifying risk factors which would indicate the need for higher level of care including thoughts to harm self or others and/or self-harming behavior and encouraged patient to contact this office, call 911, or present to the nearest emergency room should any of these events occur. Discussed crisis intervention services and means to access.  Patient adamantly and convincingly  denies current suicidal or homicidal ideation or perceptual disturbance.    Assessment    Patient continues to struggle with significant symptoms of depression and anxiety/panic which continues to likely be exacerbated by strained relationship with his life and ongoing difficulty with his 15-year-old son.  The patient symptomology continues to cause significant impairment important areas of functioning including social, interpersonal, and vocational domains.  There is no indication of imminent threat to self or others.  As a result, the patient can be reasonably expected to continue to benefit from treatment and would likely be at increased risk for decompensation    Diagnoses and all orders for this visit:    Major depressive disorder, recurrent episode, moderate    Panic disorder without agoraphobia               Mental Status Exam  Hygiene:  good  Dress:  casual  Attitude:  Cooperative  Motor Activity:  Slow  Speech:  Monotone  Mood:  depressed  Affect:  very depressed  Thought Processes:  Goal directed  Thought Content:  normal  Suicidal Thoughts:  denies  Homicidal Thoughts:  denies  Crisis Safety Plan: yes, to come to the emergency room.  Hallucinations:  denies    Patient's Support Network Includes:  wife and children    Progress toward goal: Not at goal    Functional Status: Moderate impairment     Prognosis: Fair with Ongoing Treatment     Plan         Patient will continue in individual outpatient psychotherapy session at the [facility] weekly and pharmacotherapy as scheduled with KEAGAN Vasquez.  Patient will adhere to medication regimen as prescribed and report any side effects. Patient will contact this office, call 911 or present to the nearest emergency room should suicidal or homicidal ideations occur. Provide Cognitive Behavioral Therapy and Integrative Therapy to improve functioning, maintain stability, and avoid decompensation and the need for higher level of care.          Return in  about 1 week (around 12/06/2017) for Next scheduled follow up.      This document signed by Izaiah Tinoco LCSW, November 29, 2017 3:27 PM

## 2017-12-07 RX ORDER — BUSPIRONE HYDROCHLORIDE 15 MG/1
15 TABLET ORAL 3 TIMES DAILY
Qty: 90 TABLET | Refills: 0 | Status: SHIPPED | OUTPATIENT
Start: 2017-12-07 | End: 2017-12-15 | Stop reason: SDUPTHER

## 2017-12-15 ENCOUNTER — OFFICE VISIT (OUTPATIENT)
Dept: PSYCHIATRY | Facility: CLINIC | Age: 47
End: 2017-12-15

## 2017-12-15 ENCOUNTER — TELEPHONE (OUTPATIENT)
Dept: PSYCHIATRY | Facility: CLINIC | Age: 47
End: 2017-12-15

## 2017-12-15 VITALS
HEART RATE: 56 BPM | DIASTOLIC BLOOD PRESSURE: 71 MMHG | BODY MASS INDEX: 26.18 KG/M2 | SYSTOLIC BLOOD PRESSURE: 108 MMHG | HEIGHT: 71 IN | WEIGHT: 187 LBS

## 2017-12-15 DIAGNOSIS — F33.1 MAJOR DEPRESSIVE DISORDER, RECURRENT EPISODE, MODERATE (HCC): Primary | ICD-10-CM

## 2017-12-15 PROCEDURE — 99213 OFFICE O/P EST LOW 20 MIN: CPT | Performed by: NURSE PRACTITIONER

## 2017-12-15 RX ORDER — DIAZEPAM 5 MG/1
5 TABLET ORAL 2 TIMES DAILY
Qty: 60 TABLET | Refills: 0 | Status: SHIPPED | OUTPATIENT
Start: 2017-12-15 | End: 2017-12-28 | Stop reason: SDUPTHER

## 2017-12-15 RX ORDER — QUETIAPINE FUMARATE 100 MG/1
100 TABLET, FILM COATED ORAL NIGHTLY
Qty: 30 TABLET | Refills: 0 | Status: SHIPPED | OUTPATIENT
Start: 2017-12-15 | End: 2018-01-17 | Stop reason: SDUPTHER

## 2017-12-15 RX ORDER — SERTRALINE HYDROCHLORIDE 100 MG/1
150 TABLET, FILM COATED ORAL NIGHTLY
Qty: 45 TABLET | Refills: 0 | Status: SHIPPED | OUTPATIENT
Start: 2017-12-15 | End: 2018-01-17 | Stop reason: SDUPTHER

## 2017-12-15 RX ORDER — MIRTAZAPINE 15 MG/1
15 TABLET, FILM COATED ORAL NIGHTLY
Qty: 30 TABLET | Refills: 0 | Status: SHIPPED | OUTPATIENT
Start: 2017-12-15 | End: 2018-01-17 | Stop reason: SDUPTHER

## 2017-12-15 RX ORDER — BUSPIRONE HYDROCHLORIDE 15 MG/1
15 TABLET ORAL 3 TIMES DAILY
Qty: 90 TABLET | Refills: 0 | Status: SHIPPED | OUTPATIENT
Start: 2017-12-15 | End: 2018-01-17 | Stop reason: SDUPTHER

## 2017-12-15 NOTE — PROGRESS NOTES
"  Subjective   Amish Win is a 47 y.o. male who is here today for medication management follow up at the [facility], he presents to his appointment on time.     Chief Complaint: Anxiety      History of Present Illness He states that he is doing ok today, he denies any SE or problems with his medications.  He shares that he has some \"hard days\".  He states that there are days that he can't seem to get out of the bed, discussed possibly increasing the zoloft.  He rates his depression 7/10 with 10 being the worse; anxiety rates 8/10 with 10 being the worse.  He feels a \"nervous wreck\", he states that he wants to do more and doesn't believe he can.  He feels like his sleep is fine with at least 6 hours per night, states that he sleeps \"too much\" at times- denies any NM.  He states that his appetite is decreased, has to force self to eat a lot, noted to have lost about 6 pounds.  He states that her almost called the crisis line last week.  He denies any SI/HI, denies any auditory hallucinations.  Shares that he gets fixated on \"one thing and I get scared\".  He denies any recent health.  He continues to be stressed out about work, son remains in the hospital and will probably go to a permanent facility from there.      The following portions of the patient's history were reviewed and updated as appropriate: allergies, current medications, past family history, past medical history, past social history, past surgical history and problem list.    Review of Systems   Constitutional: Negative for appetite change, chills, diaphoresis, fatigue, fever and unexpected weight change.   HENT: Negative for hearing loss, sore throat, trouble swallowing and voice change.    Eyes: Negative for photophobia and visual disturbance.   Respiratory: Negative for cough, chest tightness and shortness of breath.    Cardiovascular: Negative for chest pain and palpitations.   Gastrointestinal: Negative for abdominal pain, constipation, " "nausea and vomiting.   Endocrine: Negative for cold intolerance and heat intolerance.   Genitourinary: Negative for dysuria and frequency.   Musculoskeletal: Negative for arthralgias, back pain, joint swelling and neck stiffness.   Skin: Negative for color change and wound.   Allergic/Immunologic: Negative for environmental allergies and immunocompromised state.   Neurological: Negative for dizziness, tremors, seizures, syncope, weakness, light-headedness and headaches.   Hematological: Negative for adenopathy. Does not bruise/bleed easily.   Psychiatric/Behavioral: Negative for decreased concentration, dysphoric mood, hallucinations, self-injury, sleep disturbance and suicidal ideas. The patient is not nervous/anxious.        Objective   Physical Exam   Constitutional: He appears well-developed and well-nourished. No distress.   Neurological: He is alert. Coordination and gait normal.   Vitals reviewed.    Blood pressure 108/71, pulse 56, height 180 cm (70.87\"), weight 84.8 kg (187 lb).    Medication List:   Current Outpatient Prescriptions   Medication Sig Dispense Refill   • aspirin 81 MG EC tablet Take 81 mg by mouth 2 (Two) Times a Day.     • busPIRone (BUSPAR) 15 MG tablet Take 1 tablet by mouth 3 (Three) Times a Day. 90 tablet 0   • diazePAM (VALIUM) 5 MG tablet Take 1 tablet by mouth 2 (Two) Times a Day. 60 tablet 0   • metoprolol tartrate (LOPRESSOR) 50 MG tablet Take 50 mg by mouth 2 (Two) Times a Day With Meals.     • mirtazapine (REMERON) 15 MG tablet Take 1 tablet by mouth Every Night. 30 tablet 0   • pantoprazole (PROTONIX) 40 MG EC tablet Take 40 mg by mouth Daily.     • QUEtiapine (SEROquel) 100 MG tablet Take 1 tablet by mouth Every Night. 30 tablet 0   • sertraline (ZOLOFT) 100 MG tablet Take 1.5 tablets by mouth Every Night. 45 tablet 0   • simvastatin (ZOCOR) 20 MG tablet Take 20 mg by mouth Every Night.     • vitamin D (ERGOCALCIFEROL) 85894 UNITS capsule capsule Take 50,000 Units by mouth 1 " (One) Time Per Week.       No current facility-administered medications for this visit.        Mental Status Exam:   Hygiene:   fair  Cooperation:  Cooperative  Eye Contact:  Fair  Psychomotor Behavior:  Appropriate  Affect:  Blunted  Hopelessness: 3  Speech:  Monotone  Thought Process:  Goal directed and Linear  Thought Content:  Mood congurent  Suicidal:  None and Death wish  Homicidal:  None  Hallucinations:  None  Delusion:  None  Memory:  Intact  Orientation:  Person, Place, Time and Situation  Reliability:  poor  Insight:  Poor  Judgement:  Poor  Impulse Control:  Poor  Physical/Medical Issues:  No     Assessment/Plan   Diagnoses and all orders for this visit:    Major depressive disorder, recurrent episode, moderate    Other orders  -     sertraline (ZOLOFT) 100 MG tablet; Take 1.5 tablets by mouth Every Night.  -     QUEtiapine (SEROquel) 100 MG tablet; Take 1 tablet by mouth Every Night.  -     mirtazapine (REMERON) 15 MG tablet; Take 1 tablet by mouth Every Night.  -     diazePAM (VALIUM) 5 MG tablet; Take 1 tablet by mouth 2 (Two) Times a Day.  -     busPIRone (BUSPAR) 15 MG tablet; Take 1 tablet by mouth 3 (Three) Times a Day.    Discussed medication options.  Buspar and valium for anixety, remeron for depression and sleep, continue seroquel for thoughts, and increase zoloft for depression and anxiety. Reviewed the risks, benefits, and side effects of the medications; patient acknowledged and verbally consented.  Patient is agreeable to call the [facility].  Patient is aware to call 911 or go to the nearest ER should begin having SI/HI.  Continue therapy.     Return in 4 weeks

## 2017-12-19 ENCOUNTER — OFFICE VISIT (OUTPATIENT)
Dept: PSYCHIATRY | Facility: CLINIC | Age: 47
End: 2017-12-19

## 2017-12-19 DIAGNOSIS — F41.0 PANIC DISORDER WITHOUT AGORAPHOBIA: ICD-10-CM

## 2017-12-19 DIAGNOSIS — F33.1 MAJOR DEPRESSIVE DISORDER, RECURRENT EPISODE, MODERATE (HCC): Primary | ICD-10-CM

## 2017-12-19 PROCEDURE — 90834 PSYTX W PT 45 MINUTES: CPT | Performed by: SOCIAL WORKER

## 2017-12-19 NOTE — PROGRESS NOTES
"Date of Service: December 19, 2017  Time In: 11:14 AM  Time Out: 11:55 AM      PROGRESS NOTE  Data:  Amish Win is a 47 y.o. male who met with the undersigned for a regularly scheduled individual outpatient psychotherapy session at  the [facility] for follow-up of depression and panic disorder.     HPI: Patient reports he hasn't been doing well as of late and states he continues with obsessive worry over cardiac issues.  Patient reports he has obsessive thoughts of the possibility of having a heart attack and states this continues to cause him significant impairment including avoiding going places as he is fearful of not being close to a medical facility.  In fact, the patient states he regularly places his hand over his heart in an attempt to hear it beating.  The patient continues to struggle with significant anxiety including feeling on edge, feeling overwhelmed, increased heart rate, difficulty concentrating, muscle tension, mind goes blank, and a distinct sense of impending doom.  Patient rates current symptoms of anxiety at an 8 on a scale of 1-10 with 10 being most severe.  Patient also continues to struggle with depression as evidenced by depressed mood, anhedonia, anergia, feeling hopeless and helpless, feeling useless, low self-esteem, periods of hypersomnia, and ongoing social isolation.  Patient rates current symptoms of depression at a 7 on a scale of 1-10 with 10 being most severe.  Patient also reports ongoing difficulties in his marriage and states his wife recently told him if he did not \"get his problems under control\" they would not be able to remain in a relationship.  Patient reports she continues to be very condescending and degrading.  Patient also reports the entire family continues to deal significant stress concerning his oldest son who has autism.  Patient reports he continues to adhere to medication regimen as prescribed.  The patient adamantly convincingly denies suicidal " ideation vehemently denies any substance use.      Clinical Maneuvering/Intervention:  Assisted patient in processing above session content; acknowledged and normalized patient’s thoughts, feelings, and concerns.  Utilized motivational interviewing techniques including complex reflections an attempt to challenge the patient's irrational fears concerning his health which was met with ambivalence.  Also utilized cognitive behavioral therapy to assist the patient in developing specific strategy to decrease his obsessive worrying including positive self talk, relaxation techniques, challenging faulty, irrational thoughts with factual counter arguments, and guided imagery.  Also discussed the mutually self-perpetuating nature of ongoing social isolation and the patient's symptoms and encouraged him to actively seek enjoyable activities he can engage in a regular basis.  Also challenged the patient to consider long history of significant conflict in his marriage has likely contributed to his symptoms.  Provided unconditional positive regard in a safe, supportive environment.    Allowed patient to freely discuss issues without interruption or judgment. Provided safe, confidential environment to facilitate the development of positive therapeutic relationship and encourage open, honest communication. Assisted patient in identifying risk factors which would indicate the need for higher level of care including thoughts to harm self or others and/or self-harming behavior and encouraged patient to contact this office, call 911, or present to the nearest emergency room should any of these events occur. Discussed crisis intervention services and means to access.  Patient adamantly and convincingly denies current suicidal or homicidal ideation or perceptual disturbance.    Assessment    Patient continues to struggle with significant symptoms of depression and anxiety which is manifested by obsessive worry concerning health issues.   The patient's symptoms continue to cause significant impairment in important areas of functioning including social, interpersonal, and vocational domains as evidence by the fact the patient is on his final warning at work.  The patient can be reasonably expected to continue to benefit from treatment and would likely be at increased risk for decompensation and otherwise.    Diagnoses and all orders for this visit:    Major depressive disorder, recurrent episode, moderate    Panic disorder without agoraphobia               Mental Status Exam  Hygiene:  fair  Dress:  casual  Attitude:  Cooperative  Motor Activity:  Slow  Speech:  Monotone  Mood:  anxious and depressed  Affect:  depressed and tearful  Thought Processes:  Goal directed  Thought Content:  normal  Suicidal Thoughts:  denies  Homicidal Thoughts:  denies  Crisis Safety Plan: yes, to come to the emergency room.  Hallucinations:  denies    Patient's Support Network Includes:  wife and children    Progress toward goal: Not at goal    Functional Status: Moderate impairment     Prognosis: Fair with Ongoing Treatment     Plan         The patient will continue in individual outpatient psychotherapy sessions every 1-2 weeks at the [facility] and pharmacotherapy as scheduled with KEAGAN Vasquez.  Patient will adhere to medication regimen as prescribed and report any side effects. Patient will contact this office, call 911 or present to the nearest emergency room should suicidal or homicidal ideations occur. Provide Cognitive Behavioral Therapy and Integrative Therapy to improve functioning, maintain stability, and avoid decompensation and the need for higher level of care.          Return in about 1 week (around 12/26/2017) for Next scheduled follow up.      This document signed by Izaiah Tinoco LCSW, [facility] December 19, 2017 3:07 PM

## 2018-01-03 ENCOUNTER — OFFICE VISIT (OUTPATIENT)
Dept: PSYCHIATRY | Facility: CLINIC | Age: 48
End: 2018-01-03

## 2018-01-03 DIAGNOSIS — F41.0 PANIC DISORDER WITHOUT AGORAPHOBIA: ICD-10-CM

## 2018-01-03 DIAGNOSIS — F33.1 MAJOR DEPRESSIVE DISORDER, RECURRENT EPISODE, MODERATE (HCC): Primary | ICD-10-CM

## 2018-01-03 PROCEDURE — 90832 PSYTX W PT 30 MINUTES: CPT | Performed by: SOCIAL WORKER

## 2018-01-03 RX ORDER — DIAZEPAM 5 MG/1
5 TABLET ORAL 2 TIMES DAILY PRN
Qty: 60 TABLET | Refills: 0 | OUTPATIENT
Start: 2018-01-03 | End: 2018-01-26

## 2018-01-03 NOTE — PROGRESS NOTES
Date of Service: January 3, 2018  Time In: 3:20 PM  Time Out: 3:50 PM      PROGRESS NOTE  Data:  Amish Win is a 47 y.o. male who met with the undersigned for a regularly scheduled individual outpatient psychotherapy session at  the [facility] for follow-up of depression and anxiety.  Patient reports he was unable to keep his appointment earlier in the day simply due to not being able to get out of bed.     HPI: Patient reports he continues to struggle with significant symptoms of depression including depressed mood, anhedonia, irritability, hopelessness, anergia, hypersomnia, and social isolation.  The patient rates current symptoms of depression at an 8 on a scale of 1-10 with 10 being most severe.  Patient also continues to struggle with significant anxiety as evidenced by anxious mood, feeling on edge, heart palpitations, sweating, mind goes blank, feeling extremely overwhelmed, and a sense of impending doom.  The patient continues to struggle with obsessive worry concerning health and compulsive behaviors including feeling of his heart frequently to reassure himself he is okay.  Patient reports he continues to adhere to medication regimen as prescribed.  The patient adamantly convincingly deny suicidal ideation vehemently denies any substance use.      Clinical Maneuvering/Intervention:  Assisted patient in processing above session content; acknowledged and normalized patient’s thoughts, feelings, and concerns.  Continue to utilize cognitive behavioral therapy to challenge the patient's father becoming irrational assumptions and encouraged him to continue to utilize factual counter arguments.  Also utilized motivational didn't make including complex reflections to remind the patient he has had multiple medical testing was no indication of significant health concerns.  Also discussed the fact the patient is not making significant progress and suggested he see a higher level of care.  As a result,  provided the patient with contact information concerning inpatient rationally emotive behavioral therapy at the [facility] in [place] and encouraged him to contact as soon as possible.  Provided unconditional positive regard in a safe, supportive environment.    Allowed patient to freely discuss issues without interruption or judgment. Provided safe, confidential environment to facilitate the development of positive therapeutic relationship and encourage open, honest communication. Assisted patient in identifying risk factors which would indicate the need for higher level of care including thoughts to harm self or others and/or self-harming behavior and encouraged patient to contact this office, call 911, or present to the nearest emergency room should any of these events occur. Discussed crisis intervention services and means to access.  Patient adamantly and convincingly denies current suicidal or homicidal ideation or perceptual disturbance.    Assessment    Patient continues to struggle with significant refractory depression and anxiety.  The patient's symptomology continues to cause severe impairment important areas of functioning including social, interpersonal, and vocational domains.  In fact, the patient is on his final warning at work.  As a result, the patient would likely benefit from inpatient treatment and would likely be at increased risk for ongoing decompensation otherwise.    Diagnoses and all orders for this visit:    Major depressive disorder, recurrent episode, moderate    Panic disorder without agoraphobia               Mental Status Exam  Hygiene:  good  Dress:  casual  Attitude:  Cooperative  Motor Activity:  Slow  Speech:  Monotone  Mood:  anxious and depressed  Affect:  very depressed  Thought Processes:  Goal directed  Thought Content:  normal  Suicidal Thoughts:  denies  Homicidal Thoughts:  denies  Crisis Safety Plan: yes, to come to the emergency room.  Hallucinations:  denies    Patient's  Support Network Includes:  wife and children    Progress toward goal: Not at goal    Functional Status: Severe impairment    Prognosis: Fair with Ongoing Treatment     Plan         Patient will contact the [facility] in [place] concerning inpatient treatment and will contact this office to arrange follow-up outpatient treatment.       Return in about 1 week (around 01/10/2018) for Next scheduled follow up.      This document signed by Izaiah Tinoco LCSW, JOAQUIN January 3, 2018 6:45 PM

## 2018-01-04 ENCOUNTER — DOCUMENTATION (OUTPATIENT)
Dept: PSYCHIATRY | Facility: CLINIC | Age: 48
End: 2018-01-04

## 2018-01-04 ENCOUNTER — TELEPHONE (OUTPATIENT)
Dept: PSYCHIATRY | Facility: CLINIC | Age: 48
End: 2018-01-04

## 2018-01-04 NOTE — TELEPHONE ENCOUNTER
Called to inform patient letter is ready to be picked up in office. No answer and no voicemail picked up

## 2018-01-09 ENCOUNTER — TELEPHONE (OUTPATIENT)
Dept: PSYCHIATRY | Facility: CLINIC | Age: 48
End: 2018-01-09

## 2018-01-09 NOTE — TELEPHONE ENCOUNTER
Patient has been in the [facility] they will only excuse him through tomorrow, he is requesting if you can excuse him through Sunday from work

## 2018-01-16 ENCOUNTER — OFFICE VISIT (OUTPATIENT)
Dept: PSYCHIATRY | Facility: CLINIC | Age: 48
End: 2018-01-16

## 2018-01-16 DIAGNOSIS — F41.1 GENERALIZED ANXIETY DISORDER: Primary | ICD-10-CM

## 2018-01-16 DIAGNOSIS — F33.1 MAJOR DEPRESSIVE DISORDER, RECURRENT EPISODE, MODERATE (HCC): ICD-10-CM

## 2018-01-16 DIAGNOSIS — F42.2 MIXED OBSESSIONAL THOUGHTS AND ACTS: ICD-10-CM

## 2018-01-16 PROCEDURE — 90834 PSYTX W PT 45 MINUTES: CPT | Performed by: SOCIAL WORKER

## 2018-01-16 NOTE — TREATMENT PLAN
Multi-Disciplinary Problems (from Behavioral Health Treatment Plan)    Active Problems     Problem: Anxiety (Priority: --)  (Start Date: 01/16/18) (Resolve Date: --)    Problem Details:  The patient self-scales this problem as a 7 with 10 being the worst.         Goal Start Date End Date    Patient will develop and implement behavioral and cognitive strategies to reduce anxiety and irrational fears. 01/16/18 --    Goal Details:  Progress toward goal:  The patient self-scales their progress related to this goal as a 7 with 10 being the worst.         Goal Intervention Frequency Start Date End Date    Help patient explore past emotional issues in relation to present anxiety. Weekly 01/16/18 01/16/19    Intervention Details:  Duration of treatment until remission of symptoms.         Goal Intervention Frequency Start Date End Date    Help patient develop an awareness of their cognitive and physical responses to anxiety. Weekly 01/16/18 01/16/19    Intervention Details:  Duration of treatment until until remission of symptoms.               Problem: Depression (Priority: --)  (Start Date: 01/16/18) (Resolve Date: --)    Problem Details:  The patient self-scales this problem as a 7 with 10 being the worst.         Goal Start Date End Date    Patient will demonstrate the ability to initiate new constructive life skills outside of sessions on a consistent basis. 01/16/18 --    Goal Details:  Progress toward goal:  The patient self-scales their progress related to this goal as a 7 with 10 being the worst.         Goal Intervention Frequency Start Date End Date    Assist patient in setting attainable activities of daily living goals. Weekly 01/16/18 01/16/19    Intervention Details:  The patient with focus on the skills of daily living including eating a healthy diet, getting regular physical activity, and getting adequate sleep.  Patient will also begin to utilize meditation on a daily basis.  Patient will also take a walk  around the neighborhood at least 3 times weekly.       Goal Intervention Frequency Start Date End Date    Provide education about depression Weekly 01/16/18 01/16/19    Intervention Details:  Duration of treatment until remission of symptoms.         Goal Intervention Frequency Start Date End Date    Assist patient in developing healthy coping strategies. Weekly 01/16/18 01/16/19    Intervention Details:  Duration of treatment until remission of symptoms.               Problem: Obsessive/Compulsive (Priority: --)  (Start Date: 01/16/18) (Resolve Date: --)    Problem Details:  The patient self-scales this problem as a 7 with 10 being the worst.         Goal Start Date End Date    Patient will decrease frequency of obsessive/compulsive behaviors that interfere with daily functioning. 01/16/18 --    Goal Details:  Progress toward goal:  Not appropriate to rate progress toward goal since this is the initial treatment plan.         Goal Intervention Frequency Start Date End Date    Assist patient in learning thought stopping and self talk techniques that cognitively and behaviorally reduce obsessions and compulsions. Weekly 01/16/18 01/16/19    Intervention Details:  Duration of treatment until remission of symptoms.                            I have discussed and reviewed this treatment plan with the patient.  It has been printed for signatures.       This document signed by Izaiah Tinoco LCSW, JOAQUIN January 16, 2018 9:59 AM

## 2018-01-16 NOTE — PROGRESS NOTES
"Date of Service: January 16, 2018  Time In: 9:00 AM  Time Out: 9:40 AM      PROGRESS NOTE  Data:  Amish Win is a 47 y.o. male who met with the undersigned for a regularly scheduled individual outpatient psychotherapy session at  the [facility] for follow-up of depression, anxiety, and obsessive behaviors.  Patient reports he spent approximately 4 days at the [facility] in [place] and states they increased his Zoloft to 200 mg daily and discontinued BuSpar.  Patient reports he has not return to work as of yet and request a letter of release.  Patient also reports he had lost an additional 7 pounds upon entering the rage and states this totals approximately 53 pounds in the last year.  He reports things continue to be stressful at home and states his wife told him he would have \"bigger problems\" if he did not return to work.  The patient states he presented this as meaning she would make him leave.  Patient reports he and his wife to slept in different rooms for the past several years and states they have very little positive interaction.       HPI: Patient continues to struggle with significant anxiety including feeling on edge, feeling overwhelmed, obsessive worry, increased heart rate, muscle tension, mind goes blank, and a distinct sense of impending doom.  Patient rates current symptoms of anxiety at 7 on a scale of 1-10 with 10 being most severe.  Patient also continues to struggle with depression as evidenced by depressed mood, anhedonia, anergia, decreased motivation, decreased appetite, feeling hopeless, feeling useless, and ongoing social isolation.  Patient rates current symptoms of depression at 7 on a scale of 1-10 with 10 being most severe.  Patient also continues to struggle with obsessive thoughts and behaviors which are primarily focused on an irrational fear of impending health issues.  The patient continues to obsessively Himself on the chest and hold his hand over his chest and states this is " due to a fear of cardiac issues although he has been cleared by his primary care provider and cardiologist.  The patient reports his Zoloft was increased to 200 mg and states he continues to adhere to medication regimen as prescribed.  Patient adamantly convincingly denies suicidal ideation and vehemently denies any substance use.      Clinical Maneuvering/Intervention:  Assisted patient in processing above session content; acknowledged and normalized patient’s thoughts, feelings, and concerns.  Allow the patient to discuss/vent his feelings and frustrations with ongoing difficulty home validated his feelings.  Also utilized motivational interviewing techniques including complex reflections to confront the patient with the fact he must take appropriate steps if he is to improve.  Also utilized cognitive behavioral therapy to assist the patient in developing strategies to extinguish his obsessive behaviors and encouraged him to become conscious of his behaviors.  Also assisted the patient in developing appropriate coping mechanisms to decrease severity and frequency of symptoms including positive self talk, daily affirmations, thought blocking techniques, and challenging faulty, irrational fears with factual counter arguments.  Also challenged the patient to consider he has the ability to choose whether he will focus on the positive or negative.  Also continued to discuss the fact the patient's dysfunctional home life is likely one of the primary precipitants of his symptoms.  Provided the patient with a letter of release to return to work.    Patient participated in the formulation of his updated treatment plan and is agreeable to goals and objectives.  Completed new treatment plan on this date.    Allowed patient to freely discuss issues without interruption or judgment. Provided safe, confidential environment to facilitate the development of positive therapeutic relationship and encourage open, honest  communication. Assisted patient in identifying risk factors which would indicate the need for higher level of care including thoughts to harm self or others and/or self-harming behavior and encouraged patient to contact this office, call 911, or present to the nearest emergency room should any of these events occur. Discussed crisis intervention services and means to access.  Patient adamantly and convincingly denies current suicidal or homicidal ideation or perceptual disturbance.    Assessment    Patient continues to struggle with significant symptoms of depression and anxiety which has culminated in obsessive thoughts and behaviors.  The patient's symptomology continues to cause significant impairment in important areas of functioning including social, interpersonal, and vocational domains.  As a result, patient appears to be at risk of losing his job as he has been placed on final warning several weeks ago.  It appears the patient's symptoms have become refractory, as a result, it is likely appropriate to seek further consultation with psychiatrist and this office.  As a result, the patient can be reasonably expected continues to benefit from treatment and would likely be at increased risk for decompensation otherwise.    Diagnoses and all orders for this visit:    Generalized anxiety disorder    Major depressive disorder, recurrent episode, moderate    Mixed obsessional thoughts and acts               Mental Status Exam  Hygiene:  fair  Dress:  casual  Attitude:  Cooperative  Motor Activity:  Slow  Speech:  Monotone  Mood:  anxious and depressed  Affect:  depressed  Thought Processes:  Goal directed  Thought Content:  normal  Suicidal Thoughts:  denies  Homicidal Thoughts:  denies  Crisis Safety Plan: yes, to come to the emergency room.  Hallucinations:  denies    Patient's Support Network Includes:  wife    Progress toward goal: Not at goal    Functional Status: Moderate impairment     Prognosis: Guarded with  Ongoing Treatment    Plan         The undersigned sent a message to KEAGAN Vasquez via Epic concerning the possibility of arranging a consult.  The patient will continue in individual outpatient psychotherapy sessions weekly at the [facility] and pharmacotherapy as scheduled with KEAGAN Vasquez.  Patient will adhere to medication regimen as prescribed and report any side effects. Patient will contact this office, call 911 or present to the nearest emergency room should suicidal or homicidal ideations occur. Provide Cognitive Behavioral Therapy and Integrative Therapy to improve functioning, maintain stability, and avoid decompensation and the need for higher level of care.          Return in about 1 week (around 01/23/2018) for Next scheduled follow up.      This document signed by Izaiah Tinoco LCSW, JOAQUIN January 16, 2018 10:00 AM

## 2018-01-17 ENCOUNTER — TELEPHONE (OUTPATIENT)
Dept: PSYCHIATRY | Facility: CLINIC | Age: 48
End: 2018-01-17

## 2018-01-17 ENCOUNTER — DOCUMENTATION (OUTPATIENT)
Dept: PSYCHIATRY | Facility: CLINIC | Age: 48
End: 2018-01-17

## 2018-01-17 RX ORDER — MIRTAZAPINE 15 MG/1
15 TABLET, FILM COATED ORAL NIGHTLY
Qty: 30 TABLET | Refills: 0 | Status: SHIPPED | OUTPATIENT
Start: 2018-01-17 | End: 2018-03-09

## 2018-01-17 RX ORDER — BUSPIRONE HYDROCHLORIDE 15 MG/1
15 TABLET ORAL 3 TIMES DAILY
Qty: 90 TABLET | Refills: 0 | Status: SHIPPED | OUTPATIENT
Start: 2018-01-17 | End: 2018-03-23

## 2018-01-17 RX ORDER — SERTRALINE HYDROCHLORIDE 100 MG/1
150 TABLET, FILM COATED ORAL NIGHTLY
Qty: 45 TABLET | Refills: 0 | Status: SHIPPED | OUTPATIENT
Start: 2018-01-17 | End: 2018-02-15 | Stop reason: SDUPTHER

## 2018-01-17 RX ORDER — QUETIAPINE FUMARATE 100 MG/1
100 TABLET, FILM COATED ORAL NIGHTLY
Qty: 30 TABLET | Refills: 0 | Status: SHIPPED | OUTPATIENT
Start: 2018-01-17 | End: 2018-02-15 | Stop reason: SDUPTHER

## 2018-01-17 NOTE — PROGRESS NOTES
Discussed patient's case with Dr. Villaeral face to face concerning the refractory nature of his symptoms and the risk for his ability to function. Dr. Villareal agreed to see the patient on 1/19/2018 for consultation and second opinion.     Izaiah Tinoco LCSW, [facility]

## 2018-01-26 ENCOUNTER — OFFICE VISIT (OUTPATIENT)
Dept: PSYCHIATRY | Facility: CLINIC | Age: 48
End: 2018-01-26

## 2018-01-26 VITALS
BODY MASS INDEX: 26.32 KG/M2 | HEIGHT: 71 IN | WEIGHT: 188 LBS | SYSTOLIC BLOOD PRESSURE: 139 MMHG | HEART RATE: 52 BPM | DIASTOLIC BLOOD PRESSURE: 89 MMHG

## 2018-01-26 DIAGNOSIS — F33.1 MAJOR DEPRESSIVE DISORDER, RECURRENT EPISODE, MODERATE (HCC): ICD-10-CM

## 2018-01-26 DIAGNOSIS — F41.1 GENERALIZED ANXIETY DISORDER: Primary | ICD-10-CM

## 2018-01-26 PROCEDURE — 90792 PSYCH DIAG EVAL W/MED SRVCS: CPT | Performed by: PSYCHIATRY & NEUROLOGY

## 2018-01-26 RX ORDER — ALPRAZOLAM 1 MG/1
1 TABLET, EXTENDED RELEASE ORAL EVERY MORNING
Qty: 30 TABLET | Refills: 0 | Status: SHIPPED | OUTPATIENT
Start: 2018-01-26 | End: 2018-01-26 | Stop reason: SDUPTHER

## 2018-01-26 RX ORDER — ALPRAZOLAM 1 MG/1
1 TABLET, EXTENDED RELEASE ORAL EVERY MORNING
Qty: 30 TABLET | Refills: 0 | Status: SHIPPED | OUTPATIENT
Start: 2018-01-26 | End: 2018-02-19 | Stop reason: DRUGHIGH

## 2018-01-26 NOTE — PROGRESS NOTES
"Subjective   Patient ID: Amish Win is a 47 y.o. 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      History of Present Illness  patient first remembers being depressed at age 10 associated with his parental uncertainties.  His father left the family when patient was 11 years of age.  He has rare contact with his father who is had difficulty with depression and perhaps alcohol abuse over the years.  Mr. Win had seen numerous Counselors and therapists during his adolescence and since then. His first episode of significant depression associated with a dysfunctional state was last year when he was hospitalized in August here at the [facility].  His second psychiatric hospitalization was in October and is third at [facility] in [place] the first week in January of this year.  He is actually just been out of the hospital several weeks, they had increased his Zoloft to 200 mg daily and he is continued to take Remeron 15 mg at bedtime along was Seroquel 100 mg at bedtime.  He currently is struggling at his place of employment, apparently more or less on probation there. \"One more complaint from a customer during my shift and I'll be dismissed\".       Patient states that he's never been completely free of depression but for this past year his been much worse.  He\"s had a long-standing tendency with intrusive obsessional thoughts associated with anxiety, this is become much worse during this past year associated with the depression.  At this point he is obsessed with his thought that he has a cardiac problem and has compulsive behavior checking his pulse patting his chest as apparent " "during this interview..  He had a severe panic attack taking him to the emergency room a year ago and subsequently had at least 2 thorough cardiac evaluations which has not revealed pathology (doesn't recall having echocardiogram to rule out mitral valve prolapse).  He is developed a tic-type movement of his head and neck which was diagnosed to be Tourette's at the [facility], this movement is not observed today.  His initial Ashok panic attack was at age 29, agoraphobia or avoidance symptomatology.  He is distressed today feeling he is in imminent danger of yet another recurrent attack which makes his returning to work extremely stressful.       Current status of his depression include difficulties concentrating, loss of interest in usual interest, intermittent feelings of hopelessness although he specifically denies any thoughts of harming himself.  No suicidal plan or intent.  He is lost libido he has intermittent insomnia although he probably sleeps a total of 8 hours daily.  \"I know I need help\".  He states his wife is supportive and denies marital stress as a issue with his depression or anxiety, at this point his stresses primarily his employment.  He states he does have for 5 weeks left of available FMLA if needed.          Past Psych History: see HPI.     Substance Use HistorNo history at all of substance misuse or abuse, patient careful to take medications only as prescribed, has been on diazepam 5 mg twice a day and to one dose this morning at 9 AM.     Medical HistoryPatient has a history of hypertension, has been on a beta blocker for many years, currently taking by the propanol 50 mg twice a day      Past Medical History:   Diagnosis Date   • Anxiety    • Depression    • BOZENA (generalized anxiety disorder)    • GERD (gastroesophageal reflux disease)    • Hyperlipidemia    • Hypertension    • Hypertension    • Low back pain    • Major depressive disorder    • Panic disorder    • Panic disorder    • " "Self-injurious behavior     slapping self   • Suicidal thoughts        Medications:   Current Outpatient Prescriptions:   •  aspirin 81 MG EC tablet, Take 81 mg by mouth 2 (Two) Times a Day., Disp: , Rfl:     •  diazePAM (VALIUM) 5 MG tablet, Take 1 tablet by mouth 2 (Two) Times a Day As Needed for Anxiety., Disp: 60 tablet, Rfl: 0  •  metoprolol tartrate (LOPRESSOR) 50 MG tablet, Take 50 mg by mouth 2 (Two) Times a Day With Meals., Disp: , Rfl:   •  mirtazapine (REMERON) 15 MG tablet, Take 1 tablet by mouth Every Night., Disp: 30 tablet, Rfl: 0  •  pantoprazole (PROTONIX) 40 MG EC tablet, Take 40 mg by mouth Daily., Disp: , Rfl:   •  QUEtiapine (SEROquel) 100 MG tablet, Take 1 tablet by mouth Every Night., Disp: 30 tablet, Rfl: 0  •  sertraline (ZOLOFT) 100 MG tablet, Take 1.5 tablets by mouth Every Night., Disp: 45 tablet, Rfl: 0  •  simvastatin (ZOCOR) 20 MG tablet, Take 20 mg by mouth Every Night., Disp: , Rfl:   •  vitamin D (ERGOCALCIFEROL) 94932 UNITS capsule capsule, Take 50,000 Units by mouth 1 (One) Time Per Week., Disp: , Rfl:     Review of Systems   Constitutional: Negative.    HENT: Negative.    Eyes: Negative.    Respiratory: Positive for chest tightness.    Cardiovascular: Negative.    Gastrointestinal: Negative.    Endocrine: Negative.    Genitourinary: Negative.    Musculoskeletal: Negative.    Skin: Negative.    Neurological: Negative.    Hematological: Negative.    Psych: see HPI.       Labs: no current labs.     Family History  Family History   Problem Relation Age of Onset   • Depression Father    • Anxiety disorder Father    • Alcohol abuse Father    • No Known Problems Daughter    • No Known Problems Son      Social History: see HPI. No history of abuse although apparently traumatized by his parents conflict and the father leaving the family when he was 12 y/o.  Does not attend [employer] citing his job responsibilities.  States his wife is supportive, his mental status \"fair\".  No legal problems " "past or current.  Wife is a schoolteacher here in [place] [facility] with special education.  The 16-year-old autistic son has been assigned to state custody in order to find placement, \"he can't find a place in [place] because of the severity of his autism and violent behavior\".       Objective   Mental Status Exam  Appearance:  clean and casually dressed, appropriate  Attitude toward clinician:  cooperative and agreeable   Speech:    Rate:  Slow and soft.  Motor:  no abnormal movements present  Mood:  Depressed, anxious.   Affect:  Depressed and anxious.   Thought Processes:  linear, persistent worry that he has a cardiac issue.   Thought Content:  Obsessed with the status of his health, heart.   Suicidal Thoughts:  absent  Homicidal Thoughts:  absent  Perceptual Disturbance: no perceptual disturbance  Attention and Concentration:  Problems concentrating due to his intrussive obsessional health concerns and depression.   Insight and Judgement:  fair  Memory:  intact    Strengths: Motivated for treatment, intelligent, family support.     Weaknesses: severe anxiety coupled with the job situation and stress.     Problem List:   Patient Active Problem List   Diagnosis   • Abnormal EKG, early repolarization abnormality   • Mixed hyperlipidemia   • GERD (gastroesophageal reflux disease)   • Essential hypertension   • BOZENA (generalized anxiety disorder)   • Severe episode of recurrent major depressive disorder, without psychotic features     Treatment plan:   Patient to continue the Zoloft at 200 mg daily, the Remeron @ 15 mg hs, and the Seroquel 100 mg @hs. it was explained that an adequate treatment trial on high-dose SSRI would probably take an additional 4-6 weeks.     Patient was to discontinue the diazepam (to bring the prescription bottle with him next appointment) and to start taking alprazolam extended release 1 mg daily increasing to 1 mg twice a day if needed.      Patient to attempt to report to work today but if " unable due to extreme anxiety, he will let me know and we will cover his FMLA.  Patient was given this physician's cell phone to call on a as-needed basis.  He did contract for safety.   We will need to check that his thyroid function is normal and to review with the cardiologist that mitral file prolapse has been rule out.   Patient reassured there options available for additional treatment trials if indicated.   Patient to return to the clinic in 1 week or PRN sooner.

## 2018-02-02 ENCOUNTER — OFFICE VISIT (OUTPATIENT)
Dept: PSYCHIATRY | Facility: CLINIC | Age: 48
End: 2018-02-02

## 2018-02-02 VITALS
DIASTOLIC BLOOD PRESSURE: 88 MMHG | BODY MASS INDEX: 26.18 KG/M2 | SYSTOLIC BLOOD PRESSURE: 136 MMHG | WEIGHT: 187 LBS | HEIGHT: 71 IN | HEART RATE: 55 BPM

## 2018-02-02 DIAGNOSIS — F41.1 GENERALIZED ANXIETY DISORDER: Primary | ICD-10-CM

## 2018-02-02 DIAGNOSIS — F33.1 MAJOR DEPRESSIVE DISORDER, RECURRENT EPISODE, MODERATE (HCC): ICD-10-CM

## 2018-02-02 PROCEDURE — 99214 OFFICE O/P EST MOD 30 MIN: CPT | Performed by: PSYCHIATRY & NEUROLOGY

## 2018-02-02 NOTE — PROGRESS NOTES
"Patient ID: Amish Win is a 47 y.o. male    SERVICE TYPE: EVALUATION AND MANAGEMENT (greater than 50% of the time spent for supportive psychotherapy).  Time in: 1030  Time out: 1102    /88  Pulse 55  Ht 180 cm (70.87\")  Wt 84.8 kg (187 lb)  BMI 26.18 kg/m2    ALLERGIES:  Penicillins    CC: \"Still rough\"     FOLLOWED FOR:Depression/ Anxiety.     HPI: \"Unfounded health worries still there\". Struggling at work: appetite, sleep (restless, intermittent, probably averaging 8 hours/night), has noticed diminished motivation and concentration with his anxiety.   Taking Zoloft 200 mg daily (4th week), will increase the Remeron to 30 mg @ hs, Seroquel 100 mg hs. Plus the Alprazolam XR that he's taken 1 mg daily, will increase to 1 mg bid.   Compulsive behavior picking at his chest, tic movements of head, checking pulse, all focused on health concerns.   Discussed a safety plan if he were to experience any intrusive thoughts of self-harm.    Returned with the unused Diazepam 5 mg from his 12/29. Correct count.     PFSH: Wife expressing her frustrations regarding his ongoing distressed and marginal functioning.       Review of Systems   Respiratory: Positive for choking.    Cardiovascular: Positive for palpitations.   Gastrointestinal: Negative.    Musculoskeletal: Negative.    Neurological: Negative.        SUPPORTIVE PSYCHOTHERAPY: continuing efforts to promote the therapeutic alliance, address the patient’s issues, and strengthen self awareness, insights, and coping skills.       Mental Status Exam  Appearance:  clean and casually dressed, appropriate  Attitude toward clinician:  cooperative and agreeable   Speech:    Rate:  regular rate and rhythm   Volume: normal  Motor:  Jerked his head to the left on at least one occasion, patting his left chest repetitively.    Mood:  Distressed with anxiety and depression  Affect:  Appears very anxious  Thought Processes:  linear, logical, and goal directed  Thought " Content:  Normal   Feeling Hopeless: absent  Suicidal Thoughts:  absent  Homicidal Thoughts:  absent  Perceptual Disturbance: no perceptual disturbance  Attention and Concentration:  good  Insight and Judgement:  good  Memory:  memory appears to be intact    LABS: No results found for this or any previous visit (from the past 168 hour(s)).    MEDICATION ISSUES: Have discussed with the patient the medications Risks, Benefits, and Side effects including potential falls, possible impaired driving and  metabolic adversities among others. No medication side effects or related complaints today.     TREATMENT PLAN/GOALS: Continue supportive psychotherapy efforts and medications as indicated.     Current Outpatient Prescriptions   Medication Sig Dispense Refill   • ALPRAZolam XR (XANAX XR) 1 MG 24 hr tablet  increase to 1 twice a day      • aspirin 81 MG EC tablet Take 81 mg by mouth 2 (Two) Times a Day.     • busPIRone (BUSPAR) 15 MG tablet Take 1 tablet by mouth 3 (Three) Times a Day.     • mirtazapine (REMERON) 15 MG tablet Increase to two @ hs     • pantoprazole (PROTONIX) 40 MG EC tablet Take 40 mg by mouth Daily.     • QUEtiapine (SEROquel) 100 MG tablet Take 1 tablet by mouth Every Night.     • sertraline (ZOLOFT) 100 MG tablet Take two daily.      • simvastatin (ZOCOR) 20 MG tablet Take 20 mg by mouth Every Night.     • vitamin D (ERGOCALCIFEROL) 01609 UNITS capsule capsule Take 50,000 Units by mouth 1 (One) Time Per Week.     • metoprolol tartrate (LOPRESSOR) 50 MG tablet Take 50 mg by mouth 2 (Two) Times a Day With Meals.       No current facility-administered medications for this visit.        COLLATERAL PSYCHOTHERAPEUTIC INTERVENTION:Has an appointment to see Izaiah Earnest February 7.     Encounter Diagnoses   Name Primary?   • Generalized anxiety disorder Yes   • Major depressive disorder, recurrent episode, moderate        Return in about 1 week (around 2/9/2018).  Patient knows to call if symptoms worsen or  fail to improve between appointments.

## 2018-02-06 ENCOUNTER — TELEPHONE (OUTPATIENT)
Dept: PSYCHIATRY | Facility: CLINIC | Age: 48
End: 2018-02-06

## 2018-02-06 ENCOUNTER — OFFICE VISIT (OUTPATIENT)
Dept: PSYCHIATRY | Facility: CLINIC | Age: 48
End: 2018-02-06

## 2018-02-06 VITALS
DIASTOLIC BLOOD PRESSURE: 92 MMHG | HEART RATE: 65 BPM | BODY MASS INDEX: 26.32 KG/M2 | WEIGHT: 188 LBS | SYSTOLIC BLOOD PRESSURE: 125 MMHG | HEIGHT: 71 IN

## 2018-02-06 DIAGNOSIS — F41.1 GENERALIZED ANXIETY DISORDER: Primary | ICD-10-CM

## 2018-02-06 DIAGNOSIS — F33.1 MAJOR DEPRESSIVE DISORDER, RECURRENT EPISODE, MODERATE (HCC): ICD-10-CM

## 2018-02-06 PROCEDURE — 99214 OFFICE O/P EST MOD 30 MIN: CPT | Performed by: PSYCHIATRY & NEUROLOGY

## 2018-02-06 RX ORDER — ALPRAZOLAM 2 MG/1
2 TABLET, EXTENDED RELEASE ORAL EVERY MORNING
Qty: 60 TABLET | Refills: 0 | Status: SHIPPED | OUTPATIENT
Start: 2018-02-06 | End: 2018-03-09 | Stop reason: SDUPTHER

## 2018-02-06 NOTE — TELEPHONE ENCOUNTER
The Pharmacy called on Peter's Xanax its writing as Take 1 Tablet by mouth every morning Patient was confused he thought he was told to take it 2 times a day Please Advise ....

## 2018-02-06 NOTE — PROGRESS NOTES
"Patient ID: Amish Win is a 47 y.o. male    SERVICE TYPE: EVALUATION AND MANAGEMENT (greater than 50% of the time spent for supportive psychotherapy).  Time in 1202  time out 1233    /92  Pulse 65  Ht 180 cm (70.87\")  Wt 85.3 kg (188 lb)  BMI 26.32 kg/m2    ALLERGIES:  Penicillins    CC: \"About the same, still pretty panicky\"    FOLLOWED FOR: Depression/ Anxiety.    HPI: Able to work several shifts but continues to struggle with the anxiety and obsession with health, cardiac concerns (hypochondriacal). Depression 4-5/10.  He describes his anxiety is \"almost unbearable\".  Did increase his Alprazolam ER to 1 mg bid, Zoloft 200 mg daily, and the Remeron and Seroquel at Hs. Feels hopeless \"at times\", no SI, \"I want to live, just want to get better\".   Discussed the risk of psychological dependency on the alprazolam but at this point the patient is barely functioning and potential benefit outweighs risk.  Has been trying unsuccessfully to apply CBT principles learnt in therapy here and at his recent hospitalization at the [facility].     So: will be increasing the Xanax ER to 2 mg bid, increasing the Remeron to 30 mg @ hs, and decreasing the Seroquel to 50 mg hs,  continuing the Zoloft @ 200 mg daily (5th week).     PFSH: no change at home or work.     Review of Systems   Respiratory: Negative.    Cardiovascular: Positive for palpitations.   Gastrointestinal: Negative.    Musculoskeletal: Negative.    Neurological: Negative.        SUPPORTIVE PSYCHOTHERAPY: continuing efforts to promote the therapeutic alliance, address the patient’s issues, and strengthen self awareness, insights, and coping skills.       Mental Status Exam  Appearance:  clean and casually dressed, appropriate  Attitude toward clinician:  cooperative and agreeable   Speech:    Rate:  regular rate and rhythm   Volume: normal  Motor:  no abnormal movements present  Mood:  Good  Affect:  euthymic  Thought Processes:  linear, logical, and goal " directed  Thought Content:  Normal   Feeling Hopeless: absent  Suicidal Thoughts:  absent  Homicidal Thoughts:  absent  Perceptual Disturbance: no perceptual disturbance  Attention and Concentration:  good  Insight and Judgement:  good  Memory:  memory appears to be intact    LABS: No results found for this or any previous visit (from the past 168 hour(s)).    MEDICATION ISSUES: Have discussed with the patient the medications Risks, Benefits, and Side effects including potential falls, possible impaired driving and  metabolic adversities among others. No medication side effects or related complaints today.     TREATMENT PLAN/GOALS: Continue supportive psychotherapy efforts and medications as indicated.     Current Outpatient Prescriptions   Medication Sig Dispense Refill   • ALPRAZolam XR (XANAX XR) 1 MG 24 hr tablet Take 1 tablet by mouth Every Morning. 30 tablet 0   • aspirin 81 MG EC tablet Take 81 mg by mouth 2 (Two) Times a Day.     • metoprolol tartrate (LOPRESSOR) 50 MG tablet Take 50 mg by mouth 2 (Two) Times a Day With Meals.     • mirtazapine (REMERON) 15 MG tablet Take 1 tablet by mouth Every Night. 30 tablet 0   • pantoprazole (PROTONIX) 40 MG EC tablet Take 40 mg by mouth Daily.     • QUEtiapine (SEROquel) 100 MG tablet Take 1 tablet by mouth Every Night. 30 tablet 0   • sertraline (ZOLOFT) 100 MG tablet Take 1.5 tablets by mouth Every Night. 45 tablet 0   • simvastatin (ZOCOR) 20 MG tablet Take 20 mg by mouth Every Night.     • vitamin D (ERGOCALCIFEROL) 53829 UNITS capsule capsule Take 50,000 Units by mouth 1 (One) Time Per Week.     • ALPRAZolam XR (XANAX XR) 2 MG 24 hr tablet Take 1 tablet by mouth Every Morning. 60 tablet 0   • busPIRone (BUSPAR) 15 MG tablet Take 1 tablet by mouth 3 (Three) Times a Day. 90 tablet 0     No current facility-administered medications for this visit.        COLLATERAL PSYCHOTHERAPEUTIC INTERVENTION: continuing with Izaiah Tinoco, has appointment tomorrow AM.      Encounter Diagnoses   Name Primary?   • Generalized anxiety disorder Yes   • Major depressive disorder, recurrent episode, moderate        Return in about 2 weeks (around 2/20/2018).  Advised of my time away from the office and coverage.  Patient knows to call if symptoms worsen or fail to improve between appointments.

## 2018-02-07 ENCOUNTER — OFFICE VISIT (OUTPATIENT)
Dept: PSYCHIATRY | Facility: CLINIC | Age: 48
End: 2018-02-07

## 2018-02-07 DIAGNOSIS — F33.1 MAJOR DEPRESSIVE DISORDER, RECURRENT EPISODE, MODERATE (HCC): ICD-10-CM

## 2018-02-07 DIAGNOSIS — F41.1 GENERALIZED ANXIETY DISORDER: Primary | ICD-10-CM

## 2018-02-07 PROCEDURE — 90834 PSYTX W PT 45 MINUTES: CPT | Performed by: SOCIAL WORKER

## 2018-02-07 NOTE — PROGRESS NOTES
Date of Service: February 7, 2018  Time In: 7:45 AM  Time Out: 8:30 AM      PROGRESS NOTE  Data:  Amish Win is a 47 y.o. male who met with the undersigned for a regularly scheduled individual outpatient psychotherapy session at  the [facility] for follow-up of depression and anxiety.     HPI: Patient reports things are about the same home and states his wife continues to state if he is unable to continue working he must leave.  Patient also reports his teenage son has autism continues to be in a facility in [place] awaiting a long-term placement.  Patient continues to struggle with depression including depressed mood, anhedonia, anergia, feeling hopeless and helpless, feeling useless, low self-esteem, and ongoing social isolation.  Patient also reports significantly decreased activity and states he spends most of his time at home when not working.  Patient rates current symptoms of depression at 6 on a scale 1-10 with 10 being most severe.  Patient reports he feels he is sleeping relatively well.  It appears the patient could be struggling with periods of hypersomnia.  Patient also continues to struggle with significant anxiety including feeling on edge, feeling overwhelmed, increased heart rate, and urged to escape the situation, sweating, lightheadedness, and a sense of impending doom.  Patient reports symptoms are significantly exacerbated immediately prior to leaving home for work and during his entire work shift.  Patient reports he continues to be on probation at work and states one more complaint could lead to his dismissal.  Patient rates current symptoms of anxiety at 7 on a scale of 1-10 with 10 being the most severe.  Patient also continues to have what appears to be obsessional tics including placing his hand over his heart and holding his nose in attempt to feel his heart beat.  Patient reports he continues to adhere to medication regimen as prescribed and states he is planning to begin his  increased dose of alprazolam today.  Patient adamantly convincingly denies suicidal ideation vehemently denies any substance use.      Clinical Maneuvering/Intervention:  Assisted patient in processing above session content; acknowledged and normalized patient’s thoughts, feelings, and concerns.  Utilized motivational interviewing techniques including complex reflections to challenge the patient's irrational fear of cardiac issues.  The patient is unable to state he realizes this is likely true but cannot stop his obsessional worry.  Also utilized cognitive behavioral therapy to assist the patient in developing appropriate coping mechanisms and decrease severity and frequency of symptoms including positive self talk, relaxation techniques, guided imagery, and challenging coffee, irrational thoughts with factual can arguments.  Also confronted the patient with the fact he must make a concerted effort to bring about change as medication is not a panacea.  Also assisted the patient in recognizing his ongoing decreased activity and social isolation is likely contributing to his symptoms and strongly encouraged him to seek enjoyable activities he can engage in regular basis.  Also confronted the patient with the fact it appears his job is one of his primary sources of stress and encouraged him to begin to seek other employment opportunities.  Provided unconditional positive regard in a safe, supportive environment.    Allowed patient to freely discuss issues without interruption or judgment. Provided safe, confidential environment to facilitate the development of positive therapeutic relationship and encourage open, honest communication. Assisted patient in identifying risk factors which would indicate the need for higher level of care including thoughts to harm self or others and/or self-harming behavior and encouraged patient to contact this office, call 911, or present to the nearest emergency room should any of these  events occur. Discussed crisis intervention services and means to access.  Patient adamantly and convincingly denies current suicidal or homicidal ideation or perceptual disturbance.    Assessment    Patient continues to struggle with significant symptoms of anxiety and what appears to be secondary depression.  The patient's symptomology continues to cause significant impairment important areas of functioning including social, interpersonal, and vocational domains.  As a result, the patient in the regional expected to continue to benefit from treatment and will likely be at increased risk for decompensation otherwise.    Diagnoses and all orders for this visit:    Generalized anxiety disorder    Major depressive disorder, recurrent episode, moderate               Mental Status Exam  Hygiene:  fair  Dress:  casual  Attitude:  Cooperative  Motor Activity:  Slow  Speech:  Normal  Mood:  anxious and depressed  Affect:  depressed  Thought Processes:  Linear  Thought Content:  normal  Suicidal Thoughts:  denies  Homicidal Thoughts:  denies  Crisis Safety Plan: yes, to come to the emergency room.  Hallucinations:  denies    Patient's Support Network Includes:  children    Progress toward goal: Not at goal    Functional Status: Moderate impairment     Prognosis: Fair with Ongoing Treatment     Plan         Patient will continue in individual outpatient psychotherapy session at the [facility] every 2 weeks and pharmacotherapy as scheduled with Dr. Villareal.  Patient will adhere to medication regimen as prescribed and report any side effects. Patient will contact this office, call 911 or present to the nearest emergency room should suicidal or homicidal ideations occur. Provide Cognitive Behavioral Therapy and Integrative Therapy to improve functioning, maintain stability, and avoid decompensation and the need for higher level of care.          Return in about 2 weeks (around 02/21/2018) for Next scheduled follow  up.      This document signed by Izaiah Tinoco, FAUSTO, LCADC February 7, 2018 3:02 PM

## 2018-02-15 RX ORDER — SERTRALINE HYDROCHLORIDE 100 MG/1
150 TABLET, FILM COATED ORAL NIGHTLY
Qty: 45 TABLET | Refills: 0 | Status: SHIPPED | OUTPATIENT
Start: 2018-02-15 | End: 2018-03-01 | Stop reason: SDUPTHER

## 2018-02-15 RX ORDER — QUETIAPINE FUMARATE 100 MG/1
100 TABLET, FILM COATED ORAL NIGHTLY
Qty: 30 TABLET | Refills: 0 | Status: SHIPPED | OUTPATIENT
Start: 2018-02-15 | End: 2018-03-09 | Stop reason: ALTCHOICE

## 2018-02-15 NOTE — TELEPHONE ENCOUNTER
RX request can you please cover for ? Patient states he will not have enough to last him all through the week.

## 2018-02-19 ENCOUNTER — OFFICE VISIT (OUTPATIENT)
Dept: PSYCHIATRY | Facility: CLINIC | Age: 48
End: 2018-02-19

## 2018-02-19 VITALS
HEIGHT: 71 IN | SYSTOLIC BLOOD PRESSURE: 110 MMHG | BODY MASS INDEX: 26.04 KG/M2 | HEART RATE: 55 BPM | DIASTOLIC BLOOD PRESSURE: 77 MMHG | WEIGHT: 186 LBS

## 2018-02-19 DIAGNOSIS — F33.1 MAJOR DEPRESSIVE DISORDER, RECURRENT EPISODE, MODERATE (HCC): ICD-10-CM

## 2018-02-19 DIAGNOSIS — F41.1 GENERALIZED ANXIETY DISORDER: Primary | ICD-10-CM

## 2018-02-19 PROCEDURE — 99213 OFFICE O/P EST LOW 20 MIN: CPT | Performed by: PSYCHIATRY & NEUROLOGY

## 2018-02-19 NOTE — PROGRESS NOTES
"Patient ID: Amish Win is a 47 y.o. male    SERVICE TYPE: EVALUATION AND MANAGEMENT (greater than 50% of the time spent for supportive psychotherapy).      /77  Pulse 55  Ht 180 cm (70.87\")  Wt 84.4 kg (186 lb)  BMI 26.04 kg/m2    ALLERGIES:  Penicillins    CC: \"Slightly better\".     FOLLOWED FOR:    HPI: \"Parts of the day I'm more relaxed, doing a little more, less negativity\". Still a trying experienced at his job that continues to be very stressful (is for all the managers). Less \"checking my heart bet, but less\". Encouraged.   \"Want to do things to be better\" Like walking?  Others, including his supervisor, have recent given him positive feedback.   One panic attach at work - able to de-escalate controlling his rate of breathing.     Will increase the Zoloft to 250 mg daily while continuing the Alprazolam ER 2 mg bid, the Remeron 15 mg hs, and the Seroquel 100 mg hs.     PFSH: \"nothing has changed\".     Review of Systems   Respiratory: Positive for shortness of breath.    Cardiovascular: Positive for palpitations.   Gastrointestinal: Negative.    Neurological: Negative.        SUPPORTIVE PSYCHOTHERAPY: continuing efforts to promote the therapeutic alliance, address the patient’s issues, and strengthen self awareness, insights, and coping skills.       Mental Status Exam  Appearance:  clean and casually dressed, appropriate  Attitude toward clinician:  cooperative and agreeable   Speech:    Rate:  regular rate and rhythm   Volume: normal  Motor:  no abnormal movements present  Mood:  Good  Affect:  euthymic  Thought Processes:  linear, logical, and goal directed  Thought Content:  Normal   Feeling Hopeless: absent  Suicidal Thoughts:  absent  Homicidal Thoughts:  absent  Perceptual Disturbance: no perceptual disturbance  Attention and Concentration:  good  Insight and Judgement:  good  Memory:  memory appears to be intact    LABS: No results found for this or any previous visit (from the past 168 " hour(s)).    MEDICATION ISSUES: Have discussed with the patient the medications Risks, Benefits, and Side effects including potential falls, possible impaired driving and  metabolic adversities among others. No medication side effects or related complaints today.     TREATMENT PLAN/GOALS: Continue supportive psychotherapy efforts and medications as indicated.     Current Outpatient Prescriptions   Medication Sig Dispense Refill   • ALPRAZolam XR (XANAX XR) 2 MG 24 hr tablet Take 1 tablet by mouth Every Morning. (Patient taking differently: Take 2 mg by mouth 2 (Two) Times a Day.) Has supply    • aspirin 81 MG EC tablet Take 81 mg by mouth 2 (Two) Times a Day.     •       • metoprolol tartrate (LOPRESSOR) 50 MG tablet Take 50 mg by mouth 2 (Two) Times a Day With Meals.     • mirtazapine (REMERON) 15 MG tablet Take 1 tablet by mouth Every Night. Has supply    • pantoprazole (PROTONIX) 40 MG EC tablet Take 40 mg by mouth Daily.     • QUEtiapine (SEROquel) 100 MG tablet Take 1 tablet by mouth Every Night. Has supply    • sertraline (ZOLOFT) 100 MG tablet To take 250 mg daily Has supply    • simvastatin (ZOCOR) 20 MG tablet Take 20 mg by mouth Every Night.     • vitamin D (ERGOCALCIFEROL) 26078 UNITS capsule capsule Take 50,000 Units by mouth 1 (One) Time Per Week.       No current facility-administered medications for this visit.        COLLATERAL PSYCHOTHERAPEUTIC INTERVENTION: continuing with Izaiah Tinoco LCSW.     Encounter Diagnoses   Name PLAN   • Generalized anxiety disorder Continue with the same medications and collateral psychotherapeutic effort    • Major depressive disorder, recurrent episode, moderate        Return in about 2 weeks (around 3/5/2018).  Patient knows to call if symptoms worsen or fail to improve between appointments.

## 2018-02-21 ENCOUNTER — OFFICE VISIT (OUTPATIENT)
Dept: PSYCHIATRY | Facility: CLINIC | Age: 48
End: 2018-02-21

## 2018-02-21 DIAGNOSIS — F33.1 MAJOR DEPRESSIVE DISORDER, RECURRENT EPISODE, MODERATE (HCC): ICD-10-CM

## 2018-02-21 DIAGNOSIS — F41.1 GENERALIZED ANXIETY DISORDER: Primary | ICD-10-CM

## 2018-02-21 PROCEDURE — 90834 PSYTX W PT 45 MINUTES: CPT | Performed by: SOCIAL WORKER

## 2018-02-21 NOTE — PROGRESS NOTES
Date of Service: February 21, 2018  Time In: 10:35 AM  Time Out: 11:15 AM      PROGRESS NOTE  Data:  Amish Win is a 47 y.o. male who met with the undersigned for a regularly scheduled individual outpatient psychotherapy session at  the [facility] for follow-up of depression and anxiety.     HPI: Patient reports he feels he has made a slight improvement and states he has even had some periods where he felt slightly happy.  Patient also reports he received a positive comments from his manager which made him feel good.  However, he continues to report significant symptoms of depression including depressed mood, anhedonia, anergia, decreased motivation, feeling hopeless, and ongoing social isolation.  Patient also reports significantly reduced physical activity.  Patient rates current symptoms of depression at a 7 on a scale of 1-10 with 10 being most severe.  Patient also reports he continues to struggle with anxious mood, feeling on edge, feeling overwhelmed, trembling, and urged to escape the situation, increased heart rate, and a feeling of losing control.  Patient does report he feels these symptoms have improved slightly since beginning alprazolam extended release.  The patient rates current symptoms of anxiety at 7 on a scale of 1-10 with 10 being most severe.  Patient continues to nervously tap His chest and hold his nose and attempt to feel his heart rate.  He states he realizes this is simply a nervous tic as a result of his fear of having cardiac issues.      Clinical Maneuvering/Intervention:  Assisted patient in processing above session content; acknowledged and normalized patient’s thoughts, feelings, and concerns.  Validated the patient's progress and encouraged him to consciously focus on the positive.  Also utilized cognitive behavioral therapy to assist the patient in developing appropriate coping mechanisms including positive self talk, relaxation techniques, challenging faulty, irrational  thoughts with factual counter arguments, and strongly urged the patient to make a concerted effort to become more physically active.  Patient agreed to take a walk 3-5 times weekly.  Also assisted the patient in developing a strategy to avoid becoming anxious prior to going to work by utilizing thought blocking techniques.  Provided unconditional positive regard in a safe, supportive environment.    Allowed patient to freely discuss issues without interruption or judgment. Provided safe, confidential environment to facilitate the development of positive therapeutic relationship and encourage open, honest communication. Assisted patient in identifying risk factors which would indicate the need for higher level of care including thoughts to harm self or others and/or self-harming behavior and encouraged patient to contact this office, call 911, or present to the nearest emergency room should any of these events occur. Discussed crisis intervention services and means to access.  Patient adamantly and convincingly denies current suicidal or homicidal ideation or perceptual disturbance.    Assessment    Patient appears to have made slight progress.  However, he continues to struggle with significant depression and anxiety which continue to cause impairment in important areas of functioning including social, interpersonal, and vocational domains.  As a result, the patient can really be expected to continue to benefit from treatment and likely be at increased risk for decompensation otherwise.    Diagnoses and all orders for this visit:    Generalized anxiety disorder    Major depressive disorder, recurrent episode, moderate               Mental Status Exam  Hygiene:  good  Dress:  casual  Attitude:  Cooperative  Motor Activity:  Appropriate  Speech:  Normal  Mood:  depressed  Affect:  Depressed  Thought Processes:  Goal directed  Thought Content:  normal  Suicidal Thoughts:  denies  Homicidal Thoughts:  denies  Crisis  Safety Plan: yes, to come to the emergency room.  Hallucinations:  denies    Patient's Support Network Includes:  wife and children    Progress toward goal: Not at goal    Functional Status: Moderate impairment     Prognosis: Fair with Ongoing Treatment     Plan         Patient will continue in individual outpatient psychotherapy session at the [facility] every 2-3 weeks and pharmacotherapy as scheduled with Dr. Villareal.  Patient will adhere to medication regimen as prescribed and report any side effects. Patient will contact this office, call 911 or present to the nearest emergency room should suicidal or homicidal ideations occur. Provide Cognitive Behavioral Therapy and Integrative Therapy to improve functioning, maintain stability, and avoid decompensation and the need for higher level of care.          Return in about 2 weeks (around 03/07/2018) for Next scheduled follow up.      This document signed by Izaiah Tinoco LCSW, [facility] February 21, 2018 4:26 PM

## 2018-03-01 ENCOUNTER — TELEPHONE (OUTPATIENT)
Dept: PSYCHIATRY | Facility: CLINIC | Age: 48
End: 2018-03-01

## 2018-03-02 RX ORDER — SERTRALINE HYDROCHLORIDE 100 MG/1
150 TABLET, FILM COATED ORAL NIGHTLY
Qty: 45 TABLET | Refills: 0 | Status: SHIPPED | OUTPATIENT
Start: 2018-03-02 | End: 2018-03-09 | Stop reason: SDUPTHER

## 2018-03-05 ENCOUNTER — TELEPHONE (OUTPATIENT)
Dept: PSYCHIATRY | Facility: CLINIC | Age: 48
End: 2018-03-05

## 2018-03-05 NOTE — TELEPHONE ENCOUNTER
Patient called and stated that he was supposed to be taking Zoloft 300mg daily. Zoloft 150mg daily was called in. What should it be?

## 2018-03-05 NOTE — TELEPHONE ENCOUNTER
Amish is supposed to be on 300mg of zoloft and only 150 mg was called in. He needs a refill on the 300 mg, although we are not showing it was prescribed.

## 2018-03-06 NOTE — TELEPHONE ENCOUNTER
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      He was given a RX for 150 mg daily and quantity of #45.    The patient has been taking more than what you have prescribed and the pharmacy will not fill the 150 mg recently sent in to them, until this is clarified.      Amish has been taking 300 mg daily and is completely out of his Zoloft.      Can you please clarify if a RX needs to be called in so the patient is not out of medication?  What quantity should be sent in at a dose of 250 mg daily?

## 2018-03-06 NOTE — TELEPHONE ENCOUNTER
Amish's prescription is out due to misunderstanding. He needs the 250mg called in or the 2 1/2 100 mg tables corrected so he does not run out of medication. It was called in as 100mg, take 1 1/2 tablets a day instead of 2 1/2 tablets a day.

## 2018-03-09 ENCOUNTER — OFFICE VISIT (OUTPATIENT)
Dept: PSYCHIATRY | Facility: CLINIC | Age: 48
End: 2018-03-09

## 2018-03-09 VITALS
DIASTOLIC BLOOD PRESSURE: 86 MMHG | BODY MASS INDEX: 26.18 KG/M2 | WEIGHT: 187 LBS | HEIGHT: 71 IN | HEART RATE: 57 BPM | SYSTOLIC BLOOD PRESSURE: 149 MMHG

## 2018-03-09 DIAGNOSIS — F41.1 GENERALIZED ANXIETY DISORDER: Primary | ICD-10-CM

## 2018-03-09 DIAGNOSIS — F33.1 MAJOR DEPRESSIVE DISORDER, RECURRENT EPISODE, MODERATE (HCC): ICD-10-CM

## 2018-03-09 PROCEDURE — 99213 OFFICE O/P EST LOW 20 MIN: CPT | Performed by: PSYCHIATRY & NEUROLOGY

## 2018-03-09 RX ORDER — SERTRALINE HYDROCHLORIDE 100 MG/1
TABLET, FILM COATED ORAL
Qty: 76 TABLET | Refills: 0 | Status: SHIPPED | OUTPATIENT
Start: 2018-03-09 | End: 2018-03-30

## 2018-03-09 RX ORDER — ALPRAZOLAM 2 MG/1
2 TABLET, EXTENDED RELEASE ORAL 2 TIMES DAILY
Qty: 60 TABLET | Refills: 0 | Status: SHIPPED | OUTPATIENT
Start: 2018-03-09 | End: 2018-03-09 | Stop reason: SDUPTHER

## 2018-03-09 RX ORDER — SERTRALINE HYDROCHLORIDE 100 MG/1
TABLET, FILM COATED ORAL
Qty: 76 TABLET | Refills: 0 | Status: SHIPPED | OUTPATIENT
Start: 2018-03-09 | End: 2018-03-09 | Stop reason: SDUPTHER

## 2018-03-09 RX ORDER — MIRTAZAPINE 30 MG/1
30 TABLET, FILM COATED ORAL NIGHTLY
Qty: 30 TABLET | Refills: 0 | Status: SHIPPED | OUTPATIENT
Start: 2018-03-09 | End: 2018-04-26

## 2018-03-09 RX ORDER — ALPRAZOLAM 2 MG/1
2 TABLET, EXTENDED RELEASE ORAL 2 TIMES DAILY
Qty: 60 TABLET | Refills: 0 | Status: SHIPPED | OUTPATIENT
Start: 2018-03-09 | End: 2018-03-30 | Stop reason: SDUPTHER

## 2018-03-09 NOTE — PROGRESS NOTES
"Patient ID: Amish Win is a 47 y.o. male    SERVICE TYPE: EVALUATION AND MANAGEMENT (greater than 50% of the time spent for supportive psychotherapy).      /86  Pulse 57  Ht 180 cm (70.87\")  Wt 84.8 kg (187 lb)  BMI 26.18 kg/m2    ALLERGIES:  Penicillins    CC: \"Doing OK\"     FOLLOWED FOR: Anxiety/ Depression.     HPI: Has regressed somewhat \"not too bad, not completely\". Sleeping OK. Back to worrying that he might have a heart attach and . Knows not rational but  Continues to experience the intrusive thought - has periods when the rationally re framing will de escalate his anxiety. Worse at work, \"keep reminding me I'm on my last string\".   Rates his depression \"not too bad, mild\", occasionally feels hopeless \"cause not getting any better\". No SI.     PFSH: home \"ok\", wife not critical \"all the time\". Work continues to be very stressful. Less anxious at home.     Review of Systems   Cardiovascular: Positive for palpitations.   Gastrointestinal: Negative.    Musculoskeletal: Negative.    Neurological: Negative.        SUPPORTIVE PSYCHOTHERAPY: continuing efforts to promote the therapeutic alliance, address the patient’s issues, and strengthen self awareness, insights, and coping skills.       Mental Status Exam  Appearance:  clean and casually dressed, appropriate  Attitude toward clinician:  cooperative and agreeable   Speech:    Rate:  regular rate and rhythm   Volume: normal  Motor:  no abnormal movements present  Mood:  Anxious/ depressed.   Affect:  anxious  Thought Processes:  linear, logical, and goal directed  Thought Content:  Normal   Feeling Hopeless: absent, discouraged with lack of more improvement.   Suicidal Thoughts:  absent  Homicidal Thoughts:  absent  Perceptual Disturbance: no perceptual disturbance  Attention and Concentration:  good  Insight and Judgement:  good  Memory:  memory appears to be intact    LABS: No results found for this or any previous visit (from the past 168 " hour(s)).    MEDICATION ISSUES: Have discussed with the patient the medications Risks, Benefits, and Side effects including potential falls, possible impaired driving and  metabolic adversities among others. No medication side effects or related complaints today.     TREATMENT PLAN/GOALS: Continue supportive psychotherapy efforts and medications as indicated.     Current Outpatient Prescriptions   Medication Sig Dispense Refill   • ALPRAZolam XR (XANAX XR) 2 MG 24 hr tablet Take 1 tablet by mouth 2 (Two) Times a Day. 60 tablet 0   • aspirin 81 MG EC tablet Take 81 mg by mouth 2 (Two) Times a Day.     •       • metoprolol tartrate (LOPRESSOR) 50 MG tablet Take 50 mg by mouth 2 (Two) Times a Day With Meals.     • pantoprazole (PROTONIX) 40 MG EC tablet Take 40 mg by mouth Daily.     • sertraline (ZOLOFT) 100 MG tablet Take 2 and 1/2 daily 76 tablet 0   • simvastatin (ZOCOR) 20 MG tablet Take 20 mg by mouth Every Night.     • vitamin D (ERGOCALCIFEROL) 73775 UNITS capsule capsule Take 50,000 Units by mouth 1 (One) Time Per Week.     • mirtazapine (REMERON) 30 MG tablet Take 1 tablet by mouth Every Night. 30 tablet 0     No current facility-administered medications for this visit.        COLLATERAL PSYCHOTHERAPEUTIC INTERVENTION: encouraged to continue with Izaiah Tinoco LCSW.   Encounter Diagnoses   Name Primary?   • Generalized anxiety disorder Yes   • Major depressive disorder, recurrent episode, moderate        Return in about 2 weeks (around 3/23/2018).  Patient knows to call if symptoms worsen or fail to improve between appointments.

## 2018-03-23 ENCOUNTER — OFFICE VISIT (OUTPATIENT)
Dept: PSYCHIATRY | Facility: CLINIC | Age: 48
End: 2018-03-23

## 2018-03-23 VITALS
WEIGHT: 185 LBS | SYSTOLIC BLOOD PRESSURE: 138 MMHG | HEART RATE: 60 BPM | DIASTOLIC BLOOD PRESSURE: 85 MMHG | HEIGHT: 71 IN | BODY MASS INDEX: 25.9 KG/M2

## 2018-03-23 DIAGNOSIS — F40.01 PANIC DISORDER WITH AGORAPHOBIA: Primary | ICD-10-CM

## 2018-03-23 DIAGNOSIS — F33.1 MAJOR DEPRESSIVE DISORDER, RECURRENT EPISODE, MODERATE (HCC): ICD-10-CM

## 2018-03-23 PROCEDURE — 99214 OFFICE O/P EST MOD 30 MIN: CPT | Performed by: PSYCHIATRY & NEUROLOGY

## 2018-03-23 NOTE — PROGRESS NOTES
"Patient ID: Amish Win is a 47 y.o. male    SERVICE TYPE: EVALUATION AND MANAGEMENT (greater than 50% of the time spent for supportive psychotherapy).  Time in: 1025      Time out: 1105    /85   Pulse 60   Ht 180 cm (70.87\")   Wt 83.9 kg (185 lb)   BMI 25.90 kg/m²     ALLERGIES:  Penicillins    CC: \"Not well at all\"     FOLLOWED FOR: Anxiety/ Depression.     HPI: Anxiety worse at his job (but clearly carries over to other situations away from his home, true Agoraphobia). Avoids crowds, stores, public events..Going on 1.5 years. Has develop new habit of holding his nose to check his pulse. Intermittent insomnia, depression worse. \"Always look tired, at a braking point\". Last week experience intrusive thoughts: \"why do I have to live like this\" denying recent or current SI/SP. Discouraged with his treatment resistant status.     Wife has apparent been more critical, told him that if he didn't straighten up he \"wouldn't have a family\". \"Tells me to get off my A and go to work\". \"I Feel hopeless at time\".     Does contract of safety for what that's worth. Once again given my cp# and encouraged to call prn. Wants to avoid hospitalization but understands that advisable if he were to start having suicidal type thoughts.     Working with the behavioral and cognitive technics learnt at the [facility] and in therapy here, only minimally helpful.     \"Don't know if I can go to work tonight\" Unsure how employer would respond if he didn't, is going to try. So he has two major stresses  Simultaneously - his job and his wife/marriage/family.    Might help if he would initiate efforts to find a less stressful job/ would help reduce the urgency and fear of losing his current employment. He'll think about it.    Meds: increase the Zoloft to 300 mg daily, increase the Alprazolam ER to 6 mg daily (unfortionately seems clinically necessary), continue the Remeron 30 mg hs. Has supply.     PFSH: Wife becoming less supportive, " "wife reluctant to have joint therapy, sees Nel Claire individually.     Review of Systems   Respiratory: Positive for chest tightness.    Cardiovascular: Positive for palpitations.   Gastrointestinal:        GERD   Musculoskeletal: Negative.    Neurological: Negative.        SUPPORTIVE PSYCHOTHERAPY: continuing efforts to promote the therapeutic alliance, address the patient’s issues, and strengthen self awareness, insights, and coping skills.  Reviewed CBT principles. Provided support and reassurance of eventual recovery.      Mental Status Exam  Appearance:  clean and casually dressed, appropriate  Attitude toward clinician:  cooperative and agreeable   Speech:    Rate:  regular rate and rhythm   Volume: normal  Motor:  no abnormal movements present  Mood:  Anxious and depressed.   Affect:  Anxious and depressed.   Thought Processes:  linear, logical  Thought Content:  Normal   Feeling Hopeless: \"at times\"   Suicidal Thoughts:  denies  Homicidal Thoughts:  absent  Perceptual Disturbance: no perceptual disturbance  Attention and Concentration:  poor  Insight and Judgement:  good  Memory:  memory appears to be intact    LABS: No results found for this or any previous visit (from the past 168 hour(s)).    MEDICATION ISSUES: Have discussed with the patient the medications Risks, Benefits, and Side effects including potential falls, possible impaired driving and  metabolic adversities among others. No medication side effects or related complaints today.     TREATMENT PLAN/GOALS: Continue supportive psychotherapy efforts and medications as indicated.     Current Outpatient Prescriptions   Medication Sig Dispense Refill   • ALPRAZolam XR (XANAX XR) 2 MG 24 hr tablet To start taking 6 mg daily     • aspirin 81 MG EC tablet Take 81 mg by mouth 2 (Two) Times a Day.     • metoprolol tartrate (LOPRESSOR) 50 MG tablet Take 50 mg by mouth 2 (Two) Times a Day With Meals.     • mirtazapine (REMERON) 30 MG tablet Take 1 " tablet by mouth Every Night.     • pantoprazole (PROTONIX) 40 MG EC tablet Take 40 mg by mouth Daily.     • sertraline (ZOLOFT) 100 MG tablet Take three daily     • simvastatin (ZOCOR) 20 MG tablet Take 20 mg by mouth Every Night.     • vitamin D (ERGOCALCIFEROL) 19182 UNITS capsule capsule Take 50,000 Units by mouth 1 (One) Time Per Week.       No current facility-administered medications for this visit.        COLLATERAL PSYCHOTHERAPEUTIC INTERVENTION: patient encourage to continue with therapist Izaiah Tinoco.     Encounter Diagnoses   Name Plan   • Panic disorder with agoraphobia (Severe) See above HPI note.    • Major depressive disorder, recurrent episode, moderate        Return in about 1 week (around 3/30/2018).  Patient knows to call if symptoms worsen or fail to improve between appointments.

## 2018-03-27 ENCOUNTER — TELEPHONE (OUTPATIENT)
Dept: PSYCHIATRY | Facility: CLINIC | Age: 48
End: 2018-03-27

## 2018-03-30 ENCOUNTER — OFFICE VISIT (OUTPATIENT)
Dept: PSYCHIATRY | Facility: CLINIC | Age: 48
End: 2018-03-30

## 2018-03-30 VITALS
BODY MASS INDEX: 26.32 KG/M2 | DIASTOLIC BLOOD PRESSURE: 83 MMHG | HEIGHT: 71 IN | HEART RATE: 57 BPM | SYSTOLIC BLOOD PRESSURE: 144 MMHG | WEIGHT: 188 LBS

## 2018-03-30 DIAGNOSIS — F33.1 MAJOR DEPRESSIVE DISORDER, RECURRENT EPISODE, MODERATE (HCC): ICD-10-CM

## 2018-03-30 DIAGNOSIS — F40.01 PANIC DISORDER WITH AGORAPHOBIA: ICD-10-CM

## 2018-03-30 DIAGNOSIS — F41.1 GENERALIZED ANXIETY DISORDER: Primary | ICD-10-CM

## 2018-03-30 PROCEDURE — 99213 OFFICE O/P EST LOW 20 MIN: CPT | Performed by: PSYCHIATRY & NEUROLOGY

## 2018-03-30 RX ORDER — IMIPRAMINE HCL 25 MG
25 TABLET ORAL 2 TIMES DAILY
Qty: 60 TABLET | Refills: 0 | Status: SHIPPED | OUTPATIENT
Start: 2018-03-30 | End: 2018-04-26

## 2018-03-30 RX ORDER — ALPRAZOLAM 2 MG/1
2 TABLET, EXTENDED RELEASE ORAL 3 TIMES DAILY
Qty: 90 TABLET | Refills: 0 | Status: SHIPPED | OUTPATIENT
Start: 2018-03-30 | End: 2018-04-26 | Stop reason: SDUPTHER

## 2018-03-30 NOTE — PROGRESS NOTES
"Patient ID: Amish Win is a 48 y.o. male    SERVICE TYPE: EVALUATION AND MANAGEMENT (greater than 50% of the time spent for supportive psychotherapy).      /83   Pulse 57   Ht 180 cm (70.87\")   Wt 85.3 kg (188 lb)   BMI 26.32 kg/m²     ALLERGIES:  Penicillins    CC: \"About the same, just barely pushing thru work\"     FOLLOWED FOR: Anxiety/ Depression    HPI: Panic Attaches at work as he was leaving for home. Resolved when he arrived home. Depression \"a little worse, not much\". No SI/SP. Restless sleep. Forcing a daily walk, enjoys/ relaxes with TV and videos.    Zoloft - time to try another antidepressant (prior trials on Paxil, Wellbutrin, adjunct Abilify).   Will start with Imipramine while tapering off the Zoloft during the next week.  We'll continue the Remeron and alprazolam.    PFSH: Wife and daughter off to [place]. Patient has a fear being home with just his 15 y/o son \"if something bad happens to me there won't be anyone to drive me to the hospital\". More irrational obsessional thinking he recognizes.     One bit of good news: the Autistic son has found a placement in [place].     Review of Systems   Respiratory: Positive for shortness of breath.    Cardiovascular: Positive for palpitations.   Gastrointestinal: Negative.    Musculoskeletal: Negative.    Neurological: Negative.        SUPPORTIVE PSYCHOTHERAPY: continuing efforts to promote the therapeutic alliance, address the patient’s issues, and strengthen self awareness, insights, and coping skills.       Mental Status Exam  Appearance:  clean and casually dressed, appropriate  Attitude toward clinician:  cooperative and agreeable   Speech:    Rate:  regular rate and rhythm   Volume: normal  Motor:  no abnormal movements present  Mood:  Depressed/anxious  Affect:  Anxious   Thought Processes:  linear, logical, and goal directed  Thought Content:  Normal   Feeling Hopeless: absent  Suicidal Thoughts:  absent  Homicidal Thoughts:  " absent  Perceptual Disturbance: no perceptual disturbance  Attention and Concentration: Fair  Insight and Judgement:  good  Memory:  memory appears to be intact    LABS: No results found for this or any previous visit (from the past 168 hour(s)).    MEDICATION ISSUES: Have discussed with the patient the medications Risks, Benefits, and Side effects including potential falls, possible impaired driving and  metabolic adversities among others. No medication side effects or related complaints today.     TREATMENT PLAN/GOALS: Continue supportive psychotherapy efforts and medications as indicated.     Current Outpatient Prescriptions   Medication Sig Dispense Refill   • ALPRAZolam XR (XANAX XR) 2 MG 24 hr tablet Take 1 tablet by mouth 3 (Three) Times a Day. 90 tablet 0   • aspirin 81 MG EC tablet Take 81 mg by mouth 2 (Two) Times a Day.     • metoprolol tartrate (LOPRESSOR) 50 MG tablet Take 50 mg by mouth 2 (Two) Times a Day With Meals.     • mirtazapine (REMERON) 30 MG tablet Take 1 tablet by mouth Every Night. 30 tablet 0   • pantoprazole (PROTONIX) 40 MG EC tablet Take 40 mg by mouth Daily.     • simvastatin (ZOCOR) 20 MG tablet Take 20 mg by mouth Every Night.     • vitamin D (ERGOCALCIFEROL) 78056 UNITS capsule capsule Take 50,000 Units by mouth 1 (One) Time Per Week.     • imipramine (TOFRANIL) 25 MG tablet Take 1 tablet by mouth 2 (Two) Times a Day. 60 tablet 0     No current facility-administered medications for this visit.        COLLATERAL PSYCHOTHERAPEUTIC INTERVENTION To see Izaiah Tinoco April 4.      :   Encounter Diagnoses   Name Primary?   • Generalized anxiety disorder Yes   • Panic disorder with agoraphobia    • Major depressive disorder, recurrent episode, moderate        Return in about 2 weeks (around 4/13/2018).  Patient knows to call if symptoms worsen or fail to improve between appointments. Has my cell phone number

## 2018-04-04 ENCOUNTER — TELEPHONE (OUTPATIENT)
Dept: PSYCHIATRY | Facility: CLINIC | Age: 48
End: 2018-04-04

## 2018-04-04 NOTE — TELEPHONE ENCOUNTER
The pharmacy is calling to make you aware of a drug interaction for Sertraline 100 mg, 2 1/2 by mouth QD and Imipramine 25 mg.Please advise

## 2018-04-05 ENCOUNTER — TELEPHONE (OUTPATIENT)
Dept: PSYCHIATRY | Facility: CLINIC | Age: 48
End: 2018-04-05

## 2018-04-05 NOTE — TELEPHONE ENCOUNTER
Called the patient regarding his medication  - the [facility] pharmacy had withheld the prescription for the imipramine for concerns of possible drug interaction.  With the Zoloft.  Once again discuss the principal of tapering off the Zoloft was initiating the starting dose of imipramine, shouldn't be a problem.  We'll let me know if this continues to be an issue with the pharmacy.

## 2018-04-05 NOTE — TELEPHONE ENCOUNTER
Patient called stating you had took him off zoloft and put him on a new medication. States the new medication is having interaction with his other meds and he is needing it switched or put back on Zoloft. Also states the [facility] said they had been trying to call us since last week about it, but I have not talked to anyone from the Pharmacy. Please advise

## 2018-04-09 ENCOUNTER — TELEPHONE (OUTPATIENT)
Dept: PSYCHIATRY | Facility: CLINIC | Age: 48
End: 2018-04-09

## 2018-04-09 NOTE — TELEPHONE ENCOUNTER
Patient has to work on next appointment with you on 4/13/18 is there any other days you can work him in or can you see him the next Friday 4/20/18

## 2018-04-11 ENCOUNTER — OFFICE VISIT (OUTPATIENT)
Dept: PSYCHIATRY | Facility: CLINIC | Age: 48
End: 2018-04-11

## 2018-04-11 ENCOUNTER — TELEPHONE (OUTPATIENT)
Dept: PSYCHIATRY | Facility: CLINIC | Age: 48
End: 2018-04-11

## 2018-04-11 DIAGNOSIS — F40.01 PANIC DISORDER WITH AGORAPHOBIA: ICD-10-CM

## 2018-04-11 DIAGNOSIS — F33.1 MAJOR DEPRESSIVE DISORDER, RECURRENT EPISODE, MODERATE (HCC): ICD-10-CM

## 2018-04-11 DIAGNOSIS — F41.1 GENERALIZED ANXIETY DISORDER: Primary | ICD-10-CM

## 2018-04-11 PROCEDURE — 90834 PSYTX W PT 45 MINUTES: CPT | Performed by: SOCIAL WORKER

## 2018-04-11 NOTE — TELEPHONE ENCOUNTER
Patient can not make an appointment this week due to work schedule is there another day you can work him in except Wednesdays

## 2018-04-11 NOTE — PROGRESS NOTES
"Date of Service: April 11, 2018  Time In: 12:00 PM  Time Out: 12:40 PM      PROGRESS NOTE  Data:  Amish Win is a 48 y.o. male who met with the undersigned for a regularly scheduled individual outpatient psychotherapy session at  the [facility] for follow-up of anxiety/panic and depression.  Patient reports he is happy they've finally notified his autistic son has found permanent placement.  Patient also reports his wife and daughter went on a trip to [place] this past week and states \"I wasn't invited\".     HPI: Patient reports he has been unable to maintain his work schedule but states \"I've been on the verge of asking for a leave of absence\".  The patient reports he continues to struggle with significant anxiety/panic which is significantly exacerbated while at work.  The patient continues to struggle with feeling on edge, feeling overwhelmed, increased heart rate, muscle tension, and urged to escape the situation, trembling, and a sense of impending doom.  The patient rates current symptoms of anxiety at 6 on a scale of 1-10 with 10 being most severe and reports his symptoms are significantly decreased while not at work.  However, he reports he continues to struggle with symptoms and states he is struggling to place himself in social situations and states he recently began walking in his backyard in an attempt to increase his daily activity.  Patient also continues to struggle with depression including depressed mood, anhedonia, anergia, feeling hopeless and helpless, feeling as though he will never improve, obsessive worry, and ongoing social isolation.  Patient rates current symptoms of depression at 4 on a scale 1-10 with 10 being most severe.  Patient also continues to struggle with what appears to be obsessive habits which are likely due to his ongoing obsessive worry over his health.  The patient repeatedly holds his nose in an attempt to feel his pulse in his ears.  Patient reports he has not been " sleeping as well since his Remeron was increased to 30 mg.  However, he states he is sleeping 5-6 hours nightly.  Patient also reports no recent weight loss.  Patient reports he continues to adhere to medication regimen as prescribed.  Patient adamantly and convincingly denies suicidal ideation vehemently denies any substance use.      Clinical Maneuvering/Intervention:  Assisted patient in processing above session content; acknowledged and normalized patient’s thoughts, feelings, and concerns.  Utilized motivational interviewing techniques including complex reflections to identify positive aspects of the patient's life and encouraged him to remind himself although it is struggling he has been able to maintain his daily functions.  Also urged the patient to consider the significance of the trigger at his work and encouraged him to begin seeking alternative employment.  Also discussed the [place] self-perpetuating nature of the patient's symptoms and social isolation and strongly urged him to continue to seek enjoyable activities he can engage in on a regular basis to reduce idle time.  Also utilized cognitive behavioral therapy including thoughts stopping techniques to assist the patient in reducing the severity and frequency of symptoms.  Challenged him to remind himself not to worry about things he cannot control.  Provided unconditional positive regard in a safe, supportive environment.    Allowed patient to freely discuss issues without interruption or judgment. Provided safe, confidential environment to facilitate the development of positive therapeutic relationship and encourage open, honest communication. Assisted patient in identifying risk factors which would indicate the need for higher level of care including thoughts to harm self or others and/or self-harming behavior and encouraged patient to contact this office, call 911, or present to the nearest emergency room should any of these events occur.  Discussed crisis intervention services and means to access.  Patient adamantly and convincingly denies current suicidal or homicidal ideation or perceptual disturbance.    Assessment    Patient continues to struggle with significant symptoms of depression and anxiety which continue to be exacerbated by stress at work and long standing strained relationship with his wife.  The patient can reasonably be expected to continue to benefit from treatment and will likely be at significant risk for decompensation otherwise.    Diagnoses and all orders for this visit:    Generalized anxiety disorder    Panic disorder with agoraphobia    Major depressive disorder, recurrent episode, moderate               Mental Status Exam  Hygiene:  Fair  Dress:  casual  Attitude:  Cooperative  Motor Activity:  Restless  Speech:  Monotone  Mood:  anxious  Affect:  anxious  Thought Processes:  Linear  Thought Content:  normal  Suicidal Thoughts:  denies  Homicidal Thoughts:  denies  Crisis Safety Plan: yes, to come to the emergency room.  Hallucinations:  denies    Patient's Support Network Includes:  wife and extended family    Progress toward goal: Not at goal    Functional Status: Moderate impairment     Prognosis: Fair with Ongoing Treatment     Plan         Patient will continue in individual outpatient psychotherapy session at the [facility] every 2 weeks and pharmacotherapy as scheduled with Dr. Villareal.  Patient will adhere to medication regimen as prescribed and report any side effects. Patient will contact this office, call 911 or present to the nearest emergency room should suicidal or homicidal ideations occur. Provide Cognitive Behavioral Therapy and Integrative Therapy to improve functioning, maintain stability, and avoid decompensation and the need for higher level of care.          Return in about 1 week (around 4/18/2018) for Next scheduled follow up.      This document signed by Izaiah Tinoco LCSW, [facility] April 11, 2018  3:18 PM

## 2018-04-18 ENCOUNTER — TELEPHONE (OUTPATIENT)
Dept: PSYCHIATRY | Facility: CLINIC | Age: 48
End: 2018-04-18

## 2018-04-18 ENCOUNTER — OFFICE VISIT (OUTPATIENT)
Dept: PSYCHIATRY | Facility: CLINIC | Age: 48
End: 2018-04-18

## 2018-04-18 DIAGNOSIS — F40.01 PANIC DISORDER WITH AGORAPHOBIA: ICD-10-CM

## 2018-04-18 DIAGNOSIS — F41.1 GENERALIZED ANXIETY DISORDER: Primary | ICD-10-CM

## 2018-04-18 DIAGNOSIS — F33.1 MAJOR DEPRESSIVE DISORDER, RECURRENT EPISODE, MODERATE (HCC): ICD-10-CM

## 2018-04-18 PROCEDURE — 90834 PSYTX W PT 45 MINUTES: CPT | Performed by: SOCIAL WORKER

## 2018-04-18 NOTE — PROGRESS NOTES
Date of Service: April 18, 2018  Time In: 12:30 PM  Time Out: 1:10 PM      PROGRESS NOTE  Data:  Amish Win is a 48 y.o. male who met with the undersigned for a regularly scheduled individual outpatient psychotherapy session at  the [facility] for follow-up of anxiety/panic and depression     HPI: Patient reports he feels things have been going somewhat better as of late and states he finds himself thinking of the future and having brief periods of feeling hopeful.  However, he reports he continues to struggle with significant anxiety/panic including anxious mood, feeling on edge, obsessive fear concerning his health, difficulty concentrating, feeling overwhelmed, increased heart rate, heart palpitations, and a sense of impending doom.  The patient rates current symptoms of anxiety at 6 on a scale of 1-10 with 10 being most severe.  Patient also continues to struggle with depression including depressed mood, anhedonia, anergia, feeling hopeless and helpless, and social isolation.  The patient rates current symptoms of depression at 5 on a scale of 1-10 with 10 being most severe.  Patient reports he continues to adhere to medication regimen as prescribed.  Patient adamantly convincingly denies suicidal ideation vehemently denies any substance use.      Clinical Maneuvering/Intervention:  Assisted patient in processing above session content; acknowledged and normalized patient’s thoughts, feelings, and concerns.  Utilized motivational interviewing techniques including complex reflections to assist the patient in identifying and acknowledging his progress over the past few weeks and challenged him to remind himself he has the power to do decide whether he will focus on the positive or negative.  Also assisted the patient in developing a strategy to decrease his tendency to hold his nose in an attempt to feel his heart beat by becoming consciously aware of when he engages in this behavior and using thought  blocking techniques and alternative coping mechanisms.  Also challenged the patient's irrational fear of cardiac issues by utilizing a significant body of evidence he has no cardiac issues.  Provided unconditional positive regard in a safe, supportive environment.    Allowed patient to freely discuss issues without interruption or judgment. Provided safe, confidential environment to facilitate the development of positive therapeutic relationship and encourage open, honest communication. Assisted patient in identifying risk factors which would indicate the need for higher level of care including thoughts to harm self or others and/or self-harming behavior and encouraged patient to contact this office, call 911, or present to the nearest emergency room should any of these events occur. Discussed crisis intervention services and means to access.  Patient adamantly and convincingly denies current suicidal or homicidal ideation or perceptual disturbance.    Assessment    Patient appears to have made marginal progress over the past few weeks but continues to struggle with significant symptoms of depression and anxiety/panic which continues to cause impairment important areas of functioning.  As result, patient immigrated expected to continue to benefit from treatment and would likely be at increased risk for decompensation and otherwise.    Diagnoses and all orders for this visit:    Generalized anxiety disorder    Panic disorder with agoraphobia    Major depressive disorder, recurrent episode, moderate               Mental Status Exam  Hygiene:  good  Dress:  casual  Attitude:  Cooperative  Motor Activity:  Slow  Speech:  Monotone  Mood:  anxious and depressed  Affect:  depressed  Thought Processes:  Linear  Thought Content:  normal  Suicidal Thoughts:  denies  Homicidal Thoughts:  denies  Crisis Safety Plan: yes, to come to the emergency room.  Hallucinations:  denies    Patient's Support Network Includes:  wife and  extended family    Progress toward goal: Not at goal    Functional Status: Moderate impairment     Prognosis: Fair with Ongoing Treatment     Plan         Patient will continue in individual outpatient psychotherapy session at the [facility] weekly and pharmacotherapy as scheduled with Dr. Villareal.  Patient will adhere to medication regimen as prescribed and report any side effects. Patient will contact this office, call 911 or present to the nearest emergency room should suicidal or homicidal ideations occur. Provide Cognitive Behavioral Therapy and Integrative Therapy to improve functioning, maintain stability, and avoid decompensation and the need for higher level of care.          Return in about 1 week (around 4/25/2018) for Next scheduled follow up.      This document signed by Izaiah Tinoco LCSW, JOAQUIN April 18, 2018 6:22 PM

## 2018-04-18 NOTE — TELEPHONE ENCOUNTER
Amish was wondering if you could fit him into your schedule on Friday. He said you had to cancel on him his last few visits.

## 2018-04-26 ENCOUNTER — TELEPHONE (OUTPATIENT)
Dept: PSYCHIATRY | Facility: CLINIC | Age: 48
End: 2018-04-26

## 2018-04-26 ENCOUNTER — OFFICE VISIT (OUTPATIENT)
Dept: PSYCHIATRY | Facility: CLINIC | Age: 48
End: 2018-04-26

## 2018-04-26 VITALS
DIASTOLIC BLOOD PRESSURE: 84 MMHG | HEART RATE: 78 BPM | SYSTOLIC BLOOD PRESSURE: 144 MMHG | WEIGHT: 194 LBS | HEIGHT: 71 IN | BODY MASS INDEX: 27.16 KG/M2

## 2018-04-26 DIAGNOSIS — F41.1 GENERALIZED ANXIETY DISORDER: Primary | ICD-10-CM

## 2018-04-26 DIAGNOSIS — F33.41 MDD (MAJOR DEPRESSIVE DISORDER), RECURRENT, IN PARTIAL REMISSION (HCC): ICD-10-CM

## 2018-04-26 PROCEDURE — 99214 OFFICE O/P EST MOD 30 MIN: CPT | Performed by: PSYCHIATRY & NEUROLOGY

## 2018-04-26 RX ORDER — IMIPRAMINE HCL 50 MG
50 TABLET ORAL 2 TIMES DAILY
Qty: 60 TABLET | Refills: 2 | Status: SHIPPED | OUTPATIENT
Start: 2018-04-26 | End: 2018-05-15 | Stop reason: SDUPTHER

## 2018-04-26 RX ORDER — ALPRAZOLAM 2 MG/1
2 TABLET, EXTENDED RELEASE ORAL 3 TIMES DAILY
Qty: 90 TABLET | Refills: 0 | Status: SHIPPED | OUTPATIENT
Start: 2018-04-26 | End: 2018-05-15 | Stop reason: SDUPTHER

## 2018-04-26 RX ORDER — MIRTAZAPINE 15 MG/1
15 TABLET, ORALLY DISINTEGRATING ORAL NIGHTLY
Qty: 30 TABLET | Refills: 2 | Status: SHIPPED | OUTPATIENT
Start: 2018-04-26 | End: 2018-05-15 | Stop reason: SDUPTHER

## 2018-04-26 RX ORDER — IMIPRAMINE HCL 50 MG
50 TABLET ORAL NIGHTLY
Qty: 30 TABLET | Refills: 2 | Status: SHIPPED | OUTPATIENT
Start: 2018-04-26 | End: 2018-04-26 | Stop reason: SDUPTHER

## 2018-04-26 NOTE — TELEPHONE ENCOUNTER
Pharmacy called stating she is needing clarification on medication Imipramine 25 MG needing to know if it was increased to take twice daily. Please advise

## 2018-04-26 NOTE — PROGRESS NOTES
"Patient ID: Amish Win is a 48 y.o. male    SERVICE TYPE: EVALUATION AND MANAGEMENT (greater than 50% of the time spent for supportive psychotherapy).  Time in:     /84   Pulse 78   Ht 180 cm (70.87\")   Wt 88 kg (194 lb)   BMI 27.16 kg/m²     ALLERGIES:  Penicillins    CC: \"Had some rough spots\"     FOLLOWED FOR: Anxiety/ Depression    HPI: Sleeping issues: to bed normally @ 11PM, awaking at 2 AM, then back to sleep after perhaps 10 min or up to 2 hours. Represents a change, did better taking the 15 mg Remeron instead of 30 mg.   Went to dentist yesterday - went well. Recent optometrist Dx minor degree of Glaucoma - started on gtts (advised to follow up due to issue with Imipramine).   Weight up 5 lbs this past month. Fairly good dietary discipline.   Not checking his heart rate as frequently.   \"Parts doing better\" - the checking of his pulse, \"a numbers a number\", trying to reapply the RBT practice learnt at the [facility]. \"Not going to go back to the ER\".   \"Parts that are not better\" - overall anxiety. Has notice a tendency to become angry.   Depression - \"is beginning to do better\", attributes to the Imipramine, \"trying to think positive\"   Walking now - encouraging, aim at least 30 min 5 days.   Looking for alternative employment via computer, \"no bites as yet\". Has been more realistic about current employer.   Starting projects at home.   Moving in a positive directions.       PFSH: sounds like his home situation has improved, the wife is helping him with his job search.     Review of Systems   Respiratory: Positive for shortness of breath.    Cardiovascular: Positive for chest pain and palpitations.   Genitourinary: Negative.    Musculoskeletal: Negative.    Neurological: Negative.       Continues to obsess about his health - hypocondiasis.     SUPPORTIVE PSYCHOTHERAPY: continuing efforts to promote the therapeutic alliance, address the patient’s issues, and strengthen self awareness, insights, and " coping skills.       Mental Status Exam  Appearance:  clean and casually dressed, appropriate  Attitude toward clinician:  cooperative and agreeable   Speech:    Rate:  regular rate and rhythm   Volume: normal  Motor:  no abnormal movements present  Mood:  Good  Affect:  euthymic  Thought Processes:  linear, logical, and goal directed  Thought Content:  Normal   Feeling Hopeless: absent  Suicidal Thoughts:  absent  Homicidal Thoughts:  absent  Perceptual Disturbance: no perceptual disturbance  Attention and Concentration:  good  Insight and Judgement:  good  Memory:  memory appears to be intact    LABS: No results found for this or any previous visit (from the past 168 hour(s)).    MEDICATION ISSUES: Have discussed with the patient the medications Risks, Benefits, and Side effects including potential falls, possible impaired driving and  metabolic adversities among others. No medication side effects or related complaints today.     TREATMENT PLAN/GOALS: Continue supportive psychotherapy efforts and medications as indicated.     Current Outpatient Prescriptions   Medication Sig Dispense Refill   • ALPRAZolam XR (XANAX XR) 2 MG 24 hr tablet Take 1 tablet by mouth 3 (Three) Times a Day. 90 tablet 0   • aspirin 81 MG EC tablet Take 81 mg by mouth 2 (Two) Times a Day.     • metoprolol tartrate (LOPRESSOR) 50 MG tablet Take 50 mg by mouth 2 (Two) Times a Day With Meals.     • pantoprazole (PROTONIX) 40 MG EC tablet Take 40 mg by mouth Daily.     • simvastatin (ZOCOR) 20 MG tablet Take 20 mg by mouth Every Night.     • vitamin D (ERGOCALCIFEROL) 67638 UNITS capsule capsule Take 50,000 Units by mouth 1 (One) Time Per Week.     • imipramine (TOFRANIL) 50 MG tablet Take 1 tablet by mouth 2 (Two) Times a Day. 60 tablet 2   • mirtazapine (REMERON SOL-TAB) 15 MG disintegrating tablet Take 1 tablet by mouth Every Night. 30 tablet 2     No current facility-administered medications for this visit.        COLLATERAL  PSYCHOTHERAPEUTIC INTERVENTION: patient not interested in additional psychotherapy.     Encounter Diagnoses   Name Primary?   • Generalized anxiety disorder Yes   • MDD (major depressive disorder), recurrent, in partial remission        Return in about 4 weeks (around 5/24/2018).  Patient knows to call if symptoms worsen or fail to improve between appointments.

## 2018-04-27 NOTE — TELEPHONE ENCOUNTER
Patient was prescribed imipramine 50 mg twice a day yesterday.  They should have the prescription, if not that would be #60 with no refill

## 2018-05-08 ENCOUNTER — OFFICE VISIT (OUTPATIENT)
Dept: PSYCHIATRY | Facility: CLINIC | Age: 48
End: 2018-05-08

## 2018-05-08 DIAGNOSIS — F41.1 GENERALIZED ANXIETY DISORDER: Primary | ICD-10-CM

## 2018-05-08 DIAGNOSIS — F33.1 MAJOR DEPRESSIVE DISORDER, RECURRENT EPISODE, MODERATE (HCC): ICD-10-CM

## 2018-05-08 PROCEDURE — 90832 PSYTX W PT 30 MINUTES: CPT | Performed by: SOCIAL WORKER

## 2018-05-08 NOTE — PROGRESS NOTES
Date of Service: May 8, 2018  Time In: 12:45 PM  Time Out: 1:15 PM      PROGRESS NOTE  Data:  Amish Win is a 48 y.o. male who met with the undersigned for a regularly scheduled individual outpatient therapy session at the [facility] for follow-up of depression and anxiety.     HPI: Patient reports he feels he is doing some better and states he recently joined a local gym and is making a concerted effort to be more active.  Patient reports he simply became tired of struggling and states he almost became angry which served as a motivator to bring about positive change.  Patient also reports he is eating better and states he has gained approximately 17 pounds in the past few weeks.  However, the patient reports he does continue to struggle with significant anxiety at times including anxious mood, feeling on edge, feeling overwhelmed, trembling, shortness of breath, increased heart rate, and urged to escape the situation, and a sense of impending doom.  Patient rates current symptoms of anxiety at a 5 on a scale of 1-10 with 10 being most severe.  Patient also continues to struggle with depression including depressed mood, anhedonia, anergia, decreased motivation, periods of hopelessness, and social isolation.  Patient rates current symptoms of depression at a 4 on scale 1-10 with 10 being most severe.  Patient reports although he feels he is doing better he continues to obsessively worry over health issues including possible cardiac event.  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  Patient reports he continues to adhere to medication regimen as prescribed.  Patient adamantly and convincingly denies suicidal ideation vehemently denies any substance use.      Clinical Maneuvering/Intervention:  Assisted patient in processing above session content; acknowledged and normalized patient’s thoughts, feelings, and concerns.  Utilize motivational  interviewing techniques including complex reflections within a strength-based perspective to assist the patient in identifying and acknowledging steps he has taken recently to bring about positive change.  Also discussed and demonstrated challenging faulty, irrational thoughts with factual counter arguments and assisted patient in identifying automatic negative thoughts which lead to his obsessive worry.  Discussed things we can control and things we cannot control and encourage the patient to utilize this question to challenge his obsessive worry.  Strongly encouraged the patient to continue working out 2-3 times weekly and continuing to increase his daily activity.  Provided unconditional positive regard in a safe, supportive environment.    Allowed patient to freely discuss issues without interruption or judgment. Provided safe, confidential environment to facilitate the development of positive therapeutic relationship and encourage open, honest communication. Assisted patient in identifying risk factors which would indicate the need for higher level of care including thoughts to harm self or others and/or self-harming behavior and encouraged patient to contact this office, call 911, or present to the nearest emergency room should any of these events occur. Discussed crisis intervention services and means to access.  Patient adamantly and convincingly denies current suicidal or homicidal ideation or perceptual disturbance.    Assessment    Patient appears to have made modest progress.  However, he continues to struggle with anxiety and depression which continues to cause impairment important areas of functioning.  As a result, he can be reasonably expected to continue to benefit from treatment likely be at increased risk for decompensation otherwise.    Diagnoses and all orders for this visit:    Generalized anxiety disorder    Major depressive disorder, recurrent episode, moderate               Mental Status  Exam  Hygiene:  good  Dress:  casual  Attitude:  Cooperative  Motor Activity:  Appropriate  Speech:  Normal  Mood:  anxious  Affect:  anxious  Thought Processes:  Linear  Thought Content:  normal  Suicidal Thoughts:  denies  Homicidal Thoughts:  denies  Crisis Safety Plan: yes, to come to the emergency room.  Hallucinations:  denies    Patient's Support Network Includes:  children and extended family    Progress toward goal: Not at goal    Functional Status: Moderate impairment     Prognosis: Fair with Ongoing Treatment     Plan         Patient will continue in individual outpatient therapy sessions in 1 week at the [facility] and pharmacotherapy as scheduled with Dr. Villareal.  Patient will adhere to medication regimen as prescribed and report any side effects. Patient will contact this office, call 911 or present to the nearest emergency room should suicidal or homicidal ideations occur. Provide Cognitive Behavioral Therapy and Integrative Therapy to improve functioning, maintain stability, and avoid decompensation and the need for higher level of care.          Return in about 1 week (around 5/15/2018) for Next scheduled follow up.      This document signed by Izaiah Tinoco LCSW, JOAQUIN May 8, 2018 3:16 PM

## 2018-05-15 ENCOUNTER — OFFICE VISIT (OUTPATIENT)
Dept: PSYCHIATRY | Facility: CLINIC | Age: 48
End: 2018-05-15

## 2018-05-15 VITALS
HEIGHT: 71 IN | BODY MASS INDEX: 27.72 KG/M2 | DIASTOLIC BLOOD PRESSURE: 78 MMHG | WEIGHT: 198 LBS | HEART RATE: 67 BPM | SYSTOLIC BLOOD PRESSURE: 116 MMHG

## 2018-05-15 DIAGNOSIS — F41.1 GENERALIZED ANXIETY DISORDER: Primary | ICD-10-CM

## 2018-05-15 DIAGNOSIS — F33.41 MDD (MAJOR DEPRESSIVE DISORDER), RECURRENT, IN PARTIAL REMISSION (HCC): ICD-10-CM

## 2018-05-15 PROCEDURE — 99213 OFFICE O/P EST LOW 20 MIN: CPT | Performed by: PSYCHIATRY & NEUROLOGY

## 2018-05-15 RX ORDER — IMIPRAMINE HCL 50 MG
50 TABLET ORAL 2 TIMES DAILY
Qty: 60 TABLET | Refills: 0 | Status: SHIPPED | OUTPATIENT
Start: 2018-05-15 | End: 2018-06-12 | Stop reason: SDUPTHER

## 2018-05-15 RX ORDER — ALPRAZOLAM 2 MG/1
2 TABLET, EXTENDED RELEASE ORAL 3 TIMES DAILY
Qty: 90 TABLET | Refills: 0 | Status: SHIPPED | OUTPATIENT
Start: 2018-05-15 | End: 2018-06-12 | Stop reason: SDUPTHER

## 2018-05-15 RX ORDER — MIRTAZAPINE 15 MG/1
15 TABLET, ORALLY DISINTEGRATING ORAL NIGHTLY
Qty: 30 TABLET | Refills: 0 | Status: SHIPPED | OUTPATIENT
Start: 2018-05-15 | End: 2018-06-12

## 2018-05-15 NOTE — PROGRESS NOTES
"Patient ID: Amish Win is a 48 y.o. male    SERVICE TYPE: EVALUATION AND MANAGEMENT (greater than 50% of the time spent for supportive psychotherapy).      /78   Pulse 67   Ht 180 cm (70.87\")   Wt 89.8 kg (198 lb)   BMI 27.72 kg/m²     ALLERGIES:  Penicillins    CC: \"Feel pretty good today after working 6 days straight\"     FOLLOWED FOR:anxiety and depression.     HPI: Panic attaches occurring at work - talked about de escalating technics. Sleeping better.   No trips to the ER. Rates depression slight worse since last visit \"but nothing like I was\".   \"My main goal is to stay busy\". Continuing his exercise effort, continues to be making progress overall with a few minor setbacks.       PFSH: Job search ongoing. Autistic son placed in group home in [place], \"in a better place\".   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     Review of Systems   Respiratory: Positive for shortness of breath.    Cardiovascular: Positive for palpitations.   Gastrointestinal: Positive for constipation.   Musculoskeletal: Negative.    Neurological: Negative.        SUPPORTIVE PSYCHOTHERAPY: continuing efforts to promote the therapeutic alliance, address the patient’s issues, and strengthen self awareness, insights, and coping skills.       Mental Status Exam  Appearance:  clean and casually dressed, appropriate  Attitude toward clinician:  cooperative and agreeable   Speech:    Rate:  regular rate and rhythm   Volume: normal  Motor:  no abnormal movements present  Mood:  Has brief periods when he becomes discouraged.   Affect:  euthymic  Thought Processes:  linear, logical, and goal directed  Thought Content:  Normal   Feeling Hopeless: absent  Suicidal Thoughts:  absent  Homicidal Thoughts:  absent  Perceptual Disturbance: no perceptual disturbance  Attention and Concentration:  good  Insight and Judgement:  good  Memory:  memory appears to be intact    LABS: No results found for this or any previous visit (from " the past 168 hour(s)).    MEDICATION ISSUES: Have discussed with the patient the medications Risks, Benefits, and Side effects including potential falls, possible impaired driving and  metabolic adversities among others. No medication side effects or related complaints today. Constipation due to Imipramine.     TREATMENT PLAN/GOALS: Continue supportive psychotherapy efforts and medications as indicated.     Current Outpatient Prescriptions   Medication Sig Dispense Refill   • ALPRAZolam XR (XANAX XR) 2 MG 24 hr tablet Take 1 tablet by mouth 3 (Three) Times a Day. 90 tablet 0   • aspirin 81 MG EC tablet Take 81 mg by mouth 2 (Two) Times a Day.     • imipramine (TOFRANIL) 50 MG tablet Take 1 tablet by mouth 2 (Two) Times a Day. 60 tablet 0   • metoprolol tartrate (LOPRESSOR) 50 MG tablet Take 50 mg by mouth 2 (Two) Times a Day With Meals.     • mirtazapine (REMERON SOL-TAB) 15 MG disintegrating tablet Take 1 tablet by mouth Every Night. 30 tablet 0   • pantoprazole (PROTONIX) 40 MG EC tablet Take 40 mg by mouth Daily.     • simvastatin (ZOCOR) 20 MG tablet Take 20 mg by mouth Every Night.     • vitamin D (ERGOCALCIFEROL) 20325 UNITS capsule capsule Take 50,000 Units by mouth 1 (One) Time Per Week.       No current facility-administered medications for this visit.        COLLATERAL PSYCHOTHERAPEUTIC INTERVENTION: patient not interested in additional psychotherapy.     Encounter Diagnoses   Name Primary?   • Generalized anxiety disorder Yes   • MDD (major depressive disorder), recurrent, in partial remission        Return in about 4 weeks (around 6/12/2018).  Patient knows to call if symptoms worsen or fail to improve between appointments.     Dictated utilizing Dragon dictation

## 2018-05-16 ENCOUNTER — OFFICE VISIT (OUTPATIENT)
Dept: PSYCHIATRY | Facility: CLINIC | Age: 48
End: 2018-05-16

## 2018-05-16 DIAGNOSIS — F33.41 MAJOR DEPRESSIVE DISORDER, RECURRENT EPISODE, IN PARTIAL REMISSION (HCC): ICD-10-CM

## 2018-05-16 DIAGNOSIS — F41.1 GENERALIZED ANXIETY DISORDER: Primary | ICD-10-CM

## 2018-05-16 PROCEDURE — 90834 PSYTX W PT 45 MINUTES: CPT | Performed by: SOCIAL WORKER

## 2018-05-17 NOTE — PROGRESS NOTES
Date of Service: May 16, 2018  Time In: 2:25 PM  Time Out: 3:10 PM      PROGRESS NOTE  Data:  Amish Win is a 48 y.o. male who met with the undersigned for a regularly scheduled individual outpatient therapy session at the [facility] for follow-up of depression and anxiety.     HPI: Patient reports he feels he is doing better and states he recently joined a local gym and its he is working out 3-5 days weekly.  The patient reports he does continue to struggle with anxiety at times including anxious mood, feeling on edge, feeling overwhelmed, trembling, shortness of breath, increased heart rate, an urge to escape the situation, and a sense of impending doom.  Patient reports his symptoms are increased at work.  Patient rates current symptoms of anxiety at a 4 on a scale of 1-10 with 10 being most severe.  Patient reports symptoms of depression are significantly decreased the states he continues to have periods of depressed mood, feeling hopeless, low self-esteem, and difficulty avoiding isolating himself.  The patient does report he finds himself feeling hopeful at times.  She rates current symptoms of depression at a 3 on a scale of 1-10 with 10 being most severe.  Patient reports obsessive worry concerning cardiac issues has improved but states he continues to have periods of obsessive worry.  The patient is able to acknowledge decrease in obsessive behavior including tapping his chest and holding his nose feeling for his pulse.  Patient reports he is sleeping relatively well.  Patient reports he continues to adhere to medication regimen as prescribed.  Patient adamantly and convincingly denies suicidal ideation vehemently denies any substance use.      Clinical Maneuvering/Intervention:  Assisted patient in processing above session content; acknowledged and normalized patient’s thoughts, feelings, and concerns.  Utilize motivational interviewing techniques including complex reflections within a  strength-based perspective to assist the patient in identifying and acknowledging steps he has taken recently to bring about positive change.  Utilized cognitive behavioral therapy to discuss and demonstrate thought blocking techniques and encouraged patient to challenge his faulty, irrational thoughts to fax ARGUMENTS.  Also continued to challenge the patient's irrational fear of cardiac issues with the fact he has been cleared medically multiple times and is unable to carry out his daily activities without issue.  Strongly encouraged the patient to continue working out times weekly and continuing to increase his daily activity.  No encouraged the patient to consider whether he needs to look for a different job to decrease stress and to have a more appropriate schedule.  Provided unconditional positive regard in a safe, supportive environment.    Allowed patient to freely discuss issues without interruption or judgment. Provided safe, confidential environment to facilitate the development of positive therapeutic relationship and encourage open, honest communication. Assisted patient in identifying risk factors which would indicate the need for higher level of care including thoughts to harm self or others and/or self-harming behavior and encouraged patient to contact this office, call 911, or present to the nearest emergency room should any of these events occur. Discussed crisis intervention services and means to access.  Patient adamantly and convincingly denies current suicidal or homicidal ideation or perceptual disturbance.    Assessment    Patient appears to continue to make progress.  However, he continues to struggle with anxiety and depression which continues to cause impairment important areas of functioning.  As a result, he can be reasonably expected to continue to benefit from treatment likely be at increased risk for decompensation otherwise.    Diagnoses and all orders for this visit:    Generalized  anxiety disorder    Major depressive disorder, recurrent episode, in partial remission               Mental Status Exam  Hygiene:  good  Dress:  casual  Attitude:  Cooperative  Motor Activity:  Appropriate  Speech:  Normal  Mood:  anxious  Affect:  anxious  Thought Processes:  Linear  Thought Content:  normal  Suicidal Thoughts:  denies  Homicidal Thoughts:  denies  Crisis Safety Plan: yes, to come to the emergency room.  Hallucinations:  denies    Patient's Support Network Includes:  children and extended family    Progress toward goal: Not at goal    Functional Status: Moderate impairment     Prognosis: Fair with Ongoing Treatment     Plan         Patient will continue in individual outpatient therapy sessions every 2 weeks at the [facility] and pharmacotherapy as scheduled with Dr. Villareal.  Patient will adhere to medication regimen as prescribed and report any side effects. Patient will contact this office, call 911 or present to the nearest emergency room should suicidal or homicidal ideations occur. Provide Cognitive Behavioral Therapy and Integrative Therapy to improve functioning, maintain stability, and avoid decompensation and the need for higher level of care.          Return in about 2 weeks (around 5/30/2018) for Next scheduled follow up.      This document signed by Izaiah Tinoco LCSW, [facility] May 17, 2018 10:24 AM

## 2018-06-12 ENCOUNTER — OFFICE VISIT (OUTPATIENT)
Dept: PSYCHIATRY | Facility: CLINIC | Age: 48
End: 2018-06-12

## 2018-06-12 VITALS
WEIGHT: 193 LBS | BODY MASS INDEX: 27.02 KG/M2 | HEIGHT: 71 IN | SYSTOLIC BLOOD PRESSURE: 115 MMHG | HEART RATE: 60 BPM | DIASTOLIC BLOOD PRESSURE: 79 MMHG

## 2018-06-12 DIAGNOSIS — F33.41 MAJOR DEPRESSIVE DISORDER, RECURRENT EPISODE, IN PARTIAL REMISSION (HCC): ICD-10-CM

## 2018-06-12 DIAGNOSIS — F41.1 GENERALIZED ANXIETY DISORDER: Primary | ICD-10-CM

## 2018-06-12 PROCEDURE — 99213 OFFICE O/P EST LOW 20 MIN: CPT | Performed by: PSYCHIATRY & NEUROLOGY

## 2018-06-12 RX ORDER — ALPRAZOLAM 2 MG/1
2 TABLET, EXTENDED RELEASE ORAL 3 TIMES DAILY
Qty: 90 TABLET | Refills: 0 | Status: SHIPPED | OUTPATIENT
Start: 2018-06-12 | End: 2018-07-31 | Stop reason: SDUPTHER

## 2018-06-12 RX ORDER — IMIPRAMINE HCL 50 MG
TABLET ORAL
Qty: 90 TABLET | Refills: 0 | Status: SHIPPED | OUTPATIENT
Start: 2018-06-12 | End: 2018-07-03 | Stop reason: SDUPTHER

## 2018-06-12 RX ORDER — MIRTAZAPINE 15 MG/1
15 TABLET, FILM COATED ORAL NIGHTLY
Qty: 30 TABLET | Refills: 0 | Status: SHIPPED | OUTPATIENT
Start: 2018-06-12 | End: 2018-07-31 | Stop reason: SDUPTHER

## 2018-06-12 RX ORDER — SIMVASTATIN 20 MG
TABLET ORAL
COMMUNITY
Start: 2018-01-25 | End: 2018-07-03 | Stop reason: SDUPTHER

## 2018-06-12 RX ORDER — PANTOPRAZOLE SODIUM 40 MG/1
1 TABLET, DELAYED RELEASE ORAL
COMMUNITY
Start: 2018-01-25 | End: 2018-07-03 | Stop reason: SDUPTHER

## 2018-06-12 RX ORDER — MIRTAZAPINE 15 MG/1
1 TABLET, FILM COATED ORAL
COMMUNITY
Start: 2017-05-02 | End: 2018-06-12

## 2018-06-12 NOTE — PROGRESS NOTES
"Patient ID: Amish Win is a 48 y.o. male    SERVICE TYPE: EVALUATION AND MANAGEMENT (greater than 50% of the time spent for supportive psychotherapy).      /79   Pulse 60   Ht 180 cm (70.87\")   Wt 87.5 kg (193 lb)   BMI 27.02 kg/m²     ALLERGIES:  Penicillins    CC: \"Doing OK\"     FOLLOWED FOR: Anxiety/ Depression.     HPI: Several panic attaches at work - feels job more in jobert.   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   \"Getting better and better slowly\".     Did increase the Imipramine dose to 150 mg daily today.     PFSH: to visit son in [place] tomorrow.     Review of Systems   Respiratory: Negative.    Cardiovascular: Negative.    Gastrointestinal: Negative.    Musculoskeletal: Negative.    Neurological: Negative.        SUPPORTIVE PSYCHOTHERAPY: continuing efforts to promote the therapeutic alliance, address the patient’s issues, and strengthen self awareness, insights, and coping skills.       Mental Status Exam  Appearance:  clean and casually dressed, appropriate  Attitude toward clinician:  cooperative and agreeable   Speech:    Rate:  regular rate and rhythm   Volume: normal  Motor:  no abnormal movements present  Mood:  Good  Affect:  euthymic  Thought Processes:  linear, logical, and goal directed  Thought Content:  Normal   Feeling Hopeless: absent  Suicidal Thoughts:  absent  Homicidal Thoughts:  absent  Perceptual Disturbance: no perceptual disturbance  Attention and Concentration:  good  Insight and Judgement:  good  Memory:  memory appears to be intact    LABS: No results found for this or any previous visit (from the past 168 hour(s)).    MEDICATION ISSUES: Have discussed with the patient the medications Risks, Benefits, and Side effects including potential falls, possible impaired driving and  metabolic adversities among others. No medication " side effects or related complaints today.     TREATMENT PLAN/GOALS: Continue supportive psychotherapy efforts and medications as indicated.     Current Outpatient Prescriptions   Medication Sig Dispense Refill   • ALPRAZolam XR (XANAX XR) 2 MG 24 hr tablet Take 1 tablet by mouth 3 (Three) Times a Day. 90 tablet 0   • aspirin 81 MG EC tablet Take 81 mg by mouth 2 (Two) Times a Day.     • imipramine (TOFRANIL) 50 MG tablet One AM and two PM 90 tablet 0   • metoprolol tartrate (LOPRESSOR) 50 MG tablet Take 50 mg by mouth 2 (Two) Times a Day With Meals.     • mirtazapine (REMERON) 15 MG tablet Take 1 tablet by mouth Every Night. 30 tablet 0   • pantoprazole (PROTONIX) 40 MG EC tablet Take 40 mg by mouth Daily.     • pantoprazole (PROTONIX) 40 MG EC tablet Take 1 tablet by mouth.     • simvastatin (ZOCOR) 20 MG tablet Take 20 mg by mouth Every Night.     • simvastatin (ZOCOR) 20 MG tablet take 1 tablet (20MG)  by oral route  every day in the evening       No current facility-administered medications for this visit.        COLLATERAL PSYCHOTHERAPEUTIC INTERVENTION: Continuing with Izaiah Tinoco.    Encounter Diagnoses   Name Primary?   • Generalized anxiety disorder Yes   • Major depressive disorder, recurrent episode, in partial remission        Return in about 3 weeks (around 7/3/2018).  Patient knows to call if symptoms worsen or fail to improve between appointments.     Dictated utilizing Dragon dictation

## 2018-06-26 ENCOUNTER — OFFICE VISIT (OUTPATIENT)
Dept: PSYCHIATRY | Facility: CLINIC | Age: 48
End: 2018-06-26

## 2018-06-26 DIAGNOSIS — F41.1 GENERALIZED ANXIETY DISORDER: Primary | ICD-10-CM

## 2018-06-26 DIAGNOSIS — F33.41 MAJOR DEPRESSIVE DISORDER, RECURRENT EPISODE, IN PARTIAL REMISSION (HCC): ICD-10-CM

## 2018-06-26 PROCEDURE — 90832 PSYTX W PT 30 MINUTES: CPT | Performed by: SOCIAL WORKER

## 2018-07-03 ENCOUNTER — OFFICE VISIT (OUTPATIENT)
Dept: PSYCHIATRY | Facility: CLINIC | Age: 48
End: 2018-07-03

## 2018-07-03 VITALS
BODY MASS INDEX: 26.6 KG/M2 | HEIGHT: 71 IN | WEIGHT: 190 LBS | HEART RATE: 64 BPM | DIASTOLIC BLOOD PRESSURE: 72 MMHG | SYSTOLIC BLOOD PRESSURE: 105 MMHG

## 2018-07-03 DIAGNOSIS — F41.1 GENERALIZED ANXIETY DISORDER: Primary | ICD-10-CM

## 2018-07-03 DIAGNOSIS — F33.41 MAJOR DEPRESSIVE DISORDER, RECURRENT EPISODE, IN PARTIAL REMISSION (HCC): ICD-10-CM

## 2018-07-03 PROCEDURE — 99214 OFFICE O/P EST MOD 30 MIN: CPT | Performed by: PSYCHIATRY & NEUROLOGY

## 2018-07-03 RX ORDER — IMIPRAMINE HCL 50 MG
TABLET ORAL
Qty: 90 TABLET | Refills: 0 | Status: SHIPPED | OUTPATIENT
Start: 2018-07-03 | End: 2018-07-31 | Stop reason: SDUPTHER

## 2018-07-03 NOTE — PROGRESS NOTES
"Patient ID: Amish Win is a 48 y.o. male    SERVICE TYPE: EVALUATION AND MANAGEMENT (greater than 50% of the time spent for supportive psychotherapy).  Time in: 1303      Time out 1330    /72   Pulse 64   Ht 180 cm (70.87\")   Wt 86.2 kg (190 lb)   BMI 26.60 kg/m²     ALLERGIES:  Penicillins    CC: \"Still struggling at work\"    FOLLOWED FOR: Anxiety/ Depression    HPI: Anxious and distraught at work, more relaxed at home. \"Always worry about getting fired, two write ups since last contact\". Level of anxiety with subclinical panic attacks continue to be a issue With his job.  Depression perhaps improved, patient's says his wife Has remarked that he is \"better\" sleeps well with the Remeron 15 mg at bedtime.  Is focused on a healthier diet and trying to exercise on a regular basis although recently has slacked off somewhat.    PFSH: Continues to explored Alternative job opportunities on the computer, no good Leads as yet.    Review of Systems   HENT:        Dry mouth   Respiratory: Positive for shortness of breath.    Cardiovascular: Positive for palpitations.   Gastrointestinal: Positive for constipation.   Musculoskeletal: Negative.    Neurological: Negative.        SUPPORTIVE PSYCHOTHERAPY: continuing efforts to promote the therapeutic alliance, address the patient’s issues, and strengthen self awareness, insights, and coping skills.  Focused on coping strategies particularly when on the job.     Mental Status Exam  Appearance:  clean and casually dressed, appropriate  Attitude toward clinician:  cooperative and agreeable   Speech:    Rate:  regular rate and rhythm   Volume: normal  Motor:  no abnormal movements present  Mood:  Anxious  Affect:  Euthymic Today  Thought Processes:  linear, logical, and goal directed  Thought Content:  Obsessional concerns continued to be focused primarily on his physical status  Feeling Hopeless: absent  Suicidal Thoughts:  absent  Homicidal Thoughts:  absent  Perceptual " Disturbance: no perceptual disturbance  Attention and Concentration:  good  Insight and Judgement:  good  Memory:  memory appears to be intact    LABS: No results found for this or any previous visit (from the past 168 hour(s)).    MEDICATION ISSUES: Have discussed with the patient the medications Risks, Benefits, and Side effects including potential falls, possible impaired driving and  metabolic adversities among others. No medication side effects or related complaints today.     TREATMENT PLAN/GOALS: Continue supportive psychotherapy efforts and medications as indicated.     Current Outpatient Prescriptions   Medication Sig Dispense Refill   • ALPRAZolam XR (XANAX XR) 2 MG 24 hr tablet Take 1 tablet by mouth 3 (Three) Times a Day.     • aspirin 81 MG EC tablet Take 81 mg by mouth 2 (Two) Times a Day.     • imipramine (TOFRANIL) 50 MG tablet One AM and two PM 90 tablet 0   • metoprolol tartrate (LOPRESSOR) 50 MG tablet Take 50 mg by mouth 2 (Two) Times a Day With Meals.     • mirtazapine (REMERON) 15 MG tablet Take 1 tablet by mouth Every Night.     • pantoprazole (PROTONIX) 40 MG EC tablet Take 40 mg by mouth Daily.     • simvastatin (ZOCOR) 20 MG tablet Take 20 mg by mouth Every Night.       No current facility-administered medications for this visit.      Has supplies of the alprazolam and Remeron.    COLLATERAL PSYCHOTHERAPEUTIC INTERVENTION: Is continuing to see therapist Izaiah Tinoco twice a month.    RISK:  Moderate.     Encounter Diagnoses   Name Primary?   • Generalized anxiety disorder Yes   • Major depressive disorder, recurrent episode, in partial remission (CMS/HCC)        Return in about 4 weeks (around 7/31/2018).  Patient knows to call if symptoms worsen or fail to improve between appointments.     Dictated utilizing Dragon dictation

## 2018-07-05 NOTE — PROGRESS NOTES
Date of Service: 10/26/2018  Time In: 9:10 AM  Time Out: 9:35 AM      PROGRESS NOTE  Data:  Amish Win is a 48 y.o. male who met with the undersigned for a regularly scheduled individual outpatient therapy session at the [facility] for follow-up of depression and anxiety.     HPI: Patient reports he feels he is doing better states he continues to struggle with significant symptoms of anxiety while at work.  The patient reports he does continue to struggle with anxiety at times including anxious mood, feeling on edge, feeling overwhelmed, trembling, shortness of breath, increased heart rate, an urge to escape the situation, and a sense of impending doom.   Patient rates current symptoms of anxiety at a 5 on a scale of 1-10 with 10 being most severe.  Patient reports symptoms of depression are significantly decreased but states he continues to have periods of depressed mood, feeling hopeless, low self-esteem, and difficulty avoiding isolating himself.  The patient does report he has a more positive outlook.  She rates current symptoms of depression at a 3 on a scale of 1-10 with 10 being most severe.   Patient reports he is sleeping relatively well.  Patient reports he continues to adhere to medication regimen as prescribed.  Patient adamantly and convincingly denies suicidal ideation vehemently denies any substance use.      Clinical Maneuvering/Intervention:  Assisted patient in processing above session content; acknowledged and normalized patient’s thoughts, feelings, and concerns.  Assisted the patient in identifying and acknowledging his progress and praised him for his effort.  Continued to utilize cognitive behavioral therapy to assist the patient in developing appropriate coping mechanisms to decrease significant anxiety at work including positive self talk, deep breathing exercises, and reminding himself that he can only do his best.  Also continued to discuss the possibility of looking for other  employment with a more regular schedule.  Provided unconditional positive regard in a safe, supportive environment.    Allowed patient to freely discuss issues without interruption or judgment. Provided safe, confidential environment to facilitate the development of positive therapeutic relationship and encourage open, honest communication. Assisted patient in identifying risk factors which would indicate the need for higher level of care including thoughts to harm self or others and/or self-harming behavior and encouraged patient to contact this office, call 911, or present to the nearest emergency room should any of these events occur. Discussed crisis intervention services and means to access.  Patient adamantly and convincingly denies current suicidal or homicidal ideation or perceptual disturbance.    Assessment    Patient appears to continue to make progress.  However, he continues to struggle with anxiety and depression which continues to cause impairment important areas of functioning.  As a result, he can be reasonably expected to continue to benefit from treatment likely be at increased risk for decompensation otherwise.    Diagnoses and all orders for this visit:    Generalized anxiety disorder    Major depressive disorder, recurrent episode, in partial remission (CMS/[facility])               Mental Status Exam  Hygiene:  good  Dress:  casual  Attitude:  Cooperative  Motor Activity:  Appropriate  Speech:  Normal  Mood:  anxious  Affect:  anxious  Thought Processes:  Linear  Thought Content:  normal  Suicidal Thoughts:  denies  Homicidal Thoughts:  denies  Crisis Safety Plan: yes, to come to the emergency room.  Hallucinations:  denies    Patient's Support Network Includes:  children and extended family    Progress toward goal: Not at goal    Functional Status: Moderate impairment     Prognosis: Fair with Ongoing Treatment     Plan         Patient will continue in individual outpatient therapy sessions every 2  weeks at the [facility] and pharmacotherapy as scheduled with Dr. Villareal.  Patient will adhere to medication regimen as prescribed and report any side effects. Patient will contact this office, call 911 or present to the nearest emergency room should suicidal or homicidal ideations occur. Provide Cognitive Behavioral Therapy and Integrative Therapy to improve functioning, maintain stability, and avoid decompensation and the need for higher level of care.          Return in about 2 weeks (around 7/10/2018) for Next scheduled follow up.      This document signed by Izaiah Tinoco LCSW, [facility] July 5, 2018 11:46 AM

## 2018-07-24 ENCOUNTER — OFFICE VISIT (OUTPATIENT)
Dept: PSYCHIATRY | Facility: CLINIC | Age: 48
End: 2018-07-24

## 2018-07-24 DIAGNOSIS — F33.41 MAJOR DEPRESSIVE DISORDER, RECURRENT, IN PARTIAL REMISSION (HCC): ICD-10-CM

## 2018-07-24 DIAGNOSIS — F41.1 GENERALIZED ANXIETY DISORDER: Primary | ICD-10-CM

## 2018-07-24 PROCEDURE — 90832 PSYTX W PT 30 MINUTES: CPT | Performed by: SOCIAL WORKER

## 2018-07-24 NOTE — PROGRESS NOTES
Date of Service: July 24, 2018  Time In: 12:10 PM  Time Out: 12:40 PM      PROGRESS NOTE  Data:  Amish Win is a 48 y.o. male who met with the undersigned for a regularly scheduled individual outpatient therapy session at the [facility] for follow-up of depression and anxiety.     HPI:  The patient reports he continues to struggle with anxiety at times including anxious mood, feeling on edge, feeling overwhelmed, trembling, shortness of breath, increased heart rate, an urge to escape the situation, and a sense of impending doom.   Patient rates current symptoms of anxiety at a 6 on a scale of 1-10 with 10 being most severe.  He reports symptoms of anxiety are exacerbated significantly while at work and states he is fearful of losing his job at any moment.  He also reports he finds himself significantly dreading going to work and counting down the hours.  Patient also reports he has recently resorted to what appears to be compulsively holding his nose to hear his heart beat and tapping his chest.  Patient reports this began within the last 2 days.  Patient reports symptoms of depression continues to be improved;, however, he continues to have periods of depressed mood, feeling hopeless, low self-esteem, and difficulty avoiding isolating himself.    She rates current symptoms of depression at a 4 on a scale of 1-10 with 10 being most severe.   Patient reports he is sleeping relatively well.  Patient reports he continues to adhere to medication regimen as prescribed.  Patient adamantly and convincingly denies suicidal ideation vehemently denies any substance use.      Clinical Maneuvering/Intervention:  Assisted patient in processing above session content; acknowledged and normalized patient’s thoughts, feelings, and concerns.  Confronted the patient with the fact his current employment situation and job schedule might not be sustainable and strongly urged him to continue to seek alternative employment.  Also  utilized motivational interviewing techniques including complex reflections to assist the patient in recognizing and acknowledging his progress over the past several weeks.  Utilize cognitive behavioral therapy to assist patient in developing strategy to reduce anxiety and worry with regard to work including positive self talk, guided imagery, and thought blocking techniques.  Provided unconditional positive regard in a safe, supportive environment.    Allowed patient to freely discuss issues without interruption or judgment. Provided safe, confidential environment to facilitate the development of positive therapeutic relationship and encourage open, honest communication. Assisted patient in identifying risk factors which would indicate the need for higher level of care including thoughts to harm self or others and/or self-harming behavior and encouraged patient to contact this office, call 911, or present to the nearest emergency room should any of these events occur. Discussed crisis intervention services and means to access.  Patient adamantly and convincingly denies current suicidal or homicidal ideation or perceptual disturbance.    Assessment    Patient appears to be struggling with somewhat increased symptoms of anxiety which appears to be primarily situated around his work.  He also continues to struggle with periods of secondary depression.  The patient's symptomology continues to cause impairment in important areas of functioning.  As a result, he can be reasonably expected to continue to benefit from treatment likely be at increased risk for decompensation otherwise.    Diagnoses and all orders for this visit:    Generalized anxiety disorder    Major depressive disorder, recurrent, in partial remission (CMS/[facility])               Mental Status Exam  Hygiene:  good  Dress:  casual  Attitude:  Cooperative  Motor Activity:  Appropriate  Speech:  Normal  Mood:  anxious  Affect:  anxious  Thought Processes:   Linear  Thought Content:  normal  Suicidal Thoughts:  denies  Homicidal Thoughts:  denies  Crisis Safety Plan: yes, to come to the emergency room.  Hallucinations:  denies    Patient's Support Network Includes:  children and extended family    Progress toward goal: Not at goal    Functional Status: Moderate impairment     Prognosis: Fair with Ongoing Treatment     Plan         Patient will continue in individual outpatient therapy sessions every 2 weeks at the [facility] and pharmacotherapy as scheduled with Dr. Villareal.  Patient will adhere to medication regimen as prescribed and report any side effects. Patient will contact this office, call 911 or present to the nearest emergency room should suicidal or homicidal ideations occur. Provide Cognitive Behavioral Therapy and Integrative Therapy to improve functioning, maintain stability, and avoid decompensation and the need for higher level of care.          Return in about 3 weeks (around 8/14/2018) for Next scheduled follow up.      This document signed by Izaiah Tinoco LCSW, [facility] July 24, 2018 4:46 PM

## 2018-07-31 ENCOUNTER — OFFICE VISIT (OUTPATIENT)
Dept: PSYCHIATRY | Facility: CLINIC | Age: 48
End: 2018-07-31

## 2018-07-31 VITALS
BODY MASS INDEX: 27.72 KG/M2 | WEIGHT: 198 LBS | SYSTOLIC BLOOD PRESSURE: 122 MMHG | DIASTOLIC BLOOD PRESSURE: 85 MMHG | HEIGHT: 71 IN | HEART RATE: 67 BPM

## 2018-07-31 DIAGNOSIS — F33.41 MAJOR DEPRESSIVE DISORDER, RECURRENT, IN PARTIAL REMISSION (HCC): Primary | ICD-10-CM

## 2018-07-31 DIAGNOSIS — F41.1 GENERALIZED ANXIETY DISORDER: ICD-10-CM

## 2018-07-31 PROCEDURE — 99214 OFFICE O/P EST MOD 30 MIN: CPT | Performed by: PSYCHIATRY & NEUROLOGY

## 2018-07-31 RX ORDER — IMIPRAMINE HCL 50 MG
TABLET ORAL
Qty: 90 TABLET | Refills: 0 | Status: SHIPPED | OUTPATIENT
Start: 2018-07-31 | End: 2018-08-24 | Stop reason: SDUPTHER

## 2018-07-31 RX ORDER — ALPRAZOLAM 2 MG/1
2 TABLET, EXTENDED RELEASE ORAL 3 TIMES DAILY
Qty: 90 TABLET | Refills: 0 | Status: SHIPPED | OUTPATIENT
Start: 2018-07-31 | End: 2018-08-28 | Stop reason: SDUPTHER

## 2018-07-31 RX ORDER — MIRTAZAPINE 15 MG/1
15 TABLET, FILM COATED ORAL NIGHTLY
Qty: 30 TABLET | Refills: 0 | Status: SHIPPED | OUTPATIENT
Start: 2018-07-31 | End: 2018-08-28 | Stop reason: SDUPTHER

## 2018-07-31 NOTE — PROGRESS NOTES
"Patient ID: Amish Win is a 48 y.o. male    SERVICE TYPE: EVALUATION AND MANAGEMENT (greater than 50% of the time spent for supportive psychotherapy).  Time in 1435 time out 1510    /85   Pulse 67   Ht 180 cm (70.87\")   Wt 89.8 kg (198 lb)   BMI 27.72 kg/m²     ALLERGIES:  Penicillins    CC: \"Few set backs\"     FOLLOWED FOR: Depression/ Anxiety.     HPI: Following daily exercising fairly steady - more comfortable in his yard. Continues job search. No major setbacks at his job, talked with his supervisor about his anxiety and realizes they need him and his experience. Sleeping >6 hours per night. Good dietary discipline. Actually sounds like he is doing fairly well even with his residual symptoms of obsessional anxiety with a focus on health issue.     PFSH: home life better.     Review of Systems   Respiratory: Positive for chest tightness.    Cardiovascular: Positive for palpitations.   Gastrointestinal: Negative.        SUPPORTIVE PSYCHOTHERAPY: continuing efforts to promote the therapeutic alliance, address the patient’s issues, and strengthen self awareness, insights, and coping skills.  Discussed avoidance as a maladaptive behavior, encourage to resume his gym membership.     Mental Status Exam  Appearance:  clean and casually dressed, appropriate  Attitude toward clinician:  cooperative and agreeable   Speech:    Rate:  regular rate and rhythm   Volume: normal  Motor:  no abnormal movements present  Mood:  Good  Affect:  euthymic  Thought Processes:  linear, logical, and goal directed  Thought Content:  Normal   Feeling Hopeless: absent  Suicidal Thoughts:  absent  Homicidal Thoughts:  absent  Perceptual Disturbance: no perceptual disturbance  Attention and Concentration:  good  Insight and Judgement:  good  Memory:  memory appears to be intact    LABS: No results found for this or any previous visit (from the past 168 hour(s)).    MEDICATION ISSUES: Have discussed with the patient the " medications Risks, Benefits, and Side effects including potential falls, possible impaired driving and  metabolic adversities among others. No medication side effects or related complaints today.     TREATMENT PLAN/GOALS: Continue supportive psychotherapy efforts and medications as indicated.     Current Outpatient Prescriptions   Medication Sig Dispense Refill   • ALPRAZolam XR (XANAX XR) 2 MG 24 hr tablet Take 1 tablet by mouth 3 (Three) Times a Day. 90 tablet 0   • aspirin 81 MG EC tablet Take 81 mg by mouth 2 (Two) Times a Day.     • imipramine (TOFRANIL) 50 MG tablet One AM and two PM 90 tablet 0   • metoprolol tartrate (LOPRESSOR) 50 MG tablet Take 50 mg by mouth 2 (Two) Times a Day With Meals.     • mirtazapine (REMERON) 15 MG tablet Take 1 tablet by mouth Every Night. 30 tablet 0   • pantoprazole (PROTONIX) 40 MG EC tablet Take 40 mg by mouth Daily.     • simvastatin (ZOCOR) 20 MG tablet Take 20 mg by mouth Every Night.       No current facility-administered medications for this visit.        COLLATERAL PSYCHOTHERAPEUTIC INTERVENTION: will be seeing his therapist every two weeks.     RISK:  moderate    Encounter Diagnoses   Name Primary?   • Major depressive disorder, recurrent, in partial remission (CMS/HCC) Yes   • Generalized anxiety disorder        Return in about 4 weeks (around 8/28/2018).  Patient knows to call if symptoms worsen or fail to improve between appointments.     Dictated utilizing Dragon dictation

## 2018-08-07 ENCOUNTER — OFFICE VISIT (OUTPATIENT)
Dept: PSYCHIATRY | Facility: CLINIC | Age: 48
End: 2018-08-07

## 2018-08-07 DIAGNOSIS — F41.1 GENERALIZED ANXIETY DISORDER: ICD-10-CM

## 2018-08-07 DIAGNOSIS — F33.41 MAJOR DEPRESSIVE DISORDER, RECURRENT, IN PARTIAL REMISSION (HCC): Primary | ICD-10-CM

## 2018-08-07 PROCEDURE — 90832 PSYTX W PT 30 MINUTES: CPT | Performed by: SOCIAL WORKER

## 2018-08-08 NOTE — TREATMENT PLAN
Multi-Disciplinary Problems (from Behavioral Health Treatment Plan)    Active Problems     Problem:  Patient Care Overview (Adult)  Start Date: 08/07/18    Problem Details:  Patient appears to be doing somewhat better with depression but continues to have periods of sad mood, hopelessness, social isolation, and decreased energy.  Patient also continues to struggle with anxiety, especially at work.  Patient continues to strug gle with anxious mood, feeling on edge, feeling overwhelmed, increased heart rate, mind goes blank, and a sense of impending doom.  Patient does appear to be doing somewhat better at work and has had no issues in the past several weeks.     Goal Priority Start Date Expected End Date End Date    Plan of Care Review High 08/07/18 11/07/18 --    Goal Details:  Patient will continue to focus on healthy skills of daily living including eating a healthy diet, getting regular physical activity, and getting adequate sleep.  The patient will actively seek enjoyable activities he can engage in a regular basis to redu ce idle time.  We will continue to utilize cognitive behavioral therapy and solution focused therapy to assist the patient in developing and utilizing appropriate coping mechanisms to decrease severity and frequency of symptoms.  Provide safe, confidenti al environment to facilitate the maintenance of positive therapeutic relationship and encourage open, honest communication.                        I have discussed and reviewed this treatment plan with the patient.  It has been printed for signatures.      This document signed by Izaiah Tinoco LCSW, [facility] August 8, 2018 8:26 AM

## 2018-08-08 NOTE — PROGRESS NOTES
Date of Service: August 7, 2018  Time In: 11:10 AM  Time Out: 11:40 AM      PROGRESS NOTE  Data:  Amish Win is a 48 y.o. male who met with the undersigned for a regularly scheduled individual outpatient therapy session at the [facility] for follow-up of depression and anxiety.     HPI: Patient reports he feels things are going somewhat better and states he feels he is doing relatively well at work.  He reports a recent incident where he was forced to deny alcoholic beverages to a customer which caused increased anxiety but states he feels he handled it appropriately.  The patient reports he continues to struggle with anxiety at times including anxious mood, feeling on edge, feeling overwhelmed, trembling, shortness of breath, increased heart rate, an urge to escape the situation, and a sense of impending doom.   Patient rates current symptoms of anxiety at a 5 on a scale of 1-10 with 10 being most severe.  Patient continues to report his anxiety is significantly increased at work and states he believes this is partly due to working a late shift. Patient reports symptoms of depression continues to be improved;, however, he continues to have periods of depressed mood, feeling hopeless, low self-esteem, and difficulty avoiding isolating himself.    Patient rates current symptoms of depression at a 4 on a scale of 1-10 with 10 being most severe.   Patient reports he is sleeping relatively well.  He reports he is trying to go to the gym on a regular basis but states he is struggling to remain motivated.  Patient reports he continues to adhere to medication regimen as prescribed.  Patient adamantly and convincingly denies suicidal ideation vehemently denies any substance use.      Clinical Maneuvering/Intervention:  Assisted patient in processing above session content; acknowledged and normalized patient’s thoughts, feelings, and concerns.  Utilized solution focused therapy and cognitive behavioral therapy to  assist the patient in developing a strategy to increase his attendance at the gym including setting specific days of the week and challenging irrational justifications for not going.  Also utilized motivational interviewing techniques including complex reflections to assist the patient in identifying and acknowledging his progress and encouraged him to continue to utilize appropriate coping mechanisms including positive self talk, relaxation techniques, and challenged him to remind himself he has the ability to choose whether he focuses on the positive or negative.  Also validated the patient's plan to continue to seek other employment with a more suitable schedule.  Provided unconditional positive regard in a safe, supportive environment.    Completed quarterly treatment review on this date.    Allowed patient to freely discuss issues without interruption or judgment. Provided safe, confidential environment to facilitate the development of positive therapeutic relationship and encourage open, honest communication. Assisted patient in identifying risk factors which would indicate the need for higher level of care including thoughts to harm self or others and/or self-harming behavior and encouraged patient to contact this office, call 911, or present to the nearest emergency room should any of these events occur. Discussed crisis intervention services and means to access.  Patient adamantly and convincingly denies current suicidal or homicidal ideation or perceptual disturbance.    Assessment    Patient appears to be maintaining relative stability at this time compared to his baseline.  However, he continues to struggle at work.    The patient's symptomology continues to cause impairment in important areas of functioning.  As a result, he can be reasonably expected to continue to benefit from treatment likely be at increased risk for decompensation otherwise.    Diagnoses and all orders for this visit:    Major  depressive disorder, recurrent, in partial remission (CMS/HCC)    Generalized anxiety disorder               Mental Status Exam  Hygiene:  good  Dress:  casual  Attitude:  Cooperative  Motor Activity:  Appropriate  Speech:  Normal  Mood:  Within normal limits  Affect:  Appropriate  Thought Processes:  Linear  Thought Content:  normal  Suicidal Thoughts:  denies  Homicidal Thoughts:  denies  Crisis Safety Plan: yes, to come to the emergency room.  Hallucinations:  denies    Patient's Support Network Includes:  children and extended family    Progress toward goal: Not at goal    Functional Status: Moderate impairment     Prognosis: Fair with Ongoing Treatment     Plan         Patient will continue in individual outpatient therapy sessions every 2 weeks at the [facility] and pharmacotherapy as scheduled with Dr. Villareal.  Patient will adhere to medication regimen as prescribed and report any side effects. Patient will contact this office, call 911 or present to the nearest emergency room should suicidal or homicidal ideations occur. Provide Cognitive Behavioral Therapy and Integrative Therapy to improve functioning, maintain stability, and avoid decompensation and the need for higher level of care.          Return in about 2 weeks (around 8/21/2018) for Next scheduled follow up.      This document signed by Izaiah Tinoco LCSW, JOAQUIN August 8, 2018 8:27 AM

## 2018-08-24 RX ORDER — IMIPRAMINE HCL 50 MG
TABLET ORAL
Qty: 90 TABLET | Refills: 0 | Status: SHIPPED | OUTPATIENT
Start: 2018-08-24 | End: 2018-08-28 | Stop reason: SDUPTHER

## 2018-08-24 NOTE — TELEPHONE ENCOUNTER
Pt  Has an appt with you Monday but he will be out of this medication on Sunday asking for enough med til Monday.

## 2018-08-28 ENCOUNTER — OFFICE VISIT (OUTPATIENT)
Dept: PSYCHIATRY | Facility: CLINIC | Age: 48
End: 2018-08-28

## 2018-08-28 VITALS
HEIGHT: 71 IN | BODY MASS INDEX: 27.72 KG/M2 | WEIGHT: 198 LBS | HEART RATE: 67 BPM | SYSTOLIC BLOOD PRESSURE: 110 MMHG | DIASTOLIC BLOOD PRESSURE: 74 MMHG

## 2018-08-28 DIAGNOSIS — Z79.899 MEDICATION MANAGEMENT: ICD-10-CM

## 2018-08-28 DIAGNOSIS — F41.1 GENERALIZED ANXIETY DISORDER: Primary | ICD-10-CM

## 2018-08-28 DIAGNOSIS — F33.41 MAJOR DEPRESSIVE DISORDER, RECURRENT, IN PARTIAL REMISSION (HCC): ICD-10-CM

## 2018-08-28 LAB
AMPHETAMINE CUT-OFF: 1000
BENZODIAZIPINE CUT-OFF: 300
BUPRENORPHINE CUT-OFF: 10
COCAINE CUT-OFF: 300
EXTERNAL AMPHETAMINE SCREEN URINE: NEGATIVE
EXTERNAL BENZODIAZEPINE SCREEN URINE: POSITIVE
EXTERNAL BUPRENORPHINE SCREEN URINE: NEGATIVE
EXTERNAL COCAINE SCREEN URINE: NEGATIVE
EXTERNAL MDMA: NEGATIVE
EXTERNAL METHADONE SCREEN URINE: NEGATIVE
EXTERNAL METHAMPHETAMINE SCREEN URINE: NEGATIVE
EXTERNAL OPIATES SCREEN URINE: NEGATIVE
EXTERNAL OXYCODONE SCREEN URINE: NEGATIVE
EXTERNAL THC SCREEN URINE: NEGATIVE
MDMA CUT-OFF: 500
METHADONE CUT-OFF: 300
METHAMPHETAMINE CUT-OFF: 1000
OPIATES CUT-OFF: 300
OXYCODONE CUT-OFF: 100
THC CUT-OFF: 50

## 2018-08-28 PROCEDURE — 99214 OFFICE O/P EST MOD 30 MIN: CPT | Performed by: PSYCHIATRY & NEUROLOGY

## 2018-08-28 RX ORDER — DOCUSATE SODIUM 100 MG/1
100 CAPSULE, LIQUID FILLED ORAL 2 TIMES DAILY
Qty: 60 CAPSULE | Refills: 1 | Status: SHIPPED | OUTPATIENT
Start: 2018-08-28

## 2018-08-28 RX ORDER — ERGOCALCIFEROL 1.25 MG/1
CAPSULE ORAL
COMMUNITY
Start: 2018-08-08

## 2018-08-28 RX ORDER — IMIPRAMINE HCL 50 MG
TABLET ORAL
Qty: 90 TABLET | Refills: 1 | Status: SHIPPED | OUTPATIENT
Start: 2018-08-28 | End: 2018-11-08 | Stop reason: SDUPTHER

## 2018-08-28 RX ORDER — ALPRAZOLAM 2 MG/1
2 TABLET, EXTENDED RELEASE ORAL 3 TIMES DAILY
Qty: 90 TABLET | Refills: 1 | Status: SHIPPED | OUTPATIENT
Start: 2018-08-28 | End: 2018-11-01 | Stop reason: SDUPTHER

## 2018-08-28 RX ORDER — RANITIDINE 150 MG/1
TABLET ORAL
COMMUNITY
Start: 2018-08-08 | End: 2020-03-30

## 2018-08-28 RX ORDER — MIRTAZAPINE 15 MG/1
15 TABLET, FILM COATED ORAL NIGHTLY
Qty: 30 TABLET | Refills: 1 | Status: SHIPPED | OUTPATIENT
Start: 2018-08-28 | End: 2018-11-08 | Stop reason: SDUPTHER

## 2018-08-28 NOTE — PROGRESS NOTES
"Patient ID: Amish Win is a 48 y.o. male    SERVICE TYPE: EVALUATION AND MANAGEMENT (greater than 50% of the time spent for supportive psychotherapy).  Time in 1310  Time out 1350    /74   Pulse 67   Ht 180 cm (70.87\")   Wt 89.8 kg (198 lb)   BMI 27.72 kg/m²     ALLERGIES:  Penicillins    CC: \"Overall not too bad\"     FOLLOWED FOR:    HPI: \"Not felt the palpitations for months\" Still have surges of anxiety at his job, Overall seems to be gaining and ability to redirect his  Obsessional/Hypochondriacal thoughts although clearly not mastering them at this point.  His depression is certainly improved.  He is continuing his physical fitness and dietary discipline.  Some difficulties with constipation, did prescribe a stool softener today and talked about a high-fiber diet.  Also discussed spreading out our appointments in that he is continuing to see his psychotherapist on a every 2 week basis. Slow progress moving towards wellness.    PFSH: Recent He and his wife visited to his son in [place], An emotional experience for both, The gentleman is doing well there in That structured environment..    Review of Systems   Gastrointestinal: Positive for constipation.   Neurological: Negative.        SUPPORTIVE PSYCHOTHERAPY: continuing efforts to promote the therapeutic alliance, address the patient’s issues, and strengthen self awareness, insights, and coping skills.   Talk more about the CPT concept and application.    Mental Status Exam  Appearance:  clean and casually dressed, appropriate  Attitude toward clinician:  cooperative and agreeable   Speech:    Rate:  regular rate and rhythm   Volume: normal  Motor:  no abnormal movements present  Mood:  Good  Affect:  euthymic  Thought Processes:  linear, logical, and goal directed  Thought Content:  Normal   Feeling Hopeless: absent  Suicidal Thoughts:  absent  Homicidal Thoughts:  absent  Perceptual Disturbance: no perceptual disturbance  Attention and " Concentration:  good  Insight and Judgement:  good  Memory:  memory appears to be intact    LABS:   Recent Results (from the past 168 hour(s))   [facility] Drug Screen    Collection Time: 08/28/18  1:30 PM   Result Value Ref Range    External Amphetamine Screen Urine Negative     Amphetamine Cut-Off 1,000     External Benzodiazepine Screen Urine Positive     Benzodiazipine Cut-Off 300     External Cocaine Screen Urine Negative     Cocaine Cut-Off 300     External THC Screen Urine Negative     THC Cut-Off 50     External Methadone Screen Urine Negative     Methadone Cut-Off 300     External Methamphetamine Screen Urine Negative     Methamphetamine Cut-Off 1,000     External Oxycodone Screen Urine Negative     Oxycodone Cut-Off 100     External Buprenorphine Screen Urine Negative     Buprenorphine Cut-Off 10     External MDMA Negative     MDMA Cut-Off 500     External Opiates Screen Urine Negative     Opiates Cut-Off 300        MEDICATION ISSUES: Have discussed with the patient the medications Risks, Benefits, and Side effects including potential falls, possible impaired driving and  metabolic adversities among others. No medication side effects or related complaints today.     TREATMENT PLAN/GOALS: Continue supportive psychotherapy efforts and medications as indicated.     Current Outpatient Prescriptions   Medication Sig Dispense Refill   • ALPRAZolam XR (XANAX XR) 2 MG 24 hr tablet Take 1 tablet by mouth 3 (Three) Times a Day. 90 tablet 1   • aspirin 81 MG EC tablet Take 81 mg by mouth 2 (Two) Times a Day.     • imipramine (TOFRANIL) 50 MG tablet One AM and two PM 90 tablet 1   • metoprolol tartrate (LOPRESSOR) 50 MG tablet Take 50 mg by mouth 2 (Two) Times a Day With Meals.     • mirtazapine (REMERON) 15 MG tablet Take 1 tablet by mouth Every Night. 30 tablet 1   • pantoprazole (PROTONIX) 40 MG EC tablet Take 40 mg by mouth Daily.     • simvastatin (ZOCOR) 20 MG tablet Take 20 mg by mouth Every Night.     • docusate  sodium (COLACE) 100 MG capsule Take 1 capsule by mouth 2 (Two) Times a Day. 60 capsule 1   • raNITIdine (ZANTAC) 150 MG tablet      • vitamin D (ERGOCALCIFEROL) 92155 units capsule capsule        No current facility-administered medications for this visit.        COLLATERAL PSYCHOTHERAPEUTIC INTERVENTION: patient not interested in additional psychotherapy.    RISK:  Low to moderate    Encounter Diagnosis   Name Primary?   •     General anxiety disorder  Major depressive disorder in partial remission    Return in about 2 months (around 10/28/2018).  Patient knows to call if symptoms worsen or fail to improve between appointments.     Dictated utilizing Dragon dictation

## 2018-09-04 ENCOUNTER — OFFICE VISIT (OUTPATIENT)
Dept: PSYCHIATRY | Facility: CLINIC | Age: 48
End: 2018-09-04

## 2018-09-04 DIAGNOSIS — F41.1 GENERALIZED ANXIETY DISORDER: Primary | ICD-10-CM

## 2018-09-04 DIAGNOSIS — F33.41 MAJOR DEPRESSIVE DISORDER, RECURRENT, IN PARTIAL REMISSION (HCC): ICD-10-CM

## 2018-09-04 PROCEDURE — 90834 PSYTX W PT 45 MINUTES: CPT | Performed by: SOCIAL WORKER

## 2018-09-04 NOTE — PROGRESS NOTES
Date of Service: September 4, 2018  Time In: 8:52 AM  Time Out: 9:40 AM       MT 9:40 AMOGRESS NOTE  Data:  Amish Win is a 48 y.o. male who met with the undersigned for a regularly scheduled individual outpatient therapy session at the [facility] for follow-up of depression and anxiety.     HPI:  patient reports he feels he was doing significantly better until 6 days ago when he began working 8 straight days.  He reports he continues to work second shift and states he gets home at approximately 2 AM.  Patient reports he feels he has decompensated and states he feels physically sick when it becomes time to get ready for work.   The patient reports he continues to struggle with anxiety including anxious mood, feeling on edge, feeling overwhelmed, trembling, shortness of breath, increased heart rate, an urge to escape the situation, and a sense of impending doom.   Patient rates current symptoms of anxiety at a at 7on a scale of 1-10 with 10 being most severe.  Patient continues to report his anxiety is significantly increased at work and states he believes this is partly due to working a late shift. Patient reports symptoms of depression continues to be improved; however, he continues to have periods of depressed mood, feeling hopeless, low self-esteem, and difficulty avoiding isolating himself.    Patient rates current symptoms of depression at a at 5on a scale of 1-10 with 10 being most severe.  patient also reports he feels symptoms of depression are exacerbated by his job and states he feels better at home.    Patient reports he is sleeping relatively well but reports constantly feeling tired.  He reports he is trying to go to the gym on a regular basis but  states it is difficult due to not having the energy.  Patient also reports increased obsessive worry for the last few days and states he is began picking at himself and listening for his heart beat.  Patient reports  he continues to adhere to  medication regimen as prescribed.  Patient adamantly and convincingly denies suicidal ideation vehemently denies any substance use.      Clinical Maneuvering/Intervention:  Assisted patient in processing above session content; acknowledged and normalized patient’s thoughts, feelings, and concerns.  utilize motivational interviewing techniques including complex reflect is to assist the patient in recognizing the likelihood his job simply isn't conducive to his stability and encouraged him to consider looking for other options.  I also utilized cognitive behavioral therapy to assist the patient in developing strategy to reduce the exacerbation of his symptoms at work including positive self talk, reminding himself he can only do the best he can, and thought blocking techniques.  Continued to discuss healthy skills of daily living and strongly urge the patient to actively seek enjoyable activities she can engage in regular basis.  Provided unconditional positive regard in a safe, supportive environment.      Allowed patient to freely discuss issues without interruption or judgment. Provided safe, confidential environment to facilitate the development of positive therapeutic relationship and encourage open, honest communication. Assisted patient in identifying risk factors which would indicate the need for higher level of care including thoughts to harm self or others and/or self-harming behavior and encouraged patient to contact this office, call 911, or present to the nearest emergency room should any of these events occur. Discussed crisis intervention services and means to access.  Patient adamantly and convincingly denies current suicidal or homicidal ideation or perceptual disturbance.    Assessment    Patient appears to be maintaining relative stability at this time compared to his baseline.  However, he continues to struggle at work.    The patient's symptomology continues to cause impairment in important areas  of functioning.  As a result, he can be reasonably expected to continue to benefit from treatment likely be at increased risk for decompensation otherwise.    Diagnoses and all orders for this visit:    Generalized anxiety disorder    Major depressive disorder, recurrent, in partial remission (CMS/[facility])               Mental Status Exam  Hygiene:  good  Dress:  casual  Attitude:  Cooperative  Motor Activity:  Appropriate  Speech:  Normal  Mood:  anxious   Affect:  depressed   Thought Processes:  Linear  Thought Content:  normal  Suicidal Thoughts:  denies  Homicidal Thoughts:  denies  Crisis Safety Plan: yes, to come to the emergency room.  Hallucinations:  denies    Patient's Support Network Includes:  children and extended family    Progress toward goal: Not at goal    Functional Status: Moderate impairment     Prognosis: Fair with Ongoing Treatment     Plan         Patient will continue in individual outpatient therapy sessions every 2 weeks at the [facility] and pharmacotherapy as scheduled with Dr. Villareal.  Patient will adhere to medication regimen as prescribed and report any side effects. Patient will contact this office, call 911 or present to the nearest emergency room should suicidal or homicidal ideations occur. Provide Cognitive Behavioral Therapy and Integrative Therapy to improve functioning, maintain stability, and avoid decompensation and the need for higher level of care.          Return in about 2 weeks (around 9/18/2018) for Next scheduled follow up.      This document signed by Izaiah Tinoco LCSW, [facility] September 4, 2018 3:42 PM

## 2018-10-02 ENCOUNTER — OFFICE VISIT (OUTPATIENT)
Dept: PSYCHIATRY | Facility: CLINIC | Age: 48
End: 2018-10-02

## 2018-10-02 DIAGNOSIS — F33.1 MAJOR DEPRESSIVE DISORDER, RECURRENT EPISODE, MODERATE (HCC): ICD-10-CM

## 2018-10-02 DIAGNOSIS — F41.1 GENERALIZED ANXIETY DISORDER: Primary | ICD-10-CM

## 2018-10-02 DIAGNOSIS — Z63.0 MARITAL/PARTNER RELATIONAL PROBLEM: ICD-10-CM

## 2018-10-02 PROCEDURE — 90834 PSYTX W PT 45 MINUTES: CPT | Performed by: SOCIAL WORKER

## 2018-10-02 SDOH — SOCIAL STABILITY - SOCIAL INSECURITY: PROBLEMS IN RELATIONSHIP WITH SPOUSE OR PARTNER: Z63.0

## 2018-10-02 NOTE — PROGRESS NOTES
Date of Service: October 2, 2018  Time In: 9:15 AM  Time Out: 9:58 AM      TX 9:40 AMOGRESS NOTE  Data:  Amish Win is a 48 y.o. male who met with the undersigned for a regularly scheduled individual outpatient therapy session at the [facility] for follow-up of depression and anxiety.     HPI: Patient reports the last few weeks of memory difficulties states he was given a final right up at work approximately a week and a half ago after an employee was found to be drinking on the job.  Patient reports things continue to be her difficult home and states his wife continues to be very critical. The patient reports he continues to struggle with anxiety including anxious mood, feeling on edge, feeling overwhelmed, trembling, shortness of breath, increased heart rate, an urge to escape the situation, and a sense of impending doom.   Patient also reports an increase in nervous tics surrounding his health including holding his nose in attempt to feel his pulse and tapping himself on the chest.  Patient rates current symptoms of anxiety at  an 8 on a scale of 1-10 with 10 being most severe.  Patient continues to report his anxiety is significantly increased at work and states he believes this is partly due to working a late shift. Patient reports symptoms of depression continues to be improved; however, he continues to have periods of depressed mood, feeling hopeless, low self-esteem, and difficulty avoiding isolating himself.    Patient rates current symptoms of depression at a at 5 on a scale of 1-10 with 10 being most severe.  Patient continues to report he feels his primary stressor is his work including the fact he is working to approximately 3 AM.    Patient reports he is sleeping relatively well but reports constantly feeling tired.   Patient also reports increased obsessive worry for the last few days and states he is began picking at himself and listening for his heart beat.   Patient also reports recent lab  work at [employer] revealed he has low testosterone. Patient reports  he continues to adhere to medication regimen as prescribed.  Patient adamantly and convincingly denies suicidal ideation vehemently denies any substance use.      Clinical Maneuvering/Intervention:  Assisted patient in processing above session content; acknowledged and normalized patient’s thoughts, feelings, and concerns.   Strongly urged the patient to follow-up with primary care provider concerning his low testosterone and also encouraged him to seek other treatment and they appear to be unwilling or unable to address this issue.  Utilized motivational interviewing techniques including complex relations to assist the patient in acknowledging his job is creating a great deal of stress and also challenged him to consider his shift is simply not sustainable.  Utilized cognitive behavioral therapy to assist the patient in developing a strategy to reduce the severity and frequency of symptoms including taking one day at a time, deep breathing exercises, and planning to look for other employment while he is off for a few days during fall break. Provided unconditional positive regard in a safe, supportive environment.      Allowed patient to freely discuss issues without interruption or judgment. Provided safe, confidential environment to facilitate the development of positive therapeutic relationship and encourage open, honest communication. Assisted patient in identifying risk factors which would indicate the need for higher level of care including thoughts to harm self or others and/or self-harming behavior and encouraged patient to contact this office, call 911, or present to the nearest emergency room should any of these events occur. Discussed crisis intervention services and means to access.  Patient adamantly and convincingly denies current suicidal or homicidal ideation or perceptual disturbance.    Assessment    Patient is  struggling with increased symptoms azelaic which appears to be primarily precipitated by stress at work and ongoing marital strain.  The patient's mood, also be negatively affected by low testosterone as reported by the patient. The patient's symptomology continues to cause impairment in important areas of functioning.  As a result, he can be reasonably expected to continue to benefit from treatment likely be at increased risk for decompensation otherwise.    Diagnoses and all orders for this visit:    Generalized anxiety disorder    Major depressive disorder, recurrent episode, moderate (CMS/HCC)    Marital/partner relational problem               Mental Status Exam  Hygiene:  good  Dress:  casual  Attitude:  Cooperative  Motor Activity:  Appropriate  Speech:  Normal  Mood:  anxious   Affect:  depressed   Thought Processes:  Linear  Thought Content:  normal  Suicidal Thoughts:  denies  Homicidal Thoughts:  denies  Crisis Safety Plan: yes, to come to the emergency room.  Hallucinations:  denies    Patient's Support Network Includes:  children and extended family    Progress toward goal: Not at goal    Functional Status: Moderate impairment     Prognosis: Fair with Ongoing Treatment     Plan         Patient will continue in individual outpatient therapy sessions every 2 weeks at the [facility] and pharmacotherapy as scheduled with Dr. Villareal.  Patient will adhere to medication regimen as prescribed and report any side effects. Patient will contact this office, call 911 or present to the nearest emergency room should suicidal or homicidal ideations occur. Provide Cognitive Behavioral Therapy and Integrative Therapy to improve functioning, maintain stability, and avoid decompensation and the need for higher level of care.          Return in about 2 weeks (around 10/16/2018) for Next scheduled follow up.      This document signed by Izaiah Tinoco LCSW, JOAQUIN October 2, 2018 2:41 PM

## 2018-10-23 ENCOUNTER — OFFICE VISIT (OUTPATIENT)
Dept: PSYCHIATRY | Facility: CLINIC | Age: 48
End: 2018-10-23

## 2018-10-23 VITALS
BODY MASS INDEX: 27.44 KG/M2 | HEART RATE: 76 BPM | SYSTOLIC BLOOD PRESSURE: 124 MMHG | WEIGHT: 196 LBS | HEIGHT: 71 IN | DIASTOLIC BLOOD PRESSURE: 82 MMHG

## 2018-10-23 DIAGNOSIS — F33.1 MAJOR DEPRESSIVE DISORDER, RECURRENT EPISODE, MODERATE (HCC): ICD-10-CM

## 2018-10-23 DIAGNOSIS — F41.1 GENERALIZED ANXIETY DISORDER: Primary | ICD-10-CM

## 2018-10-23 PROCEDURE — 99214 OFFICE O/P EST MOD 30 MIN: CPT | Performed by: PSYCHIATRY & NEUROLOGY
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
## 2018-10-30 ENCOUNTER — OFFICE VISIT (OUTPATIENT)
Dept: PSYCHIATRY | Facility: CLINIC | Age: 48
End: 2018-10-30

## 2018-10-30 DIAGNOSIS — F41.1 GENERALIZED ANXIETY DISORDER: Primary | ICD-10-CM

## 2018-10-30 DIAGNOSIS — F33.1 MAJOR DEPRESSIVE DISORDER, RECURRENT EPISODE, MODERATE (HCC): ICD-10-CM

## 2018-10-30 DIAGNOSIS — Z56.6 WORK-RELATED STRESS: ICD-10-CM

## 2018-10-30 PROCEDURE — 90832 PSYTX W PT 30 MINUTES: CPT | Performed by: SOCIAL WORKER

## 2018-10-30 SDOH — HEALTH STABILITY - MENTAL HEALTH: OTHER PHYSICAL AND MENTAL STRAIN RELATED TO WORK: Z56.6

## 2018-11-01 RX ORDER — ALPRAZOLAM 2 MG/1
2 TABLET, EXTENDED RELEASE ORAL 3 TIMES DAILY
Qty: 90 TABLET | Refills: 0 | Status: SHIPPED | OUTPATIENT
Start: 2018-11-01 | End: 2018-11-21 | Stop reason: SDUPTHER

## 2018-11-08 ENCOUNTER — OFFICE VISIT (OUTPATIENT)
Dept: PSYCHIATRY | Facility: CLINIC | Age: 48
End: 2018-11-08

## 2018-11-08 VITALS
SYSTOLIC BLOOD PRESSURE: 133 MMHG | HEART RATE: 63 BPM | DIASTOLIC BLOOD PRESSURE: 84 MMHG | HEIGHT: 71 IN | WEIGHT: 202 LBS | BODY MASS INDEX: 28.28 KG/M2

## 2018-11-08 DIAGNOSIS — F41.1 GENERALIZED ANXIETY DISORDER: ICD-10-CM

## 2018-11-08 DIAGNOSIS — F33.1 MAJOR DEPRESSIVE DISORDER, RECURRENT EPISODE, MODERATE (HCC): Primary | ICD-10-CM

## 2018-11-08 PROCEDURE — 99214 OFFICE O/P EST MOD 30 MIN: CPT | Performed by: PSYCHIATRY & NEUROLOGY

## 2018-11-08 RX ORDER — LATANOPROST 50 UG/ML
SOLUTION/ DROPS OPHTHALMIC
COMMUNITY
Start: 2018-11-01

## 2018-11-08 RX ORDER — IMIPRAMINE HCL 50 MG
TABLET ORAL
Qty: 60 TABLET | Refills: 0 | Status: SHIPPED | OUTPATIENT
Start: 2018-11-08 | End: 2018-11-21 | Stop reason: SDUPTHER

## 2018-11-08 RX ORDER — MIRTAZAPINE 15 MG/1
15 TABLET, FILM COATED ORAL NIGHTLY
Qty: 30 TABLET | Refills: 0 | Status: SHIPPED | OUTPATIENT
Start: 2018-11-08 | End: 2018-11-21 | Stop reason: SDUPTHER
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
## 2018-11-20 ENCOUNTER — OFFICE VISIT (OUTPATIENT)
Dept: PSYCHIATRY | Facility: CLINIC | Age: 48
End: 2018-11-20

## 2018-11-20 DIAGNOSIS — F33.1 MAJOR DEPRESSIVE DISORDER, RECURRENT EPISODE, MODERATE (HCC): ICD-10-CM

## 2018-11-20 DIAGNOSIS — F41.1 GENERALIZED ANXIETY DISORDER: Primary | ICD-10-CM

## 2018-11-20 DIAGNOSIS — Z56.6 WORK-RELATED STRESS: ICD-10-CM

## 2018-11-20 PROCEDURE — 90834 PSYTX W PT 45 MINUTES: CPT | Performed by: SOCIAL WORKER

## 2018-11-20 SDOH — HEALTH STABILITY - MENTAL HEALTH: OTHER PHYSICAL AND MENTAL STRAIN RELATED TO WORK: Z56.6

## 2018-11-20 NOTE — PROGRESS NOTES
"Date of Service: November 20, 2015  Time In: 1:40 PM  Time Out: 2:21 PM      [place] 9:40 AMOGRESS NOTE  Data:  Amish Win is a 48 y.o. male who met with the undersigned for a regularly scheduled individual outpatient therapy session at the [facility] for follow-up of depression and anxiety.     HPI: Patient reports he continues to struggle with significant work-related stress and states he was recently written up again due to the behavior of other employees.  The patient states he believes they are trying to \"get rid of him\".  Patient reports his eldest son is in an assisted living facility in [facility] and appears to be doing well which has removed great deal of stress and worry.  Patient reports he is finally realized the vast majority of his stress is related to work and states he feels significantly better when leaving work or when off from work.  The patient reports he continues to struggle with anxiety including anxious mood, feeling on edge, feeling overwhelmed, trembling, shortness of breath, increased heart rate, an urge to escape the situation, and a sense of impending doom.    Patient rates current symptoms of anxiety at  a 5 on a scale of 1-10 with 10 being most severe.  However, he reports symptoms significantly increase when arriving at work.  Patient reports symptoms of depression continues to be improved; however, he continues to have periods of depressed mood, feeling hopeless, low self-esteem, and difficulty avoiding isolating himself.    Patient rates current symptoms of depression at a at 4 on a scale of 1-10 with 10 being most severe.  Patient reports  he continues to adhere to medication regimen as prescribed.  Patient adamantly and convincingly denies suicidal ideation vehemently denies any substance use.      Clinical Maneuvering/Intervention:  Assisted patient in processing above session content; acknowledged and normalized patient’s thoughts, feelings, and concerns.   " Allowed patient to discuss/vent his feelings and frustrations with work and validated his belief he is being written up for incidences outside his control.  Also continued to assist the patient with acknowledging he realizes he needs to find other employment and encouraged him to continue to work toward that end.  Also utilized cognitive behavioral therapy to assist the patient in modifying his outlook and encouraged him to remind himself all he can do is to his best at work.  Also reminded the patient he cannot control the behavior of others.  Continued to focus on behavioral activation and healthy skills of daily living.  Provided unconditional positive regard in a safe, supportive environment.      Allowed patient to freely discuss issues without interruption or judgment. Provided safe, confidential environment to facilitate the development of positive therapeutic relationship and encourage open, honest communication. Assisted patient in identifying risk factors which would indicate the need for higher level of care including thoughts to harm self or others and/or self-harming behavior and encouraged patient to contact this office, call 911, or present to the nearest emergency room should any of these events occur. Discussed crisis intervention services and means to access.  Patient adamantly and convincingly denies current suicidal or homicidal ideation or perceptual disturbance.    Assessment     Patient continues to struggle with depression and anxiety which appears to continue to be exacerbated by work-related stress.  The patient's symptomology continues to cause impairment in important areas of functioning.  As a result, he can be reasonably expected to continue to benefit from treatment likely be at increased risk for decompensation otherwise.    Diagnoses and all orders for this visit:    Generalized anxiety disorder    Major depressive disorder, recurrent episode, moderate (CMS/HCC)    Work-related  stress               Mental Status Exam  Hygiene:  good  Dress:  casual  Attitude:  Cooperative  Motor Activity:  Appropriate  Speech:  Normal  Mood:  anxious   Affect:  Calm and pleasant  Thought Processes:  Linear  Thought Content:  normal  Suicidal Thoughts:  denies  Homicidal Thoughts:  denies  Crisis Safety Plan: yes, to come to the emergency room.  Hallucinations:  denies    Patient's Support Network Includes:  children and extended family    Progress toward goal: Not at goal    Functional Status: Moderate impairment     Prognosis: Fair with Ongoing Treatment     Plan         Patient will continue in individual outpatient therapy sessions every 2 weeks at the [facility] and pharmacotherapy as scheduled with Dr. Villareal.  Patient will adhere to medication regimen as prescribed and report any side effects. Patient will contact this office, call 911 or present to the nearest emergency room should suicidal or homicidal ideations occur. Provide Cognitive Behavioral Therapy and Integrative Therapy to improve functioning, maintain stability, and avoid decompensation and the need for higher level of care.          Return in about 2 weeks (around 12/4/2018) for Next scheduled follow up.      This document signed by Izaiah Tinoco LCSW, JOAQUIN November 20, 2018 2:33 PM

## 2018-11-21 ENCOUNTER — OFFICE VISIT (OUTPATIENT)
Dept: PSYCHIATRY | Facility: CLINIC | Age: 48
End: 2018-11-21

## 2018-11-21 VITALS
SYSTOLIC BLOOD PRESSURE: 116 MMHG | BODY MASS INDEX: 28.42 KG/M2 | HEART RATE: 64 BPM | DIASTOLIC BLOOD PRESSURE: 77 MMHG | WEIGHT: 203 LBS | HEIGHT: 71 IN

## 2018-11-21 DIAGNOSIS — F41.1 GENERALIZED ANXIETY DISORDER: ICD-10-CM

## 2018-11-21 DIAGNOSIS — F33.41 MAJOR DEPRESSIVE DISORDER, RECURRENT EPISODE, IN PARTIAL REMISSION (HCC): Primary | ICD-10-CM

## 2018-11-21 PROCEDURE — 99213 OFFICE O/P EST LOW 20 MIN: CPT | Performed by: PSYCHIATRY & NEUROLOGY

## 2018-11-21 RX ORDER — ALPRAZOLAM 2 MG/1
2 TABLET, EXTENDED RELEASE ORAL 3 TIMES DAILY
Qty: 90 TABLET | Refills: 0 | Status: SHIPPED | OUTPATIENT
Start: 2018-11-21 | End: 2018-12-13 | Stop reason: SDUPTHER

## 2018-11-21 RX ORDER — IMIPRAMINE HCL 50 MG
TABLET ORAL
Qty: 60 TABLET | Refills: 0 | Status: SHIPPED | OUTPATIENT
Start: 2018-11-21 | End: 2018-12-13 | Stop reason: SDUPTHER

## 2018-11-21 RX ORDER — MIRTAZAPINE 15 MG/1
15 TABLET, FILM COATED ORAL NIGHTLY
Qty: 30 TABLET | Refills: 0 | Status: SHIPPED | OUTPATIENT
Start: 2018-11-21 | End: 2018-12-13 | Stop reason: SDUPTHER

## 2018-11-21 NOTE — PROGRESS NOTES
"Patient ID: Amish Win is a 48 y.o. male    SERVICE TYPE: EVALUATION AND MANAGEMENT (greater than 50% of the time spent for supportive psychotherapy).      /77   Pulse 64   Ht 180 cm (70.87\")   Wt 92.1 kg (203 lb)   BMI 28.42 kg/m²     ALLERGIES:  Penicillins    CC/ Focus of the visit: anxiety/ depression:     HPI:Patient's level of anxiety and depression somewhat improved, probably in part related to his having made a decision to terminate his employment at [employer] hopefully having another job opportunity prior to that.  Feeling better, less  Depressed, fewer incidences of panic and less hypochondriasis regarding his cardiac status. Encouraged to continue his exercise Routine.  Patient reports that basically he does fairly well apart from his time at work when the anxiety becomes much more severe.    PFSH: Home situation hasn't changed clear    Review of Systems   Respiratory: Negative.    Cardiovascular: Negative.    Gastrointestinal: Negative.    Neurological: Negative.        SUPPORTIVE PSYCHOTHERAPY: continuing efforts to promote the therapeutic alliance, address the patient’s issues, and strengthen self awareness, insights, and coping skills.       Mental Status Exam  Appearance:  clean and casually dressed, appropriate  Attitude toward clinician:  cooperative and agreeable   Speech:    Rate:  regular rate and rhythm   Volume: normal  Motor:  no abnormal movements present  Mood:  Good  Affect:  euthymic  Thought Processes:  linear, logical, and goal directed  Thought Content:  Normal   Feeling Hopeless: absent  Suicidal Thoughts:  absent  Homicidal Thoughts:  absent  Perceptual Disturbance: no perceptual disturbance  Attention and Concentration:  good  Insight and Judgement:  good  Memory:  memory appears to be intact    LABS: No results found for this or any previous visit (from the past 168 hour(s)).    MEDICATION ISSUES: Have discussed with the patient the medications Risks, Benefits, and " Side effects including potential falls, possible impaired driving and  metabolic adversities among others. No medication side effects or related complaints today.     TREATMENT PLAN/GOALS: Continue supportive psychotherapy efforts and medications as indicated.     Current Outpatient Medications   Medication Sig Dispense Refill   • ALPRAZolam XR (XANAX XR) 2 MG 24 hr tablet Take 1 tablet by mouth 3 (Three) Times a Day. 90 tablet 0   • aspirin 81 MG EC tablet Take 81 mg by mouth 2 (Two) Times a Day.     • docusate sodium (COLACE) 100 MG capsule Take 1 capsule by mouth 2 (Two) Times a Day. 60 capsule 1   • imipramine (TOFRANIL) 50 MG tablet Take two bid. 60 tablet 0   • latanoprost (XALATAN) 0.005 % ophthalmic solution      • metoprolol tartrate (LOPRESSOR) 50 MG tablet Take 50 mg by mouth 2 (Two) Times a Day With Meals.     • mirtazapine (REMERON) 15 MG tablet Take 1 tablet by mouth Every Night. 30 tablet 0   • pantoprazole (PROTONIX) 40 MG EC tablet Take 40 mg by mouth Daily.     • raNITIdine (ZANTAC) 150 MG tablet      • simvastatin (ZOCOR) 20 MG tablet Take 20 mg by mouth Every Night.     • vitamin D (ERGOCALCIFEROL) 15888 units capsule capsule        No current facility-administered medications for this visit.        COLLATERAL PSYCHOTHERAPEUTIC INTERVENTION: .Continuing with the collateral psychotherapeutic effort which is been very helpful    RISK:  Low to moderate    Encounter Diagnoses   Name Primary?   • Major depressive disorder, recurrent episode, in partial remission (CMS/HCC) Yes   • Generalized anxiety disorder        Return in about 3 weeks (around 12/12/2018).  Patient knows to call if symptoms worsen or fail to improve between appointments.     Dictated utilizing Dragon dictation

## 2018-12-04 ENCOUNTER — OFFICE VISIT (OUTPATIENT)
Dept: PSYCHIATRY | Facility: CLINIC | Age: 48
End: 2018-12-04

## 2018-12-04 DIAGNOSIS — Z56.6 WORK-RELATED STRESS: ICD-10-CM

## 2018-12-04 DIAGNOSIS — F41.1 GENERALIZED ANXIETY DISORDER: ICD-10-CM

## 2018-12-04 DIAGNOSIS — F33.41 MAJOR DEPRESSIVE DISORDER, RECURRENT, IN PARTIAL REMISSION (HCC): Primary | ICD-10-CM

## 2018-12-04 PROCEDURE — 90832 PSYTX W PT 30 MINUTES: CPT | Performed by: SOCIAL WORKER

## 2018-12-04 SDOH — HEALTH STABILITY - MENTAL HEALTH: OTHER PHYSICAL AND MENTAL STRAIN RELATED TO WORK: Z56.6

## 2018-12-04 NOTE — PROGRESS NOTES
Date of Service: December 4, 2018  Time In: 9:15 AM  Time Out: 9:45 AM      [place] 9:40 AMOGRESS NOTE  Data:  Amish Win is a 48 y.o. male who met with the undersigned for a regularly scheduled individual outpatient therapy session at the [facility] for follow-up of depression and anxiety.     HPI: Patient reports he continues to struggle with significant difficulties at work and states he feels as though he is being singled out by management.  Patient reports depression is improved, especially when not at work.  The patient reports he continues to struggle with anxiety including anxious mood, feeling on edge, feeling overwhelmed, trembling, shortness of breath, increased heart rate, an urge to escape the situation, and a sense of impending doom.    Patient rates current symptoms of anxiety at  a 4/5 on a scale of 1-10 with 10 being most severe.  However, he reports symptoms significantly increase when at work.  Patient reports symptoms of depression continues to be improved; however, he continues to have periods of depressed mood, feeling hopeless, low self-esteem, and difficulty avoiding isolating himself.    Patient rates current symptoms of depression at a at 3 on a scale of 1-10 with 10 being most severe.  Patient reports  he continues to adhere to medication regimen as prescribed.  Patient adamantly and convincingly denies suicidal ideation vehemently denies any substance use.      Clinical Maneuvering/Intervention:  Assisted patient in processing above session content; acknowledged and normalized patient’s thoughts, feelings, and concerns.   Allowed patient to discuss/vent his feelings and frustration concerning ongoing difficulty at work and validated his feelings.  Also utilized motivational techniques including complex reflections to discussed concept of things we can't change things we cannot change and encouraged patient to remind himself all he can do is his very best at work.  Continue to encourage  patient to consider looking for other employment.  Also utilized cognitive behavioral therapy to assist patient in developing coping mechanisms to cope with symptoms of anxiety due to work-related stress including deep breathing exercises, guided imagery, and positive self talk.  Also continue to discuss healthy skills of daily living and encouraged patient to continue to seek activities he can engage in a regular basis to reduce idle time.  Provided unconditional positive regard in a safe, supportive environment.      Allowed patient to freely discuss issues without interruption or judgment. Provided safe, confidential environment to facilitate the development of positive therapeutic relationship and encourage open, honest communication. Assisted patient in identifying risk factors which would indicate the need for higher level of care including thoughts to harm self or others and/or self-harming behavior and encouraged patient to contact this office, call 911, or present to the nearest emergency room should any of these events occur. Discussed crisis intervention services and means to access.  Patient adamantly and convincingly denies current suicidal or homicidal ideation or perceptual disturbance.    Assessment     Patient's symptoms of depression appear to be somewhat improved although his symptoms primarily of anxiety appear to be significantly exacerbated by work-related stress.  The patient's symptomology continues to cause impairment in important areas of functioning.  As a result, he can be reasonably expected to continue to benefit from treatment likely be at increased risk for decompensation otherwise.    Diagnoses and all orders for this visit:    Major depressive disorder, recurrent, in partial remission (CMS/HCC)    Generalized anxiety disorder    Work-related stress               Mental Status Exam  Hygiene:  good  Dress:  casual  Attitude:  Cooperative  Motor Activity:  Appropriate  Speech:   Normal  Mood:  anxious   Affect:  Calm and pleasant  Thought Processes:  Linear  Thought Content:  normal  Suicidal Thoughts:  denies  Homicidal Thoughts:  denies  Crisis Safety Plan: yes, to come to the emergency room.  Hallucinations:  denies    Patient's Support Network Includes:  children and extended family    Progress toward goal: Not at goal    Functional Status: Moderate impairment     Prognosis: Fair with Ongoing Treatment     Plan         Patient will continue in individual outpatient therapy sessions every 2 weeks at the [facility] and pharmacotherapy as scheduled with Dr. Villareal.  Patient will adhere to medication regimen as prescribed and report any side effects. Patient will contact this office, call 911 or present to the nearest emergency room should suicidal or homicidal ideations occur. Provide Cognitive Behavioral Therapy and Integrative Therapy to improve functioning, maintain stability, and avoid decompensation and the need for higher level of care.          Return in about 2 weeks (around 12/18/2018) for Next scheduled follow up.      This document signed by Izaiah Tinoco LCSW, JOAQUIN December 4, 2018 11:34 AM

## 2018-12-13 ENCOUNTER — OFFICE VISIT (OUTPATIENT)
Dept: PSYCHIATRY | Facility: CLINIC | Age: 48
End: 2018-12-13

## 2018-12-13 VITALS
HEIGHT: 71 IN | DIASTOLIC BLOOD PRESSURE: 83 MMHG | BODY MASS INDEX: 28.19 KG/M2 | SYSTOLIC BLOOD PRESSURE: 120 MMHG | WEIGHT: 201.4 LBS

## 2018-12-13 DIAGNOSIS — Z79.899 MEDICATION MANAGEMENT: ICD-10-CM

## 2018-12-13 DIAGNOSIS — F33.41 MAJOR DEPRESSIVE DISORDER, RECURRENT, IN PARTIAL REMISSION (HCC): ICD-10-CM

## 2018-12-13 DIAGNOSIS — F41.1 GENERALIZED ANXIETY DISORDER: Primary | ICD-10-CM

## 2018-12-13 LAB
AMPHETAMINE CUT-OFF: ABNORMAL
BENZODIAZIPINE CUT-OFF: ABNORMAL
BUPRENORPHINE CUT-OFF: ABNORMAL
COCAINE CUT-OFF: ABNORMAL
EXTERNAL AMPHETAMINE SCREEN URINE: NEGATIVE
EXTERNAL BENZODIAZEPINE SCREEN URINE: POSITIVE
EXTERNAL BUPRENORPHINE SCREEN URINE: NEGATIVE
EXTERNAL COCAINE SCREEN URINE: NEGATIVE
EXTERNAL MDMA: NEGATIVE
EXTERNAL METHADONE SCREEN URINE: NEGATIVE
EXTERNAL METHAMPHETAMINE SCREEN URINE: NEGATIVE
EXTERNAL OPIATES SCREEN URINE: NEGATIVE
EXTERNAL OXYCODONE SCREEN URINE: NEGATIVE
EXTERNAL THC SCREEN URINE: NEGATIVE
MDMA CUT-OFF: ABNORMAL
METHADONE CUT-OFF: ABNORMAL
METHAMPHETAMINE CUT-OFF: ABNORMAL
OPIATES CUT-OFF: ABNORMAL
OXYCODONE CUT-OFF: ABNORMAL
THC CUT-OFF: ABNORMAL

## 2018-12-13 PROCEDURE — 99214 OFFICE O/P EST MOD 30 MIN: CPT | Performed by: PSYCHIATRY & NEUROLOGY

## 2018-12-13 RX ORDER — MIRTAZAPINE 15 MG/1
15 TABLET, FILM COATED ORAL NIGHTLY
Qty: 30 TABLET | Refills: 0 | Status: SHIPPED | OUTPATIENT
Start: 2018-12-13 | End: 2019-01-10 | Stop reason: SDUPTHER

## 2018-12-13 RX ORDER — ALPRAZOLAM 2 MG/1
2 TABLET, EXTENDED RELEASE ORAL 3 TIMES DAILY
Qty: 90 TABLET | Refills: 0 | Status: SHIPPED | OUTPATIENT
Start: 2018-12-13 | End: 2019-01-10 | Stop reason: SDUPTHER

## 2018-12-13 RX ORDER — IMIPRAMINE HCL 50 MG
TABLET ORAL
Qty: 120 TABLET | Refills: 0 | Status: SHIPPED | OUTPATIENT
Start: 2018-12-13 | End: 2019-01-10 | Stop reason: SDUPTHER

## 2018-12-13 NOTE — PROGRESS NOTES
"Patient ID: Amish Win is a 48 y.o. male    SERVICE TYPE: EVALUATION AND MANAGEMENT (greater than 50% of the time spent for supportive psychotherapy).  Time in: 1202     Time out 12:30    /83 Comment: 66 bpm  Ht 180 cm (70.87\")   Wt 91.4 kg (201 lb 6.4 oz)   BMI 28.19 kg/m²     ALLERGIES:  Penicillins    CC/ Focus of the visit: anxiety/ depression:     HPI:working out at the gym 40 to 45 min 6 out of the last 8 days, encouraged to continue. Anxiety symptoms almost totally associated with his job. Depression clears somewhat when not at work.   No med complaints other than the dry mouth and constipation that the patient deals with.   Continues to check his pulse, etc.     No indications of drug misuse or abuse.  No alcohol abuse.    PFSH: no progress on the alternative employment search, aims for an annual salary of $50K/year. Reevaluating his marriage due to the total lack of intimacy. Working on this with his therapist.      Review of Systems   Respiratory: Negative.    Cardiovascular: Positive for palpitations.   Gastrointestinal: Positive for constipation.       SUPPORTIVE PSYCHOTHERAPY: continuing efforts to promote the therapeutic alliance, address the patient’s issues, and strengthen self awareness, insights, and coping skills.       Mental Status Exam  Appearance:  clean and casually dressed, appropriate  Attitude toward clinician:  cooperative and agreeable   Speech:    Rate:  regular rate and rhythm   Volume: normal  Motor:  no abnormal movements present  Mood:  Good  Affect:  euthymic  Thought Processes:  linear, logical, and goal directed  Thought Content:  Normal   Feeling Hopeless: absent  Suicidal Thoughts:  absent  Homicidal Thoughts:  absent  Perceptual Disturbance: no perceptual disturbance  Attention and Concentration:  good  Insight and Judgement:  good  Memory:  memory appears to be intact    LABS:   Recent Results (from the past 168 hour(s))   [facility] Drug Screen    Collection Time: " 12/13/18 11:58 AM   Result Value Ref Range    External Amphetamine Screen Urine Negative     Amphetamine Cut-Off 1000mg/ml     External Benzodiazepine Screen Urine Positive     Benzodiazipine Cut-Off 300mg/ml     External Cocaine Screen Urine Negative     Cocaine Cut-Off 300mg/ml     External THC Screen Urine Negative     THC Cut-Off 50mg/ml     External Methadone Screen Urine Negative     Methadone Cut-Off 300mg/ml     External Methamphetamine Screen Urine Negative     Methamphetamine Cut-Off 1000mg/ml     External Oxycodone Screen Urine Negative     Oxycodone Cut-Off 100mg/ml     External Buprenorphine Screen Urine Negative     Buprenorphine Cut-Off 10mg/ml     External MDMA Negative     MDMA Cut-Off 500mg/ml     External Opiates Screen Urine Negative     Opiates Cut-Off 300mg/ml        MEDICATION ISSUES: Have discussed with the patient the medications Risks, Benefits, and Side effects including potential falls, possible impaired driving and  metabolic adversities among others. No medication side effects or related complaints today.     TREATMENT PLAN/GOALS: Continue supportive psychotherapy efforts and medications as indicated.     Current Outpatient Medications   Medication Sig Dispense Refill   • ALPRAZolam XR (XANAX XR) 2 MG 24 hr tablet Take 1 tablet by mouth 3 (Three) Times a Day. 90 tablet 0   • aspirin 81 MG EC tablet Take 81 mg by mouth 2 (Two) Times a Day.     • imipramine (TOFRANIL) 50 MG tablet Take two bid.  120 tablet 0   • latanoprost (XALATAN) 0.005 % ophthalmic solution      • metoprolol tartrate (LOPRESSOR) 50 MG tablet Take 50 mg by mouth 2 (Two) Times a Day With Meals.     • mirtazapine (REMERON) 15 MG tablet Take 1 tablet by mouth Every Night. 30 tablet 0   • pantoprazole (PROTONIX) 40 MG EC tablet Take 40 mg by mouth Daily.     • raNITIdine (ZANTAC) 150 MG tablet      • simvastatin (ZOCOR) 20 MG tablet Take 20 mg by mouth Every Night.     • vitamin D (ERGOCALCIFEROL) 39257 units capsule  capsule      • docusate sodium (COLACE) 100 MG capsule Take 1 capsule by mouth 2 (Two) Times a Day. 60 capsule 1     No current facility-administered medications for this visit.        COLLATERAL PSYCHOTHERAPEUTIC INTERVENTION: patient not interested in additional psychotherapy.    RISK:  Moderate.     Encounter Diagnoses   Name Primary?   • Medication management    • Generalized anxiety disorder Yes   • Major depressive disorder, recurrent, in partial remission (CMS/HCC)        Return in about 4 weeks (around 1/10/2019).  Patient knows to call if symptoms worsen or fail to improve between appointments.    Dictated utilizing Dragon dictation

## 2019-01-02 ENCOUNTER — OFFICE VISIT (OUTPATIENT)
Dept: PSYCHIATRY | Facility: CLINIC | Age: 49
End: 2019-01-02

## 2019-01-02 DIAGNOSIS — F33.41 RECURRENT MAJOR DEPRESSIVE DISORDER, IN PARTIAL REMISSION (HCC): ICD-10-CM

## 2019-01-02 DIAGNOSIS — F41.1 GENERALIZED ANXIETY DISORDER: Primary | ICD-10-CM

## 2019-01-02 PROCEDURE — 90837 PSYTX W PT 60 MINUTES: CPT | Performed by: SOCIAL WORKER

## 2019-01-02 NOTE — PROGRESS NOTES
Date of Service: January 2, 2019  Time In: 9:25 AM  Time Out: 10:30 AM      [place] 9:40 AMOGRESS NOTE  Data:  Amish Win is a 48 y.o. male who met with the undersigned for a regularly scheduled individual outpatient therapy session at the [facility] for follow-up of depression and anxiety.     HPI:  Patient reports depression is improved, especially when not at work.  The patient reports he continues to struggle with anxiety including anxious mood, feeling on edge, feeling overwhelmed, trembling, shortness of breath, increased heart rate, an urge to escape the situation, and a sense of impending doom.  Patient reports his feelings of anxiety exacerbates significantly when he is at work or prior to leaving for work as he anticipates difficulties.  Patient rates current symptoms of anxiety at  a 4 on a scale of 1-10 with 10 being most severe.    Patient reports symptoms of depression continues to be improved; however, he continues to have periods of depressed mood, feeling hopeless, low self-esteem, and difficulty avoiding isolating himself.   Patient rates current symptoms of depression at a at 3 on a scale of 1-10 with 10 being most severe.  Patient reports  he continues to adhere to medication regimen as prescribed.  Patient adamantly and convincingly denies suicidal ideation vehemently denies any substance use.      Clinical Maneuvering/Intervention:  Assisted patient in processing above session content; acknowledged and normalized patient’s thoughts, feelings, and concerns.  Utilized motivational techniques including complex questions to assist the patient in recognizing and acknowledging positive aspects of his life and pointed out his tendency to focus on the negative.  Also challenged the patient to remind himself he has the ability to at least some degree to choose whether he focuses on the positive or negative.  Also discussed and demonstrated thought blocking techniques to assist the patient in  developing strategy to avoid worrying about going to work several hours prior.  Continued to assist the patient in analyzing his current employment and encouraged him to consider seeking alternative.  Provided unconditional positive regard in a safe, supportive environment.      Allowed patient to freely discuss issues without interruption or judgment. Provided safe, confidential environment to facilitate the development of positive therapeutic relationship and encourage open, honest communication. Assisted patient in identifying risk factors which would indicate the need for higher level of care including thoughts to harm self or others and/or self-harming behavior and encouraged patient to contact this office, call 911, or present to the nearest emergency room should any of these events occur. Discussed crisis intervention services and means to access.  Patient adamantly and convincingly denies current suicidal or homicidal ideation or perceptual disturbance.    Assessment    Patient's symptoms of depression appear to be somewhat improved although his symptoms primarily of anxiety appear to be significantly exacerbated by work-related stress.  The patient's symptomology continues to cause impairment in important areas of functioning.  As a result, he can be reasonably expected to continue to benefit from treatment likely be at increased risk for decompensation otherwise.    Diagnoses and all orders for this visit:    Generalized anxiety disorder    Recurrent major depressive disorder, in partial remission (CMS/[facility])               Mental Status Exam  Hygiene:  good  Dress:  casual  Attitude:  Cooperative  Motor Activity:  Appropriate  Speech:  Normal  Mood:  anxious   Affect:  Calm and pleasant  Thought Processes:  Linear  Thought Content:  normal  Suicidal Thoughts:  denies  Homicidal Thoughts:  denies  Crisis Safety Plan: yes, to come to the emergency room.  Hallucinations:  denies    Patient's Support Network  Includes:  children and extended family    Progress toward goal: Not at goal    Functional Status: Moderate impairment     Prognosis: Fair with Ongoing Treatment     Plan         Patient will continue in individual outpatient therapy sessions every 2 [facility] and pharmacotherapy as scheduled with Dr. Villareal.  Patient will adhere to medication regimen as prescribed and report any side effects. Patient will contact this office, call 911 or present to the nearest emergency room should suicidal or homicidal ideations occur. Provide Cognitive Behavioral Therapy and Integrative Therapy to improve functioning, maintain stability, and avoid decompensation and the need for higher level of care.          Return in about 2 weeks (around 1/16/2019) for Next scheduled follow up.      This document signed by zIaiah Tinoco LCSW, JOAQUIN January 2, 2019 11:38 AM

## 2019-01-10 ENCOUNTER — OFFICE VISIT (OUTPATIENT)
Dept: PSYCHIATRY | Facility: CLINIC | Age: 49
End: 2019-01-10

## 2019-01-10 VITALS
DIASTOLIC BLOOD PRESSURE: 93 MMHG | BODY MASS INDEX: 28.48 KG/M2 | SYSTOLIC BLOOD PRESSURE: 141 MMHG | WEIGHT: 203.4 LBS | HEIGHT: 71 IN

## 2019-01-10 DIAGNOSIS — F33.41 MAJOR DEPRESSIVE DISORDER, RECURRENT EPISODE, IN PARTIAL REMISSION (HCC): ICD-10-CM

## 2019-01-10 DIAGNOSIS — F41.1 GENERALIZED ANXIETY DISORDER: Primary | ICD-10-CM

## 2019-01-10 PROCEDURE — 99213 OFFICE O/P EST LOW 20 MIN: CPT | Performed by: PSYCHIATRY & NEUROLOGY

## 2019-01-10 RX ORDER — MIRTAZAPINE 15 MG/1
15 TABLET, FILM COATED ORAL NIGHTLY
Qty: 30 TABLET | Refills: 0 | Status: SHIPPED | OUTPATIENT
Start: 2019-01-10 | End: 2019-02-07 | Stop reason: SDUPTHER

## 2019-01-10 RX ORDER — IMIPRAMINE HCL 50 MG
TABLET ORAL
Qty: 120 TABLET | Refills: 0 | Status: SHIPPED | OUTPATIENT
Start: 2019-01-10 | End: 2019-02-07 | Stop reason: SDUPTHER

## 2019-01-10 RX ORDER — ALPRAZOLAM 2 MG/1
2 TABLET, EXTENDED RELEASE ORAL 3 TIMES DAILY
Qty: 90 TABLET | Refills: 0 | Status: SHIPPED | OUTPATIENT
Start: 2019-01-10 | End: 2019-02-07 | Stop reason: SDUPTHER

## 2019-01-10 NOTE — PROGRESS NOTES
"Patient ID: Amish Win is a 48 y.o. male    SERVICE TYPE: EVALUATION AND MANAGEMENT (greater than 50% of the time spent for supportive psychotherapy).      /93 Comment: 70 bpm-- Patient stated he was feeling anxious,.  Ht 180 cm (70.87\")   Wt 92.3 kg (203 lb 6.4 oz)   BMI 28.47 kg/m²     ALLERGIES:  Penicillins    CC/ Focus of the visit: anxiety/ depression. :     HPI: Reviewed therapist note from 01/02. Today patient preoccupied with the BP taken earlier today, simply can not put it aside. Depression slightly worse, \"feel like I'm going down hill again\". Stressed with the job as usual. No hopelessness or SI.   Expressed his belief that my of his \"hypochroniac\" condition is learn behavior via a grandmother with similar problems whom he spent time with after his parents . However, \"I think I was anxious in the womb\". His parents divorce was particular traumatic, apparently was very closed to his father who moved away.     PFSH: more pressure at work anticipating \"getting the axe\". Continuing his job search. Home life remain relative stable, wife struggles with her depressive tendencies.     Review of Systems   Respiratory: Positive for shortness of breath.    Cardiovascular: Positive for palpitations.   Gastrointestinal: Positive for constipation.       SUPPORTIVE PSYCHOTHERAPY: continuing efforts to promote the therapeutic alliance, address the patient’s issues, and strengthen self awareness, insights, and coping skills.       Mental Status Exam  Appearance:  clean and casually dressed, appropriate  Attitude toward clinician:  cooperative and agreeable   Speech:    Rate:  regular rate and rhythm   Volume: normal  Motor:  no abnormal movements present  Mood:  Good  Affect:  euthymic  Thought Processes:  linear, logical, and goal directed  Thought Content:  Normal   Feeling Hopeless: absent  Suicidal Thoughts:  absent  Homicidal Thoughts:  absent  Perceptual Disturbance: no perceptual " disturbance  Attention and Concentration:  good  Insight and Judgement:  good  Memory:  memory appears to be intact    LABS: No results found for this or any previous visit (from the past 168 hour(s)).    MEDICATION ISSUES: Have discussed with the patient the medications Risks, Benefits, and Side effects including potential falls, possible impaired driving and  metabolic adversities among others. No medication side effects or related complaints today.     TREATMENT PLAN/GOALS: Continue supportive psychotherapy efforts and medications as indicated.     Current Outpatient Medications   Medication Sig Dispense Refill   • ALPRAZolam XR (XANAX XR) 2 MG 24 hr tablet Take 1 tablet by mouth 3 (Three) Times a Day. 90 tablet 0   • aspirin 81 MG EC tablet Take 81 mg by mouth 2 (Two) Times a Day.     • docusate sodium (COLACE) 100 MG capsule Take 1 capsule by mouth 2 (Two) Times a Day.     • imipramine (TOFRANIL) 50 MG tablet Take two bid. 120 tablet 0   • latanoprost (XALATAN) 0.005 % ophthalmic solution      • metoprolol tartrate (LOPRESSOR) 50 MG tablet Take 50 mg by mouth 2 (Two) Times a Day With Meals.     • mirtazapine (REMERON) 15 MG tablet Take 1 tablet by mouth Every Night. 30 tablet 0   • pantoprazole (PROTONIX) 40 MG EC tablet Take 40 mg by mouth Daily.     • raNITIdine (ZANTAC) 150 MG tablet      • simvastatin (ZOCOR) 20 MG tablet Take 20 mg by mouth Every Night.     • vitamin D (ERGOCALCIFEROL) 45360 units capsule capsule        No current facility-administered medications for this visit.        COLLATERAL PSYCHOTHERAPEUTIC INTERVENTION: continuing with therapist Izaiah Tinoco LCSW..    RISK:  Moderate.     Encounter Diagnoses   Name Primary?   • Generalized anxiety disorder Yes   • Major depressive disorder, recurrent episode, in partial remission (CMS/HCC)        Return in about 4 weeks (around 2/7/2019).  Patient knows to call if symptoms worsen or fail to improve between appointments.    Dictated utilizing Ashley  dictation

## 2019-01-30 ENCOUNTER — OFFICE VISIT (OUTPATIENT)
Dept: PSYCHIATRY | Facility: CLINIC | Age: 49
End: 2019-01-30

## 2019-01-30 DIAGNOSIS — F41.1 GENERALIZED ANXIETY DISORDER: Primary | ICD-10-CM

## 2019-01-30 DIAGNOSIS — Z63.0 MARITAL/PARTNER RELATIONAL PROBLEM: ICD-10-CM

## 2019-01-30 DIAGNOSIS — F33.41 RECURRENT MAJOR DEPRESSIVE DISORDER IN PARTIAL REMISSION (HCC): ICD-10-CM

## 2019-01-30 PROCEDURE — 90834 PSYTX W PT 45 MINUTES: CPT | Performed by: SOCIAL WORKER

## 2019-01-30 SDOH — SOCIAL STABILITY - SOCIAL INSECURITY: PROBLEMS IN RELATIONSHIP WITH SPOUSE OR PARTNER: Z63.0

## 2019-02-05 NOTE — PROGRESS NOTES
Date of Service: January 30, 2019  Time In: 12:00 PM  Time Out: 12:41 PM      [place] 9:40 AMOGRESS NOTE  Data:  Amish Win is a 48 y.o. male who met with the undersigned for a regularly scheduled individual outpatient therapy session at the [facility] for follow-up of depression and anxiety.     HPI:  Patient reports he feels symptoms of depression continue to be improved but states he continues to struggle with periods of sad mood, feeling hopeless, low self-esteem, and difficulty experiencing suni.  Patient rates current symptoms of depression at a 3 on a scale of 1-10 with 10 being most severe.  The patient reports he continues to struggle with anxiety including anxious mood, feeling on edge, feeling overwhelmed, trembling, shortness of breath, increased heart rate, an urge to escape the situation, and a sense of impending doom.  Patient reports his symptoms of anxiety are significantly increased while at work.  Patient rates current symptoms of anxiety at  a 4 on a scale of 1-10 with 10 being most severe.   Patient reports he and his wife continue to have significant difficulties and states they rarely interacts.  The patient also reports his wife tells him she doesn't have time to adhere his issues from the day.  Patient continues to state he and his wife have not had physical intimacy in many years.  Patient reports  he continues to adhere to medication regimen as prescribed.  Patient adamantly and convincingly denies suicidal ideation vehemently denies any substance use.      Clinical Maneuvering/Intervention:  Assisted patient in processing above session content; acknowledged and normalized patient’s thoughts, feelings, and concerns.  Continue to allow patient to discuss/vent his feelings of frustration with ongoing difficulties validated his feelings.  Also utilized motivational techniques including complex questions to assist patient in recognizing his job is likely causing a great deal of his issues  and encouraged him to remind himself nothing will change unless he takes appropriate steps to bring about that change.  Also discussed the patient's long history of strained relationship with his wife but encouraged him to consider being in that environment on a daily basis can have a pervasive impact on his mood and mental state.  Continue to utilize cognitive behavioral therapy to assist patient in developing coping mechanisms to decrease the severity and frequency of symptoms especially when at work including positive self talk, relaxation techniques, deep breathing exercises, and thought blocking techniques.  Continued to encourage the patient to look for alternative employment.  Provided unconditional positive regard in a safe, supportive environment.      Allowed patient to freely discuss issues without interruption or judgment. Provided safe, confidential environment to facilitate the development of positive therapeutic relationship and encourage open, honest communication. Assisted patient in identifying risk factors which would indicate the need for higher level of care including thoughts to harm self or others and/or self-harming behavior and encouraged patient to contact this office, call 911, or present to the nearest emergency room should any of these events occur. Discussed crisis intervention services and means to access.  Patient adamantly and convincingly denies current suicidal or homicidal ideation or perceptual disturbance.    Assessment    Patient's symptoms of depression appear to be somewhat improved although his symptoms primarily of anxiety appear to be significantly exacerbated by work-related stress.  The patient's symptomology continues to cause impairment in important areas of functioning.  As a result, he can be reasonably expected to continue to benefit from treatment likely be at increased risk for decompensation otherwise.    Diagnoses and all orders for this visit:    Generalized  anxiety disorder    Recurrent major depressive disorder in partial remission (CMS/HCC)    Marital/partner relational problem               Mental Status Exam  Hygiene:  good  Dress:  casual  Attitude:  Cooperative  Motor Activity:  Appropriate  Speech:  Normal  Mood:  anxious   Affect:  Calm and pleasant  Thought Processes:  Linear  Thought Content:  normal  Suicidal Thoughts:  denies  Homicidal Thoughts:  denies  Crisis Safety Plan: yes, to come to the emergency room.  Hallucinations:  denies    Patient's Support Network Includes:  children and extended family    Progress toward goal: Not at goal    Functional Status: Moderate impairment     Prognosis: Fair with Ongoing Treatment     Plan         Patient will continue in individual outpatient therapy sessions every 2 weeks at the [facility] and pharmacotherapy as scheduled with Dr. Villareal.  Patient will adhere to medication regimen as prescribed and report any side effects. Patient will contact this office, call 911 or present to the nearest emergency room should suicidal or homicidal ideations occur. Provide Cognitive Behavioral Therapy and Integrative Therapy to improve functioning, maintain stability, and avoid decompensation and the need for higher level of care.          Return in about 2 weeks (around 2/13/2019) for Next scheduled follow up.      This document signed by Izaiah Tinoco LCSW, JOAQUIN February 5, 2019 8:13 AM

## 2019-02-07 ENCOUNTER — OFFICE VISIT (OUTPATIENT)
Dept: PSYCHIATRY | Facility: CLINIC | Age: 49
End: 2019-02-07

## 2019-02-07 VITALS
HEIGHT: 71 IN | DIASTOLIC BLOOD PRESSURE: 97 MMHG | HEART RATE: 65 BPM | BODY MASS INDEX: 27.64 KG/M2 | WEIGHT: 197.4 LBS | SYSTOLIC BLOOD PRESSURE: 150 MMHG

## 2019-02-07 DIAGNOSIS — F41.1 GENERALIZED ANXIETY DISORDER: Primary | ICD-10-CM

## 2019-02-07 DIAGNOSIS — F33.1 MAJOR DEPRESSIVE DISORDER, RECURRENT EPISODE, MODERATE (HCC): ICD-10-CM

## 2019-02-07 PROCEDURE — 99214 OFFICE O/P EST MOD 30 MIN: CPT | Performed by: PSYCHIATRY & NEUROLOGY

## 2019-02-07 RX ORDER — MIRTAZAPINE 15 MG/1
15 TABLET, FILM COATED ORAL NIGHTLY
Qty: 30 TABLET | Refills: 0 | Status: SHIPPED | OUTPATIENT
Start: 2019-02-07 | End: 2019-03-28 | Stop reason: SDUPTHER

## 2019-02-07 RX ORDER — ALPRAZOLAM 2 MG/1
2 TABLET, EXTENDED RELEASE ORAL 3 TIMES DAILY
Qty: 90 TABLET | Refills: 0 | Status: SHIPPED | OUTPATIENT
Start: 2019-02-07 | End: 2019-03-28 | Stop reason: SDUPTHER

## 2019-02-07 NOTE — PROGRESS NOTES
"Patient ID: Amish Win is a 48 y.o. male    SERVICE TYPE: EVALUATION AND MANAGEMENT (greater than 50% of the time spent for supportive psychotherapy).  Time in: 1201       Time [id number]    /97   Pulse 65   Ht 180 cm (70.87\")   Wt 89.5 kg (197 lb 6.4 oz)   BMI 27.63 kg/m²     ALLERGIES:  Penicillins    CC/ Focus of the visit: anxiety/ depression. :     HPI: Reviewed therapist note from January 30.  \"Not doing well\" Wrote up again with a final warning at his job. Exercising 30 min/day fairly regularly. Intensity of anxiety at his work place worse. Job search without leads. Constantly feeling his heart. \"It's getting serious at work\". No self distractive thoughts. \"At a braking point at that, the job\". Finds his wife critical of him and his problems at work. Continues to attempt applying the CBT techniques learned in therapy     Will be seeking legal advice regarding his job situation.    No indications of substance misuse or abuse.     PFSH: Job and home stress as described.     Review of Systems   Constitutional:        Recent labs per PCP all normal.    Respiratory: Positive for chest tightness.    Cardiovascular: Positive for palpitations.       SUPPORTIVE PSYCHOTHERAPY: continuing efforts to promote the therapeutic alliance, address the patient’s issues, and strengthen self awareness, insights, and coping skills.       Mental Status Exam  Appearance:  clean and casually dressed, appropriate  Attitude toward clinician:  cooperative and agreeable   Speech:    Rate:  regular rate and rhythm   Volume: normal  Motor:  no abnormal movements present  Mood:  Anxious, discourage. Affect: negative.   Thought Processes:  linear, logical, and goal directed  Thought Content:  anxious Feeling Hopeless: \"Not quite\"  Suicidal Thoughts:  absent  Homicidal Thoughts:  absent  Perceptual Disturbance: no perceptual disturbance  Attention and Concentration:  Definitely adversely effected by his anxiety/depression  Insight " and Judgement:  good  Memory:  memory appears to be intact    LABS: No results found for this or any previous visit (from the past 168 hour(s)).    MEDICATION ISSUES: Have discussed with the patient the medications Risks, Benefits, and Side effects including potential falls, possible impaired driving and  metabolic adversities among others. No medication side effects or related complaints today.     TREATMENT PLAN/GOALS: Continue supportive psychotherapy efforts and medications as indicated.     Current Outpatient Medications   Medication Sig Dispense Refill   • ALPRAZolam XR (XANAX XR) 2 MG 24 hr tablet Take 1 tablet by mouth 3 (Three) Times a Day. 90 tablet 0   • aspirin 81 MG EC tablet Take 81 mg by mouth 2 (Two) Times a Day.     • docusate sodium (COLACE) 100 MG capsule Take 1 capsule by mouth 2 (Two) Times a Day. 60 capsule 1   • imipramine (TOFRANIL) 50 MG tablet Take two bid. 120 tablet 0   • latanoprost (XALATAN) 0.005 % ophthalmic solution      • metoprolol tartrate (LOPRESSOR) 50 MG tablet Take 50 mg by mouth 2 (Two) Times a Day With Meals.     • mirtazapine (REMERON) 15 MG tablet Take 1 tablet by mouth Every Night. 30 tablet 0   • pantoprazole (PROTONIX) 40 MG EC tablet Take 40 mg by mouth Daily.     • raNITIdine (ZANTAC) 150 MG tablet      • simvastatin (ZOCOR) 20 MG tablet Take 20 mg by mouth Every Night.     • vitamin D (ERGOCALCIFEROL) 91091 units capsule capsule        No current facility-administered medications for this visit.        COLLATERAL PSYCHOTHERAPEUTIC INTERVENTION: Continuing with regular sessions    RISK:  Moderate.     Encounter Diagnoses   Name Primary?   • Generalized anxiety disorder Yes   • Major depressive disorder, recurrent episode, moderate (CMS/HCC)        Return in about 4 weeks (around 3/7/2019).  Patient knows to call if symptoms worsen or fail to improve between appointments.    Dictated utilizing Dragon dictation

## 2019-02-08 RX ORDER — IMIPRAMINE HCL 50 MG
TABLET ORAL
Qty: 120 TABLET | Refills: 0 | Status: SHIPPED | OUTPATIENT
Start: 2019-02-08 | End: 2019-03-28 | Stop reason: SDUPTHER

## 2019-02-13 ENCOUNTER — OFFICE VISIT (OUTPATIENT)
Dept: PSYCHIATRY | Facility: CLINIC | Age: 49
End: 2019-02-13

## 2019-02-13 DIAGNOSIS — F33.1 MAJOR DEPRESSIVE DISORDER, RECURRENT EPISODE, MODERATE (HCC): ICD-10-CM

## 2019-02-13 DIAGNOSIS — F41.1 GENERALIZED ANXIETY DISORDER: Primary | ICD-10-CM

## 2019-02-13 DIAGNOSIS — Z56.6 WORK-RELATED STRESS: ICD-10-CM

## 2019-02-13 PROCEDURE — 90832 PSYTX W PT 30 MINUTES: CPT | Performed by: SOCIAL WORKER

## 2019-02-13 SDOH — HEALTH STABILITY - MENTAL HEALTH: OTHER PHYSICAL AND MENTAL STRAIN RELATED TO WORK: Z56.6

## 2019-02-14 NOTE — PROGRESS NOTES
"Date of Service: February 13, 2019  Time In: 1:00 PM  Time Out: 1:30 PM      MA 9:40 AMOGRESS NOTE  Data:  Amish Win is a 48 y.o. male who met with the undersigned for a regularly scheduled individual outpatient therapy session at the [facility] for follow-up of depression and anxiety.     HPI:  Patient states \"it's a struggle\" and reports he continues to struggle with significant periods of depression.  However, he states he is off this upcoming weekend and will be going to see his son in [place] which makes him happy.  Patient reports ongoing symptoms including feeling hopeless, anhedonia, anergia, decreased motivation, and periods of hypersomnia.  Patient rates current symptoms of depression at a 3/4 on a scale of 1-10 with 10 being most severe.  The patient reports he continues to struggle with anxiety including anxious mood, feeling on edge, feeling overwhelmed, trembling, shortness of breath, increased heart rate, an urge to escape the situation, and a sense of impending doom.  Patient reports his symptoms of anxiety are significantly increased while at work.  Patient rates current symptoms of anxiety at  a 5 on a scale of 1-10 with 10 being most severe.   Patient reports he and his wife continue to have significant difficulties and states they rarely interacts.  The patient also reports his wife tells him she doesn't have time to adhere his issues from the day.  Patient continues to state he and his wife have not had physical intimacy in many years.  Patient reports  he continues to adhere to medication regimen as prescribed.  Patient adamantly and convincingly denies suicidal ideation vehemently denies any substance use.    Patient reports he was recently written up at work and states this is a final warning.  Patient reports he realizes his current job is not sustainable and states he continues to look for other options.        Clinical Maneuvering/Intervention:  Assisted patient in " processing above session content; acknowledged and normalized patient’s thoughts, feelings, and concerns.  Session was primarily focused on assisting the patient with developing specific strategy to mitigate symptoms to allow him to continue to function in his daily life.  Utilize cognitive behavioral therapy and solution focused therapy to assist patient in developing coping mechanisms decrease severity and frequency of symptoms of anxiety while at work including positive self talk, relaxation techniques, and thought blocking techniques.  Also challenged the patient to remind himself he can only do his best.  Continued to encourage the patient to look for alternative employment.  Provided unconditional positive regard in a safe, supportive environment.      Allowed patient to freely discuss issues without interruption or judgment. Provided safe, confidential environment to facilitate the development of positive therapeutic relationship and encourage open, honest communication. Assisted patient in identifying risk factors which would indicate the need for higher level of care including thoughts to harm self or others and/or self-harming behavior and encouraged patient to contact this office, call 911, or present to the nearest emergency room should any of these events occur. Discussed crisis intervention services and means to access.  Patient adamantly and convincingly denies current suicidal or homicidal ideation or perceptual disturbance.    Assessment    Patient continues to struggle with depression which appears to be exacerbated during his work week.  The patient's symptoms of anxiety also continue to be significantly exacerbated by work stress.  The patient's symptomology continues to cause impairment in important areas of functioning.  As a result, he can be reasonably expected to continue to benefit from treatment likely be at increased risk for decompensation otherwise.    Diagnoses and all orders for this  visit:    Generalized anxiety disorder    Major depressive disorder, recurrent episode, moderate (CMS/HCC)    Work-related stress               Mental Status Exam  Hygiene:  good  Dress:  casual  Attitude:  Cooperative  Motor Activity:  Appropriate  Speech:  Normal  Mood:  anxious   Affect:  Calm and pleasant  Thought Processes:  Linear  Thought Content:  normal  Suicidal Thoughts:  denies  Homicidal Thoughts:  denies  Crisis Safety Plan: yes, to come to the emergency room.  Hallucinations:  denies    Patient's Support Network Includes:  children and extended family    Progress toward goal: Not at goal    Functional Status: Moderate impairment     Prognosis: Fair with Ongoing Treatment     Plan         Patient will continue in individual outpatient therapy sessions every 2 weeks at the [facility] and pharmacotherapy as scheduled with Dr. Villareal.  Patient will adhere to medication regimen as prescribed and report any side effects. Patient will contact this office, call 911 or present to the nearest emergency room should suicidal or homicidal ideations occur. Provide Cognitive Behavioral Therapy and Integrative Therapy to improve functioning, maintain stability, and avoid decompensation and the need for higher level of care.          Return in about 2 weeks (around 2/27/2019) for Next scheduled follow up.      This document signed by Izaiah Tinoco LCSW, JOAQUIN February 14, 2019 9:05 AM

## 2019-02-27 ENCOUNTER — OFFICE VISIT (OUTPATIENT)
Dept: PSYCHIATRY | Facility: CLINIC | Age: 49
End: 2019-02-27

## 2019-02-27 DIAGNOSIS — F33.1 MAJOR DEPRESSIVE DISORDER, RECURRENT EPISODE, MODERATE (HCC): ICD-10-CM

## 2019-02-27 DIAGNOSIS — Z56.6 WORK-RELATED STRESS: ICD-10-CM

## 2019-02-27 DIAGNOSIS — F41.1 GENERALIZED ANXIETY DISORDER: Primary | ICD-10-CM

## 2019-02-27 PROCEDURE — 90832 PSYTX W PT 30 MINUTES: CPT | Performed by: SOCIAL WORKER

## 2019-02-27 SDOH — HEALTH STABILITY - MENTAL HEALTH: OTHER PHYSICAL AND MENTAL STRAIN RELATED TO WORK: Z56.6

## 2019-02-27 NOTE — PROGRESS NOTES
"Date of Service: February 27, 2019  Time In: 1:20 PM  Time Out: 1:50 PM      OH 9:40 AMOGRESS NOTE  Data:  Amish Win is a 48 y.o. male who met with the undersigned for a regularly scheduled individual outpatient therapy session at the [facility] for follow-up of depression, work-related stress and anxiety.     HPI:  Patient reports past few days been very difficult and states he continues to get berated at work.  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  The patient states there is no way to meet his expectations.  Patient reports ongoing symptoms including feeling hopeless, anhedonia, anergia, decreased motivation, and periods of hypersomnia.  Patient rates current symptoms of depression at a 3 on a scale of 1-10 with 10 being most severe.  The patient reports he continues to struggle with anxiety including anxious mood, feeling on edge, feeling overwhelmed, trembling, shortness of breath, increased heart rate, an urge to escape the situation, and a sense of impending doom.  Patient reports his symptoms of anxiety are significantly increased while at work.  Patient rates current symptoms of anxiety at  a 5/6 on a scale of 1-10 with 10 being most severe.   Patient reported him and his wife continue to have strained relationship and states \"she just wants me to keep the job no matter what\".    Patient reports  he continues to adhere to medication regimen as prescribed.  Patient adamantly and convincingly denies suicidal ideation vehemently denies any substance use.          Clinical Maneuvering/Intervention:  Assisted patient in processing above session content; acknowledged and normalized patient’s thoughts, feelings, and concerns.  Allowed the patient to discuss/vent his ongoing feelings and frustrations with work and validated his feelings.  Also utilized cognitive behavioral therapy to assist patient in developing appropriate coping " mechanisms to decrease is very frequency of symptoms including positive self talk, relaxation techniques, and guided imagery.  Also discussed and demonstrated the use of thought blocking techniques.  Continued to encourage the patient to accept the fact his current job is simply not conducive or sustainable and encouraged him to continue to look for other employment.  Also challenged the patient to remind himself his wife will likely not be happy no matter what he does.  Continued to focus on behavioral activation and healthy skills of daily living.  Provided unconditional positive regard in a safe, supportive environment.      Allowed patient to freely discuss issues without interruption or judgment. Provided safe, confidential environment to facilitate the development of positive therapeutic relationship and encourage open, honest communication. Assisted patient in identifying risk factors which would indicate the need for higher level of care including thoughts to harm self or others and/or self-harming behavior and encouraged patient to contact this office, call 911, or present to the nearest emergency room should any of these events occur. Discussed crisis intervention services and means to access.  Patient adamantly and convincingly denies current suicidal or homicidal ideation or perceptual disturbance.    Assessment    Patient continues to struggle with depression which appears to be exacerbated during his work week.  The patient's symptoms of anxiety also continue to be significantly exacerbated by work stress.  The patient's symptomology continues to cause impairment in important areas of functioning.  As a result, he can be reasonably expected to continue to benefit from treatment likely be at increased risk for decompensation otherwise.    Diagnoses and all orders for this visit:    Generalized anxiety disorder    Major depressive disorder, recurrent episode, moderate (CMS/HCC)    Work-related  stress               Mental Status Exam  Hygiene:  good  Dress:  casual  Attitude:  Cooperative  Motor Activity:  Appropriate  Speech:  Normal  Mood:  anxious   Affect:  Calm and pleasant  Thought Processes:  Linear  Thought Content:  normal  Suicidal Thoughts:  denies  Homicidal Thoughts:  denies  Crisis Safety Plan: yes, to come to the emergency room.  Hallucinations:  denies    Patient's Support Network Includes:  children and extended family    Progress toward goal: Not at goal    Functional Status: Moderate impairment     Prognosis: Fair with Ongoing Treatment     Plan         Patient will continue in individual outpatient therapy sessions every 2 weeks at the [facility] and pharmacotherapy as scheduled with Dr. Villareal.  Patient will adhere to medication regimen as prescribed and report any side effects. Patient will contact this office, call 911 or present to the nearest emergency room should suicidal or homicidal ideations occur. Provide Cognitive Behavioral Therapy and Integrative Therapy to improve functioning, maintain stability, and avoid decompensation and the need for higher level of care.          Return in about 2 weeks (around 3/13/2019) for Next scheduled follow up.      This document signed by Izaiah Tinoco LCSW, JOAQUIN February 27, 2019 5:36 PM

## 2019-03-25 RX ORDER — ALPRAZOLAM 2 MG/1
TABLET, EXTENDED RELEASE ORAL
Qty: 90 TABLET | Refills: 0 | Status: CANCELLED | OUTPATIENT
Start: 2019-03-25

## 2019-03-26 RX ORDER — ALPRAZOLAM 2 MG/1
2 TABLET, EXTENDED RELEASE ORAL 3 TIMES DAILY
Qty: 90 TABLET | Refills: 0 | Status: CANCELLED | OUTPATIENT
Start: 2019-03-26

## 2019-03-27 ENCOUNTER — OFFICE VISIT (OUTPATIENT)
Dept: PSYCHIATRY | Facility: CLINIC | Age: 49
End: 2019-03-27

## 2019-03-27 DIAGNOSIS — Z56.6 WORK-RELATED STRESS: ICD-10-CM

## 2019-03-27 DIAGNOSIS — F41.1 GENERALIZED ANXIETY DISORDER: Primary | ICD-10-CM

## 2019-03-27 DIAGNOSIS — F33.1 MAJOR DEPRESSIVE DISORDER, RECURRENT EPISODE, MODERATE (HCC): ICD-10-CM

## 2019-03-27 PROCEDURE — 90834 PSYTX W PT 45 MINUTES: CPT | Performed by: SOCIAL WORKER

## 2019-03-27 SDOH — HEALTH STABILITY - MENTAL HEALTH: OTHER PHYSICAL AND MENTAL STRAIN RELATED TO WORK: Z56.6

## 2019-03-27 NOTE — PROGRESS NOTES
"Date of Service: March 27, 2019  Time In: 8:00 AM  Time Out: 8:40 AM      GA 9:40 AMOGRESS NOTE  Data:  Amish Win is a 48 y.o. male who met with the undersigned for a regularly scheduled individual outpatient therapy session at the [facility] for follow-up of depression, work-related stress and anxiety.     HPI: Patient reports she has not Appointment in the past month due to conflict with his work schedule.  Patient reports things are \"about the same\" at home and states his stress continues to be primarily focused on work related issues.  He reports he continues to be in constant fear of being rolled up which could result in his discharge.  Patient reports ongoing symptoms including feeling hopeless, anhedonia, anergia, decreased motivation, and periods of hypersomnia.  Patient rates current symptoms of depression at a 3 on a scale of 1-10 with 10 being most severe.  The patient reports he continues to struggle with anxiety including anxious mood, feeling on edge, feeling overwhelmed, trembling, shortness of breath, increased heart rate, an urge to escape the situation, and a sense of impending doom.  Patient reports his symptoms of anxiety are significantly increased while at work.  Patient rates current symptoms of anxiety at  a 6 on a scale of 1-10 with 10 being most severe.   Patient reported him and his wife continue to have strained relationship and states \"she just wants me to keep the job no matter what\".    Patient reports  he continues to adhere to medication regimen as prescribed.  Patient adamantly and convincingly denies suicidal ideation vehemently denies any substance use.          Clinical Maneuvering/Intervention:  Assisted patient in processing above session content; acknowledged and normalized patient’s thoughts, feelings, and concerns.  Utilized motivational interviewing techniques including complex reflections to assist patient in recognizing his current work environment is simply not " sustainable and challenged him to continue to seek other viable employment.  This session was primarily focused on assisting the patient and developing appropriate coping strategies to decrease significantly elevated symptoms while at work including positive self talk, guided imagery, and challenging faulty, irrational thoughts of factual counter arguments.  Continued to focus on behavioral activation and healthy skills of daily living.  Provided unconditional positive regard in a safe, supportive environment.      Allowed patient to freely discuss issues without interruption or judgment. Provided safe, confidential environment to facilitate the development of positive therapeutic relationship and encourage open, honest communication. Assisted patient in identifying risk factors which would indicate the need for higher level of care including thoughts to harm self or others and/or self-harming behavior and encouraged patient to contact this office, call 911, or present to the nearest emergency room should any of these events occur. Discussed crisis intervention services and means to access.  Patient adamantly and convincingly denies current suicidal or homicidal ideation or perceptual disturbance.    Assessment    Patient continues to struggle with depression which appears to be exacerbated during his work week.  The patient's symptoms of anxiety also continue to be significantly exacerbated by work stress.  The patient's symptomology continues to cause impairment in important areas of functioning.  As a result, he can be reasonably expected to continue to benefit from treatment likely be at increased risk for decompensation otherwise.    Diagnoses and all orders for this visit:    Generalized anxiety disorder    Major depressive disorder, recurrent episode, moderate (CMS/HCC)    Work-related stress               Mental Status Exam  Hygiene:  good  Dress:  casual  Attitude:  Cooperative  Motor Activity:   Appropriate  Speech:  Normal  Mood:  anxious   Affect:  Calm and pleasant  Thought Processes:  Linear  Thought Content:  normal  Suicidal Thoughts:  denies  Homicidal Thoughts:  denies  Crisis Safety Plan: yes, to come to the emergency room.  Hallucinations:  denies    Patient's Support Network Includes:  children and extended family    Progress toward goal: Not at goal    Functional Status: Moderate impairment     Prognosis: Fair with Ongoing Treatment     Plan         Patient will continue in individual outpatient therapy sessions every 2 weeks at the [facility] and pharmacotherapy as scheduled with Dr. Villareal.  Patient will adhere to medication regimen as prescribed and report any side effects. Patient will contact this office, call 911 or present to the nearest emergency room should suicidal or homicidal ideations occur. Provide Cognitive Behavioral Therapy and Integrative Therapy to improve functioning, maintain stability, and avoid decompensation and the need for higher level of care.          Return in about 2 weeks (around 4/10/2019) for Next scheduled follow up.      This document signed by Izaiah Tinoco LCSW, JOAQUIN March 27, 2019 12:18 PM

## 2019-03-28 RX ORDER — ALPRAZOLAM 2 MG/1
2 TABLET, EXTENDED RELEASE ORAL 3 TIMES DAILY
Qty: 90 TABLET | Refills: 0 | Status: SHIPPED | OUTPATIENT
Start: 2019-03-28 | End: 2019-04-16 | Stop reason: SDUPTHER

## 2019-03-28 RX ORDER — MIRTAZAPINE 15 MG/1
15 TABLET, FILM COATED ORAL NIGHTLY
Qty: 30 TABLET | Refills: 0 | Status: SHIPPED | OUTPATIENT
Start: 2019-03-28 | End: 2019-04-16 | Stop reason: SDUPTHER

## 2019-03-28 RX ORDER — IMIPRAMINE HCL 50 MG
TABLET ORAL
Qty: 120 TABLET | Refills: 0 | Status: SHIPPED | OUTPATIENT
Start: 2019-03-28 | End: 2019-04-16 | Stop reason: SDUPTHER

## 2019-04-03 ENCOUNTER — OFFICE VISIT (OUTPATIENT)
Dept: PSYCHIATRY | Facility: CLINIC | Age: 49
End: 2019-04-03

## 2019-04-03 DIAGNOSIS — F33.1 MAJOR DEPRESSIVE DISORDER, RECURRENT EPISODE, MODERATE (HCC): ICD-10-CM

## 2019-04-03 DIAGNOSIS — Z56.6 WORK-RELATED STRESS: ICD-10-CM

## 2019-04-03 DIAGNOSIS — F41.1 GENERALIZED ANXIETY DISORDER: Primary | ICD-10-CM

## 2019-04-03 PROCEDURE — 90834 PSYTX W PT 45 MINUTES: CPT | Performed by: SOCIAL WORKER

## 2019-04-03 SDOH — HEALTH STABILITY - MENTAL HEALTH: OTHER PHYSICAL AND MENTAL STRAIN RELATED TO WORK: Z56.6

## 2019-04-03 NOTE — PROGRESS NOTES
Date of Service: April 3, 2019  Time In: 10:25 AM  Time Out:  11:10 AM       OH 9:40 AMOGRESS NOTE  Data:  Amish Win is a 49 y.o. male who met with the undersigned for a regularly scheduled individual outpatient therapy session at the [facility] for follow-up of depression, work-related stress and anxiety.     HPI: Patient reports last few days been very difficult and states his teenage son who lives in assisted living facility approximately 2 hours from here fail in the bathtub and subsequently had a seizure.  He reports he was unable to go to the hospital with his wife out of fear of being fired.  The patient states he cried most of the night.  Patient does report he recently received a raise at work and states this made him feel like someone at least acknowledged he was a good employee.  Patient reports ongoing symptoms including feeling hopeless, anhedonia, anergia, decreased motivation, and periods of hypersomnia.  Patient rates current symptoms of depression at a 3 on a scale of 1-10 with 10 being most severe.  The patient reports he continues to struggle with anxiety including anxious mood, feeling on edge, feeling overwhelmed, trembling, shortness of breath, increased heart rate, an urge to escape the situation, and a sense of impending doom.  Patient reports his symptoms of anxiety are significantly increased while at work.  Patient rates current symptoms of anxiety at  a 6 on a scale of 1-10 with 10 being most severe.      Patient reports  he continues to adhere to medication regimen as prescribed.  Patient adamantly and convincingly denies suicidal ideation vehemently denies any substance use.          Clinical Maneuvering/Intervention:  Assisted patient in processing above session content; acknowledged and normalized patient’s thoughts, feelings, and concerns.  Allow the patient to discuss/process his feelings and fears concerning his son's recent medical issues and validated his feelings.  Also  strongly urged the patient to consider making a concerted effort to see his son on a more regular basis.  Also utilized motivational interviewing techniques including complex reflections to assist the patient in identifying and recognizing the fact he recently received a raise in his salary indicates overall the company at least has determined he is doing well enough job to garter a raise.  Continue to utilize cognitive behavioral therapy to assist the patient in developing appropriate coping mechanisms including positive self talk, relaxation techniques, and thought blocking techniques.  Continued to focus on behavioral activation and healthy skills of daily living.  Provided unconditional positive regard in a safe, supportive environment.      Allowed patient to freely discuss issues without interruption or judgment. Provided safe, confidential environment to facilitate the development of positive therapeutic relationship and encourage open, honest communication. Assisted patient in identifying risk factors which would indicate the need for higher level of care including thoughts to harm self or others and/or self-harming behavior and encouraged patient to contact this office, call 911, or present to the nearest emergency room should any of these events occur. Discussed crisis intervention services and means to access.  Patient adamantly and convincingly denies current suicidal or homicidal ideation or perceptual disturbance.    Assessment    Patient continues to struggle with depression and anxiety which is significantly exacerbated by work-related stress and psychosocial stressors at home.   The patient's symptomology continues to cause impairment in important areas of functioning.  As a result, he can be reasonably expected to continue to benefit from treatment likely be at increased risk for decompensation otherwise.    Diagnoses and all orders for this visit:    Generalized anxiety disorder    Major  depressive disorder, recurrent episode, moderate (CMS/HCC)    Work-related stress               Mental Status Exam  Hygiene:  good  Dress:  casual  Attitude:  Cooperative  Motor Activity:  Appropriate  Speech:  Normal  Mood:  anxious   Affect:  Calm and pleasant  Thought Processes:  Linear  Thought Content:  normal  Suicidal Thoughts:  denies  Homicidal Thoughts:  denies  Crisis Safety Plan: yes, to come to the emergency room.  Hallucinations:  denies    Patient's Support Network Includes:  children and extended family    Progress toward goal: Not at goal    Functional Status: Moderate impairment     Prognosis: Fair with Ongoing Treatment     Plan         Patient will continue in individual outpatient [facility] and pharmacotherapy as scheduled with Dr. Villareal.  Patient will adhere to medication regimen as prescribed and report any side effects. Patient will contact this office, call 911 or present to the nearest emergency room should suicidal or homicidal ideations occur. Provide Cognitive Behavioral Therapy and Integrative Therapy to improve functioning, maintain stability, and avoid decompensation and the need for higher level of care.          Return in about 2 weeks (around 4/17/2019) for Next scheduled follow up.      This document signed by Izaiah Tinoco LCSW, JOAQUIN April 3, 2019 1:18 PM

## 2019-04-16 ENCOUNTER — OFFICE VISIT (OUTPATIENT)
Dept: PSYCHIATRY | Facility: CLINIC | Age: 49
End: 2019-04-16

## 2019-04-16 VITALS
BODY MASS INDEX: 27.86 KG/M2 | DIASTOLIC BLOOD PRESSURE: 77 MMHG | HEART RATE: 66 BPM | SYSTOLIC BLOOD PRESSURE: 115 MMHG | WEIGHT: 199 LBS | HEIGHT: 71 IN

## 2019-04-16 DIAGNOSIS — Z79.899 MEDICATION MANAGEMENT: Primary | ICD-10-CM

## 2019-04-16 DIAGNOSIS — F33.41 RECURRENT MAJOR DEPRESSIVE DISORDER IN PARTIAL REMISSION (HCC): ICD-10-CM

## 2019-04-16 DIAGNOSIS — F41.1 GENERALIZED ANXIETY DISORDER: ICD-10-CM

## 2019-04-16 PROCEDURE — 99214 OFFICE O/P EST MOD 30 MIN: CPT | Performed by: PSYCHIATRY & NEUROLOGY

## 2019-04-16 RX ORDER — ALPRAZOLAM 2 MG/1
2 TABLET, EXTENDED RELEASE ORAL 3 TIMES DAILY
Qty: 90 TABLET | Refills: 0 | Status: SHIPPED | OUTPATIENT
Start: 2019-04-16 | End: 2019-05-14 | Stop reason: SDUPTHER

## 2019-04-16 RX ORDER — LISINOPRIL AND HYDROCHLOROTHIAZIDE 12.5; 1 MG/1; MG/1
TABLET ORAL
COMMUNITY
Start: 2019-02-12 | End: 2020-03-30 | Stop reason: SDUPTHER

## 2019-04-16 RX ORDER — MIRTAZAPINE 15 MG/1
15 TABLET, FILM COATED ORAL NIGHTLY
Qty: 30 TABLET | Refills: 0 | Status: SHIPPED | OUTPATIENT
Start: 2019-04-16 | End: 2019-05-14 | Stop reason: SDUPTHER

## 2019-04-16 RX ORDER — IMIPRAMINE HCL 50 MG
TABLET ORAL
Qty: 120 TABLET | Refills: 0 | Status: SHIPPED | OUTPATIENT
Start: 2019-04-16 | End: 2019-05-14 | Stop reason: SDUPTHER

## 2019-04-16 NOTE — PROGRESS NOTES
"Patient ID: Amish Win is a 49 y.o. male    SERVICE TYPE: EVALUATION AND MANAGEMENT (greater than 50% of the time spent for supportive psychotherapy).  Time in: 14 30     time out 1536    /77   Pulse 66   Ht 180 cm (70.87\")   Wt 90.3 kg (199 lb)   BMI 27.86 kg/m²     ALLERGIES:  Penicillins    CC/ Focus of the visit: Depression and anxiety    HPI: Some bad some good news.  Was given a raise but then another incident and was transferred to work and in a [employer] which would require him driving to and from 5 days a week starting next week.  On vacation and aggressively seeking alternative employment with some good prospects.  His anxiety is a little bit less, his optimism a little bit more, still has the obsessive/hypochondriacal symptoms perhaps slightly less so.  Has been working to help his wife around the house given the fact he is off work, feeling positive about that.  Also going to be resuming some mild exercise routines daily.  We can all just keep her fingers crossed that he finds an alternative opportunity for he does not have to drive to [email or web address] and fears he will have the same problems there with\" the record follows me with this company.\"  \"Would be great to tell them to stuff it\"    PFSH: Patient's son has recovered from his fall, wife is having more problems with depression but patient is been helping her about the house and feels more positive about her relationship.    Review of Systems   Respiratory: Positive for shortness of breath.    Cardiovascular: Positive for palpitations.   Gastrointestinal: Negative.    Genitourinary:        Think I have something wrong with my kidneys but I do not, just this hypochondriacal thing.       SUPPORTIVE PSYCHOTHERAPY: continuing efforts to promote the therapeutic alliance, address the patient’s issues, and strengthen self awareness, insights, and coping skills.       Mental Status Exam  Appearance:  clean and casually dressed, " appropriate  Attitude toward clinician:  cooperative and agreeable   Speech:    Rate:  regular rate and rhythm   Volume: normal  Motor:  no abnormal movements present  Mood:  Good  Affect:  euthymic  Thought Processes:  linear, logical, and goal directed  Thought Content:  Normal   Feeling Hopeless: absent  Suicidal Thoughts:  absent  Homicidal Thoughts:  absent  Perceptual Disturbance: no perceptual disturbance  Attention and Concentration:  good  Insight and Judgement:  good  Memory:  memory appears to be intact    LABS:   Recent Results (from the past 168 hour(s))   [facility] Drug Screen    Collection Time: 04/16/19  2:43 PM   Result Value Ref Range    External Amphetamine Screen Urine Negative     Amphetamine Cut-Off 1000mg/ml     External Benzodiazepine Screen Urine Positive     Benzodiazipine Cut-Off 300mg/ml     External Cocaine Screen Urine Negative     Cocaine Cut-Off 300mg/ml     External THC Screen Urine Negative     THC Cut-Off 50mg/ml     External Methadone Screen Urine Negative     Methadone Cut-Off 300mg/ml     External Methamphetamine Screen Urine Negative     Methamphetamine Cut-Off 1000mg/ml     External Oxycodone Screen Urine Negative     Oxycodone Cut-Off 100mg/ml     External Buprenorphine Screen Urine Negative     Buprenorphine Cut-Off 10mg/ml     External MDMA Negative     MDMA Cut-Off 500mg/ml     External Opiates Screen Urine Negative     Opiates Cut-Off 300mg/ml        MEDICATION ISSUES: Have discussed with the patient the medications Risks, Benefits, and Side effects including potential falls, possible impaired driving and  metabolic adversities among others. No medication side effects or related complaints today.     TREATMENT PLAN/GOALS: Continue supportive psychotherapy efforts and medications as indicated.     Current Outpatient Medications   Medication Sig Dispense Refill   • ALPRAZolam XR (XANAX XR) 2 MG 24 hr tablet Take 1 tablet by mouth 3 (Three) Times a Day. 90 tablet 0   • aspirin  81 MG EC tablet Take 81 mg by mouth 2 (Two) Times a Day.     • docusate sodium (COLACE) 100 MG capsule Take 1 capsule by mouth 2 (Two) Times a Day. 60 capsule 1   • imipramine (TOFRANIL) 50 MG tablet Take two bid. 120 tablet 0   • latanoprost (XALATAN) 0.005 % ophthalmic solution      • lisinopril-hydrochlorothiazide (PRINZIDE,ZESTORETIC) 10-12.5 MG per tablet      • metoprolol tartrate (LOPRESSOR) 50 MG tablet Take 50 mg by mouth 2 (Two) Times a Day With Meals.     • mirtazapine (REMERON) 15 MG tablet Take 1 tablet by mouth Every Night. 30 tablet 0   • pantoprazole (PROTONIX) 40 MG EC tablet Take 40 mg by mouth Daily.     • raNITIdine (ZANTAC) 150 MG tablet      • simvastatin (ZOCOR) 20 MG tablet Take 20 mg by mouth Every Night.     • vitamin D (ERGOCALCIFEROL) 50763 units capsule capsule        No current facility-administered medications for this visit.      There is a mutual patient/physician plan to taper back on the alprazolam when his appointment situation stabilizes.    COLLATERAL PSYCHOTHERAPEUTIC INTERVENTION: Continues to see his therapist during [place] on a bimonthly basis, has an appointment tomorrow.    RISK: Moderate    Assessment/Plan     Diagnoses and all orders for this visit:    Medication management  -     [facility] Drug Screen    Generalized anxiety disorder    Recurrent major depressive disorder in partial remission (CMS/HCC)    Other orders  -     ALPRAZolam XR (XANAX XR) 2 MG 24 hr tablet; Take 1 tablet by mouth 3 (Three) Times a Day.  -     mirtazapine (REMERON) 15 MG tablet; Take 1 tablet by mouth Every Night.  -     imipramine (TOFRANIL) 50 MG tablet; Take two bid.        Return in about 4 weeks (around 5/14/2019).         Patient knows to call if symptoms worsen or fail to improve between appointments.     I spent a total of 36 minutes in direct patient care, greater than 30 minutes (greater than 50%) were spent in coordination of care, and counseling the patient regarding his anxiety and  depression.. Answered any questions patient had with medication and plan.     Dictated utilizing Dragon dictation    LAUREN Villareal MD

## 2019-04-17 ENCOUNTER — OFFICE VISIT (OUTPATIENT)
Dept: PSYCHIATRY | Facility: CLINIC | Age: 49
End: 2019-04-17

## 2019-04-17 DIAGNOSIS — F33.41 RECURRENT MAJOR DEPRESSIVE DISORDER IN PARTIAL REMISSION (HCC): ICD-10-CM

## 2019-04-17 DIAGNOSIS — Z56.6 WORK-RELATED STRESS: ICD-10-CM

## 2019-04-17 DIAGNOSIS — F41.1 GENERALIZED ANXIETY DISORDER: Primary | ICD-10-CM

## 2019-04-17 PROCEDURE — 90834 PSYTX W PT 45 MINUTES: CPT | Performed by: SOCIAL WORKER

## 2019-04-17 SDOH — HEALTH STABILITY - MENTAL HEALTH: OTHER PHYSICAL AND MENTAL STRAIN RELATED TO WORK: Z56.6

## 2019-04-23 NOTE — PROGRESS NOTES
"Date of Service: April 17, 2019  Time In: 10:30 AM  Time Out:  11:10 AM       CO 9:40 AMOGRESS NOTE  Data:  Amish Win is a 49 y.o. male who met with the undersigned for a regularly scheduled individual outpatient therapy session at the [facility] for follow-up of depression, work-related stress and anxiety.     HPI: Patient reports he was recently told he would have to travel to [facility] to continue in his job and states this was simply ploy to get him to quit.  Patient reports he has no intentions on going to [place] and states he is currently \"beating the bushes\" to find a different job.  Patient reports he feels somehow mildly relieved that he is now forced to begin to seriously consider finding other employment even if he is forced to take a small decrease in pay.  Patient reports ongoing symptoms including feeling hopeless, anhedonia, anergia, decreased motivation, and periods of hypersomnia.  Patient rates current symptoms of depression at a 3 on a scale of 1-10 with 10 being most severe.  The patient reports he continues to struggle with anxiety including anxious mood, feeling on edge, feeling overwhelmed, trembling, shortness of breath, increased heart rate, an urge to escape the situation, and a sense of impending doom.  Patient reports his symptoms of anxiety are significantly increased while at work.  Patient rates current symptoms of anxiety at  a 6 on a scale of 1-10 with 10 being most severe.      Patient reports  he continues to adhere to medication regimen as prescribed.  Patient adamantly and convincingly denies suicidal ideation vehemently denies any substance use.          Clinical Maneuvering/Intervention:  Assisted patient in processing above session content; acknowledged and normalized patient’s thoughts, feelings, and concerns.  Allow the patient to discuss/vent his feeling frustrations with long history of difficulties at work and validated his feelings.  Also agreed " with the patient he has now had his hand forced to find other employment and encouraged him to remain focused on this task.  Also utilized motivational reviewed techniques including complex reflections to assist the patient and identifying and acknowledging positive aspects of his life including the fact he has dealt with this situation better than he feels he has.  Continue to discussed and demonstrated challenging faulty, irrational thoughts of factual counter arguments and encouraged patient to continue to challenge periods of automatic negative expectation.  Provided unconditional positive regard in a safe, supportive environment.      Allowed patient to freely discuss issues without interruption or judgment. Provided safe, confidential environment to facilitate the development of positive therapeutic relationship and encourage open, honest communication. Assisted patient in identifying risk factors which would indicate the need for higher level of care including thoughts to harm self or others and/or self-harming behavior and encouraged patient to contact this office, call 911, or present to the nearest emergency room should any of these events occur. Discussed crisis intervention services and means to access.  Patient adamantly and convincingly denies current suicidal or homicidal ideation or perceptual disturbance.    Assessment    Patient continues to struggle with depression and anxiety which is significantly exacerbated by work-related stress and psychosocial stressors at home.  However, he appears to be more motivated as he has been faced with eminent loss of his job.  The patient's symptomology continues to cause impairment in important areas of functioning.  As a result, he can be reasonably expected to continue to benefit from treatment likely be at increased risk for decompensation otherwise.    Diagnoses and all orders for this visit:    Generalized anxiety disorder    Recurrent major depressive  disorder in partial remission (CMS/HCC)    Work-related stress               Mental Status Exam  Hygiene:  good  Dress:  casual  Attitude:  Cooperative  Motor Activity:  Appropriate  Speech:  Normal  Mood:  anxious   Affect:  Calm and pleasant  Thought Processes:  Linear  Thought Content:  normal  Suicidal Thoughts:  denies  Homicidal Thoughts:  denies  Crisis Safety Plan: yes, to come to the emergency room.  Hallucinations:  denies    Patient's Support Network Includes:  children and extended family    Progress toward goal: Not at goal    Functional Status: Moderate impairment     Prognosis: Fair with Ongoing Treatment     Plan         Patient will continue in individual outpatient therapy sessions every 2 weeks at the [facility] and pharmacotherapy as scheduled with Dr. Villareal.  Patient will adhere to medication regimen as prescribed and report any side effects. Patient will contact this office, call 911 or present to the nearest emergency room should suicidal or homicidal ideations occur. Provide Cognitive Behavioral Therapy and Integrative Therapy to improve functioning, maintain stability, and avoid decompensation and the need for higher level of care.          Return in about 2 weeks (around 5/1/2019) for Next scheduled follow up.      This document signed by Izaiah Tinoco LCSW, JOAQUIN April 23, 2019 1:20 PM

## 2019-05-01 ENCOUNTER — OFFICE VISIT (OUTPATIENT)
Dept: PSYCHIATRY | Facility: CLINIC | Age: 49
End: 2019-05-01

## 2019-05-01 DIAGNOSIS — F41.1 GENERALIZED ANXIETY DISORDER: Primary | ICD-10-CM

## 2019-05-01 DIAGNOSIS — F33.41 RECURRENT MAJOR DEPRESSIVE DISORDER IN PARTIAL REMISSION (HCC): ICD-10-CM

## 2019-05-01 PROCEDURE — 90832 PSYTX W PT 30 MINUTES: CPT | Performed by: SOCIAL WORKER

## 2019-05-01 NOTE — PROGRESS NOTES
"Date of Service: May 1, 2019  Time In: 11:15 AM  Time Out: 11:45 AM      NE 9:40 AMOGRESS NOTE  Data:  Amish Win is a 49 y.o. male who met with the undersigned for a regularly scheduled individual outpatient therapy session at the [facility] for follow-up of depression, work-related stress and anxiety.  Patient verbalizes his agreement to have in turn, Jet Paniagua, present during session.     HPI: Patient reports he ultimately left his job at [employer] and states he feels as though they had implemented a plan to \"push him out\".  Patient reports he has had several job prospects and states he is currently attempting to decide which one he will accept.  Patient reports ongoing symptoms including feeling hopeless, anhedonia, anergia, decreased motivation, and periods of hypersomnia.  Patient rates current symptoms of depression at a 3 on a scale of 1-10 with 10 being most severe.  The patient reports he continues to struggle with anxiety including anxious mood, feeling on edge, feeling overwhelmed, trembling, shortness of breath, increased heart rate, an urge to escape the situation, and a sense of impending doom.  Patient reports his symptoms of anxiety are significantly increased while at work.  Patient rates current symptoms of anxiety at  a 6/7 on a scale of 1-10 with 10 being most severe.      Patient reports  he continues to adhere to medication regimen as prescribed.  Patient adamantly and convincingly denies suicidal ideation vehemently denies any substance use.          Clinical Maneuvering/Intervention:  Assisted patient in processing above session content; acknowledged and normalized patient’s thoughts, feelings, and concerns.  Utilized motivational interviewing techniques including complex reflections to assist the patient in recognizing he is currently in the process of making positive change in his life and encouraged him to remind himself of this.  Also assisted the patient in identifying pros " and cons of both jobs and encouraged him to take the job he feels is most suitable to him.  Continue to utilize cognitive behavioral therapy to assist patient in developing appropriate coping mechanisms to decrease the severity frequency of symptoms.  Continue to focus on behavioral activation.  Provided unconditional positive regard in a safe, supportive environment.      Also made the patient aware of the undersigned will be leaving [facility] 5/23/2019 but also informed her of the plan to continue to practice out of the [facility] at least one day monthly.    Allowed patient to freely discuss issues without interruption or judgment. Provided safe, confidential environment to facilitate the development of positive therapeutic relationship and encourage open, honest communication. Assisted patient in identifying risk factors which would indicate the need for higher level of care including thoughts to harm self or others and/or self-harming behavior and encouraged patient to contact this office, call 911, or present to the nearest emergency room should any of these events occur. Discussed crisis intervention services and means to access.  Patient adamantly and convincingly denies current suicidal or homicidal ideation or perceptual disturbance.    Assessment    Patient appears to feel somewhat better since quitting job at [employer] but continues to struggle with significant anxiety and a sense of uncertainty while attempting to decide on a new job.  The patient's symptomology continues to cause impairment in important areas of functioning.  As a result, he can be reasonably expected to continue to benefit from treatment likely be at increased risk for decompensation otherwise.    Diagnoses and all orders for this visit:    Generalized anxiety disorder    Recurrent major depressive disorder in partial remission (CMS/[facility])               Mental Status Exam  Hygiene:  good  Dress:  casual  Attitude:   Cooperative  Motor Activity:  Appropriate  Speech:  Normal  Mood:  anxious   Affect:  Calm and pleasant  Thought Processes:  Linear  Thought Content:  normal  Suicidal Thoughts:  denies  Homicidal Thoughts:  denies  Crisis Safety Plan: yes, to come to the emergency room.  Hallucinations:  denies    Patient's Support Network Includes:  children and extended family    Progress toward goal: Not at goal    Functional Status: Moderate impairment     Prognosis: Fair with Ongoing Treatment     Plan         Patient will continue in individual outpatient therapy sessions every 2 weeks at the [facility] and pharmacotherapy as scheduled with Dr. Villareal.  Patient will adhere to medication regimen as prescribed and report any side effects. Patient will contact this office, call 911 or present to the nearest emergency room should suicidal or homicidal ideations occur. Provide Cognitive Behavioral Therapy and Integrative Therapy to improve functioning, maintain stability, and avoid decompensation and the need for higher level of care.          No Follow-up on file.      This document signed by Izaiah Tinoco LCSW, JOAQUIN May 1, 2019 3:24 PM

## 2019-05-14 ENCOUNTER — OFFICE VISIT (OUTPATIENT)
Dept: PSYCHIATRY | Facility: CLINIC | Age: 49
End: 2019-05-14

## 2019-05-14 VITALS
SYSTOLIC BLOOD PRESSURE: 125 MMHG | BODY MASS INDEX: 28.08 KG/M2 | HEART RATE: 75 BPM | HEIGHT: 71 IN | DIASTOLIC BLOOD PRESSURE: 86 MMHG | WEIGHT: 200.6 LBS

## 2019-05-14 DIAGNOSIS — F33.41 RECURRENT MAJOR DEPRESSIVE DISORDER IN PARTIAL REMISSION (HCC): ICD-10-CM

## 2019-05-14 DIAGNOSIS — F41.1 GENERALIZED ANXIETY DISORDER: Primary | ICD-10-CM

## 2019-05-14 PROCEDURE — 99214 OFFICE O/P EST MOD 30 MIN: CPT | Performed by: PSYCHIATRY & NEUROLOGY

## 2019-05-14 RX ORDER — IMIPRAMINE HCL 50 MG
TABLET ORAL
Qty: 120 TABLET | Refills: 0 | Status: SHIPPED | OUTPATIENT
Start: 2019-05-14 | End: 2019-06-17 | Stop reason: SDUPTHER

## 2019-05-14 RX ORDER — MIRTAZAPINE 15 MG/1
15 TABLET, FILM COATED ORAL NIGHTLY
Qty: 30 TABLET | Refills: 0 | Status: SHIPPED | OUTPATIENT
Start: 2019-05-14 | End: 2019-06-17 | Stop reason: SDUPTHER

## 2019-05-14 RX ORDER — ALPRAZOLAM 2 MG/1
2 TABLET, EXTENDED RELEASE ORAL 3 TIMES DAILY
Qty: 90 TABLET | Refills: 0 | Status: SHIPPED | OUTPATIENT
Start: 2019-05-14 | End: 2019-06-17 | Stop reason: SDUPTHER

## 2019-05-14 NOTE — PROGRESS NOTES
"Patient ID: Amish Win is a 49 y.o. male    SERVICE TYPE: EVALUATION AND MANAGEMENT (greater than 50% of the time spent for supportive psychotherapy).  Time in: 1500    time out: 1529    /86   Pulse 75   Ht 180 cm (70.87\")   Wt 91 kg (200 lb 9.6 oz)   BMI 28.08 kg/m²     ALLERGIES:  Penicillins    CC/ Focus of the visit: Depression/ anxiety.      HPI: Training for [employer] in [place], in the second week of three.  Probably will make a world of difference in his level of distress, depression and anxiety, he is sleep cycle is stabilizing, up early to bed early.  He has dropped off his exercise routine but encouraged to resume a daily 30-60-minute exercise regimen.  There is a return of optimism.  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    Discussed the desirability of possibly slowly tapering the dose of the alprazolam if his improvement continues with his new employment    PFSH: Says his wife is seriously depressed, she is seeing a therapist and APRN here at the [facility], apparently they have talked about referring her for ECT evaluation.    Review of Systems   Respiratory: Negative.    Cardiovascular:        Having some psychosomatic cardiovascular symptoms but too much less intensity than previously been the case, another words improved.   Musculoskeletal:        Reporting some recent difficulties with back pain, being treated by chiropractor, worried that it might affect him at his work having to stand for long periods of time.       SUPPORTIVE PSYCHOTHERAPY: continuing efforts to promote the therapeutic alliance, address the patient’s issues, and strengthen self awareness, insights, and coping skills.       Mental Status Exam  Appearance:  clean and casually dressed, appropriate  Attitude toward clinician:  cooperative and agreeable   Speech:    Rate:  regular rate and rhythm   Volume: normal  Motor:  no abnormal movements present  Mood:  Good  Affect:  euthymic  Thought " Processes:  linear, logical, and goal directed  Thought Content:  Normal   Feeling Hopeless: absent  Suicidal Thoughts:  absent  Homicidal Thoughts:  absent  Perceptual Disturbance: no perceptual disturbance  Attention and Concentration:  good  Insight and Judgement:  good  Memory:  memory appears to be intact    LABS: No results found for this or any previous visit (from the past 168 hour(s)).    MEDICATION ISSUES: Have discussed with the patient the medications Risks, Benefits, and Side effects including potential falls, possible impaired driving and  metabolic adversities among others. No medication side effects or related complaints today.     TREATMENT PLAN/GOALS: Continue supportive psychotherapy efforts and medications as indicated.     Current Outpatient Medications   Medication Sig Dispense Refill   • ALPRAZolam XR (XANAX XR) 2 MG 24 hr tablet Take 1 tablet by mouth 3 (Three) Times a Day. 90 tablet 0   • aspirin 81 MG EC tablet Take 81 mg by mouth 2 (Two) Times a Day.     • docusate sodium (COLACE) 100 MG capsule Take 1 capsule by mouth 2 (Two) Times a Day. 60 capsule 1   • imipramine (TOFRANIL) 50 MG tablet Take two bid. 120 tablet 0   • latanoprost (XALATAN) 0.005 % ophthalmic solution      • lisinopril-hydrochlorothiazide (PRINZIDE,ZESTORETIC) 10-12.5 MG per tablet      • metoprolol tartrate (LOPRESSOR) 50 MG tablet Take 50 mg by mouth 2 (Two) Times a Day With Meals.     • mirtazapine (REMERON) 15 MG tablet Take 1 tablet by mouth Every Night. 30 tablet 0   • pantoprazole (PROTONIX) 40 MG EC tablet Take 40 mg by mouth Daily.     • raNITIdine (ZANTAC) 150 MG tablet      • simvastatin (ZOCOR) 20 MG tablet Take 20 mg by mouth Every Night.     • vitamin D (ERGOCALCIFEROL) 92700 units capsule capsule        No current facility-administered medications for this visit.        COLLATERAL PSYCHOTHERAPEUTIC INTERVENTION: Plans to follow his therapist to the therapist new position at the [facility].  Seems appropriate.    RISK: Moderate    Assessment/Plan     Diagnoses and all orders for this visit:    Generalized anxiety disorder    Recurrent major depressive disorder in partial remission (CMS/HCC)    Other orders  -     ALPRAZolam XR (XANAX XR) 2 MG 24 hr tablet; Take 1 tablet by mouth 3 (Three) Times a Day.  -     mirtazapine (REMERON) 15 MG tablet; Take 1 tablet by mouth Every Night.  -     imipramine (TOFRANIL) 50 MG tablet; Take two bid.        Return in about 4 weeks (around 6/11/2019).         Patient knows to call if symptoms worsen or fail to improve between appointments.     I spent a total of 29 minutes in direct patient care, greater than 26 minutes (greater than 50%) were spent in coordination of care, and counseling the patient regarding  (diagnosis) . Answered any questions patient had with medication and plan.     Dictated utilizing Dragon dicttierney Villareal MD

## 2019-05-15 ENCOUNTER — OFFICE VISIT (OUTPATIENT)
Dept: PSYCHIATRY | Facility: CLINIC | Age: 49
End: 2019-05-15

## 2019-05-15 DIAGNOSIS — F41.1 GENERALIZED ANXIETY DISORDER: Primary | ICD-10-CM

## 2019-05-15 DIAGNOSIS — F33.41 RECURRENT MAJOR DEPRESSIVE DISORDER IN PARTIAL REMISSION (HCC): ICD-10-CM

## 2019-05-15 PROCEDURE — 90832 PSYTX W PT 30 MINUTES: CPT | Performed by: SOCIAL WORKER

## 2019-05-15 NOTE — PROGRESS NOTES
Date of Service: May 15, 2019  Time In: 8:30 AM  Time Out: 9:00 AM      MT 9:40 AMOGRESS NOTE  Data:  Amish Win is a 49 y.o. male who met with the undersigned for a regularly scheduled individual outpatient therapy session at the [facility] for follow-up of depression and anxiety.    HPI: Patient reports she began a new job with [employer] and states he will be in [place] for the next several weeks doing training.  He reports he feels much better after leaving [employer] but states he continues to struggle with periodic symptoms of anxiety including feeling on edge, feeling overwhelmed, trembling, shortness of breath, and a sense of impending doom.  Patient rates symptoms of anxiety at a 4 on a scale of 1-10 with 10 being most severe.  Patient reports symptoms of depression have improved but states he continues to struggle with periods of sad mood, anhedonia, decreased energy, and a distinct sense of uncertainty.  Patient rates current symptoms of depression at a 2 on a scale of 1-10 with 10 being most severe.     Patient reports  he continues to adhere to medication regimen as prescribed.  Patient adamantly and convincingly denies suicidal ideation vehemently denies any substance use.      Clinical Maneuvering/Intervention:  Assisted patient in processing above session content; acknowledged and normalized patient’s thoughts, feelings, and concerns.  Praised the patient for his willingness to take steps to bring about positive change in his life.  This session was primarily focused on assisting the patient with developing a strategy to mitigate symptoms and enable him to complete his job training.  Utilized cognitive behavioral therapy to assist the patient in developing appropriate coping mechanisms to decrease the severity and frequency of symptoms including positive self talk, thought blocking techniques, relaxation techniques, and grounding techniques.  Also assisted the patient in identifying faulty,  irrational thoughts/fears and demonstrated challenging with factual counter arguments.  Discussed Socratic questioning and provided patient with a brief worksheet.  Provided unconditional positive regard and a safe, supportive environment.    Allowed patient to freely discuss issues without interruption or judgment. Provided safe, confidential environment to facilitate the development of positive therapeutic relationship and encourage open, honest communication. Assisted patient in identifying risk factors which would indicate the need for higher level of care including thoughts to harm self or others and/or self-harming behavior and encouraged patient to contact this office, call 911, or present to the nearest emergency room should any of these events occur. Discussed crisis intervention services and means to access.  Patient adamantly and convincingly denies current suicidal or homicidal ideation or perceptual disturbance.    Assessment    Patient's depression appears to be improved since leaving his job at [employer].  However, the patient continues to struggle with periods of anxiety which continues to cause impairment in important areas of functioning.  The patient's symptomology continues to cause impairment in important areas of functioning.  As a result, he can be reasonably expected to continue to benefit from treatment likely be at increased risk for decompensation otherwise.    Diagnoses and all orders for this visit:    Generalized anxiety disorder    Recurrent major depressive disorder in partial remission (CMS/[facility])               Mental Status Exam  Hygiene:  good  Dress:  casual  Attitude:  Cooperative  Motor Activity:  Appropriate  Speech:  Normal  Mood:  anxious   Affect:  Calm and pleasant  Thought Processes:  Linear  Thought Content:  normal  Suicidal Thoughts:  denies  Homicidal Thoughts:  denies  Crisis Safety Plan: yes, to come to the emergency room.  Hallucinations:  denies    Patient's Support  Network Includes:  children and extended family    Progress toward goal: Not at goal    Functional Status: Moderate impairment     Prognosis: Fair with Ongoing Treatment     Plan         Patient will continue in pharmacotherapy as scheduled with Dr. Villareal and will consider beginning with new psychotherapist in this office or scheduling with the undersigned at [facility].  Patient will adhere to medication regimen as prescribed and report any side effects. Patient will contact this office, call 911 or present to the nearest emergency room should suicidal or homicidal ideations occur. Provide Cognitive Behavioral Therapy and Integrative Therapy to improve functioning, maintain stability, and avoid decompensation and the need for higher level of care.          No Follow-up on file.      This document signed by Izaiah Tinoco LCSW, JOAQUIN May 15, 2019 9:09 AM

## 2019-06-05 ENCOUNTER — OFFICE VISIT (OUTPATIENT)
Dept: PSYCHIATRY | Facility: CLINIC | Age: 49
End: 2019-06-05

## 2019-06-05 DIAGNOSIS — Z63.0 MARITAL/PARTNER RELATIONAL PROBLEM: ICD-10-CM

## 2019-06-05 DIAGNOSIS — F33.41 RECURRENT MAJOR DEPRESSIVE DISORDER IN PARTIAL REMISSION (HCC): ICD-10-CM

## 2019-06-05 DIAGNOSIS — F41.1 GENERALIZED ANXIETY DISORDER: Primary | ICD-10-CM

## 2019-06-05 PROCEDURE — 90837 PSYTX W PT 60 MINUTES: CPT | Performed by: SOCIAL WORKER

## 2019-06-05 SDOH — SOCIAL STABILITY - SOCIAL INSECURITY: PROBLEMS IN RELATIONSHIP WITH SPOUSE OR PARTNER: Z63.0

## 2019-06-05 NOTE — PROGRESS NOTES
Date of Service: June 5, 2019  Time In: 8:30 AM  Time Out: 9:31 AM      IA 9:40 AMOGRESS NOTE  Data:  Amish Win is a 49 y.o. male who met with the undersigned for a regularly scheduled individual outpatient therapy session at the [facility] for follow-up of depression, anxiety and long-standing marital strain.    HPI: Patient reports she is in the sixth week of his 8-week training course with his new job and states although it is stressful trying to learn a new system he is happy with his decision.  Patient reports he is feeling better since starting his new job and reports he has significantly decreased symptoms while working including heart palpitations, feeling on edge, feeling overwhelmed, sweating, and a sense of impending doom.  However, he continues to struggle with periodic symptoms of anxiety including feeling on edge, feeling overwhelmed, trembling, shortness of breath, and a sense of impending doom.  Patient rates symptoms of anxiety at a 3/4 on a scale of 1-10 with 10 being most severe.  Patient reports symptoms of depression have improved but states he continues to struggle with periods of sad mood, anhedonia, decreased energy, and a distinct sense of uncertainty.  Patient rates current symptoms of depression at a 2 on a scale of 1-10 with 10 being most severe.  Patient also reports he and his wife continue to struggle with her marriage and states he feels as though they are simply roommates at this point.  Patient reports it has been several years since he and his wife had any kind of emotional connection or romantic interlude.    Patient reports  he continues to adhere to medication regimen as prescribed.  Patient adamantly and convincingly denies suicidal ideation vehemently denies any substance use.      Clinical Maneuvering/Intervention:  Assisted patient in processing above session content; acknowledged and normalized patient’s thoughts, feelings, and concerns.  Utilized motivational  reviewed techniques including complex reflections to assist the patient in identifying and acknowledging the steps he has taken to bring about positive change in his life.  Also utilized cognitive behavioral therapy to assist the patient and realizing that feeling anxious and somewhat uncertain beginning a new job he is a normal reaction.  Continued to utilize cognitive behavioral therapy to assist patient with developing appropriate coping mechanisms to decrease the severity frequency of symptoms including positive self talk, relaxation techniques, and strongly urged him to find enjoyable activities he can engage in outside of work.  Patient requests not to address marital strain at this time and states he is focusing on his new job.  Provided unconditional positive regard and a safe, supportive environment.    Allowed patient to freely discuss issues without interruption or judgment. Provided safe, confidential environment to facilitate the development of positive therapeutic relationship and encourage open, honest communication. Assisted patient in identifying risk factors which would indicate the need for higher level of care including thoughts to harm self or others and/or self-harming behavior and encouraged patient to contact this office, call 911, or present to the nearest emergency room should any of these events occur. Discussed crisis intervention services and means to access.  Patient adamantly and convincingly denies current suicidal or homicidal ideation or perceptual disturbance.    Assessment    Patient's depression appears to be improved since leaving his job at [employer].  However, the patient continues to struggle with periods of anxiety which continues to cause impairment in important areas of functioning.  The patient's symptoms are also likely exacerbated by long history of marital strain.  The patient's symptomology continues to cause impairment in important areas of functioning.  As a  result, he can be reasonably expected to continue to benefit from treatment likely be at increased risk for decompensation otherwise.    Diagnoses and all orders for this visit:    Generalized anxiety disorder    Recurrent major depressive disorder in partial remission (CMS/HCC)    Marital/partner relational problem               Mental Status Exam  Hygiene:  good  Dress:  casual  Attitude:  Cooperative  Motor Activity:  Appropriate  Speech:  Normal  Mood:  anxious   Affect:  Calm and pleasant  Thought Processes:  Linear  Thought Content:  normal  Suicidal Thoughts:  denies  Homicidal Thoughts:  denies  Crisis Safety Plan: yes, to come to the emergency room.  Hallucinations:  denies    Patient's Support Network Includes:  children and extended family    Progress toward goal: Not at goal    Functional Status: Moderate impairment     Prognosis: Fair with Ongoing Treatment     Plan         Patient will continue in individual outpatient therapy session at the [facility] weekly with ongoing reassessment of frequency and clinical need.  Patient will continue in pharmacotherapy as scheduled with Dr. Villareal.  Patient will adhere to medication regimen as prescribed and report any side effects. Patient will contact this office, call 911 or present to the nearest emergency room should suicidal or homicidal ideations occur. Provide Cognitive Behavioral Therapy and Integrative Therapy to improve functioning, maintain stability, and avoid decompensation and the need for higher level of care.          Return in about 1 week (around 6/12/2019) for Next scheduled follow up.      This document signed by Izaiah Tinoco LCSW, JOAQUIN June 5, 2019 9:36 AM

## 2019-06-17 ENCOUNTER — OFFICE VISIT (OUTPATIENT)
Dept: PSYCHIATRY | Facility: CLINIC | Age: 49
End: 2019-06-17

## 2019-06-17 VITALS
HEART RATE: 63 BPM | WEIGHT: 204.6 LBS | SYSTOLIC BLOOD PRESSURE: 113 MMHG | HEIGHT: 71 IN | DIASTOLIC BLOOD PRESSURE: 78 MMHG | BODY MASS INDEX: 28.64 KG/M2

## 2019-06-17 DIAGNOSIS — F41.1 GENERALIZED ANXIETY DISORDER: Primary | ICD-10-CM

## 2019-06-17 DIAGNOSIS — F33.41 RECURRENT MAJOR DEPRESSIVE DISORDER IN PARTIAL REMISSION (HCC): ICD-10-CM

## 2019-06-17 PROCEDURE — 99213 OFFICE O/P EST LOW 20 MIN: CPT | Performed by: PSYCHIATRY & NEUROLOGY

## 2019-06-17 RX ORDER — MIRTAZAPINE 15 MG/1
15 TABLET, FILM COATED ORAL NIGHTLY
Qty: 30 TABLET | Refills: 1 | Status: SHIPPED | OUTPATIENT
Start: 2019-06-17 | End: 2019-07-25 | Stop reason: SDUPTHER

## 2019-06-17 RX ORDER — IMIPRAMINE HCL 50 MG
TABLET ORAL
Qty: 120 TABLET | Refills: 1 | Status: SHIPPED | OUTPATIENT
Start: 2019-06-17 | End: 2019-07-25 | Stop reason: SDUPTHER

## 2019-06-17 RX ORDER — ALPRAZOLAM 2 MG/1
2 TABLET, EXTENDED RELEASE ORAL 3 TIMES DAILY
Qty: 90 TABLET | Refills: 1 | Status: SHIPPED | OUTPATIENT
Start: 2019-06-17 | End: 2019-07-25 | Stop reason: SDUPTHER

## 2019-06-17 NOTE — PROGRESS NOTES
"Patient ID: Amish Win is a 49 y.o. male    SERVICE TYPE: EVALUATION AND MANAGEMENT (greater than 50% of the time spent for supportive psychotherapy).      /78   Pulse 63   Ht 180 cm (70.87\")   Wt 92.8 kg (204 lb 9.6 oz)   BMI 28.64 kg/m²     ALLERGIES:  Penicillins    CC/ Focus of the visit: depression/ anxiety.      HPI: \"Much better job\". Exercising 5/7 30-45 walking. Depression variable - rates moderate, not feeling hopelessness. \"Will probably always be some depressed, have PA and anxious but not like I was\". Have a mutual goal of slowly reducing the Alprazolam XR.  Reading  self-help books.    \"I think I am on my way to recovery\"    PFSH: Feel blessed with his new employer, especially the person training him. 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 His wife is to seek psych tx in [place].   Would like to visit his parents, were divorce with he was 10 y/o.     Review of Systems   Respiratory: Positive for choking.    Cardiovascular: Positive for palpitations.   Gastrointestinal: Negative.    Neurological: Negative.        SUPPORTIVE PSYCHOTHERAPY: continuing efforts to promote the therapeutic alliance, address the patient’s issues, and strengthen self awareness, insights, and coping skills.       Mental Status Exam  Appearance:  clean and casually dressed, appropriate  Attitude toward clinician:  cooperative and agreeable   Speech:    Rate:  regular rate and rhythm   Volume: normal  Motor:  no abnormal movements present  Mood:  Good  Affect:  euthymic  Thought Processes:  linear, logical, and goal directed  Thought Content:  Normal   Feeling Hopeless: absent  Suicidal Thoughts:  absent  Homicidal Thoughts:  absent  Perceptual Disturbance: no perceptual disturbance  Attention and Concentration:  good  Insight and Judgement:  good  Memory:  memory appears to be intact    LABS: No results found for this or any previous visit (from the past 168 hour(s)).    MEDICATION ISSUES: Have " discussed with the patient the medications Risks, Benefits, and Side effects including potential falls, possible impaired driving and  metabolic adversities among others. No medication side effects or related complaints today.     TREATMENT PLAN/GOALS: Continue supportive psychotherapy efforts and medications as indicated.     Current Outpatient Medications   Medication Sig Dispense Refill   • ALPRAZolam XR (XANAX XR) 2 MG 24 hr tablet Take 1 tablet by mouth 3 (Three) Times a Day. 90 tablet 1   • aspirin 81 MG EC tablet Take 81 mg by mouth 2 (Two) Times a Day.     • docusate sodium (COLACE) 100 MG capsule Take 1 capsule by mouth 2 (Two) Times a Day. 60 capsule 1   • imipramine (TOFRANIL) 50 MG tablet Take two bid. 120 tablet 1   • latanoprost (XALATAN) 0.005 % ophthalmic solution      • lisinopril-hydrochlorothiazide (PRINZIDE,ZESTORETIC) 10-12.5 MG per tablet      • metoprolol tartrate (LOPRESSOR) 50 MG tablet Take 50 mg by mouth 2 (Two) Times a Day With Meals.     • mirtazapine (REMERON) 15 MG tablet Take 1 tablet by mouth Every Night. 30 tablet 1   • pantoprazole (PROTONIX) 40 MG EC tablet Take 40 mg by mouth Daily.     • raNITIdine (ZANTAC) 150 MG tablet      • simvastatin (ZOCOR) 20 MG tablet Take 20 mg by mouth Every Night.     • vitamin D (ERGOCALCIFEROL) 98646 units capsule capsule        No current facility-administered medications for this visit.        COLLATERAL PSYCHOTHERAPEUTIC INTERVENTION:continuing with this therapist     RISK:  Low to moderate    Assessment/Plan     Diagnoses and all orders for this visit:    Generalized anxiety disorder    Recurrent major depressive disorder in partial remission (CMS/HCC)    Other orders  -     ALPRAZolam XR (XANAX XR) 2 MG 24 hr tablet; Take 1 tablet by mouth 3 (Three) Times a Day.  -     mirtazapine (REMERON) 15 MG tablet; Take 1 tablet by mouth Every Night.  -     imipramine (TOFRANIL) 50 MG tablet; Take two bid.        Return in about 6 weeks (around  7/29/2019).         Patient knows to call if symptoms worsen or fail to improve between appointments.    Dictated utilizing Moblication dictation    LAUREN Villareal MD

## 2019-06-26 ENCOUNTER — OFFICE VISIT (OUTPATIENT)
Dept: PSYCHIATRY | Facility: CLINIC | Age: 49
End: 2019-06-26

## 2019-06-26 DIAGNOSIS — F33.41 RECURRENT MAJOR DEPRESSIVE DISORDER IN PARTIAL REMISSION (HCC): ICD-10-CM

## 2019-06-26 DIAGNOSIS — Z63.0 MARITAL/PARTNER RELATIONAL PROBLEM: ICD-10-CM

## 2019-06-26 DIAGNOSIS — F41.1 GENERALIZED ANXIETY DISORDER: Primary | ICD-10-CM

## 2019-06-26 PROCEDURE — 90834 PSYTX W PT 45 MINUTES: CPT | Performed by: SOCIAL WORKER

## 2019-06-26 SDOH — SOCIAL STABILITY - SOCIAL INSECURITY: PROBLEMS IN RELATIONSHIP WITH SPOUSE OR PARTNER: Z63.0

## 2019-06-26 NOTE — PROGRESS NOTES
Date of Service: June 26, 2019  Time In: 7:50 AM  Time Out: 8:35 AM      MN 9:40 AMOGRESS NOTE  Data:  Amish Win is a 49 y.o. male who met with the undersigned for a regularly scheduled individual outpatient therapy session at the [facility] for follow-up of depression, anxiety and long-standing marital strain.    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  Patient reports he feels as though this is been a good move for him and states he continues to struggle with the fact his wife berates him over the fact he will be making a little less money.  He states she tells him they will not be able to survive financially if he does not get bonuses.  Patient continues to struggle with with periodic symptoms of anxiety including feeling on edge, feeling overwhelmed, trembling, shortness of breath, and a sense of impending doom.  Patient rates symptoms of anxiety at a 3 on a scale of 1-10 with 10 being most severe.  Patient reports symptoms of depression have improved but states he continues to struggle with periods of sad mood, anhedonia, decreased energy, and a distinct sense of uncertainty.  Patient rates current symptoms of depression at a 2 on a scale of 1-10 with 10 being most severe.  Patient also reports he and his wife continue to struggle with their marriage and states he feels as though they are simply roommates at this point. Patient reports  he continues to adhere to medication regimen as prescribed.  Patient adamantly and convincingly denies suicidal ideation vehemently denies any substance use.      Clinical Maneuvering/Intervention:  Assisted patient in processing above session content; acknowledged and normalized patient’s thoughts, feelings, and concerns.  Utilized motivational reviewed techniques including complex reflections to assist the patient in  identifying and acknowledging the steps he has taken to bring about positive change.  Also allow the patient to discuss ongoing difficulties in his marriage but encouraged him not to make any major life decisions at this time until he reaches and maintains stability.  Continue to utilize cognitive behavioral therapy to assist patient in developing appropriate coping mechanisms.  Provided unconditional positive regard and a safe, supportive environment.    Allowed patient to freely discuss issues without interruption or judgment. Provided safe, confidential environment to facilitate the development of positive therapeutic relationship and encourage open, honest communication. Assisted patient in identifying risk factors which would indicate the need for higher level of care including thoughts to harm self or others and/or self-harming behavior and encouraged patient to contact this office, call 911, or present to the nearest emergency room should any of these events occur. Discussed crisis intervention services and means to access.  Patient adamantly and convincingly denies current suicidal or homicidal ideation or perceptual disturbance.    Assessment    Patient's depression appears to be improved since leaving his job at [employer].  However, the patient continues to struggle with periods of anxiety which continues to cause impairment in important areas of functioning.  The patient's symptoms are also likely exacerbated by long history of marital strain.  The patient's symptomology continues to cause impairment in important areas of functioning.  As a result, he can be reasonably expected to continue to benefit from treatment likely be at increased risk for decompensation otherwise.    Diagnoses and all orders for this visit:    Generalized anxiety disorder    Recurrent major depressive disorder in partial remission (CMS/HCC)    Marital/partner relational problem               Mental Status Exam  Hygiene:   good  Dress:  casual  Attitude:  Cooperative  Motor Activity:  Appropriate  Speech:  Normal  Mood:  anxious   Affect:  Calm and pleasant  Thought Processes:  Linear  Thought Content:  normal  Suicidal Thoughts:  denies  Homicidal Thoughts:  denies  Crisis Safety Plan: yes, to come to the emergency room.  Hallucinations:  denies    Patient's Support Network Includes:  children and extended family    Progress toward goal: Not at goal    Functional Status: Moderate impairment     Prognosis: Fair with Ongoing Treatment     Plan         Patient will continue in individual outpatient therapy session at the [facility] and every 2 weeks with ongoing reassessment of frequency and clinical need.  Patient will continue in pharmacotherapy as scheduled with Dr. Villareal.  Patient will adhere to medication regimen as prescribed and report any side effects. Patient will contact this office, call 911 or present to the nearest emergency room should suicidal or homicidal ideations occur. Provide Cognitive Behavioral Therapy and Integrative Therapy to improve functioning, maintain stability, and avoid decompensation and the need for higher level of care.          Return in about 2 weeks (around 7/10/2019) for Next scheduled follow up.      This document signed by Izaiah Tinoco LCSW, JOAQUIN June 26, 2019 1:35 PM

## 2019-07-10 ENCOUNTER — OFFICE VISIT (OUTPATIENT)
Dept: PSYCHIATRY | Facility: CLINIC | Age: 49
End: 2019-07-10

## 2019-07-10 DIAGNOSIS — F33.41 RECURRENT MAJOR DEPRESSIVE DISORDER, IN PARTIAL REMISSION (HCC): ICD-10-CM

## 2019-07-10 DIAGNOSIS — Z63.0 MARITAL/PARTNER RELATIONAL PROBLEM: ICD-10-CM

## 2019-07-10 DIAGNOSIS — F41.1 GENERALIZED ANXIETY DISORDER: Primary | ICD-10-CM

## 2019-07-10 PROCEDURE — 90834 PSYTX W PT 45 MINUTES: CPT | Performed by: SOCIAL WORKER

## 2019-07-10 SDOH — SOCIAL STABILITY - SOCIAL INSECURITY: PROBLEMS IN RELATIONSHIP WITH SPOUSE OR PARTNER: Z63.0

## 2019-07-10 NOTE — PROGRESS NOTES
Date of Service: July 10, 2019  Time In: 7:50 AM  Time Out: 8:30 AM      [place] 9:40 AMOGRESS NOTE  Data:  Amish Win is a 49 y.o. male who met with the undersigned for a regularly scheduled individual outpatient therapy session at the [facility] for follow-up of depression, anxiety and long-standing marital strain.    HPI: Patient reports she is becoming more comfortable in his new job but states he continues to struggle with what appears to be symptoms of panic.  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  Patient continues to struggle with with periodic symptoms of anxiety including feeling on edge, feeling overwhelmed, trembling, shortness of breath, and a sense of impending doom.  Patient rates symptoms of anxiety at a 3/4 on a scale of 1-10 with 10 being most severe.  Patient reports symptoms of depression have improved but states he continues to struggle with periods of sad mood, anhedonia, decreased energy, and a distinct sense of uncertainty.  Patient rates current symptoms of depression at a 2 on a scale of 1-10 with 10 being most severe.  Patient reports he and his wife continue to struggle with her relationship and states he realizes he has very little support from her.  Patient reports  he continues to adhere to medication regimen as prescribed.  Patient adamantly and convincingly denies suicidal ideation vehemently denies any substance use.      Clinical Maneuvering/Intervention:  Assisted patient in processing above session content; acknowledged and normalized patient’s thoughts, feelings, and concerns.  Utilized motivational interviewing techniques including complex reflections to assist the patient in identifying and acknowledging the positive aspects of his life and the fact he was able to find a new job within 2 weeks.  Also discussed the likelihood the  patient developed automatic negative cognitions while working at his previous job and encouraged him to remind himself he could take some time to extinguish those.  Continue to utilize cognitive behavioral therapy to assist the patient in developing appropriate coping mechanisms.  Also focused on healthy skills of daily living and behavioral activation.  Provided unconditional positive regard and a safe, supportive environment.    Allowed patient to freely discuss issues without interruption or judgment. Provided safe, confidential environment to facilitate the development of positive therapeutic relationship and encourage open, honest communication. Assisted patient in identifying risk factors which would indicate the need for higher level of care including thoughts to harm self or others and/or self-harming behavior and encouraged patient to contact this office, call 911, or present to the nearest emergency room should any of these events occur. Discussed crisis intervention services and means to access.  Patient adamantly and convincingly denies current suicidal or homicidal ideation or perceptual disturbance.    Assessment    Patient's depression appears to be improved since leaving his job at [employer].  However, the patient continues to struggle with periods of anxiety which continues to cause impairment in important areas of functioning.  The patient's symptoms are also likely exacerbated by long history of marital strain.  The patient's symptomology continues to cause impairment in important areas of functioning.  As a result, he can be reasonably expected to continue to benefit from treatment likely be at increased risk for decompensation otherwise.    Diagnoses and all orders for this visit:    Generalized anxiety disorder    Recurrent major depressive disorder, in partial remission (CMS/HCC)    Marital/partner relational problem               Mental Status Exam  Hygiene:  good  Dress:  casual  Attitude:   Cooperative  Motor Activity:  Appropriate  Speech:  Normal  Mood:  anxious   Affect:  Calm and pleasant  Thought Processes:  Linear  Thought Content:  normal  Suicidal Thoughts:  denies  Homicidal Thoughts:  denies  Crisis Safety Plan: yes, to come to the emergency room.  Hallucinations:  denies    Patient's Support Network Includes:  children and extended family    Progress toward goal: Not at goal    Functional Status: Moderate impairment     Prognosis: Fair with Ongoing Treatment     Plan         Patient will continue in individual outpatient therapy session at the [facility] and every 2 weeks with ongoing reassessment of frequency and clinical need.  Patient will continue in pharmacotherapy as scheduled with Dr. Villareal.  Patient will adhere to medication regimen as prescribed and report any side effects. Patient will contact this office, call 911 or present to the nearest emergency room should suicidal or homicidal ideations occur. Provide Cognitive Behavioral Therapy and Integrative Therapy to improve functioning, maintain stability, and avoid decompensation and the need for higher level of care.          Return in about 2 weeks (around 7/24/2019) for Next scheduled follow up.      This document signed by Izaiah Tinoco LCSW, JOAQUIN July 10, 2019 5:52 PM

## 2019-07-24 ENCOUNTER — OFFICE VISIT (OUTPATIENT)
Dept: PSYCHIATRY | Facility: CLINIC | Age: 49
End: 2019-07-24

## 2019-07-24 DIAGNOSIS — F33.1 MAJOR DEPRESSIVE DISORDER, RECURRENT EPISODE, MODERATE (HCC): Primary | ICD-10-CM

## 2019-07-24 DIAGNOSIS — Z63.0 MARITAL/PARTNER RELATIONAL PROBLEM: ICD-10-CM

## 2019-07-24 DIAGNOSIS — F41.1 GENERALIZED ANXIETY DISORDER: ICD-10-CM

## 2019-07-24 PROCEDURE — 90837 PSYTX W PT 60 MINUTES: CPT | Performed by: SOCIAL WORKER

## 2019-07-24 SDOH — SOCIAL STABILITY - SOCIAL INSECURITY: PROBLEMS IN RELATIONSHIP WITH SPOUSE OR PARTNER: Z63.0
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
## 2019-07-25 ENCOUNTER — OFFICE VISIT (OUTPATIENT)
Dept: PSYCHIATRY | Facility: CLINIC | Age: 49
End: 2019-07-25

## 2019-07-25 VITALS
HEART RATE: 68 BPM | HEIGHT: 71 IN | SYSTOLIC BLOOD PRESSURE: 133 MMHG | WEIGHT: 206.4 LBS | BODY MASS INDEX: 28.9 KG/M2 | DIASTOLIC BLOOD PRESSURE: 89 MMHG

## 2019-07-25 DIAGNOSIS — F41.0 PANIC DISORDER: ICD-10-CM

## 2019-07-25 DIAGNOSIS — F33.41 RECURRENT MAJOR DEPRESSIVE DISORDER, IN PARTIAL REMISSION (HCC): Primary | ICD-10-CM

## 2019-07-25 DIAGNOSIS — F41.1 GENERALIZED ANXIETY DISORDER: ICD-10-CM

## 2019-07-25 PROCEDURE — 99214 OFFICE O/P EST MOD 30 MIN: CPT | Performed by: PSYCHIATRY & NEUROLOGY

## 2019-07-25 RX ORDER — ALPRAZOLAM 2 MG/1
2 TABLET, EXTENDED RELEASE ORAL 3 TIMES DAILY
Qty: 90 TABLET | Refills: 1 | Status: SHIPPED | OUTPATIENT
Start: 2019-07-25 | End: 2019-10-14 | Stop reason: SDUPTHER

## 2019-07-25 RX ORDER — IMIPRAMINE HCL 50 MG
TABLET ORAL
Qty: 120 TABLET | Refills: 1 | Status: SHIPPED | OUTPATIENT
Start: 2019-07-25 | End: 2019-10-14 | Stop reason: SDUPTHER

## 2019-07-25 RX ORDER — MIRTAZAPINE 15 MG/1
15 TABLET, FILM COATED ORAL NIGHTLY
Qty: 30 TABLET | Refills: 1 | Status: SHIPPED | OUTPATIENT
Start: 2019-07-25 | End: 2019-10-14 | Stop reason: SDUPTHER

## 2019-07-25 NOTE — PROGRESS NOTES
"Patient ID: Amish Win is a 49 y.o. male    SERVICE TYPE: EVALUATION AND MANAGEMENT (greater than 50% of the time spent for supportive psychotherapy).      /89   Pulse 68   Ht 180 cm (70.87\")   Wt 93.6 kg (206 lb 6.4 oz)   BMI 28.89 kg/m²     ALLERGIES:  Penicillins    CC/ Focus of the visit: depression/ anxiety     HPI: Reviewed note from yesterday's session with his therapist.   Patient:  \"Feeling like going into a relapse, going back to my nervous ticks, a little better\".   Patient attributing to the stress of his new job although certainly less than at [employer]. Slept well last night. Conflict with a peer at work probably one of the core current issues, reviewed this and talked about his options. Down played his marital issue for now.   \"Thinking very rationally, doing better, once this levels out at work I''ll be better\".   Reports does not take the Alprazolam before drives to and from his job.   Attempted to reduce the dose to two daily but retreated recently.     Leaving medications as is.   No indications of medication misuse or abuse.     PFSH: job and home situations reviewed.     Review of Systems   Respiratory: Positive for choking.    Cardiovascular: Positive for palpitations.   Both anxiety related.     SUPPORTIVE PSYCHOTHERAPY: continuing efforts to promote the therapeutic alliance, address the patient’s issues, and strengthen self awareness, insights, and coping skills.   See HPI.     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   Mood:  anxious  Affect:  euthymic  Thought Processes:  linear, logical, and goal directed  Thought Content:  Normal   Feeling Hopeless: absent  Suicidal Thoughts:  absent  Homicidal Thoughts:  absent  Perceptual Disturbance: no perceptual " disturbance  Attention and Concentration:  good  Insight and Judgement:  good  Memory:  memory appears to be intact    LABS: No results found for this or any previous visit (from the past 168 hour(s)).    MEDICATION ISSUES: Have discussed with the patient the medications Risks, Benefits, and Side effects including potential falls, possible impaired driving and  metabolic adversities among others. No medication side effects or related complaints today.     TREATMENT PLAN/GOALS: Continue supportive psychotherapy efforts and medications as indicated.     Current Outpatient Medications   Medication Sig Dispense Refill   • ALPRAZolam XR (XANAX XR) 2 MG 24 hr tablet Take 1 tablet by mouth 3 (Three) Times a Day. 90 tablet 1   • aspirin 81 MG EC tablet Take 81 mg by mouth 2 (Two) Times a Day.     • docusate sodium (COLACE) 100 MG capsule Take 1 capsule by mouth 2 (Two) Times a Day. 60 capsule 1   • imipramine (TOFRANIL) 50 MG tablet Take two bid. 120 tablet 1   • latanoprost (XALATAN) 0.005 % ophthalmic solution      • lisinopril-hydrochlorothiazide (PRINZIDE,ZESTORETIC) 10-12.5 MG per tablet      • metoprolol tartrate (LOPRESSOR) 50 MG tablet Take 50 mg by mouth 2 (Two) Times a Day With Meals.     • mirtazapine (REMERON) 15 MG tablet Take 1 tablet by mouth Every Night. 30 tablet 1   • pantoprazole (PROTONIX) 40 MG EC tablet Take 40 mg by mouth Daily.     • raNITIdine (ZANTAC) 150 MG tablet      • simvastatin (ZOCOR) 20 MG tablet Take 20 mg by mouth Every Night.     • vitamin D (ERGOCALCIFEROL) 14022 units capsule capsule        No current facility-administered medications for this visit.        COLLATERAL PSYCHOTHERAPEUTIC INTERVENTION: Continuing with [facility] twice a month.     RISK:  Moderate.     Assessment/Plan     Diagnoses and all orders for this visit:    Recurrent major depressive disorder, in partial remission (CMS/HCC)    Generalized anxiety disorder    Panic disorder    Other orders  -     imipramine  (TOFRANIL) 50 MG tablet; Take two bid.  -     mirtazapine (REMERON) 15 MG tablet; Take 1 tablet by mouth Every Night.  -     ALPRAZolam XR (XANAX XR) 2 MG 24 hr tablet; Take 1 tablet by mouth 3 (Three) Times a Day.    Rx's to be fill after August 13, 2019    Return in about 3 months (around 10/25/2019).         Patient knows to call if symptoms worsen or fail to improve between appointments.  Patient advised of my time away from the office in September, will continue with seeing his therapist twice a month in the interval between our appointments.     I spent a total of 38 minutes in direct patient care, greater than 27 minutes (greater than 50%) were spent in coordination of care, and counseling the patient regarding anxiety and depression . Answered any questions patient had with medication and plan.     Dictated utilizing Dragon dicttierney Villareal MD

## 2019-08-07 ENCOUNTER — OFFICE VISIT (OUTPATIENT)
Dept: PSYCHIATRY | Facility: CLINIC | Age: 49
End: 2019-08-07

## 2019-08-07 DIAGNOSIS — F41.1 GENERALIZED ANXIETY DISORDER: ICD-10-CM

## 2019-08-07 DIAGNOSIS — F33.41 RECURRENT MAJOR DEPRESSIVE DISORDER IN PARTIAL REMISSION (HCC): Primary | ICD-10-CM

## 2019-08-07 DIAGNOSIS — F41.0 PANIC DISORDER: ICD-10-CM

## 2019-08-07 PROCEDURE — 90837 PSYTX W PT 60 MINUTES: CPT | Performed by: SOCIAL WORKER
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
## 2019-09-04 ENCOUNTER — OFFICE VISIT (OUTPATIENT)
Dept: PSYCHIATRY | Facility: CLINIC | Age: 49
End: 2019-09-04

## 2019-09-04 DIAGNOSIS — F41.0 PANIC DISORDER: ICD-10-CM

## 2019-09-04 DIAGNOSIS — F33.41 RECURRENT MAJOR DEPRESSIVE DISORDER IN PARTIAL REMISSION (HCC): Primary | ICD-10-CM

## 2019-09-04 DIAGNOSIS — F41.1 GENERALIZED ANXIETY DISORDER: ICD-10-CM

## 2019-09-04 PROCEDURE — 90837 PSYTX W PT 60 MINUTES: CPT | Performed by: SOCIAL WORKER

## 2019-09-04 NOTE — PROGRESS NOTES
"Date of Service: September 4, 2019  Time In: 7:55 AM  Time Out: 8:50 AM      MO 9:40 AMOGRESS NOTE  Data:  Amish Win is a 49 y.o. male who met with the undersigned for a regularly scheduled individual outpatient therapy session at the [facility] for follow-up of depression, anxiety and long-standing marital strain.    HPI: Patient reports things have been difficult for the past few weeks and states \"things are getting worse at work\".  Patient reports his  has berated him for not making more progress and states he even cursed at him on one occasion.  Patient reports he is also been placed on all night shifts which is made it very difficult and he does not get home until approximately 1 AM.  Patient reports increased symptoms of anxiety including feeling on edge, feeling overwhelmed, trembling, fears of medical issues and is feeling his pulse tapping himself on the chest.  Patient rates current symptoms of anxiety at a 6 on a scale of 1-10 with 10 being most severe.    Patient reports symptoms of depression have improved but states he continues to struggle with periods of sad mood, anhedonia, decreased energy, and a distinct sense of uncertainty.  Patient rates current symptoms of depression at a 4 on a scale of 1-10 with 10 being most severe.   Patient reports  he continues to adhere to medication regimen as prescribed.  Patient adamantly and convincingly denies suicidal ideation vehemently denies any substance use.      Clinical Maneuvering/Intervention:  Assisted patient in processing above session content; acknowledged and normalized patient’s thoughts, feelings, and concerns.  Allow the patient to discuss/vent his feelings of frustration with ongoing difficulties at work and validated his feelings.  Also utilized cognitive behavioral therapy to assist the patient and identifying some of his automatic negative cognitions and demonstrated and discussed challenging these with factual " counter arguments.  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  Encouraged patient to continue to seek other employment and to continue to focus on healthy skills of daily living and self-care.  Also continued to instruct the patient to avoid taking benzodiazepines while driving.  Provided unconditional positive regard and a safe, supportive environment.    Patient actively participated in the formulation of the treatment plan and verbalizes agreement with all goals and objectives.  Treatment plan completed on this date.    Allowed patient to freely discuss issues without interruption or judgment. Provided safe, confidential environment to facilitate the development of positive therapeutic relationship and encourage open, honest communication. Assisted patient in identifying risk factors which would indicate the need for higher level of care including thoughts to harm self or others and/or self-harming behavior and encouraged patient to contact this office, call 911, or present to the nearest emergency room should any of these events occur. Discussed crisis intervention services and means to access.  Patient adamantly and convincingly denies current suicidal or homicidal ideation or perceptual disturbance.    Assessment    Patient appears to be struggling with increased stress at work which has exacerbated his symptoms.  the patient's symptomology continues to cause impairment in important areas of functioning.  As a result, he can be reasonably expected to continue to benefit from treatment likely be at increased risk for decompensation otherwise.    Diagnoses and all orders for this visit:    Recurrent major depressive disorder in partial remission (CMS/HCC)    Generalized anxiety disorder    Panic disorder               Mental Status Exam  Hygiene:  good  Dress:  casual  Attitude:  Cooperative  Motor Activity:  Appropriate  Speech:   Normal  Mood:  anxious   Affect:  Calm and pleasant  Thought Processes:  Linear  Thought Content:  normal  Suicidal Thoughts:  denies  Homicidal Thoughts:  denies  Crisis Safety Plan: yes, to come to the emergency room.  Hallucinations:  denies    Patient's Support Network Includes:  children and extended family    Progress toward goal: Not at goal    Functional Status: Moderate impairment     Prognosis: Fair with Ongoing Treatment     Plan         Patient will continue in individual outpatient therapy session at the [facility] and every 2 weeks with ongoing reassessment of frequency and clinical need.  Patient will continue in pharmacotherapy as scheduled with Dr. Villareal.  Patient will adhere to medication regimen as prescribed and report any side effects. Patient will contact this office, call 911 or present to the nearest emergency room should suicidal or homicidal ideations occur. Provide Cognitive Behavioral Therapy and Integrative Therapy to improve functioning, maintain stability, and avoid decompensation and the need for higher level of care.          Return in about 2 weeks (around 9/18/2019) for Next scheduled follow up.      This document signed by Izaiah Tinoco LCSW, JOAQUIN September 4, 2019 10:19 AM

## 2019-09-04 NOTE — TREATMENT PLAN
Multi-Disciplinary Problems (from Behavioral Health Treatment Plan)    Active Problems     Problem: Anxiety  Start Date: 09/04/19    Problem Details:  The patient self-scales this problem as a 6 with 10 being the worst.       Goal Priority Start Date Expected End Date End Date    Patient will develop and implement behavioral and cognitive strategies to reduce anxiety and irrational fears. [facility] 09/04/19 09/04/20 --    Goal Details:  Progress toward goal:  The patient self-scales their progress related to this goal as a 5 with 10 being the worst.       Goal Intervention Frequency Start Date End Date    Help patient explore past emotional issues in relation to present anxiety. Q2 Weeks 09/04/19 09/04/20    Intervention Details:  Duration of treatment until remission of symptoms.       Goal Intervention Frequency Start Date End Date    Help patient develop an awareness of their cognitive and physical responses to anxiety. Q2 Weeks 09/04/19 09/04/20    Intervention Details:  Duration of treatment until remission of symptoms.             Problem: Depression  Start Date: 09/04/19    Problem Details:  The patient self-scales this problem as a 6 with 10 being the worst.       Goal Priority Start Date Expected End Date End Date    Patient will demonstrate the ability to initiate new constructive life skills outside of sessions on a consistent basis. [facility] 09/04/19 09/04/20 --    Goal Details:  Progress toward goal:  The patient self-scales their progress related to this goal as a 5 with 10 being the worst.       Goal Intervention Frequency Start Date End Date    Assist patient in setting attainable activities of daily living goals. Q2 Weeks 09/04/19 09/04/20    Intervention Details:  Patient will focus on healthy skills of daily living including eating healthy diet, getting regular physical activity, and getting adequate sleep.  The patient will also actively seek activities he can engage in on a regular basis including taking  a walk  3-4 times weekly.  Patient will continue to keep all appointments and adhere to medication regimen as prescribed.     Goal Intervention Frequency Start Date End Date    Provide education about depression Q2 Weeks 09/04/19 09/04/20    Intervention Details:  Duration of treatment until remission of symptoms.       Goal Intervention Frequency Start Date End Date    Assist patient in developing healthy coping strategies. Q2 Weeks 09/04/19 09/04/20    Intervention Details:  Duration of treatment until remission of symptoms.                          I have discussed and reviewed this treatment plan with the patient.    This document signed by Izaiah Tinooc LCSW, JOAQUIN September 4, 2019 10:17 AM.

## 2019-10-02 ENCOUNTER — OFFICE VISIT (OUTPATIENT)
Dept: PSYCHIATRY | Facility: CLINIC | Age: 49
End: 2019-10-02

## 2019-10-02 DIAGNOSIS — Z63.0 MARITAL/PARTNER RELATIONAL PROBLEM: ICD-10-CM

## 2019-10-02 DIAGNOSIS — F41.0 PANIC DISORDER: ICD-10-CM

## 2019-10-02 DIAGNOSIS — F33.41 RECURRENT MAJOR DEPRESSIVE DISORDER IN PARTIAL REMISSION (HCC): Primary | ICD-10-CM

## 2019-10-02 DIAGNOSIS — F41.1 GENERALIZED ANXIETY DISORDER: ICD-10-CM

## 2019-10-02 PROCEDURE — 90837 PSYTX W PT 60 MINUTES: CPT | Performed by: SOCIAL WORKER

## 2019-10-02 SDOH — SOCIAL STABILITY - SOCIAL INSECURITY: PROBLEMS IN RELATIONSHIP WITH SPOUSE OR PARTNER: Z63.0

## 2019-10-02 NOTE — PROGRESS NOTES
"Date of Service: October 2, 2019  Time In: 7:45 AM  Time Out: 8:45 AM      [place] 9:40 AMOGRESS NOTE  Data:  Amish Win is a 49 y.o. male who met with the undersigned for a regularly scheduled individual outpatient therapy session at the [facility] for follow-up of depression, anxiety and long-standing marital strain.    HPI: Patient reports things have been going \"about the same\" but states he was pulled over by a local police in [place] after leaving work due to swerving.  Patient reports he had not taken his dose of alprazolam at that time states he was simply tired.  Patient does report he has started looking for other work as he has admitted to himself current situation and his commute is not sustainable.  Patient reports ongoing symptoms of anxiety including feeling on edge, feeling overwhelmed, trembling, fears of medical issues and is feeling his pulse tapping himself on the chest.  Patient rates current symptoms of anxiety at a 6/7 on a scale of 1-10 with 10 being most severe.  Patient reports he did forget his medication one day at work and states \"it was the worst day ever\".    Patient reports symptoms of depression have improved but states he continues to struggle with periods of sad mood, anhedonia, decreased energy, and a distinct sense of uncertainty.  Patient rates current symptoms of depression at a 4 on a scale of 1-10 with 10 being most severe.   Patient reports he and his wife continue to struggle and states her depression is severe.  He states he is responsible for all the household chores and states she simply sits on the couch.  The patient does report he has been feeling particularly lonely as of late and has found himself having casual conversations through text messages with his ex-wife.  Patient states he would never be unfaithful to his wife and states he does feel somewhat guilty but reports he needed to talk to someone.  Patient reports  he continues to adhere to medication regimen " as prescribed.  Patient adamantly and convincingly denies suicidal ideation vehemently denies any substance use.      Clinical Maneuvering/Intervention:  Assisted patient in processing above session content; acknowledged and normalized patient’s thoughts, feelings, and concerns.  Validated the patient's belief his current job situation is likely not sustainable and encouraged him to continue to seek other employment.  Also continue to utilize cognitive behavioral therapy to assist patient in developing appropriate coping mechanisms with a specific emphasis placed on behavioral activation and healthy skills of daily living.  Also utilized motivational reviewed techniques including complex reflections to assist patient in recognizing his feelings of loneliness and a desire for human contact is normal.  Challenged the patient to admit he must consider whether he is happy in his current relationship.  Also encouraged the patient to remind himself having a desire for happiness is not selfishness and he should not feel guilty for doing what he feels is best for him.  Provided unconditional positive regard and a safe, supportive environment.    Allowed patient to freely discuss issues without interruption or judgment. Provided safe, confidential environment to facilitate the development of positive therapeutic relationship and encourage open, honest communication. Assisted patient in identifying risk factors which would indicate the need for higher level of care including thoughts to harm self or others and/or self-harming behavior and encouraged patient to contact this office, call 911, or present to the nearest emergency room should any of these events occur. Discussed crisis intervention services and means to access.  Patient adamantly and convincingly denies current suicidal or homicidal ideation or perceptual disturbance.    Assessment    Patient appears to be maintaining relative stability as compared to his baseline  over the past several years but continues to struggle with significant symptoms of anxiety and less severe secondary symptoms of depression.  The patient's symptoms continue to be exacerbated by work stress and other various psychosocial stressors including ongoing long-term marital strain.  The patient's symptomology continues to cause impairment in important areas of functioning.  As a result, he can be reasonably expected to continue to benefit from treatment likely be at increased risk for decompensation otherwise.    Diagnoses and all orders for this visit:    Recurrent major depressive disorder in partial remission (CMS/HCC)    Generalized anxiety disorder    Panic disorder    Marital/partner relational problem               Mental Status Exam  Hygiene:  good  Dress:  casual  Attitude:  Cooperative  Motor Activity:  Appropriate  Speech:  Normal  Mood:  anxious   Affect:  Calm and pleasant  Thought Processes:  Linear  Thought Content:  normal  Suicidal Thoughts:  denies  Homicidal Thoughts:  denies  Crisis Safety Plan: yes, to come to the emergency room.  Hallucinations:  denies    Patient's Support Network Includes:  children and extended family    Progress toward goal: Not at goal    Functional Status: Moderate impairment     Prognosis: Fair with Ongoing Treatment     Plan         Patient will continue in individual outpatient therapy session at the [facility] and every 2 weeks with ongoing reassessment of frequency and clinical need.  Patient will continue in pharmacotherapy as scheduled with Dr. Villareal.  Patient will adhere to medication regimen as prescribed and report any side effects. Patient will contact this office, call 911 or present to the nearest emergency room should suicidal or homicidal ideations occur. Provide Cognitive Behavioral Therapy and Integrative Therapy to improve functioning, maintain stability, and avoid decompensation and the need for higher level of care.          Return in  about 2 weeks (around 10/16/2019) for Next scheduled follow up.      This document signed by Izaiah Tinoco, FAUSTO, JOAQUIN October 2, 2019 9:54 AM

## 2019-10-14 ENCOUNTER — OFFICE VISIT (OUTPATIENT)
Dept: PSYCHIATRY | Facility: CLINIC | Age: 49
End: 2019-10-14

## 2019-10-14 VITALS
SYSTOLIC BLOOD PRESSURE: 132 MMHG | DIASTOLIC BLOOD PRESSURE: 86 MMHG | HEIGHT: 71 IN | WEIGHT: 215.6 LBS | BODY MASS INDEX: 30.18 KG/M2 | HEART RATE: 62 BPM

## 2019-10-14 DIAGNOSIS — F41.0 PANIC DISORDER: ICD-10-CM

## 2019-10-14 DIAGNOSIS — F41.1 GENERALIZED ANXIETY DISORDER: Primary | ICD-10-CM

## 2019-10-14 DIAGNOSIS — F33.41 RECURRENT MAJOR DEPRESSIVE DISORDER, IN PARTIAL REMISSION (HCC): ICD-10-CM

## 2019-10-14 PROCEDURE — 99214 OFFICE O/P EST MOD 30 MIN: CPT | Performed by: PSYCHIATRY & NEUROLOGY

## 2019-10-14 RX ORDER — MIRTAZAPINE 15 MG/1
15 TABLET, FILM COATED ORAL NIGHTLY
Qty: 30 TABLET | Refills: 2 | Status: SHIPPED | OUTPATIENT
Start: 2019-10-14 | End: 2020-01-06 | Stop reason: SDUPTHER

## 2019-10-14 RX ORDER — ALPRAZOLAM 2 MG/1
2 TABLET, EXTENDED RELEASE ORAL 3 TIMES DAILY
Qty: 90 TABLET | Refills: 1 | Status: SHIPPED | OUTPATIENT
Start: 2019-10-14 | End: 2019-10-14 | Stop reason: SDUPTHER

## 2019-10-14 RX ORDER — ALPRAZOLAM 2 MG/1
2 TABLET, EXTENDED RELEASE ORAL 3 TIMES DAILY
Qty: 90 TABLET | Refills: 2 | Status: SHIPPED | OUTPATIENT
Start: 2019-10-14 | End: 2019-12-31 | Stop reason: SDUPTHER

## 2019-10-14 RX ORDER — IMIPRAMINE HCL 50 MG
TABLET ORAL
Qty: 120 TABLET | Refills: 1 | Status: SHIPPED | OUTPATIENT
Start: 2019-10-14 | End: 2019-10-14 | Stop reason: SDUPTHER

## 2019-10-14 RX ORDER — IMIPRAMINE HCL 50 MG
TABLET ORAL
Qty: 120 TABLET | Refills: 2 | Status: SHIPPED | OUTPATIENT
Start: 2019-10-14 | End: 2020-01-06 | Stop reason: SDUPTHER

## 2019-10-14 RX ORDER — MIRTAZAPINE 15 MG/1
15 TABLET, FILM COATED ORAL NIGHTLY
Qty: 30 TABLET | Refills: 1 | Status: SHIPPED | OUTPATIENT
Start: 2019-10-14 | End: 2019-10-14 | Stop reason: SDUPTHER

## 2019-10-14 NOTE — PROGRESS NOTES
"Patient ID: Amish Win is a 49 y.o. male    SERVICE TYPE: EVALUATION AND MANAGEMENT (greater than 50% of the time spent for supportive psychotherapy).  Time in 1250    time out 1320    /86   Pulse 62   Ht 180 cm (70.87\")   Wt 97.8 kg (215 lb 9.6 oz)   BMI 30.18 kg/m²     ALLERGIES:  [place]illins[place]/ Focus of the visit:     HPI: Depression worse that he attributes to a turn to the worse of his wife's dysphoric and ill temperament ([place] for a disability status). Considering a \"trial\" marital separation that fits with his getting an apartment in the [place] so his 15 yo son and 13 y/o can continue to go to school here if his wife retires from her teaching job. .   Cross sectionally seems patient is doing fairly well, still anxious and struggling with his marital situation.\"I know how to get from A to B now and know how to cope\". Interest, application of CBT with some success. Less somatic.     Does understand we will eventually commence tapering down the dose of the Alprazolam.     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     Review of Systems   Respiratory: Negative.    Cardiovascular: Positive for palpitations.   Gastrointestinal: Negative.    Neurological: Negative.    \"Know how to deal with my chest symptoms now, apply with Izaiah has thought me\".     SUPPORTIVE PSYCHOTHERAPY: continuing efforts to promote the therapeutic alliance, address the patient’s issues, and strengthen self awareness, insights, and coping skills.       Mental Status Exam  Appearance:  clean and casually dressed, appropriate  Attitude toward clinician:  cooperative and agreeable   Speech:    Rate:  regular rate and rhythm   Volume: normal  Motor:  no abnormal movements present  Mood:  Good  Affect:  euthymic  Thought Processes:  linear, logical, and goal directed  Thought Content:  Normal   Feeling Hopeless: absent  Suicidal Thoughts:  " absent  Homicidal Thoughts:  absent  Perceptual Disturbance: no perceptual disturbance  Attention and Concentration:  good  Insight and Judgement:  good  Memory:  memory appears to be intact    LABS: No results found for this or any previous visit (from the past 168 hour(s)).    MEDICATION ISSUES: Have discussed with the patient the medications Risks, Benefits, and Side effects including potential falls, possible impaired driving and  metabolic adversities among others. No medication side effects or related complaints today.     TREATMENT PLAN/GOALS: Continue supportive psychotherapy efforts and medications as indicated.     Current Outpatient Medications   Medication Sig Dispense Refill   • ALPRAZolam XR (XANAX XR) 2 MG 24 hr tablet Take 1 tablet by mouth 3 (Three) Times a Day. 90 tablet 2   • aspirin 81 MG EC tablet Take 81 mg by mouth 2 (Two) Times a Day.     • docusate sodium (COLACE) 100 MG capsule Take 1 capsule by mouth 2 (Two) Times a Day. 60 capsule 1   • imipramine (TOFRANIL) 50 MG tablet Take two bid. 120 tablet 2   • latanoprost (XALATAN) 0.005 % ophthalmic solution      • lisinopril-hydrochlorothiazide (PRINZIDE,ZESTORETIC) 10-12.5 MG per tablet      • metoprolol tartrate (LOPRESSOR) 50 MG tablet Take 50 mg by mouth 2 (Two) Times a Day With Meals.     • mirtazapine (REMERON) 15 MG tablet Take 1 tablet by mouth Every Night. 30 tablet 2   • pantoprazole (PROTONIX) 40 MG EC tablet Take 40 mg by mouth Daily.     • raNITIdine (ZANTAC) 150 MG tablet      • simvastatin (ZOCOR) 20 MG tablet Take 20 mg by mouth Every Night.     • vitamin D (ERGOCALCIFEROL) 38846 units capsule capsule        No current facility-administered medications for this visit.        COLLATERAL PSYCHOTHERAPEUTIC INTERVENTION: Patient greatly benefiting from his current psychotherapeutic effort.    RISK:  Low to moderate.     Assessment/Plan     Diagnoses and all orders for this visit:    Generalized anxiety disorder  -     imipramine  (TOFRANIL) 50 MG tablet; Take two bid.  -     mirtazapine (REMERON) 15 MG tablet; Take 1 tablet by mouth Every Night.    Panic disorder  -     imipramine (TOFRANIL) 50 MG tablet; Take two bid.  -     mirtazapine (REMERON) 15 MG tablet; Take 1 tablet by mouth Every Night.  -     ALPRAZolam XR (XANAX XR) 2 MG 24 hr tablet; Take 1 tablet by mouth 3 (Three) Times a Day.    Recurrent major depressive disorder, in partial remission (CMS/HCC)  -     imipramine (TOFRANIL) 50 MG tablet; Take two bid.  -     mirtazapine (REMERON) 15 MG tablet; Take 1 tablet by mouth Every Night.          Return in about 3 months (around 1/14/2020).         Patient knows to call if symptoms worsen or fail to improve between appointments.     I spent a total of 30 minutes in direct patient care, greater than 26 minutes (greater than 50%) were spent in coordination of care, and counseling the patient regarding his anxiety and depression . Answered any questions patient had with medication and plan.     Dictated utilizing Dragon dictation    LAUREN Villareal MD

## 2019-10-30 ENCOUNTER — OFFICE VISIT (OUTPATIENT)
Dept: PSYCHIATRY | Facility: CLINIC | Age: 49
End: 2019-10-30

## 2019-10-30 DIAGNOSIS — F33.41 RECURRENT MAJOR DEPRESSIVE DISORDER, IN PARTIAL REMISSION (HCC): ICD-10-CM

## 2019-10-30 DIAGNOSIS — F41.0 PANIC DISORDER: ICD-10-CM

## 2019-10-30 DIAGNOSIS — F41.1 GENERALIZED ANXIETY DISORDER: Primary | ICD-10-CM

## 2019-10-30 DIAGNOSIS — Z63.0 MARITAL/PARTNER RELATIONAL PROBLEM: ICD-10-CM

## 2019-10-30 PROCEDURE — 90837 PSYTX W PT 60 MINUTES: CPT | Performed by: SOCIAL WORKER

## 2019-10-30 SDOH — SOCIAL STABILITY - SOCIAL INSECURITY: PROBLEMS IN RELATIONSHIP WITH SPOUSE OR PARTNER: Z63.0

## 2019-10-30 NOTE — PROGRESS NOTES
"Date of Service: October 30, 2019  Time In: 7:50 AM  Time Out: 8:50 AM      CT 9:40 AMOGRESS NOTE  Data:  Amish Win is a 49 y.o. male who met with the undersigned for a regularly scheduled individual outpatient therapy session at the [facility] for follow-up of depression, anxiety and long-standing marital strain.    HPI: Patient reports she is doing \"okay\" and states he continues to look for other work and has a second interview with [employer] for a  position.  Patient states he believes this would be better as his commute would be significantly less.  Patient also reports ongoing significant marital strain and states he and his wife are something akin to roommates at this point.  He reports she continues to be negative and makes derogatory remarks toward him on a regular basis.  He also reports that he had very little interaction and states they have not had sex in several years.  Patient does report he continues to have communication with his ex-wife through text message and states it simply makes him feel good because she is positive with him.  He is adamant there is no romantic relationship in this situation.  Patient also reports she has been considering the possibility of  from his wife and states he brought it up to her recently when she made him feel very guilty and told him he was selfish.  Patient reports ongoing symptoms of anxiety including feeling on edge, feeling overwhelmed, trembling, fears of medical issues and is feeling his pulse tapping himself on the chest.  Patient rates current symptoms of anxiety at a 6/7 on a scale of 1-10 with 10 being most severe.  Patient reports symptoms of depression continue to be improved but states he continues to struggle with periods of sad mood, anhedonia, decreased energy, and a distinct sense of uncertainty.  Patient rates current symptoms of depression at a 4 on a scale of 1-10 with 10 being most severe.     Patient reports  he " continues to adhere to medication regimen as prescribed.  Patient adamantly and convincingly denies suicidal ideation vehemently denies any substance use.      Clinical Maneuvering/Intervention:  Assisted patient in processing above session content; acknowledged and normalized patient’s thoughts, feelings, and concerns.  Utilized motivational reviewed techniques including complex reflections to assist patient in identifying and acknowledging positive aspects of his life and also encouraged him to consider he is likely coping better than he feels he is.  Also validated the patient's frustrations with ongoing marital strain.  Assisted the patient and processing his thoughts and feelings and reaffirmed his right to do what he thinks is best for him.  Also cautioned the patient not to believe he will be able to convince his wife to be happy with his decision and encouraged him to ask himself on a regular basis if he is making himself a priority.  Continue to focus on healthy skills of daily living including getting regular physical activity, eating healthy diet, and getting adequate sleep.  Provided unconditional positive regard and a safe, supportive environment.    Allowed patient to freely discuss issues without interruption or judgment. Provided safe, confidential environment to facilitate the development of positive therapeutic relationship and encourage open, honest communication. Assisted patient in identifying risk factors which would indicate the need for higher level of care including thoughts to harm self or others and/or self-harming behavior and encouraged patient to contact this office, call 911, or present to the nearest emergency room should any of these events occur. Discussed crisis intervention services and means to access.  Patient adamantly and convincingly denies current suicidal or homicidal ideation or perceptual disturbance.    Assessment    Patient appears to be maintaining relative stability  as compared to his baseline over the past several years but continues to struggle with significant symptoms of anxiety and less severe secondary symptoms of depression.  The patient's symptoms continue to be exacerbated by work stress and other various psychosocial stressors including ongoing long-term marital strain.  The patient's symptomology continues to cause impairment in important areas of functioning.  As a result, he can be reasonably expected to continue to benefit from treatment likely be at increased risk for decompensation otherwise.    Diagnoses and all orders for this visit:    Generalized anxiety disorder    Panic disorder    Recurrent major depressive disorder, in partial remission (CMS/[facility])    Marital/partner relational problem               Mental Status Exam  Hygiene:  good  Dress:  casual  Attitude:  Cooperative  Motor Activity:  Appropriate  Speech:  Normal  Mood:  anxious   Affect:  Calm and pleasant  Thought Processes:  Linear  Thought Content:  normal  Suicidal Thoughts:  denies  Homicidal Thoughts:  denies  Crisis Safety Plan: yes, to come to the emergency room.  Hallucinations:  denies    Patient's Support Network Includes:  children and extended family    Progress toward goal: Not at goal    Functional Status: Moderate impairment     Prognosis: Fair with Ongoing Treatment     Plan         Patient will continue in individual outpatient therapy session at the [facility] and every 2 weeks with ongoing reassessment of frequency and clinical need.  Patient will continue in pharmacotherapy as scheduled with Dr. Villareal.  Patient will adhere to medication regimen as prescribed and report any side effects. Patient will contact this office, call 911 or present to the nearest emergency room should suicidal or homicidal ideations occur. Provide Cognitive Behavioral Therapy and Integrative Therapy to improve functioning, maintain stability, and avoid decompensation and the need for higher level  of care.          Return in about 2 weeks (around 11/13/2019) for Next scheduled follow up.      This document signed by Izaiah Tinoco LCSW, [facility] October 30, 2019 10:04 AM

## 2019-11-13 ENCOUNTER — OFFICE VISIT (OUTPATIENT)
Dept: PSYCHIATRY | Facility: CLINIC | Age: 49
End: 2019-11-13

## 2019-11-13 DIAGNOSIS — F33.41 RECURRENT MAJOR DEPRESSIVE DISORDER, IN PARTIAL REMISSION (HCC): ICD-10-CM

## 2019-11-13 DIAGNOSIS — F41.1 GENERALIZED ANXIETY DISORDER: Primary | ICD-10-CM

## 2019-11-13 DIAGNOSIS — F41.0 PANIC DISORDER: ICD-10-CM

## 2019-11-13 DIAGNOSIS — Z56.6 WORK-RELATED STRESS: ICD-10-CM

## 2019-11-13 DIAGNOSIS — Z63.0 MARITAL/PARTNER RELATIONAL PROBLEM: ICD-10-CM

## 2019-11-13 PROCEDURE — 90832 PSYTX W PT 30 MINUTES: CPT | Performed by: SOCIAL WORKER

## 2019-11-13 SDOH — HEALTH STABILITY - MENTAL HEALTH: OTHER PHYSICAL AND MENTAL STRAIN RELATED TO WORK: Z56.6

## 2019-11-13 SDOH — SOCIAL STABILITY - SOCIAL INSECURITY: PROBLEMS IN RELATIONSHIP WITH SPOUSE OR PARTNER: Z63.0

## 2019-11-13 NOTE — PROGRESS NOTES
"Date of Service: November 13, 2019  Time In: 7:30 AM  Time Out: 8:05 AM      [place] 9:40 AMOGRESS NOTE  Data:  Amish Win is a 49 y.o. male who met with the undersigned for a regularly scheduled individual outpatient therapy session at the [facility] for follow-up of depression, anxiety, work-related stress and long-standing marital strain.    HPI: Patient reports the past couple of weeks have been very stressful and states he continues to have significant work-related stress.  Patient reports he was given a list of things to get completed yesterday which was not possible as they were having a visit from a  today and states he became overwhelmed and almost cried.  Patient reports he expects to be berated for not being able to complete these tasks when he returns to work on Friday.  Patient reports he and his wife continue to struggle and states they do not see each other great deal of her both working opposite shifts.  He reports last week he told her he felt as though his right to point where they should consider separation and states his wife told him \"that will not work\".  Patient also reports after his last appointment with the undersigned [place] and states this was very stressful.  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  The patient states he is fairly confident that this is because they were able to pay this other person less money.  However, he does state this causes him to feel hopeless.  Patient reports ongoing symptoms of anxiety including feeling on edge, feeling overwhelmed, trembling, fears of medical issues and is feeling his pulse tapping himself on the chest.  Patient rates current symptoms of anxiety at a 7 on a scale of 1-10 with 10 being most severe.  Patient reports symptoms of depression continue to be " improved but states he continues to struggle with periods of sad mood, anhedonia, decreased energy, and a distinct sense of uncertainty.  Patient rates current symptoms of depression at a 4/5 on a scale of 1-10 with 10 being most severe.     Patient reports  he continues to adhere to medication regimen as prescribed.  Patient adamantly and convincingly denies suicidal ideation vehemently denies any substance use.      Clinical Maneuvering/Intervention:  Assisted patient in processing above session content; acknowledged and normalized patient’s thoughts, feelings, and concerns.  Allow the patient to discuss/venous feeling frustrations with various issues in his life and validated his feelings.  Also utilized motivational reviewed techniques including complex reflections to discussed concept of things we can control things he cannot control and encouraged him to remind himself how he can do is his best.  Also validated the patient's feelings concerning his marriage and reminded him that in the event he decides to separate from his wife she does not get to make that decision.  Also focused on healthy skills of daily living and behavioral activation and was able to remind the patient that when he had a routine staying physically active it was beneficial and encouraged him to actively seek activities he can engage in on a regular basis.  Provided unconditional positive regard and a safe, supportive environment.    Allowed patient to freely discuss issues without interruption or judgment. Provided safe, confidential environment to facilitate the development of positive therapeutic relationship and encourage open, honest communication. Assisted patient in identifying risk factors which would indicate the need for higher level of care including thoughts to harm self or others and/or self-harming behavior and encouraged patient to contact this office, call 911, or present to the nearest emergency room should any of these  events occur. Discussed crisis intervention services and means to access.  Patient adamantly and convincingly denies current suicidal or homicidal ideation or perceptual disturbance.    Assessment    Patient appears to be maintaining relative stability as compared to his baseline over the past several years but continues to struggle with significant symptoms of anxiety and less severe secondary symptoms of depression.  The patient's symptoms continue to be exacerbated by work stress and other various psychosocial stressors including ongoing long-term marital strain.  The patient's symptomology continues to cause impairment in important areas of functioning.  As a result, he can be reasonably expected to continue to benefit from treatment likely be at increased risk for decompensation otherwise.    Diagnoses and all orders for this visit:    Generalized anxiety disorder    Panic disorder    Recurrent major depressive disorder, in partial remission (CMS/HCC)    Marital/partner relational problem    Work-related stress               Mental Status Exam  Hygiene:  good  Dress:  casual  Attitude:  Cooperative  Motor Activity:  Appropriate  Speech:  Normal  Mood:  anxious   Affect:  Calm and pleasant  Thought Processes:  Linear  Thought Content:  normal  Suicidal Thoughts:  denies  Homicidal Thoughts:  denies  Crisis Safety Plan: yes, to come to the emergency room.  Hallucinations:  denies    Patient's Support Network Includes:  children and extended family    Progress toward goal: Not at goal    Functional Status: Moderate impairment     Prognosis: Fair with Ongoing Treatment     Plan         Patient will continue in individual outpatient therapy session at the [facility] and every 2 weeks with ongoing reassessment of frequency and clinical need.  Patient will continue in pharmacotherapy as scheduled with Dr. Villareal.  Patient will adhere to medication regimen as prescribed and report any side effects. Patient will  contact this office, call 911 or present to the nearest emergency room should suicidal or homicidal ideations occur. Provide Cognitive Behavioral Therapy and Integrative Therapy to improve functioning, maintain stability, and avoid decompensation and the need for higher level of care.          Return in about 2 weeks (around 11/27/2019) for Next scheduled follow up.      This document signed by Izaiah Tinoco LCSW, [facility] November 13, 2019 9:39 AM

## 2019-11-27 ENCOUNTER — OFFICE VISIT (OUTPATIENT)
Dept: PSYCHIATRY | Facility: CLINIC | Age: 49
End: 2019-11-27

## 2019-11-27 DIAGNOSIS — F41.0 PANIC DISORDER: ICD-10-CM

## 2019-11-27 DIAGNOSIS — F33.41 RECURRENT MAJOR DEPRESSIVE DISORDER, IN PARTIAL REMISSION (HCC): ICD-10-CM

## 2019-11-27 DIAGNOSIS — Z63.0 MARITAL/PARTNER RELATIONAL PROBLEM: ICD-10-CM

## 2019-11-27 DIAGNOSIS — Z56.6 WORK-RELATED STRESS: ICD-10-CM

## 2019-11-27 DIAGNOSIS — F41.1 GENERALIZED ANXIETY DISORDER: Primary | ICD-10-CM

## 2019-11-27 PROCEDURE — 90834 PSYTX W PT 45 MINUTES: CPT | Performed by: SOCIAL WORKER

## 2019-11-27 SDOH — SOCIAL STABILITY - SOCIAL INSECURITY: PROBLEMS IN RELATIONSHIP WITH SPOUSE OR PARTNER: Z63.0

## 2019-11-27 SDOH — HEALTH STABILITY - MENTAL HEALTH: OTHER PHYSICAL AND MENTAL STRAIN RELATED TO WORK: Z56.6

## 2019-11-27 NOTE — PROGRESS NOTES
"Date of Service: November 27, 2019  Time In: 7:30 AM  Time Out: 8:10 AM      ME 9:40 AMOGRESS NOTE  Data:  Amish Win is a 49 y.o. male who met with the undersigned for a regularly scheduled individual outpatient therapy session at the [facility] for follow-up of depression, anxiety, work-related stress and long-standing marital strain.    HPI: Patient reports the past couple of weeks have been \"rough\" and states he continues to struggle at work as they have a new .  Patient also reports she continues to struggle with working the late shift and states he continues to struggle with the approximately 1 hour commute.  Patient also reports he and his wife continue to struggle and states that he spends very little time together and have very little positive interaction.  Once again, he states he told his wife he feels as though they should consider  but she refused to consider this as an option.  Patient reports he continues to seek other employment.  The patient also discusses the fact he has not seen either of his parents in approximately 10 years and states he would like to see them in the coming months.  Patient reports ongoing symptoms of anxiety including feeling on edge, feeling overwhelmed, trembling, fears of medical issues and is feeling his pulse tapping himself on the chest.  Patient rates current symptoms of anxiety at a 7/8 on a scale of 1-10 with 10 being most severe.  Patient reports symptoms of depression continue to be improved but states he continues to struggle with periods of sad mood, anhedonia, decreased energy, and a distinct sense of uncertainty.  Patient rates current symptoms of depression at a 6 on a scale of 1-10 with 10 being most severe.     Patient reports  he continues to adhere to medication regimen as prescribed.  Patient adamantly and convincingly denies suicidal ideation vehemently denies any substance use.      Clinical " "Maneuvering/Intervention:  Assisted patient in processing above session content; acknowledged and normalized patient’s thoughts, feelings, and concerns.  Allow the patient to discuss/vent his feelings and frustrations with ongoing difficulties at work and at home and validated his feelings.  Also utilized motivational reviewed techniques including complex reflections to assist the patient in recognizing he is navigating difficult situations at work in the past and has the ability to find more appropriate employment.  Also utilized cognitive behavioral therapy and reality based theory to challenge the patient's perception that he needs the approval of his wife to decide how he wishes to proceed within the relationship.  Encouraged the patient to remind himself that his wife will likely not ever agree with his opinion or plan.  Challenged patient to remind himself \"I do not need anyone's permission\".  Also discussed the importance of attitude and encouraged the patient to practice building a positive attitude which will likely lead to a self-fulfilling prophecy.  Strongly urged the patient to actively seek enjoyable activities he can engage in on a regular basis and encouraged him to consider getting a massage next week while he is off.  Provided unconditional positive regard and a safe, supportive environment.    Allowed patient to freely discuss issues without interruption or judgment. Provided safe, confidential environment to facilitate the development of positive therapeutic relationship and encourage open, honest communication. Assisted patient in identifying risk factors which would indicate the need for higher level of care including thoughts to harm self or others and/or self-harming behavior and encouraged patient to contact this office, call 911, or present to the nearest emergency room should any of these events occur. Discussed crisis intervention services and means to access.  Patient adamantly and " convincingly denies current suicidal or homicidal ideation or perceptual disturbance.    Assessment    Patient appears to be maintaining relative stability as compared to his baseline over the past several years but continues to struggle with significant symptoms of anxiety and less severe secondary symptoms of depression.  The patient's symptoms continue to be exacerbated by work stress and other various psychosocial stressors including ongoing long-term marital strain.  The patient's symptomology continues to cause impairment in important areas of functioning.  As a result, he can be reasonably expected to continue to benefit from treatment likely be at increased risk for decompensation otherwise.    Diagnoses and all orders for this visit:    Generalized anxiety disorder    Panic disorder    Recurrent major depressive disorder, in partial remission (CMS/[facility])    Marital/partner relational problem    Work-related stress               Mental Status Exam  Hygiene:  good  Dress:  casual  Attitude:  Cooperative  Motor Activity:  Appropriate  Speech:  Normal  Mood:  anxious   Affect:  Calm and pleasant  Thought Processes:  Linear  Thought Content:  normal  Suicidal Thoughts:  denies  Homicidal Thoughts:  denies  Crisis Safety Plan: yes, to come to the emergency room.  Hallucinations:  denies    Patient's Support Network Includes:  children and extended family    Progress toward goal: Not at goal    Functional Status: Moderate impairment     Prognosis: Fair with Ongoing Treatment     Plan         Patient will continue in individual outpatient therapy session at the [facility] in 1 week..  Patient will continue in pharmacotherapy as scheduled with Dr. Villareal.  Patient will adhere to medication regimen as prescribed and report any side effects. Patient will contact this office, call 911 or present to the nearest emergency room should suicidal or homicidal ideations occur. Provide Cognitive Behavioral Therapy and  Integrative Therapy to improve functioning, maintain stability, and avoid decompensation and the need for higher level of care.          Return in about 1 week (around 12/4/2019) for Next scheduled follow up.      This document signed by Izaiah Tinoco LCSW, [facility] November 27, 2019 9:48 AM

## 2019-12-18 ENCOUNTER — OFFICE VISIT (OUTPATIENT)
Dept: PSYCHIATRY | Facility: CLINIC | Age: 49
End: 2019-12-18

## 2019-12-18 DIAGNOSIS — Z56.6 WORK-RELATED STRESS: ICD-10-CM

## 2019-12-18 DIAGNOSIS — F41.1 GENERALIZED ANXIETY DISORDER: Primary | ICD-10-CM

## 2019-12-18 DIAGNOSIS — F33.1 MAJOR DEPRESSIVE DISORDER, RECURRENT EPISODE, MODERATE (HCC): ICD-10-CM

## 2019-12-18 DIAGNOSIS — F41.0 PANIC DISORDER: ICD-10-CM

## 2019-12-18 DIAGNOSIS — Z63.0 MARITAL/PARTNER RELATIONAL PROBLEM: ICD-10-CM

## 2019-12-18 PROCEDURE — 90834 PSYTX W PT 45 MINUTES: CPT | Performed by: SOCIAL WORKER

## 2019-12-18 SDOH — SOCIAL STABILITY - SOCIAL INSECURITY: PROBLEMS IN RELATIONSHIP WITH SPOUSE OR PARTNER: Z63.0

## 2019-12-18 SDOH — HEALTH STABILITY - MENTAL HEALTH: OTHER PHYSICAL AND MENTAL STRAIN RELATED TO WORK: Z56.6

## 2019-12-18 NOTE — PROGRESS NOTES
Date of Service: December 18, 2019  Time In: 7:50 AM  Time Out: 8:30 AM      OK 9:40 AMOGRESS NOTE  Data:  Amish Win is a 49 y.o. male who met 1: 1 with the undersigned for a regularly scheduled individual outpatient therapy session at the [facility] for follow-up of depression, anxiety, work-related stress and long-standing marital strain.    HPI: Patient reports past few weeks have been very difficult and states they have a new  who is very abrasive.  Patient reports he is beginning to feel similar to when he was working at a "[employer]" and states he believes he will attempt to find employment outside of the [employer] industry.  Patient reports he continues to struggle with strained relationship with his wife and states approximately 2 weeks ago he told her he felt he should leave but states he did not because she said they could not afford it at this time.  Patient reports ongoing symptoms of anxiety including feeling on edge, feeling overwhelmed, trembling, fears of medical issues and is feeling his pulse tapping himself on the chest.  Patient rates current symptoms of anxiety at a 7 on a scale of 1-10 with 10 being most severe.  Patient reports symptoms of depression continue to be improved but states he continues to struggle with periods of sad mood, anhedonia, decreased energy, and a distinct sense of uncertainty.  Patient rates current symptoms of depression at a 5 on a scale of 1-10 with 10 being most severe.     Patient reports  he continues to adhere to medication regimen as prescribed.  Patient adamantly and convincingly denies suicidal ideation vehemently denies any substance use.      Clinical Maneuvering/Intervention:  Assisted patient in processing above session content; acknowledged and normalized patient’s thoughts, feelings, and concerns.  Continue to allow the patient to discuss/process ongoing difficulty at work and validated his feelings.  Also utilized  motivational reviewed techniques including complex reflections to discussed the importance of attitude and encouraged patient to remind himself he at least has some degree of control over whether he focuses on the positive or the negative.  Also allow the patient to discuss ongoing difficulties in his marriage.  Utilized cognitive behavioral therapy to challenge the patient's maladaptive thought process that he needs his wife's permission to do what he believes is right for him and encouraged him to remind himself that he probably will never be a convenient time to make significant life changes.  Agreed with the patient that he has a right to seek employment in a different industry and encouraged him to follow through with his plans.  Continue to focus on healthy skills of daily living and behavioral activation.  Provided unconditional positive regard and a safe, supportive environment.    Allowed patient to freely discuss issues without interruption or judgment. Provided safe, confidential environment to facilitate the development of positive therapeutic relationship and encourage open, honest communication. Assisted patient in identifying risk factors which would indicate the need for higher level of care including thoughts to harm self or others and/or self-harming behavior and encouraged patient to contact this office, call 911, or present to the nearest emergency room should any of these events occur. Discussed crisis intervention services and means to access.  Patient adamantly and convincingly denies current suicidal or homicidal ideation or perceptual disturbance.    Assessment    Patient appears to be struggling with increased symptoms as stress levels at work appear to have increased and he continues to struggle with significant ongoing marital strain.  It does not appear realistic at the patient situation was significantly change unless he takes steps to bring about positive change.  The patient's  symptomology continues to cause impairment in important areas of functioning.  As a result, he can be reasonably expected to continue to benefit from treatment likely be at increased risk for decompensation otherwise.    Diagnoses and all orders for this visit:    Generalized anxiety disorder    Panic disorder    Major depressive disorder, recurrent episode, moderate (CMS/HCC)    Marital/partner relational problem    Work-related stress               Mental Status Exam  Hygiene:  good  Dress:  casual  Attitude:  Cooperative  Motor Activity:  Appropriate  Speech:  Normal  Mood:  anxious   Affect:  Calm and pleasant  Thought Processes:  Linear  Thought Content:  normal  Suicidal Thoughts:  denies  Homicidal Thoughts:  denies  Crisis Safety Plan: yes, to come to the emergency room.  Hallucinations:  denies    Patient's Support Network Includes:  children and extended family    Progress toward goal: Not at goal    Functional Status: Moderate impairment     Prognosis: Fair with Ongoing Treatment     Plan         Patient will continue in individual outpatient therapy session at the [facility] in 2 weeks..  Patient will continue in pharmacotherapy as scheduled with Dr. Villareal.  Patient will adhere to medication regimen as prescribed and report any side effects. Patient will contact this office, call 911 or present to the nearest emergency room should suicidal or homicidal ideations occur. Provide Cognitive Behavioral Therapy and Integrative Therapy to improve functioning, maintain stability, and avoid decompensation and the need for higher level of care.          Return in about 2 weeks (around 1/1/2020) for Next scheduled follow up.      This document signed by Izaiah Tinoco LCSW, [facility] December 18, 2019 3:02 PM

## 2019-12-30 DIAGNOSIS — F41.0 PANIC DISORDER: ICD-10-CM

## 2019-12-30 RX ORDER — ALPRAZOLAM 2 MG/1
TABLET, EXTENDED RELEASE ORAL
Qty: 90 TABLET | Refills: 0 | OUTPATIENT
Start: 2019-12-30

## 2019-12-31 DIAGNOSIS — F41.0 PANIC DISORDER: ICD-10-CM

## 2019-12-31 RX ORDER — ALPRAZOLAM 2 MG/1
2 TABLET, EXTENDED RELEASE ORAL 3 TIMES DAILY
Qty: 90 TABLET | Refills: 0 | Status: SHIPPED | OUTPATIENT
Start: 2019-12-31 | End: 2020-01-06 | Stop reason: SDUPTHER

## 2019-12-31 RX ORDER — ALPRAZOLAM 2 MG/1
TABLET, EXTENDED RELEASE ORAL
Qty: 90 TABLET | Refills: 0 | OUTPATIENT
Start: 2019-12-31

## 2020-01-01 ENCOUNTER — DOCUMENTATION (OUTPATIENT)
Dept: PSYCHIATRY | Facility: CLINIC | Age: 50
End: 2020-01-01

## 2020-01-01 NOTE — PROGRESS NOTES
Patient called me on my cell phone stating that the [facility] pharmacy had no record of the electronic prescription for the alprazolam extended release 2 mg 3 times daily with no refills that was sent yesterday.  He was there at the pharmacy.  Subsequently called the pharmacy leaving a order for the medication on the phone noted not to duplicate the prescription.

## 2020-01-06 ENCOUNTER — OFFICE VISIT (OUTPATIENT)
Dept: PSYCHIATRY | Facility: CLINIC | Age: 50
End: 2020-01-06

## 2020-01-06 VITALS
HEART RATE: 66 BPM | DIASTOLIC BLOOD PRESSURE: 89 MMHG | BODY MASS INDEX: 29.76 KG/M2 | SYSTOLIC BLOOD PRESSURE: 132 MMHG | WEIGHT: 212.6 LBS | HEIGHT: 71 IN

## 2020-01-06 DIAGNOSIS — F41.1 GENERALIZED ANXIETY DISORDER: ICD-10-CM

## 2020-01-06 DIAGNOSIS — F33.41 RECURRENT MAJOR DEPRESSIVE DISORDER, IN PARTIAL REMISSION (HCC): ICD-10-CM

## 2020-01-06 DIAGNOSIS — F41.0 PANIC DISORDER: ICD-10-CM

## 2020-01-06 PROCEDURE — 99214 OFFICE O/P EST MOD 30 MIN: CPT | Performed by: PSYCHIATRY & NEUROLOGY

## 2020-01-06 RX ORDER — MIRTAZAPINE 15 MG/1
15 TABLET, FILM COATED ORAL NIGHTLY
Qty: 30 TABLET | Refills: 2 | Status: SHIPPED | OUTPATIENT
Start: 2020-01-06 | End: 2020-03-30 | Stop reason: SDUPTHER

## 2020-01-06 RX ORDER — IMIPRAMINE HCL 50 MG
TABLET ORAL
Qty: 120 TABLET | Refills: 2 | Status: SHIPPED | OUTPATIENT
Start: 2020-01-06 | End: 2020-03-30 | Stop reason: SDUPTHER

## 2020-01-06 RX ORDER — ALPRAZOLAM 2 MG/1
2 TABLET, EXTENDED RELEASE ORAL 3 TIMES DAILY
Qty: 90 TABLET | Refills: 1 | Status: SHIPPED | OUTPATIENT
Start: 2020-01-30 | End: 2020-03-30 | Stop reason: SDUPTHER

## 2020-01-06 NOTE — PROGRESS NOTES
"Patient ID: Amish Win is a 49 y.o. male    SERVICE TYPE: EVALUATION AND MANAGEMENT (greater than 50% of the time spent for supportive psychotherapy).      /89   Pulse 66   Ht 180 cm (70.87\")   Wt 96.4 kg (212 lb 9.6 oz)   BMI 29.76 kg/m²     ALLERGIES:  Penicillins    CC/ Focus of the visit: Anxiety/depression    HPI: \"Rough right now\".  Patient having little bit more difficulty now with his somatic preoccupation with cardiac function, relates to his stress at home with his wife struggling with depression (she is possibly going to have outpatient ECT via [place] psychiatrist).  States his job is secure although has a \"marquez boss\".  Does not like to drive to [place] and is continuing to look for employment closer to home.  States he is comfortable at his home \"when my was not there\".  Working in therapy with the decision about whether to go or stay.  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    No suggestion or indications of any misuse or abuse of prescribed medication.    PFSH: Has the son who is a senior in high school and a daughter who is now a freshman.  There is much of the homemaking task himself with the wife being dysfunctional due to her depression.    Review of Systems   Respiratory: Positive for choking.    Cardiovascular: Positive for palpitations.   Gastrointestinal: Negative.    Neurological: Negative.        SUPPORTIVE PSYCHOTHERAPY: continuing efforts to promote the therapeutic alliance, address the patient’s issues, and strengthen self awareness, insights, and coping skills.       Mental Status Exam  Appearance:  clean and casually dressed, appropriate  Attitude toward clinician:  cooperative and agreeable   Speech:    Rate:  regular rate and rhythm   Volume: normal  Motor:  no abnormal movements present  Mood:  Good  Affect:  euthymic  Thought Processes:  linear, logical, and goal directed  Thought " Content: Preoccupied with his somatic concerns  feeling Hopeless: absent  Suicidal Thoughts:  absent  Homicidal Thoughts:  absent  Perceptual Disturbance: no perceptual disturbance  Attention and Concentration:  good  Insight and Judgement:  good  Memory:  memory  intact    LABS: No results found for this or any previous visit (from the past 168 hour(s)).    MEDICATION ISSUES: Have discussed with the patient the medications Risks, Benefits, and Side effects including potential falls, possible impaired driving and  metabolic adversities among others. No medication side effects or related complaints today.     TREATMENT PLAN/GOALS: Continue supportive psychotherapy efforts and medications as indicated.     Current Outpatient Medications   Medication Sig Dispense Refill   • [START ON 1/30/2020] ALPRAZolam XR (XANAX XR) 2 MG 24 hr tablet Take 1 tablet by mouth 3 (Three) Times a Day. Fill after January 30, refill in one month. 90 tablet 1   • aspirin 81 MG EC tablet Take 81 mg by mouth 2 (Two) Times a Day.     • docusate sodium (COLACE) 100 MG capsule Take 1 capsule by mouth 2 (Two) Times a Day. 60 capsule 1   • imipramine (TOFRANIL) 50 MG tablet Take two bid. 120 tablet 2   • latanoprost (XALATAN) 0.005 % ophthalmic solution      • lisinopril-hydrochlorothiazide (PRINZIDE,ZESTORETIC) 10-12.5 MG per tablet      • metoprolol tartrate (LOPRESSOR) 50 MG tablet Take 50 mg by mouth 2 (Two) Times a Day With Meals.     • mirtazapine (REMERON) 15 MG tablet Take 1 tablet by mouth Every Night. 30 tablet 2   • pantoprazole (PROTONIX) 40 MG EC tablet Take 40 mg by mouth Daily.     • raNITIdine (ZANTAC) 150 MG tablet      • simvastatin (ZOCOR) 20 MG tablet Take 20 mg by mouth Every Night.     • vitamin D (ERGOCALCIFEROL) 77016 units capsule capsule        No current facility-administered medications for this visit.        COLLATERAL PSYCHOTHERAPEUTIC INTERVENTION: Continuing with his therapist Izaiah Tinoco LCSW biweekly.    RISK:  Moderate    Assessment/Plan     Diagnoses and all orders for this visit:    Panic disorder  -     ALPRAZolam XR (XANAX XR) 2 MG 24 hr tablet; Take 1 tablet by mouth 3 (Three) Times a Day. Fill after January 30, refill in one month.  -     mirtazapine (REMERON) 15 MG tablet; Take 1 tablet by mouth Every Night.  -     imipramine (TOFRANIL) 50 MG tablet; Take two bid.    Generalized anxiety disorder  -     ALPRAZolam XR (XANAX XR) 2 MG 24 hr tablet; Take 1 tablet by mouth 3 (Three) Times a Day. Fill after January 30, refill in one month.  -     mirtazapine (REMERON) 15 MG tablet; Take 1 tablet by mouth Every Night.  -     imipramine (TOFRANIL) 50 MG tablet; Take two bid.    Recurrent major depressive disorder, in partial remission (CMS/HCC)  -     mirtazapine (REMERON) 15 MG tablet; Take 1 tablet by mouth Every Night.  -     imipramine (TOFRANIL) 50 MG tablet; Take two bid.        Return in about 3 months (around 4/6/2020).           Patient knows to call if symptoms worsen or fail to improve between appointments.     I spent a total of 30 minutes in direct patient care, greater than 25 minutes (greater than 50%) were spent face-to-face with assessment, coordination care, and counseling the patient regarding anxiety/ depression and answering any questions the patient had about the medication and plan..      Dictated utilizing Dragon dictation    LAUREN Villareal MD

## 2020-01-08 ENCOUNTER — OFFICE VISIT (OUTPATIENT)
Dept: PSYCHIATRY | Facility: CLINIC | Age: 50
End: 2020-01-08

## 2020-01-08 DIAGNOSIS — Z56.6 WORK-RELATED STRESS: ICD-10-CM

## 2020-01-08 DIAGNOSIS — F41.1 GENERALIZED ANXIETY DISORDER: ICD-10-CM

## 2020-01-08 DIAGNOSIS — F33.1 MAJOR DEPRESSIVE DISORDER, RECURRENT EPISODE, MODERATE (HCC): ICD-10-CM

## 2020-01-08 DIAGNOSIS — F41.0 PANIC DISORDER: Primary | ICD-10-CM

## 2020-01-08 DIAGNOSIS — Z63.0 MARITAL/PARTNER RELATIONAL PROBLEM: ICD-10-CM

## 2020-01-08 PROCEDURE — 90837 PSYTX W PT 60 MINUTES: CPT | Performed by: SOCIAL WORKER

## 2020-01-08 SDOH — SOCIAL STABILITY - SOCIAL INSECURITY: PROBLEMS IN RELATIONSHIP WITH SPOUSE OR PARTNER: Z63.0

## 2020-01-08 SDOH — HEALTH STABILITY - MENTAL HEALTH: OTHER PHYSICAL AND MENTAL STRAIN RELATED TO WORK: Z56.6

## 2020-01-13 NOTE — PROGRESS NOTES
Date of Service: January 8, 2020  Time In: 8:00 AM  Time Out: 9:00 AM      VA 9:40 AMOGRESS NOTE  Data:  Amish Win is a 49 y.o. male who met 1: 1 with the undersigned for a regularly scheduled individual outpatient therapy session at the [facility] for follow-up of depression, anxiety, work-related stress and long-standing marital strain.    HPI: P patient reports past couple weeks of been very difficult and states work has been very stressful.  He also reports he continues to struggle with the commute back and forth and states he realizes he has had difficulty staying awake at times.  Patient continues to report significant marital strain and states he is continuing considering leaving the relationship although he feels responsibility to assist his wife as she is currently considering the possibility of ECT treatments.  Patient reports ongoing symptoms of anxiety including feeling on edge, feeling overwhelmed, trembling, fears of medical issues and is feeling his pulse tapping himself on the chest.  Patient rates current symptoms of anxiety at a 7 on a scale of 1-10 with 10 being most severe.  Patient reports symptoms of depression continue to be improved but states he continues to struggle with periods of sad mood, anhedonia, decreased energy, and a distinct sense of uncertainty.  Patient rates current symptoms of depression at a 6 on a scale of 1-10 with 10 being most severe.     Patient reports  he continues to adhere to medication regimen as prescribed.  Patient adamantly and convincingly denies suicidal ideation vehemently denies any substance use.      Clinical Maneuvering/Intervention:  Assisted patient in processing above session content; acknowledged and normalized patient’s thoughts, feelings, and concerns.  Allow the patient to continue to discuss/process his feeling frustrations with stress at work and validated his feelings.  Strongly urged the patient to continue seeking other employment more  conducive to his wellbeing.  Also validated the patient's feelings concerning a long history of marital discord and his feelings of being loyal to his wife throughout her treatment.  However, also confronted him with the fact it is not reasonable to expect things to get better if left unchanged.  Continue to utilize cog behavioral therapy to assist patient in developing coping mechanisms to decrease the severity and frequency of symptoms.  Continue to focus on healthy skills of daily living and behavioral activation.  Provided unconditional positive regard and a safe, supportive environment.    Allowed patient to freely discuss issues without interruption or judgment. Provided safe, confidential environment to facilitate the development of positive therapeutic relationship and encourage open, honest communication. Assisted patient in identifying risk factors which would indicate the need for higher level of care including thoughts to harm self or others and/or self-harming behavior and encouraged patient to contact this office, call 911, or present to the nearest emergency room should any of these events occur. Discussed crisis intervention services and means to access.  Patient adamantly and convincingly denies current suicidal or homicidal ideation or perceptual disturbance.    Assessment    Patient appears to be struggling with increased symptoms as stress levels at work appear to have increased and he continues to struggle with significant ongoing marital strain.  It does not appear realistic at the patient situation was significantly change unless he takes steps to bring about positive change.  The patient's symptomology continues to cause impairment in important areas of functioning.  As a result, he can be reasonably expected to continue to benefit from treatment likely be at increased risk for decompensation otherwise.    Diagnoses and all orders for this visit:    Panic disorder    Generalized anxiety  disorder    Major depressive disorder, recurrent episode, moderate (CMS/HCC)    Marital/partner relational problem    Work-related stress               Mental Status Exam  Hygiene:  good  Dress:  casual  Attitude:  Cooperative  Motor Activity:  Appropriate  Speech:  Normal  Mood:  anxious   Affect:  Calm and pleasant  Thought Processes:  Linear  Thought Content:  normal  Suicidal Thoughts:  denies  Homicidal Thoughts:  denies  Crisis Safety Plan: yes, to come to the emergency room.  Hallucinations:  denies    Patient's Support Network Includes:  children and extended family    Progress toward goal: Not at goal    Functional Status: Moderate impairment     Prognosis: Fair with Ongoing Treatment     Plan         Patient will continue in individual outpatient therapy session at the [facility] in 2 weeks..  Patient will continue in pharmacotherapy as scheduled with Dr. Villareal.  Patient will adhere to medication regimen as prescribed and report any side effects. Patient will contact this office, call 911 or present to the nearest emergency room should suicidal or homicidal ideations occur. Provide Cognitive Behavioral Therapy and Integrative Therapy to improve functioning, maintain stability, and avoid decompensation and the need for higher level of care.          Return in about 2 weeks (around 1/22/2020) for Next scheduled follow up.      This document signed by Izaiah Tinoco LCSW, [facility] January 13, 2020 6:46 PM

## 2020-01-22 ENCOUNTER — OFFICE VISIT (OUTPATIENT)
Dept: PSYCHIATRY | Facility: CLINIC | Age: 50
End: 2020-01-22

## 2020-01-22 DIAGNOSIS — Z56.6 WORK-RELATED STRESS: ICD-10-CM

## 2020-01-22 DIAGNOSIS — Z63.0 MARITAL/PARTNER RELATIONAL PROBLEM: ICD-10-CM

## 2020-01-22 DIAGNOSIS — F41.0 PANIC DISORDER: Primary | ICD-10-CM

## 2020-01-22 DIAGNOSIS — F33.1 MAJOR DEPRESSIVE DISORDER, RECURRENT EPISODE, MODERATE (HCC): ICD-10-CM

## 2020-01-22 DIAGNOSIS — F41.1 GENERALIZED ANXIETY DISORDER: ICD-10-CM

## 2020-01-22 PROCEDURE — 90834 PSYTX W PT 45 MINUTES: CPT | Performed by: SOCIAL WORKER

## 2020-01-22 SDOH — SOCIAL STABILITY - SOCIAL INSECURITY: PROBLEMS IN RELATIONSHIP WITH SPOUSE OR PARTNER: Z63.0

## 2020-01-22 SDOH — HEALTH STABILITY - MENTAL HEALTH: OTHER PHYSICAL AND MENTAL STRAIN RELATED TO WORK: Z56.6

## 2020-01-22 NOTE — PROGRESS NOTES
"Date of Service: January 22, 2020  Time In: 8:00 AM  Time Out:  8:40 AM      HI 9:40 AMOGRESS NOTE  Data:  Amish Win is a 49 y.o. male who met 1: 1 with the undersigned for a regularly scheduled individual outpatient therapy session at the [facility] for follow-up of depression, anxiety, work-related stress and long-standing marital strain.    HPI: Patient reports things continue to be difficult at work and states he feels as though he is having pressure placed on him by the  to do things he does not agree with including attempting to find reasons to fire employees the patient states he feels are doing a good job.  He also reported he continues to struggle with the commute and states he has difficulty staying alert while driving home at night.  Patient also reports ongoing strained relationship with his wife and states they continue to have very little interaction.  The patient states \"all I am to her is a paycheck\".  Patient reports ongoing symptoms of anxiety including feeling on edge, feeling overwhelmed, trembling, fears of medical issues and is feeling his pulse tapping himself on the chest.  Patient rates current symptoms of anxiety at a 7 on a scale of 1-10 with 10 being most severe.  Patient reports symptoms of depression continue to be improved but states he continues to struggle with periods of sad mood, anhedonia, decreased energy, and a distinct sense of uncertainty.  Patient rates current symptoms of depression at a 6 on a scale of 1-10 with 10 being most severe.     Patient reports  he continues to adhere to medication regimen as prescribed.  Patient adamantly and convincingly denies suicidal ideation vehemently denies any substance use.      Clinical Maneuvering/Intervention:  Assisted patient in processing above session content; acknowledged and normalized patient’s thoughts, feelings, and concerns.  Allow the patient to discuss/process his feelings of frustration with ongoing " difficulty at work and validated his feelings.  Also validated the conundrum the patient feels he finds himself in with complying with demands of his boss also feeling as though he is engaging in behaviors which are in direct opposition to his beliefs and values.  Also utilized cognitive behavioral therapy to assist patient in developing appropriate coping mechanisms to decrease the severity frequency and symptoms.  Also strongly urged the patient to follow through with his plan to visit his mother in February and reminded him that his wife does not have to agree with everything he plans to do.  Continue to focus on healthy skills of daily living and behavioral activation.  Provided unconditional positive regard and a safe, supportive environment.    Allowed patient to freely discuss issues without interruption or judgment. Provided safe, confidential environment to facilitate the development of positive therapeutic relationship and encourage open, honest communication. Assisted patient in identifying risk factors which would indicate the need for higher level of care including thoughts to harm self or others and/or self-harming behavior and encouraged patient to contact this office, call 911, or present to the nearest emergency room should any of these events occur. Discussed crisis intervention services and means to access.  Patient adamantly and convincingly denies current suicidal or homicidal ideation or perceptual disturbance.    Assessment    Patient appears to be struggling with increased symptoms as stress levels at work appear to have increased and he continues to struggle with significant ongoing marital strain.  It does not appear realistic at the patient situation was significantly change unless he takes steps to bring about positive change.  The patient's symptomology continues to cause impairment in important areas of functioning.  As a result, he can be reasonably expected to continue to benefit from  treatment likely be at increased risk for decompensation otherwise.    Diagnoses and all orders for this visit:    Panic disorder    Generalized anxiety disorder    Major depressive disorder, recurrent episode, moderate (CMS/HCC)    Marital/partner relational problem    Work-related stress               Mental Status Exam  Hygiene:  good  Dress:  casual  Attitude:  Cooperative  Motor Activity:  Appropriate  Speech:  Normal  Mood:  anxious   Affect:  Calm and pleasant  Thought Processes:  Linear  Thought Content:  normal  Suicidal Thoughts:  denies  Homicidal Thoughts:  denies  Crisis Safety Plan: yes, to come to the emergency room.  Hallucinations:  denies    Patient's Support Network Includes:  children and extended family    Progress toward goal: Not at goal    Functional Status: Moderate impairment     Prognosis: Fair with Ongoing Treatment     Plan         Patient will continue in individual outpatient therapy session at the [facility] in 2 weeks..  Patient will continue in pharmacotherapy as scheduled with Dr. iVllareal.  Patient will adhere to medication regimen as prescribed and report any side effects. Patient will contact this office, call 911 or present to the nearest emergency room should suicidal or homicidal ideations occur. Provide Cognitive Behavioral Therapy and Integrative Therapy to improve functioning, maintain stability, and avoid decompensation and the need for higher level of care.          Return in about 2 weeks (around 2/5/2020) for Next scheduled follow up.      This document signed by Izaiah Tinoco LCSW, [facility] January 22, 2020 12:21 PM

## 2020-02-05 ENCOUNTER — OFFICE VISIT (OUTPATIENT)
Dept: PSYCHIATRY | Facility: CLINIC | Age: 50
End: 2020-02-05

## 2020-02-05 DIAGNOSIS — F41.1 GENERALIZED ANXIETY DISORDER: ICD-10-CM

## 2020-02-05 DIAGNOSIS — F33.1 MAJOR DEPRESSIVE DISORDER, RECURRENT EPISODE, MODERATE (HCC): ICD-10-CM

## 2020-02-05 DIAGNOSIS — Z63.0 MARITAL/PARTNER RELATIONAL PROBLEM: ICD-10-CM

## 2020-02-05 DIAGNOSIS — Z56.6 WORK-RELATED STRESS: ICD-10-CM

## 2020-02-05 DIAGNOSIS — F41.0 PANIC DISORDER: Primary | ICD-10-CM

## 2020-02-05 PROCEDURE — 90837 PSYTX W PT 60 MINUTES: CPT | Performed by: SOCIAL WORKER

## 2020-02-05 SDOH — SOCIAL STABILITY - SOCIAL INSECURITY: PROBLEMS IN RELATIONSHIP WITH SPOUSE OR PARTNER: Z63.0

## 2020-02-05 SDOH — HEALTH STABILITY - MENTAL HEALTH: OTHER PHYSICAL AND MENTAL STRAIN RELATED TO WORK: Z56.6

## 2020-02-06 NOTE — PROGRESS NOTES
Date of Service: February 5, 2020  Time In: 7:50 AM  Time Out:  8:45 AM      LA 9:40 AMOGRESS NOTE  Data:  Amish Win is a 49 y.o. male who met 1: 1 with the undersigned for a regularly scheduled individual outpatient therapy session at the [facility] for follow-up of depression, anxiety, work-related stress and long-standing marital strain.    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  He states he believes this will be helpful as it will be less stress and much less time commuting.  Patient reports the day before the session he forgot his medication at home and states he began feeling what he believes was mild withdrawal symptoms from not having alprazolam.    Patient reports ongoing symptoms of anxiety including feeling on edge, feeling overwhelmed, trembling, fears of medical issues and is feeling his pulse tapping himself on the chest.  Patient rates current symptoms of anxiety at a 6/7 on a scale of 1-10 with 10 being most severe.  Patient reports symptoms of depression continue to be improved but states he continues to struggle with periods of sad mood, anhedonia, decreased energy, and a distinct sense of uncertainty.  Patient rates current symptoms of depression at a 5 on a scale of 1-10 with 10 being most severe.     Patient reports  he continues to adhere to medication regimen as prescribed.  Patient adamantly and convincingly denies suicidal ideation vehemently denies any substance use.      Clinical Maneuvering/Intervention:  Assisted patient in processing above session content; acknowledged and normalized patient’s thoughts, feelings, and concerns.  Praised the patient for his effort to find more suitable employment and encouraged him to follow through.  Also utilized cognitive behavioral therapy to assist the patient in developing appropriate coping mechanisms to decrease the severity frequency of symptoms.   Also agree with the patient he was likely experiencing initial mild withdrawal from benzodiazepines.  Also discussed the concept of psychological dependence and challenged the patient to begin to consider the possibility he is capable of learning how to cope without medication.  Continue to focus on healthy skills of daily living and behavioral activation.  Provided unconditional positive regard and a safe, supportive environment.    Allowed patient to freely discuss issues without interruption or judgment. Provided safe, confidential environment to facilitate the development of positive therapeutic relationship and encourage open, honest communication. Assisted patient in identifying risk factors which would indicate the need for higher level of care including thoughts to harm self or others and/or self-harming behavior and encouraged patient to contact this office, call 911, or present to the nearest emergency room should any of these events occur. Discussed crisis intervention services and means to access.  Patient adamantly and convincingly denies current suicidal or homicidal ideation or perceptual disturbance.    Assessment    Patient appears to be feeling somewhat more positive as he feels he has been offered a job that would bring about positive change.  However, this appears to be tentative and continues to fall into the category of being susceptible to an external locus of control.  The patient's symptomology continues to cause impairment in important areas of functioning.  As a result, he can be reasonably expected to continue to benefit from treatment likely be at increased risk for decompensation otherwise.    Diagnoses and all orders for this visit:    Panic disorder    Generalized anxiety disorder    Major depressive disorder, recurrent episode, moderate (CMS/HCC)    Marital/partner relational problem    Work-related stress               Mental Status Exam  Hygiene:  good  Dress:  casual  Attitude:   Cooperative  Motor Activity:  Appropriate  Speech:  Normal  Mood:  anxious   Affect:  Calm and pleasant  Thought Processes:  Linear  Thought Content:  normal  Suicidal Thoughts:  denies  Homicidal Thoughts:  denies  Crisis Safety Plan: yes, to come to the emergency room.  Hallucinations:  denies    Patient's Support Network Includes:  children and extended family    Progress toward goal: Not at goal    Functional Status: Moderate impairment     Prognosis: Fair with Ongoing Treatment     Plan         Patient will continue in individual outpatient therapy session at the [facility] in 2 weeks..  Patient will continue in pharmacotherapy as scheduled with Dr. Villareal.  Patient will adhere to medication regimen as prescribed and report any side effects. Patient will contact this office, call 911 or present to the nearest emergency room should suicidal or homicidal ideations occur. Provide Cognitive Behavioral Therapy and Integrative Therapy to improve functioning, maintain stability, and avoid decompensation and the need for higher level of care.          Return in about 2 weeks (around 2/19/2020) for Next scheduled follow up.      This document signed by Izaiah Tinoco LCSW, JOAQUIN February 6, 2020 10:17 AM

## 2020-02-19 ENCOUNTER — OFFICE VISIT (OUTPATIENT)
Dept: PSYCHIATRY | Facility: CLINIC | Age: 50
End: 2020-02-19

## 2020-02-19 DIAGNOSIS — Z56.6 WORK-RELATED STRESS: ICD-10-CM

## 2020-02-19 DIAGNOSIS — F41.1 GENERALIZED ANXIETY DISORDER: ICD-10-CM

## 2020-02-19 DIAGNOSIS — F33.1 MAJOR DEPRESSIVE DISORDER, RECURRENT EPISODE, MODERATE (HCC): ICD-10-CM

## 2020-02-19 DIAGNOSIS — Z63.0 MARITAL/PARTNER RELATIONAL PROBLEM: ICD-10-CM

## 2020-02-19 DIAGNOSIS — F41.0 PANIC DISORDER: Primary | ICD-10-CM

## 2020-02-19 PROCEDURE — 90837 PSYTX W PT 60 MINUTES: CPT | Performed by: SOCIAL WORKER

## 2020-02-19 SDOH — HEALTH STABILITY - MENTAL HEALTH: OTHER PHYSICAL AND MENTAL STRAIN RELATED TO WORK: Z56.6

## 2020-02-19 SDOH — SOCIAL STABILITY - SOCIAL INSECURITY: PROBLEMS IN RELATIONSHIP WITH SPOUSE OR PARTNER: Z63.0

## 2020-02-19 NOTE — PROGRESS NOTES
Date of Service: February 19, 2020  Time In: 7:50 AM  Time Out:  8:50 AM      DE 9:40 AMOGRESS NOTE  Data:  Amish Win is a 49 y.o. male who met 1: 1 with the undersigned for a regularly scheduled individual outpatient therapy session at the [facility] for follow-up of depression, anxiety, work-related stress and ongoing marital strain.    HPI: Patient reports the past week is been very difficult and states he agonize over decision as to whether or not to take a job closer to home.  However, he states he ultimately decided to stay with his current position as he believes this will be better due to the fact the new job would have also had a bar and he came to believe the the new job would have expected him to work 12 to 13 hours nightly.  Patient reports he does feel somewhat better after making this decision and states he feels less pressure.  Patient reports he plans to remain at his current job unless something significantly better comes along.  Patient reports ongoing symptoms of anxiety including feeling on edge, feeling overwhelmed, trembling, and a sense of impending doom.  Patient rates current symptoms of anxiety at a 6 on a scale of 1-10 with 10 being most severe.  Patient reports symptoms of depression continue to be improved but states he continues to struggle with periods of sad mood, anhedonia, decreased energy, and a distinct sense of uncertainty.  Patient rates current symptoms of depression at a 5 on a scale of 1-10 with 10 being most severe.     Patient reports  he continues to adhere to medication regimen as prescribed.  Patient adamantly and convincingly denies suicidal ideation vehemently denies any substance use.      Clinical Maneuvering/Intervention:  Assisted patient in processing above session content; acknowledged and normalized patient’s thoughts, feelings, and concerns.  Allow the patient to discuss/vent his feelings and frustrations with ongoing issues and validated his feelings.   Also praised the patient for his willingness and ability to make a decision concerning his employment and validated his decision.  Continue to utilize cognitive behavioral therapy to assist patient in developing appropriate coping mechanisms to decrease the severity and frequency of symptoms.  Also utilized solution focused therapy with a strength-based perspective and reiterated the importance of the fact the patient has the ability to identify and utilize solutions to his problems.  Strongly focused on healthy skills of daily living and behavioral activation and urged the patient to find activities he can engage in on a regular basis.  Provided unconditional positive regard and a safe, supportive environment.    Allowed patient to freely discuss issues without interruption or judgment. Provided safe, confidential environment to facilitate the development of positive therapeutic relationship and encourage open, honest communication. Assisted patient in identifying risk factors which would indicate the need for higher level of care including thoughts to harm self or others and/or self-harming behavior and encouraged patient to contact this office, call 911, or present to the nearest emergency room should any of these events occur. Discussed crisis intervention services and means to access.  Patient adamantly and convincingly denies current suicidal or homicidal ideation or perceptual disturbance.    Assessment    Patient appears to be maintaining relative stability as compared to his baseline.  However, he continues to struggle with depression and anxiety which continues to be exacerbated by various psychosocial stressors.  The patient's symptomology continues to cause impairment in important areas of functioning.  As a result, he can be reasonably expected to continue to benefit from treatment likely be at increased risk for decompensation otherwise.    Diagnoses and all orders for this visit:    Panic  disorder    Generalized anxiety disorder    Major depressive disorder, recurrent episode, moderate (CMS/HCC)    Marital/partner relational problem    Work-related stress               Mental Status Exam  Hygiene:  good  Dress:  casual  Attitude:  Cooperative  Motor Activity:  Appropriate  Speech:  Normal  Mood:  anxious   Affect:  Calm and pleasant  Thought Processes:  Linear  Thought Content:  normal  Suicidal Thoughts:  denies  Homicidal Thoughts:  denies  Crisis Safety Plan: yes, to come to the emergency room.  Hallucinations:  denies    Patient's Support Network Includes:  children and extended family    Progress toward goal: Not at goal    Functional Status: Moderate impairment     Prognosis: Fair with Ongoing Treatment     Plan         Patient will continue in individual outpatient therapy session at the [facility] in 2 weeks..  Patient will continue in pharmacotherapy as scheduled with Dr. Villareal.  Patient will adhere to medication regimen as prescribed and report any side effects. Patient will contact this office, call 911 or present to the nearest emergency room should suicidal or homicidal ideations occur. Provide Cognitive Behavioral Therapy and Integrative Therapy to improve functioning, maintain stability, and avoid decompensation and the need for higher level of care.          Return in about 2 weeks (around 3/4/2020) for Next scheduled follow up.      This document signed by Izaiah Tinoco LCSW, JOAQUIN February 19, 2020 3:10 PM

## 2020-03-04 ENCOUNTER — OFFICE VISIT (OUTPATIENT)
Dept: PSYCHIATRY | Facility: CLINIC | Age: 50
End: 2020-03-04

## 2020-03-04 DIAGNOSIS — F41.0 PANIC DISORDER: Primary | ICD-10-CM

## 2020-03-04 DIAGNOSIS — F41.1 GENERALIZED ANXIETY DISORDER: ICD-10-CM

## 2020-03-04 DIAGNOSIS — Z63.0 MARITAL/PARTNER RELATIONAL PROBLEM: ICD-10-CM

## 2020-03-04 DIAGNOSIS — F33.1 MAJOR DEPRESSIVE DISORDER, RECURRENT EPISODE, MODERATE (HCC): ICD-10-CM

## 2020-03-04 DIAGNOSIS — Z56.6 WORK-RELATED STRESS: ICD-10-CM

## 2020-03-04 PROCEDURE — 90832 PSYTX W PT 30 MINUTES: CPT | Performed by: SOCIAL WORKER

## 2020-03-04 SDOH — HEALTH STABILITY - MENTAL HEALTH: OTHER PHYSICAL AND MENTAL STRAIN RELATED TO WORK: Z56.6

## 2020-03-04 SDOH — SOCIAL STABILITY - SOCIAL INSECURITY: PROBLEMS IN RELATIONSHIP WITH SPOUSE OR PARTNER: Z63.0

## 2020-03-04 NOTE — PROGRESS NOTES
"Date of Service: March 4, 2020  Time In: 8:10 AM  Time Out: 8:45 AM      NV 9:40 AMOGRESS NOTE  Data:  Amish Win is a 49 y.o. male who met 1: 1 with the undersigned for a regularly scheduled individual outpatient therapy session at the [facility] for follow-up of depression, anxiety, work-related stress and ongoing marital strain.    HPI: Patient reports last couple of weeks have been \"rough\" and states he has simply felt overwhelmed.  He reports his wife continues to struggle with severe depression and states she has an assessment on this date in [place] for potential ECT treatments.  Patient reports he is planning to wait until she completes these treatments and to see how much improvement she is able to sustain and then he plans to make a decision as to whether or not he will remain in the marriage.  Patient reports work continues to be stressful and states they continue to struggle with a bug infestation.  Patient reports ongoing symptoms of anxiety including feeling on edge, feeling overwhelmed, trembling, and a sense of impending doom.  Patient rates current symptoms of anxiety at a 6 on a scale of 1-10 with 10 being most severe.  Patient reports symptoms of depression continue to be improved but states he continues to struggle with periods of sad mood, anhedonia, decreased energy, and a distinct sense of uncertainty.  Patient rates current symptoms of depression at a 5 on a scale of 1-10 with 10 being most severe.     Patient reports  he continues to adhere to medication regimen as prescribed.  Patient adamantly and convincingly denies suicidal ideation vehemently denies any substance use.      Clinical Maneuvering/Intervention:  Assisted patient in processing above session content; acknowledged and normalized patient’s thoughts, feelings, and concerns.  Allow the patient to discuss/vent his feelings and concerns concerning his wife's ongoing depression and the significant stress he feels he is " struggling with at work and validated his feelings.  Also utilized lower extremity techniques including complex reflections to discussed concept of things we can control things he cannot control and challenged him to remind himself he cannot control the decisions or behaviors of others.  Also utilized cognitive behavioral therapy to assist the patient in developing appropriate coping mechanisms to decrease the severity frequency of symptoms including relaxation techniques, positive self talk, and challenging faulty, irrational thoughts and fears with factual counter arguments.  Continue to focus on healthy skills of daily living and behavioral activation.  Provided unconditional positive regard and a safe, supportive environment.    Allowed patient to freely discuss issues without interruption or judgment. Provided safe, confidential environment to facilitate the development of positive therapeutic relationship and encourage open, honest communication. Assisted patient in identifying risk factors which would indicate the need for higher level of care including thoughts to harm self or others and/or self-harming behavior and encouraged patient to contact this office, call 911, or present to the nearest emergency room should any of these events occur. Discussed crisis intervention services and means to access.  Patient adamantly and convincingly denies current suicidal or homicidal ideation or perceptual disturbance.    Assessment    Patient appears to be maintaining relative stability as compared to his baseline.  However, he continues to struggle with depression and anxiety which continues to be exacerbated by various psychosocial stressors.  The patient's symptomology continues to cause impairment in important areas of functioning.  As a result, he can be reasonably expected to continue to benefit from treatment likely be at increased risk for decompensation otherwise.    Diagnoses and all orders for this  visit:    Panic disorder    Generalized anxiety disorder    Major depressive disorder, recurrent episode, moderate (CMS/HCC)    Marital/partner relational problem    Work-related stress               Mental Status Exam  Hygiene:  good  Dress:  casual  Attitude:  Cooperative  Motor Activity:  Appropriate  Speech:  Normal  Mood:  anxious   Affect:  Calm and pleasant  Thought Processes:  Linear  Thought Content:  normal  Suicidal Thoughts:  denies  Homicidal Thoughts:  denies  Crisis Safety Plan: yes, to come to the emergency room.  Hallucinations:  denies    Patient's Support Network Includes:  children and extended family    Progress toward goal: Not at goal    Functional Status: Moderate impairment     Prognosis: Fair with Ongoing Treatment     Plan         Patient will continue in individual outpatient therapy session at the [facility] in 2 weeks..  Patient will continue in pharmacotherapy as scheduled with Dr. Villareal.  Patient will adhere to medication regimen as prescribed and report any side effects. Patient will contact this office, call 911 or present to the nearest emergency room should suicidal or homicidal ideations occur. Provide Cognitive Behavioral Therapy and Integrative Therapy to improve functioning, maintain stability, and avoid decompensation and the need for higher level of care.          Return in about 2 weeks (around 3/18/2020) for Next scheduled follow up.      This document signed by Izaiah Tinoco LCSW, [facility] March 4, 2020 2:21 PM

## 2020-03-30 ENCOUNTER — OFFICE VISIT (OUTPATIENT)
Dept: PSYCHIATRY | Facility: CLINIC | Age: 50
End: 2020-03-30

## 2020-03-30 DIAGNOSIS — F41.1 GENERALIZED ANXIETY DISORDER: ICD-10-CM

## 2020-03-30 DIAGNOSIS — F41.0 PANIC DISORDER: ICD-10-CM

## 2020-03-30 DIAGNOSIS — F33.41 RECURRENT MAJOR DEPRESSIVE DISORDER, IN PARTIAL REMISSION (HCC): Primary | ICD-10-CM

## 2020-03-30 PROCEDURE — 99214 OFFICE O/P EST MOD 30 MIN: CPT | Performed by: PSYCHIATRY & NEUROLOGY

## 2020-03-30 RX ORDER — MIRTAZAPINE 15 MG/1
15 TABLET, FILM COATED ORAL NIGHTLY
Qty: 30 TABLET | Refills: 2 | Status: SHIPPED | OUTPATIENT
Start: 2020-03-30 | End: 2020-06-18 | Stop reason: SDUPTHER

## 2020-03-30 RX ORDER — ALPRAZOLAM 2 MG/1
2 TABLET, EXTENDED RELEASE ORAL 3 TIMES DAILY
Qty: 90 TABLET | Refills: 1 | Status: SHIPPED | OUTPATIENT
Start: 2020-03-30 | End: 2020-06-18 | Stop reason: SDUPTHER

## 2020-03-30 RX ORDER — LISINOPRIL AND HYDROCHLOROTHIAZIDE 12.5; 1 MG/1; MG/1
1 TABLET ORAL DAILY
Qty: 30 TABLET | Refills: 0 | Status: SHIPPED | OUTPATIENT
Start: 2020-03-30 | End: 2022-11-10

## 2020-03-30 RX ORDER — METOPROLOL TARTRATE 50 MG/1
50 TABLET, FILM COATED ORAL 2 TIMES DAILY WITH MEALS
Qty: 30 TABLET | Refills: 0 | Status: SHIPPED | OUTPATIENT
Start: 2020-03-30

## 2020-03-30 RX ORDER — IMIPRAMINE HCL 50 MG
TABLET ORAL
Qty: 120 TABLET | Refills: 2 | Status: SHIPPED | OUTPATIENT
Start: 2020-03-30 | End: 2020-06-18 | Stop reason: SDUPTHER

## 2020-03-30 RX ORDER — SIMVASTATIN 20 MG
20 TABLET ORAL NIGHTLY
Qty: 30 TABLET | Refills: 0 | Status: SHIPPED | OUTPATIENT
Start: 2020-03-30 | End: 2021-03-15

## 2020-03-30 RX ORDER — PANTOPRAZOLE SODIUM 40 MG/1
40 TABLET, DELAYED RELEASE ORAL DAILY
Qty: 30 TABLET | Refills: 0 | Status: SHIPPED | OUTPATIENT
Start: 2020-03-30

## 2020-03-30 NOTE — PROGRESS NOTES
"Patient ID: Amish Win is a 50 y.o. male    SERVICE TYPE: EVALUATION AND MANAGEMENT (greater than 50% of the time spent for supportive psychotherapy).  Telephone session due to the coronavirus precautions.    There were no vitals taken for this visit.    ALLERGIES:  Penicillins    CC/ Focus of the visit: depression     HPI: \"Rough times past couple months\" On vacation right now but worried about returning to his job at [employer]. Attempting to follow all the precautions.      Patient anxiety perhaps worse but his depression parameters no worse.  Patient was relaxed at home with his time off work, actually somewhat positive aside from worries about the lack of precautions at his place of employment.  Patient's obsessive compulsive tendencies pronounced with the coronavirus scare.  Patient plans to keep his appointment with his therapist.    Patient asked that I supply a month supply of his medications including those prescribed by his PCP that he is having difficulties with contacting.  Agreed to do so again relating to the coronavirus issues.      PFSH: Home life better, wife working from home and out of the classroom.    Review of Systems   Respiratory: Negative.    Cardiovascular: Negative.        SUPPORTIVE PSYCHOTHERAPY: continuing efforts to promote the therapeutic alliance, address the patient’s issues, and strengthen self awareness, insights, and coping skills.       Mental Status Exam  Appearance:  clean and casually dressed, appropriate  Attitude toward clinician:  cooperative and agreeable   Speech:    Rate:  regular rate and rhythm   Volume: normal  Motor:  no abnormal movements present  Mood:  Good  Affect:  euthymic  Thought Processes:  linear, logical, and goal directed  Thought Content:  Normal   Feeling Hopeless: absent  Suicidal Thoughts:  absent  Homicidal Thoughts:  absent  Perceptual Disturbance: no perceptual disturbance  Attention and Concentration:  good  Insight and Judgement:  " good  Memory:  memory appears to be intact    LABS: No results found for this or any previous visit (from the past 168 hour(s)).    MEDICATION ISSUES: Have discussed with the patient the medications Risks, Benefits, and Side effects including potential falls, possible impaired driving and  metabolic adversities among others. No medication side effects or related complaints today.     TREATMENT PLAN/GOALS: Continue supportive psychotherapy efforts and medications as indicated.     Current Outpatient Medications   Medication Sig Dispense Refill   • ALPRAZolam XR (XANAX XR) 2 MG 24 hr tablet Take 1 tablet by mouth 3 (Three) Times a Day. Fill after January 30, refill in one month. 90 tablet 1   • aspirin 81 MG EC tablet Take 81 mg by mouth 2 (Two) Times a Day.     • docusate sodium (COLACE) 100 MG capsule Take 1 capsule by mouth 2 (Two) Times a Day. 60 capsule 1   • imipramine (Tofranil) 50 MG tablet Take two bid. 120 tablet 2   • latanoprost (XALATAN) 0.005 % ophthalmic solution      • lisinopril-hydrochlorothiazide (PRINZIDE,ZESTORETIC) 10-12.5 MG per tablet Take 1 tablet by mouth Daily. 30 tablet 0   • metoprolol tartrate (LOPRESSOR) 50 MG tablet Take 1 tablet by mouth 2 (Two) Times a Day With Meals. 30 tablet 0   • mirtazapine (REMERON) 15 MG tablet Take 1 tablet by mouth Every Night. 30 tablet 2   • pantoprazole (PROTONIX) 40 MG EC tablet Take 1 tablet by mouth Daily. 30 tablet 0   • simvastatin (ZOCOR) 20 MG tablet Take 1 tablet by mouth Every Night. 30 tablet 0   • vitamin D (ERGOCALCIFEROL) 55066 units capsule capsule        No current facility-administered medications for this visit.        COLLATERAL PSYCHOTHERAPEUTIC INTERVENTION:  patient not interested in additional psychotherapy.    RISK:  moderate    Assessment/Plan     Diagnoses and all orders for this visit:    Recurrent major depressive disorder, in partial remission (CMS/HCC)  -     mirtazapine (REMERON) 15 MG tablet; Take 1 tablet by mouth Every  Night.  -     imipramine (Tofranil) 50 MG tablet; Take two bid.    Panic disorder  -     mirtazapine (REMERON) 15 MG tablet; Take 1 tablet by mouth Every Night.  -     imipramine (Tofranil) 50 MG tablet; Take two bid.  -     ALPRAZolam XR (XANAX XR) 2 MG 24 hr tablet; Take 1 tablet by mouth 3 (Three) Times a Day. Fill after January 30, refill in one month.    Generalized anxiety disorder  -     mirtazapine (REMERON) 15 MG tablet; Take 1 tablet by mouth Every Night.  -     imipramine (Tofranil) 50 MG tablet; Take two bid.  -     ALPRAZolam XR (XANAX XR) 2 MG 24 hr tablet; Take 1 tablet by mouth 3 (Three) Times a Day. Fill after January 30, refill in one month.    Other orders These prescribed per patient request with difficulty contacting his PCP due to the coronavirus issue.    -     simvastatin (ZOCOR) 20 MG tablet; Take 1 tablet by mouth Every Night.  -     lisinopril-hydrochlorothiazide (PRINZIDE,ZESTORETIC) 10-12.5 MG per tablet; Take 1 tablet by mouth Daily.  -     metoprolol tartrate (LOPRESSOR) 50 MG tablet; Take 1 tablet by mouth 2 (Two) Times a Day With Meals.  -     pantoprazole (PROTONIX) 40 MG EC tablet; Take 1 tablet by mouth Daily.        Return in about 3 months (around 6/30/2020).           Patient knows to call if symptoms worsen or fail to improve between appointments.     I spent a total of 25 minutes in direct patient care via phone, greater than 20 minutes (greater than 50%) were spent face-to-face with assessment, coordination care, and counseling the patient regarding anxiety and depression and answering any questions the patient had about the medication and plan..      Dictated utilizing [employer] dictation    LAUREN Villareal MD

## 2020-04-01 ENCOUNTER — OFFICE VISIT (OUTPATIENT)
Dept: PSYCHIATRY | Facility: CLINIC | Age: 50
End: 2020-04-01

## 2020-04-01 DIAGNOSIS — Z56.6 WORK-RELATED STRESS: ICD-10-CM

## 2020-04-01 DIAGNOSIS — F33.1 MAJOR DEPRESSIVE DISORDER, RECURRENT EPISODE, MODERATE (HCC): ICD-10-CM

## 2020-04-01 DIAGNOSIS — F41.0 PANIC DISORDER: Primary | ICD-10-CM

## 2020-04-01 DIAGNOSIS — F41.1 GENERALIZED ANXIETY DISORDER: ICD-10-CM

## 2020-04-01 PROCEDURE — 90832 PSYTX W PT 30 MINUTES: CPT | Performed by: SOCIAL WORKER

## 2020-04-01 SDOH — HEALTH STABILITY - MENTAL HEALTH: OTHER PHYSICAL AND MENTAL STRAIN RELATED TO WORK: Z56.6

## 2020-04-01 NOTE — PROGRESS NOTES
Date of Service: April 1, 2020  Time In: 8:00 AM  Time Out: 8:25 AM      PA 9:40 AMOGRESS NOTE  Data:  Amish Win is a 50 y.o. male who met 1: 1 with the undersigned for a regularly scheduled individual outpatient therapy session .  You have chosen to receive care through a telephone visit today. Do you consent to use a telephone visit for your medical care today? Yes  This visit has been rescheduled as a phone visit to comply with patient safety concerns in accordance with CDC recommendations. Total time of discussion was 25 minutes.  [place] [address]. The provider identified himself as well as his credentials.   The Patient is at his home using his phone because problems with video connection. The patient's condition being diagnosed/treated is appropriate for telemedicine. The patient gave consent to be seen remotely, and when consent is given they understand that the consent allows for patient identifiable information to be sent to a third party as needed.   They may refuse to be seen remotely at any time. 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  He states he has a distinct fear whether or not they would be able to remain open as they are only getting a very small amount of business daily.  Patient also reports he has a significant fear of kari a virus and states this is a constant worry.  Patient reports ongoing symptoms of anxiety including feeling on edge, feeling overwhelmed, trembling, and a sense of impending doom.  Patient rates current symptoms of anxiety at an 8 on a scale of 1-10 with 10 being most severe.  Patient reports symptoms of  depression continue to be improved but states he continues to struggle with periods of sad mood, anhedonia, decreased energy, and a distinct sense of uncertainty.  Patient rates current symptoms of depression at a 7 on a scale of 1-10 with 10 being most severe.     Patient reports  he continues to adhere to medication regimen as prescribed.  Patient adamantly and convincingly denies suicidal ideation vehemently denies any substance use.      Clinical Maneuvering/Intervention:  Assisted patient in processing above session content; acknowledged and normalized patient’s thoughts, feelings, and concerns.  Allow the patient to discuss/vent his feelings and fears concerning the current COVID-19 pandemic and the resulting difficulty in his daily life and validated his feelings.  Also utilized lower extremity techniques including complex reflections to discussed concept of things we can control things he cannot control and encouraged patient to consciously ask himself the question whether or not he is worrying about things he has no ability to affect.  Also discussed the concept of anticipatory grief and validated the patient's feelings and concerns.  Continue to utilize cognitive behavioral therapy to assist the patient in developing appropriate coping mechanisms to decrease the severity frequency of symptoms.  Also encouraged the patient to focus on healthy skills of daily living and behavioral activation and to consciously seek activities on a daily basis to maintain a sense of normalcy.  Provided unconditional positive regard and a safe, supportive environment.    Allowed patient to freely discuss issues without interruption or judgment. Provided safe, confidential environment to facilitate the development of positive therapeutic relationship and encourage open, honest communication. Assisted patient in identifying risk factors which would indicate the need for higher level of care including thoughts to harm self or  others and/or self-harming behavior and encouraged patient to contact this office, call 911, or present to the nearest emergency room should any of these events occur. Discussed crisis intervention services and means to access.  Patient adamantly and convincingly denies current suicidal or homicidal ideation or perceptual disturbance.    Assessment    Patient appears to be struggling with increased symptoms at this time which appear to be directly exacerbated by the difficulty surrounding the current COVID-19 pandemic.  The patient's symptomology continues to cause impairment in important areas of functioning.  As a result, he can be reasonably expected to continue to benefit from treatment likely be at increased risk for decompensation otherwise.    Diagnoses and all orders for this visit:    Panic disorder    Generalized anxiety disorder    Major depressive disorder, recurrent episode, moderate (CMS/HCC)    Work-related stress               Mental Status Exam  Hygiene:  good  Dress:  casual  Attitude:  Cooperative  Motor Activity:  Appropriate  Speech:  Normal  Mood:  anxious   Affect:  Calm and pleasant  Thought Processes:  Linear  Thought Content:  normal  Suicidal Thoughts:  denies  Homicidal Thoughts:  denies  Crisis Safety Plan: yes, to come to the emergency room.  Hallucinations:  denies    Patient's Support Network Includes:  children and extended family    Progress toward goal: Not at goal    Functional Status: Moderate impairment     Prognosis: Fair with Ongoing Treatment     Plan         Patient will continue in individual outpatient therapy session at the [facility] in 2 weeks..  Patient will continue in pharmacotherapy as scheduled with Dr. Villareal.  Patient will adhere to medication regimen as prescribed and report any side effects. Patient will contact this office, call 911 or present to the nearest emergency room should suicidal or homicidal ideations occur. Provide Cognitive Behavioral Therapy  and Integrative Therapy to improve functioning, maintain stability, and avoid decompensation and the need for higher level of care.          Return in about 2 weeks (around 4/15/2020) for Next scheduled follow up.      This document signed by Izaiah Tinoco LCSW, [facility] April 1, 2020 14:45

## 2020-04-15 ENCOUNTER — TELEMEDICINE (OUTPATIENT)
Dept: PSYCHIATRY | Facility: CLINIC | Age: 50
End: 2020-04-15

## 2020-04-15 DIAGNOSIS — Z63.0 MARITAL/PARTNER RELATIONAL PROBLEM: ICD-10-CM

## 2020-04-15 DIAGNOSIS — F41.0 PANIC DISORDER: Primary | ICD-10-CM

## 2020-04-15 DIAGNOSIS — F41.1 GENERALIZED ANXIETY DISORDER: ICD-10-CM

## 2020-04-15 DIAGNOSIS — F33.1 MAJOR DEPRESSIVE DISORDER, RECURRENT EPISODE, MODERATE (HCC): ICD-10-CM

## 2020-04-15 PROCEDURE — 90832 PSYTX W PT 30 MINUTES: CPT | Performed by: SOCIAL WORKER

## 2020-04-15 SDOH — SOCIAL STABILITY - SOCIAL INSECURITY: PROBLEMS IN RELATIONSHIP WITH SPOUSE OR PARTNER: Z63.0

## 2020-04-15 NOTE — PROGRESS NOTES
Date of Service: April 15, 2020  Time In: 8:05 AM  Time Out: 8:25 AM      [place] 9:40 AMOGRESS NOTE  Data:  Amish Win is a 50 y.o. male who met 1: 1 with the undersigned for a regularly scheduled individual outpatient therapy session . [place] [address]. The provider identified himself as well as his credentials.   The Patient is at his home using his phone because problems with video connection. The patient's condition being diagnosed/treated is appropriate for telemedicine. The patient gave consent to be seen remotely, and when consent is given they understand that the consent allows for patient identifiable information to be sent to a third party as needed.   They may refuse to be seen remotely at any time. The electronic data is encrypted and password protected, and the patient has been advised of the potential risks to privacy not withstanding such measures.    You have chosen to receive care through a telephone visit today. Do you consent to use a telephone visit for your medical care today? Yes  This visit has been rescheduled as a phone visit to comply with patient safety concerns in accordance with CDC recommendations. Total time of discussion was 20 minutes.        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  He reports he is receiving unemployment benefits and states he feels as though this was a better option for him.  Patient reports he is attempting to do things around the home including cleaning.  He reports his wife continues to struggle with teaching and states they continue to have limited interactions with each other.  Patient reports ongoing symptoms of anxiety including feeling on edge, feeling overwhelmed, trembling, and a sense of impending doom.  Patient rates current symptoms of anxiety at a 6 on a scale of 1-10 with 10 being most severe.  Patient reports  symptoms of depression continue to be improved but states he continues to struggle with periods of sad mood, anhedonia, decreased energy, and a sense of uncertainty.  Patient rates current symptoms of depression at a 5 on a scale of 1-10 with 10 being most severe.     Patient reports  he continues to adhere to medication regimen as prescribed.  Patient adamantly and convincingly denies suicidal ideation vehemently denies any substance use.      Clinical Maneuvering/Intervention:  Assisted patient in processing above session content; acknowledged and normalized patient’s thoughts, feelings, and concerns.  This session was primarily utilized to assist the patient with developing a strategy to develop and utilize appropriate coping mechanisms to decrease the severity frequency of symptoms.  Utilize cognitive behavioral therapy to assist the patient in recognizing his tendency toward obsessive worry and faulty, irrational thoughts and discussed and demonstrated challenging with factual counter arguments.  Also strongly urged the patient to actively seek activities he can engage in on a regular basis to reduce idle time and social isolation.  Continue to focus on healthy skills of daily living and behavioral activation.  Provided unconditional positive regard and a safe, supportive environment.    Allowed patient to freely discuss issues without interruption or judgment. Provided safe, confidential environment to facilitate the development of positive therapeutic relationship and encourage open, honest communication. Assisted patient in identifying risk factors which would indicate the need for higher level of care including thoughts to harm self or others and/or self-harming behavior and encouraged patient to contact this office, call 911, or present to the nearest emergency room should any of these events occur. Discussed crisis intervention services and means to access.  Patient adamantly and convincingly denies  current suicidal or homicidal ideation or perceptual disturbance.    Assessment    Patient symptoms appear to have abated slightly as he is not working at this time which is 1 of his primary stressors.  However, he continues to struggle with depression and anxiety and ongoing strain at home.  The patient's symptomology continues to cause impairment in important areas of functioning.  As a result, he can be reasonably expected to continue to benefit from treatment likely be at increased risk for decompensation otherwise.    Diagnoses and all orders for this visit:    Panic disorder    Generalized anxiety disorder    Major depressive disorder, recurrent episode, moderate (CMS/[facility])    Marital/partner relational problem               Mental Status Exam  Hygiene: Not applicable due to telephone session  Dress: Not applicable due to telephone session  Attitude:  Cooperative  Motor Activity: Not applicable due to telephone session  Speech:  Normal  Mood:  anxious   Affect: Not applicable due to telephone session  Thought Processes:  Linear  Thought Content:  normal  Suicidal Thoughts:  denies  Homicidal Thoughts:  denies  Crisis Safety Plan: yes, to come to the emergency room.  Hallucinations:  denies    Patient's Support Network Includes:  children and extended family    Progress toward goal: Not at goal    Functional Status: Moderate impairment     Prognosis: Fair with Ongoing Treatment     Plan         Patient will continue in individual outpatient therapy session at the [facility] in 2 weeks..  Patient will continue in pharmacotherapy as scheduled with Dr. Villareal.  Patient will adhere to medication regimen as prescribed and report any side effects. Patient will contact this office, call 911 or present to the nearest emergency room should suicidal or homicidal ideations occur. Provide Cognitive Behavioral Therapy and Integrative Therapy to improve functioning, maintain stability, and avoid decompensation and the  need for higher level of care.          No follow-ups on file.      This document signed by Izaiah Tinoco LCSW, [facility] April 15, 2020 08:18

## 2020-04-29 ENCOUNTER — TELEMEDICINE (OUTPATIENT)
Dept: PSYCHIATRY | Facility: CLINIC | Age: 50
End: 2020-04-29

## 2020-04-29 DIAGNOSIS — F41.1 GENERALIZED ANXIETY DISORDER: ICD-10-CM

## 2020-04-29 DIAGNOSIS — Z56.6 WORK-RELATED STRESS: ICD-10-CM

## 2020-04-29 DIAGNOSIS — Z63.0 MARITAL/PARTNER RELATIONAL PROBLEM: ICD-10-CM

## 2020-04-29 DIAGNOSIS — F41.0 PANIC DISORDER: Primary | ICD-10-CM

## 2020-04-29 DIAGNOSIS — F33.1 MAJOR DEPRESSIVE DISORDER, RECURRENT EPISODE, MODERATE (HCC): ICD-10-CM

## 2020-04-29 PROCEDURE — 90832 PSYTX W PT 30 MINUTES: CPT | Performed by: SOCIAL WORKER

## 2020-04-29 SDOH — HEALTH STABILITY - MENTAL HEALTH: OTHER PHYSICAL AND MENTAL STRAIN RELATED TO WORK: Z56.6

## 2020-04-29 SDOH — SOCIAL STABILITY - SOCIAL INSECURITY: PROBLEMS IN RELATIONSHIP WITH SPOUSE OR PARTNER: Z63.0

## 2020-04-29 NOTE — PROGRESS NOTES
Date of Service: April 29, 2020  Time In: 8:15 AM  Time Out: 8:45 AM      MD 9:40 AMOGRESS NOTE  Data:  mAish Win is a 50 y.o. male who met 1: 1 with the undersigned for a regularly scheduled individual outpatient therapy session . [place] [address]. The provider identified himself as well as his credentials.   The Patient is at his home using his phone because problems with video connection. The patient's condition being diagnosed/treated is appropriate for telemedicine. The patient gave consent to be seen remotely, and when consent is given they understand that the consent allows for patient identifiable information to be sent to a third party as needed.   They may refuse to be seen remotely at any time. The electronic data is encrypted and password protected, and the patient has been advised of the potential risks to privacy not withstanding such measures.    You have chosen to receive care through a telephone visit today. Do you consent to use a telephone visit for your medical care today? Yes  This visit has been rescheduled as a phone visit to comply with patient safety concerns in accordance with CDC recommendations. Total time of discussion was 30 minutes.        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  He reports financial strain is minimal as he is beginning unemployment benefits.  Patient does report he has been struggling with some increased symptoms of anxiety as of late and states he realizes he is not doing as well at staying busy and states he has been spending a great deal of time simply sitting in the program.  Patient also reports he continues to worry about his job and states he worries that they simply will not open back.  Patient reports ongoing symptoms of anxiety including feeling on edge, feeling overwhelmed, trembling, and a sense of  impending doom.  Patient rates current symptoms of anxiety at a 6 on a scale of 1-10 with 10 being most severe.  Patient reports symptoms of depression continue to be improved but states he continues to struggle with periods of sad mood, anhedonia, decreased energy, and a sense of uncertainty.  Patient rates current symptoms of depression at a 5 on a scale of 1-10 with 10 being most severe.     Patient reports  he continues to adhere to medication regimen as prescribed.  Patient adamantly and convincingly denies suicidal ideation vehemently denies any substance use.      Clinical Maneuvering/Intervention:  Assisted patient in processing above session content; acknowledged and normalized patient’s thoughts, feelings, and concerns.  Allow the patient to discuss/when his feelings.  Concerns.  Currently 19 pandemic and possible negative impact on his job.  Also utilized knowledge.  Techniques including complex reflections to assist the patient in identifying coping mechanisms which she is found to be helpful in reducing the severity and frequency of symptoms including finding ways to keep himself busy and avoiding excessive idle time.  Continue to utilize cognitive behavioral therapy to assist patient in developing appropriate coping mechanisms to decrease the severity and frequency of symptoms.  Provided unconditional positive regard and a safe, supportive environment.    Allowed patient to freely discuss issues without interruption or judgment. Provided safe, confidential environment to facilitate the development of positive therapeutic relationship and encourage open, honest communication. Assisted patient in identifying risk factors which would indicate the need for higher level of care including thoughts to harm self or others and/or self-harming behavior and encouraged patient to contact this office, call 911, or present to the nearest emergency room should any of these events occur. Discussed crisis intervention  services and means to access.  Patient adamantly and convincingly denies current suicidal or homicidal ideation or perceptual disturbance.    Assessment    Patient symptoms appear to have abated slightly as he is not working at this time which is 1 of his primary stressors.  However, he continues to struggle with depression and anxiety and ongoing strain at home.  The patient's symptomology continues to cause impairment in important areas of functioning.  As a result, he can be reasonably expected to continue to benefit from treatment likely be at increased risk for decompensation otherwise.    Diagnoses and all orders for this visit:    Panic disorder    Generalized anxiety disorder    Major depressive disorder, recurrent episode, moderate (CMS/[facility])    Marital/partner relational problem    Work-related stress               Mental Status Exam  Hygiene: Not applicable due to telephone session  Dress: Not applicable due to telephone session  Attitude:  Cooperative  Motor Activity: Not applicable due to telephone session  Speech:  Normal  Mood:  anxious   Affect: Not applicable due to telephone session  Thought Processes:  Linear  Thought Content:  normal  Suicidal Thoughts:  denies  Homicidal Thoughts:  denies  Crisis Safety Plan: yes, to come to the emergency room.  Hallucinations:  denies    Patient's Support Network Includes:  children and extended family    Progress toward goal: Not at goal    Functional Status: Moderate impairment     Prognosis: Fair with Ongoing Treatment     Plan         Patient will continue in individual outpatient therapy session at the [facility] in 2 weeks..  Patient will continue in pharmacotherapy as scheduled with Dr. Villareal.  Patient will adhere to medication regimen as prescribed and report any side effects. Patient will contact this office, call 911 or present to the nearest emergency room should suicidal or homicidal ideations occur. Provide Cognitive Behavioral Therapy and  Integrative Therapy to improve functioning, maintain stability, and avoid decompensation and the need for higher level of care.          Return in about 2 weeks (around 5/13/2020) for Next scheduled follow up.      This document signed by Izaiah Tinoco LCSW, [facility] April 29, 2020 13:09

## 2020-05-13 ENCOUNTER — TELEMEDICINE (OUTPATIENT)
Dept: PSYCHIATRY | Facility: CLINIC | Age: 50
End: 2020-05-13

## 2020-05-13 DIAGNOSIS — F33.1 MAJOR DEPRESSIVE DISORDER, RECURRENT EPISODE, MODERATE (HCC): ICD-10-CM

## 2020-05-13 DIAGNOSIS — F41.1 GENERALIZED ANXIETY DISORDER: ICD-10-CM

## 2020-05-13 DIAGNOSIS — Z56.6 WORK-RELATED STRESS: ICD-10-CM

## 2020-05-13 DIAGNOSIS — F41.0 PANIC DISORDER: Primary | ICD-10-CM

## 2020-05-13 PROCEDURE — 90832 PSYTX W PT 30 MINUTES: CPT | Performed by: SOCIAL WORKER

## 2020-05-13 SDOH — HEALTH STABILITY - MENTAL HEALTH: OTHER PHYSICAL AND MENTAL STRAIN RELATED TO WORK: Z56.6

## 2020-05-13 NOTE — PROGRESS NOTES
Date of Service: May 13, 2020  Time In: 8:10 AM  Time Out: 8:40 AM      [place] 9:40 AMOGRESS NOTE  Data:  Amish Win is a 50 y.o. male who met 1: 1 with the undersigned for a regularly scheduled individual outpatient therapy session . [place] [address]. The provider identified himself as well as his credentials.   The Patient is at his home using his phone because problems with video connection. The patient's condition being diagnosed/treated is appropriate for telemedicine. The patient gave consent to be seen remotely, and when consent is given they understand that the consent allows for patient identifiable information to be sent to a third party as needed.   They may refuse to be seen remotely at any time. The electronic data is encrypted and password protected, and the patient has been advised of the potential risks to privacy not withstanding such measures.    You have chosen to receive care through a telephone visit today. Do you consent to use a telephone visit for your medical care today? Yes  This visit has been rescheduled as a phone visit to comply with patient safety concerns in accordance with CDC recommendations. Total time of discussion was 30 minutes.        HPI: Patient reports he continues to spend the vast majority of his time at home and states he is worried that he has not heard anything from his job.  He reports he is fearful they will not open up as they cannot survive at 33% capacity.  Patient reports he realizes he is being much less active and states he is spending much more time simply laying around the house.  Patient reports ongoing symptoms of anxiety including feeling on edge, feeling overwhelmed, trembling, and a sense of impending doom.  Patient rates current symptoms of anxiety at a 7 on a scale of 1-10 with 10 being most severe.  Patient reports symptoms of depression continue to be improved but states he continues to  struggle with periods of sad mood, anhedonia, decreased energy, and a sense of uncertainty.  Patient rates current symptoms of depression at a 5 on a scale of 1-10 with 10 being most severe.     Patient reports  he continues to adhere to medication regimen as prescribed.  Patient adamantly and convincingly denies suicidal ideation vehemently denies any substance use.      Clinical Maneuvering/Intervention:  Assisted patient in processing above session content; acknowledged and normalized patient’s thoughts, feelings, and concerns.  Allow the patient to discuss/process his feelings and fears concerning his job and the potential negative impact of the COVID-19 pandemic.  Also utilized motivational reviewing techniques including complex reflections to discussed concept of things we can change things we cannot change and strongly urged patient to remind himself there are multiple aspects of the current situation he simply does not have the power to control.  Also strongly urged him to remind himself to focus on things he does have ability to affect and encouraged him to focus on increasing his daily activity and keeping himself busy.  Also utilize cognitive behavioral therapy to assist patient in developing appropriate coping mechanisms to decrease the severity frequency of symptoms and specifically discussed and demonstrated the use of thought blocking techniques.  Provided unconditional positive regard and a safe, supportive environment.    Allowed patient to freely discuss issues without interruption or judgment. Provided safe, confidential environment to facilitate the development of positive therapeutic relationship and encourage open, honest communication. Assisted patient in identifying risk factors which would indicate the need for higher level of care including thoughts to harm self or others and/or self-harming behavior and encouraged patient to contact this office, call 911, or present to the nearest  emergency room should any of these events occur. Discussed crisis intervention services and means to access.  Patient adamantly and convincingly denies current suicidal or homicidal ideation or perceptual disturbance.    Assessment    Patient symptoms appear to have abated slightly as he is not working at this time which is 1 of his primary stressors.  However, he continues to struggle with depression and anxiety and ongoing strain at home.  The patient's symptomology continues to cause impairment in important areas of functioning.  As a result, he can be reasonably expected to continue to benefit from treatment likely be at increased risk for decompensation otherwise.    Diagnoses and all orders for this visit:    Panic disorder    Generalized anxiety disorder    Major depressive disorder, recurrent episode, moderate (CMS/HCC)    Work-related stress               Mental Status Exam  Hygiene: Not applicable due to telephone session  Dress: Not applicable due to telephone session  Attitude:  Cooperative  Motor Activity: Not applicable due to telephone session  Speech:  Normal  Mood:  anxious   Affect: Not applicable due to telephone session  Thought Processes:  Linear  Thought Content:  normal  Suicidal Thoughts:  denies  Homicidal Thoughts:  denies  Crisis Safety Plan: yes, to come to the emergency room.  Hallucinations:  denies    Patient's Support Network Includes:  children and extended family    Progress toward goal: Not at goal    Functional Status: Moderate impairment     Prognosis: Fair with Ongoing Treatment     Plan         Patient will continue in individual outpatient therapy session at the [facility] in 2 weeks..  Patient will continue in pharmacotherapy as scheduled with Dr. Villareal.  Patient will adhere to medication regimen as prescribed and report any side effects. Patient will contact this office, call 911 or present to the nearest emergency room should suicidal or homicidal ideations occur.  Provide Cognitive Behavioral Therapy and Integrative Therapy to improve functioning, maintain stability, and avoid decompensation and the need for higher level of care.          Return in about 2 weeks (around 5/27/2020) for Next scheduled follow up.      This document signed by Izaiah Tinoco LCSW, [facility] May 13, 2020 14:33

## 2020-05-27 ENCOUNTER — TELEMEDICINE (OUTPATIENT)
Dept: PSYCHIATRY | Facility: CLINIC | Age: 50
End: 2020-05-27

## 2020-05-27 DIAGNOSIS — F41.1 GENERALIZED ANXIETY DISORDER: ICD-10-CM

## 2020-05-27 DIAGNOSIS — F41.0 PANIC DISORDER: Primary | ICD-10-CM

## 2020-05-27 DIAGNOSIS — F33.1 MAJOR DEPRESSIVE DISORDER, RECURRENT EPISODE, MODERATE (HCC): ICD-10-CM

## 2020-05-27 PROCEDURE — 90832 PSYTX W PT 30 MINUTES: CPT | Performed by: SOCIAL WORKER

## 2020-05-27 NOTE — PROGRESS NOTES
Date of Service: 8/27/2020  Time In: 8:10 AM  Time Out: 8:45 AM      [place] 9:40 AMOGRESS NOTE  Data:  Amish Win is a 50 y.o. male who met 1: 1 with the undersigned for a regularly scheduled individual outpatient therapy session .  This was an audio and video enabled telemedicine encounter.     This provider is located at [facility], [address] [place] [address]. The provider identified himself as well as his credentials.   The Patient is at his home using his phone  with video connection. The patient's condition being diagnosed/treated is appropriate for telemedicine. The patient gave consent to be seen remotely, and when consent is given they understand that the consent allows for patient identifiable information to be sent to a third party as needed.   They may refuse to be seen remotely at any time. 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  Patient states this was somewhat distressing but states he is becoming focused on looking for other employment opportunities.  He does report he feels as though he is struggling with a distinct sense of uncertainty which he states increases his symptoms of anxiety.  Patient reports ongoing symptoms of anxiety including feeling on edge, feeling overwhelmed, trembling, and a sense of impending doom.  Patient rates current symptoms of anxiety at an 8 on a scale of 1-10 with 10 being most severe.  Patient reports symptoms of depression continue to be improved but states he continues to struggle with periods of sad mood, anhedonia, decreased energy, and a distinct sense of uncertainty.  Patient rates current symptoms of depression at a 6/7 on a scale of 1-10 with 10 being most severe.     Patient reports  he continues to  adhere to medication regimen as prescribed.  Patient adamantly and convincingly denies suicidal ideation vehemently denies any substance use.      Clinical Maneuvering/Intervention:  Assisted patient in processing above session content; acknowledged and normalized patient’s thoughts, feelings, and concerns.  The patient to discuss/plan his [facility] fears with the abrupt loss of his job and validated his feelings.  Also utilized lower extremity techniques including complex reflections to assist patient in recognizing and identifying he has been offered various jobs over the past few weeks and also pointed out the fact the patient has at least 2 more months of unemployment benefits.  Also discussed the mutually self-perpetuating nature of the patient's symptoms and social isolation time and strongly urged him to actively seek activities he can engage in on a regular basis.  Strongly urged patient to utilize thought blocking techniques and encouraged him to avoid excessive idle time.  Provided unconditional positive regard and a safe, supportive environment.    Allowed patient to freely discuss issues without interruption or judgment. Provided safe, confidential environment to facilitate the development of positive therapeutic relationship and encourage open, honest communication. Assisted patient in identifying risk factors which would indicate the need for higher level of care including thoughts to harm self or others and/or self-harming behavior and encouraged patient to contact this office, call 911, or present to the nearest emergency room should any of these events occur. Discussed crisis intervention services and means to access.  Patient adamantly and convincingly denies current suicidal or homicidal ideation or perceptual disturbance.    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  The  patient's symptomology continues to cause impairment in important areas of functioning.  As a result, he can be reasonably expected to continue to benefit from treatment likely be at increased risk for decompensation otherwise.    Diagnoses and all orders for this visit:    Panic disorder    Generalized anxiety disorder    Major depressive disorder, recurrent episode, moderate (CMS/HCC)               Mental Status Exam  Hygiene:  good  Dress:  casual  Attitude:  Cooperative  Motor Activity:  Appropriate  Speech:  Normal  Mood:  anxious   Affect:  Calm and pleasant  Thought Processes:  Linear  Thought Content:  normal  Suicidal Thoughts:  denies  Homicidal Thoughts:  denies  Crisis Safety Plan: yes, to come to the emergency room.  Hallucinations:  denies    Patient's Support Network Includes:  children and extended family    Progress toward goal: Not at goal    Functional Status: Moderate impairment     Prognosis: Fair with Ongoing Treatment     Plan         Patient will continue in [facility]..  Patient will continue in pharmacotherapy as scheduled with Dr. Villareal.  Patient will adhere to medication regimen as prescribed and report any side effects. Patient will contact this office, call 911 or present to the nearest emergency room should suicidal or homicidal ideations occur. Provide Cognitive Behavioral Therapy and Integrative Therapy to improve functioning, maintain stability, and avoid decompensation and the need for higher level of care.          Return in about 2 weeks (around 6/10/2020) for Next scheduled follow up.      This document signed by Izaiah Tinoco LCSW, [facility] May 27, 2020 14:29

## 2020-06-10 ENCOUNTER — OFFICE VISIT (OUTPATIENT)
Dept: PSYCHIATRY | Facility: CLINIC | Age: 50
End: 2020-06-10

## 2020-06-10 DIAGNOSIS — F33.1 MAJOR DEPRESSIVE DISORDER, RECURRENT EPISODE, MODERATE (HCC): ICD-10-CM

## 2020-06-10 DIAGNOSIS — F41.1 GENERALIZED ANXIETY DISORDER: ICD-10-CM

## 2020-06-10 DIAGNOSIS — F41.0 PANIC DISORDER: Primary | ICD-10-CM

## 2020-06-10 DIAGNOSIS — Z63.0 MARITAL/PARTNER RELATIONAL PROBLEM: ICD-10-CM

## 2020-06-10 PROCEDURE — 90832 PSYTX W PT 30 MINUTES: CPT | Performed by: SOCIAL WORKER

## 2020-06-10 SDOH — SOCIAL STABILITY - SOCIAL INSECURITY: PROBLEMS IN RELATIONSHIP WITH SPOUSE OR PARTNER: Z63.0

## 2020-06-10 NOTE — PROGRESS NOTES
"Date of Service: Sharon 10, 2020  Time In: 7:58 AM  Time Out: 8:30 AM      [place] 9:40 AMOGRESS NOTE  Data:  Amish Win is a 50 y.o. male who met 1: 1 with the undersigned for a regularly scheduled individual outpatient therapy session at the [facility] for follow-up of anxiety, depression, and ongoing marital strain.  The patient and the undersigned more masks throughout the session and maintained appropriate distancing.        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  As result, he states he feels as though this was very dishonest and states he believes this was planned to reduce their operating capital.  The patient states his wife is made multiple statements concerning the fact that he has difficulty keeping a job and states she said \"what is it with you that you cannot keep a job\".  Patient reports this significantly exacerbates his feeling of uncertainty and symptoms of anxiety.  Patient reports ongoing symptoms of anxiety including feeling on edge, feeling overwhelmed, trembling, and a sense of impending doom.  Patient rates current symptoms of anxiety at an 8/9 on a scale of 1-10 with 10 being most severe.  Patient reports symptoms of depression continue to be improved but states he continues to struggle with periods of sad mood, anhedonia, decreased energy, and a distinct sense of uncertainty.  Patient rates current symptoms of depression at a 7 on a scale of 1-10 with 10 being most severe.     Patient reports he continues to adhere to medication regimen as prescribed.  Patient adamantly and convincingly denies suicidal ideation vehemently denies any substance use.    Patient actively participated in the formulation of the treatment plan and verbalizes agreement with all goals and objectives.  Treatment plan completed on this date.    Clinical " Maneuvering/Intervention:  Assisted patient in processing above session content; acknowledged and normalized patient’s thoughts, feelings, and concerns.  Allow the patient to discuss/vent his feelings and frustrations with his ongoing difficulties with employment and marital strain he has struggled with for several years and validated his feelings.  Also utilized motivational reviewing techniques including complex reflections to assist the patient in recognizing he is receiving unemployment benefits that are relatively stable for the next several months and encouraged him to remind himself he does not have to panic at this time.  Also continue to focus on healthy skills of daily living and behavioral activation and encouraged patient to find activities he can engage in on a regular basis to serve as a thought blocking technique.  Further assisted the patient in processing his feelings concerning his marriage and encouraged him to remind himself it is not feasible that he could find a way to be happy in the situation he is not happy with.  As has been discussed multiple times in the past, continue to encourage patient to remind himself at some point he will have to make a decision to either continue the status quo or to make a change in his life.  Provided unconditional positive regard and a safe, supportive environment.    Allowed patient to freely discuss issues without interruption or judgment. Provided safe, confidential environment to facilitate the development of positive therapeutic relationship and encourage open, honest communication. Assisted patient in identifying risk factors which would indicate the need for higher level of care including thoughts to harm self or others and/or self-harming behavior and encouraged patient to contact this office, call 911, or present to the nearest emergency room should any of these events occur. Discussed crisis intervention services and means to access.  Patient  adamantly and convincingly denies current suicidal or homicidal ideation or perceptual disturbance.    Assessment    Patient continues to struggle with significant ongoing stress including marital strain and difficulty maintaining employment which significantly contributes to his symptomology.  The patient's symptomology continues to cause impairment in important areas of functioning.  As a result, he can be reasonably expected to continue to benefit from treatment likely be at increased risk for decompensation otherwise.    Diagnoses and all orders for this visit:    Panic disorder    Generalized anxiety disorder    Major depressive disorder, recurrent episode, moderate (CMS/HCC)    Marital/partner relational problem               Mental Status Exam  Hygiene:  good  Dress:  casual  Attitude:  Cooperative  Motor Activity:  Appropriate  Speech:  Normal  Mood:  anxious   Affect:  Calm and pleasant  Thought Processes:  Linear  Thought Content:  normal  Suicidal Thoughts:  denies  Homicidal Thoughts:  denies  Crisis Safety Plan: yes, to come to the emergency room.  Hallucinations:  denies    Patient's Support Network Includes:  children and extended family    Progress toward goal: Not at goal    Functional Status: Moderate impairment     Prognosis: Fair with Ongoing Treatment     Plan         Patient will continue in individual outpatient therapy session at the [facility] in 2 weeks.  Patient will continue in pharmacotherapy as scheduled with Dr. Villareal.  Patient will adhere to medication regimen as prescribed and report any side effects. Patient will contact this office, call 911 or present to the nearest emergency room should suicidal or homicidal ideations occur. Provide Cognitive Behavioral Therapy and Integrative Therapy to improve functioning, maintain stability, and avoid decompensation and the need for higher level of care.          Return in about 2 weeks (around 6/24/2020) for Next scheduled follow  up.      This document signed by Izaiah Tinoco LCSW, [facility] [place] [facility], 2020 10:37

## 2020-06-10 NOTE — TREATMENT PLAN
Multi-Disciplinary Problems (from Behavioral Health Treatment Plan)    Active Problems     Problem: Anxiety  Start Date: 06/10/20    Problem Details:  The patient self-scales this problem as a 8 with 10 being the worst.       Goal Priority Start Date Expected End Date End Date    Patient will develop and implement behavioral and cognitive strategies to reduce anxiety and irrational fears. High 06/10/20 06/10/21 --    Goal Details:  Progress toward goal:  The patient self-scales their progress related to this goal as a 7 with 10 being the worst.       Goal Intervention Frequency Start Date End Date    Help patient explore past emotional issues in relation to present anxiety. Q2 Weeks 06/10/20 06/10/21    Intervention Details:  Duration of treatment until remission of symptoms.       Goal Intervention Frequency Start Date End Date    Help patient develop an awareness of their cognitive and physical responses to anxiety. Q2 Weeks 06/10/20 06/10/21    Intervention Details:  Duration of treatment until remission of symptoms.             Problem: Depression  Start Date: 06/10/20    Problem Details:  The patient self-scales this problem as a 7 with 10 being the worst.       Goal Priority Start Date Expected End Date End Date    Patient will demonstrate the ability to initiate new constructive life skills outside of sessions on a consistent basis. High 06/10/20 06/10/21 --    Goal Details:  Progress toward goal:  The patient self-scales their progress related to this goal as a 7 with 10 being the worst.       Goal Intervention Frequency Start Date End Date    Assist patient in setting attainable activities of daily living goals. Q2 Weeks 06/10/20 06/10/21    Intervention Details:  Patient will focus on healthy skills of daily living including eating a healthy diet, getting regular physical activity, and getting adequate sleep. Patient will keep all appointments and follow recommendations.      Goal Intervention Frequency  Start Date End Date    Provide education about depression Q2 Weeks 06/10/20 06/10/21    Intervention Details:  Duration of treatment until remission of symptoms.       Goal Intervention Frequency Start Date End Date    Assist patient in developing healthy coping strategies. Q2 Weeks 06/10/20 06/10/21    Intervention Details:  Duration of treatment until remission of symptoms.                          I have discussed and reviewed this treatment plan with the patient.    This document signed by Izaiah Tinoco LCSW, [facility] [place] 10, 2020 08:17

## 2020-06-18 ENCOUNTER — TELEMEDICINE (OUTPATIENT)
Dept: PSYCHIATRY | Facility: CLINIC | Age: 50
End: 2020-06-18

## 2020-06-18 DIAGNOSIS — F41.0 PANIC DISORDER: ICD-10-CM

## 2020-06-18 DIAGNOSIS — F41.1 GENERALIZED ANXIETY DISORDER: ICD-10-CM

## 2020-06-18 DIAGNOSIS — F33.41 RECURRENT MAJOR DEPRESSIVE DISORDER, IN PARTIAL REMISSION (HCC): ICD-10-CM

## 2020-06-18 PROCEDURE — 99214 OFFICE O/P EST MOD 30 MIN: CPT | Performed by: PSYCHIATRY & NEUROLOGY

## 2020-06-18 RX ORDER — ALPRAZOLAM 2 MG/1
2 TABLET, EXTENDED RELEASE ORAL 3 TIMES DAILY
Qty: 90 TABLET | Refills: 2 | Status: SHIPPED | OUTPATIENT
Start: 2020-06-18 | End: 2020-09-15 | Stop reason: SDUPTHER

## 2020-06-18 RX ORDER — MIRTAZAPINE 15 MG/1
15 TABLET, FILM COATED ORAL NIGHTLY
Qty: 30 TABLET | Refills: 2 | Status: SHIPPED | OUTPATIENT
Start: 2020-06-18 | End: 2020-09-15 | Stop reason: SDUPTHER

## 2020-06-18 RX ORDER — IMIPRAMINE HCL 50 MG
TABLET ORAL
Qty: 120 TABLET | Refills: 2 | Status: SHIPPED | OUTPATIENT
Start: 2020-06-18 | End: 2020-09-15 | Stop reason: SDUPTHER

## 2020-06-18 NOTE — PROGRESS NOTES
This patient visit is electronic.  The patient gave consent to be seen remotely, and when consent is given they understand that the consent allows for patient identifiable information to be sent to a third party as needed.   They may refuse to be seen remotely at any time. The electronic data is encrypted and password protected, and the patient has been advised of the potential risks to privacy not withstanding such measures.    The patient is located at his home in [facility].    Clinic visit by my chart and phone .         Patient ID: Amish Win is a 50 y.o. male    SERVICE TYPE: EVALUATION AND MANAGEMENT (greater than 50% of the time spent for supportive psychotherapy).      There were no vitals taken for this visit.    ALLERGIES:  Penicillins    CC/ Focus of the visit: Anxiety/depression    HPI: Patient states that he is actually done some better with less somatic cardiovascular concerns since his work situation is changed.  Patient initially laid off from [employer] and has not been called back to work.  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  He continues to see his therapist Izaiah Tinoco LCSW biweekly, this is been very beneficial for him .  Currently he states he is sleeping well, he does experience increased somatization and anxiety as well as dysphoria when stressed by being off work as actually been beneficial in his opinion.  He is gained 8 pounds, has not continued the daily exercise regimen that is been recommended and still is.  Encouraged to resume.  He does have some household tasks, cuts his grass.  He drives his wife to [place] and the [facility] for outpatient ECT, she is scheduled to receive a total of 8 treatments.  Overall he seems to be functioning and coping with the COVID 19 issues fairly well.    There is no issue suggestive of any substance misuse or abuse.    PFSH: His home situation has not changed  "significantly.  See above for the job issue.    Review of Systems   Respiratory:        Occasional episodes of shortness of breath attributed to anxiety   Cardiovascular:        Occasional  palpitations attributable to anxiety   Gastrointestinal:        Nondescript GI issues \"when I get stressed).       SUPPORTIVE PSYCHOTHERAPY: continuing efforts to promote the therapeutic alliance, address the patient’s issues, and strengthen self awareness, insights, and coping skills.       Mental Status Exam  Appearance: Normal  Attitude toward clinician:  cooperative and agreeable   Speech:    Rate:  regular rate and rhythm   Volume: normal  Motor:  no abnormal movements present  Mood:  Good  Affect:  euthymic  Thought Processes:  linear, logical, and goal directed  Thought Content:  Normal   Feeling Hopeless: absent  Suicidal Thoughts:  absent  Homicidal Thoughts:  absent  Perceptual Disturbance: no perceptual disturbance  Attention and Concentration:  good  Insight and Judgement:  good  Memory:  memory appears to be intact    LABS: No results found for this or any previous visit (from the past 168 hour(s)).    MEDICATION ISSUES: Have discussed with the patient the medications Risks, Benefits, and Side effects including potential falls, possible impaired driving and  metabolic adversities among others. No medication side effects or related complaints today.     TREATMENT PLAN/GOALS: Continue supportive psychotherapy efforts and medications as indicated.     Current Outpatient Medications   Medication Sig Dispense Refill   • ALPRAZolam XR (XANAX XR) 2 MG 24 hr tablet Take 1 tablet by mouth 3 (Three) Times a Day. Fill after January 30, refill in one month. 90 tablet 2   • aspirin 81 MG EC tablet Take 81 mg by mouth 2 (Two) Times a Day.     • docusate sodium (COLACE) 100 MG capsule Take 1 capsule by mouth 2 (Two) Times a Day. 60 capsule 1   • imipramine (Tofranil) 50 MG tablet Take two bid. 120 tablet 2   • latanoprost (XALATAN) " 0.005 % ophthalmic solution      • lisinopril-hydrochlorothiazide (PRINZIDE,ZESTORETIC) 10-12.5 MG per tablet Take 1 tablet by mouth Daily. 30 tablet 0   • metoprolol tartrate (LOPRESSOR) 50 MG tablet Take 1 tablet by mouth 2 (Two) Times a Day With Meals. 30 tablet 0   • mirtazapine (REMERON) 15 MG tablet Take 1 tablet by mouth Every Night. 30 tablet 2   • pantoprazole (PROTONIX) 40 MG EC tablet Take 1 tablet by mouth Daily. 30 tablet 0   • simvastatin (ZOCOR) 20 MG tablet Take 1 tablet by mouth Every Night. 30 tablet 0   • vitamin D (ERGOCALCIFEROL) 98180 units capsule capsule        No current facility-administered medications for this visit.        COLLATERAL PSYCHOTHERAPEUTIC INTERVENTION: Continuing with the biweekly contacts and psychotherapeutic effort.    RISK: Moderate    Assessment/Plan     Diagnoses and all orders for this visit:    Panic disorder  -     ALPRAZolam XR (XANAX XR) 2 MG 24 hr tablet; Take 1 tablet by mouth 3 (Three) Times a Day. Fill after January 30, refill in one month.  -     imipramine (Tofranil) 50 MG tablet; Take two bid.  -     mirtazapine (REMERON) 15 MG tablet; Take 1 tablet by mouth Every Night.    Generalized anxiety disorder  -     ALPRAZolam XR (XANAX XR) 2 MG 24 hr tablet; Take 1 tablet by mouth 3 (Three) Times a Day. Fill after January 30, refill in one month.  -     imipramine (Tofranil) 50 MG tablet; Take two bid.  -     mirtazapine (REMERON) 15 MG tablet; Take 1 tablet by mouth Every Night.    Recurrent major depressive disorder, in partial remission (CMS/HCC)  -     imipramine (Tofranil) 50 MG tablet; Take two bid.  -     mirtazapine (REMERON) 15 MG tablet; Take 1 tablet by mouth Every Night.        Return in about 3 months (around 9/18/2020).           Patient knows to call if symptoms worsen or fail to improve between appointments.     I spent a total of 30 minutes in direct patient care, greater than 20 minutes (greater than 50%) were with the patient for assessment,  coordination care, and counseling  regarding his status and answering any questions the patient had about the medication and plan..      Dictated utilizing Dragon dictation    LAUREN Villareal MD

## 2020-06-24 ENCOUNTER — OFFICE VISIT (OUTPATIENT)
Dept: PSYCHIATRY | Facility: CLINIC | Age: 50
End: 2020-06-24

## 2020-06-24 DIAGNOSIS — Z56.6 WORK-RELATED STRESS: ICD-10-CM

## 2020-06-24 DIAGNOSIS — F41.1 GENERALIZED ANXIETY DISORDER: ICD-10-CM

## 2020-06-24 DIAGNOSIS — Z63.0 MARITAL/PARTNER RELATIONAL PROBLEM: ICD-10-CM

## 2020-06-24 DIAGNOSIS — F33.1 MAJOR DEPRESSIVE DISORDER, RECURRENT EPISODE, MODERATE (HCC): ICD-10-CM

## 2020-06-24 DIAGNOSIS — F41.0 PANIC DISORDER: Primary | ICD-10-CM

## 2020-06-24 PROCEDURE — 90837 PSYTX W PT 60 MINUTES: CPT | Performed by: SOCIAL WORKER

## 2020-06-24 SDOH — HEALTH STABILITY - MENTAL HEALTH: OTHER PHYSICAL AND MENTAL STRAIN RELATED TO WORK: Z56.6

## 2020-06-24 SDOH — SOCIAL STABILITY - SOCIAL INSECURITY: PROBLEMS IN RELATIONSHIP WITH SPOUSE OR PARTNER: Z63.0

## 2020-06-25 NOTE — PROGRESS NOTES
Date of Service: June 24, 2020  Time In: 7:45 AM  Time Out: 8:45 AM      ID 9:40 AMOGRESS NOTE  Data:  Amish Win is a 50 y.o. male who met 1: 1 with the undersigned for a regularly scheduled individual outpatient therapy session at the [facility] for follow-up of anxiety, depression, and ongoing marital strain.  The patient and the undersigned more masks throughout the session and maintained appropriate distancing.        HPI: Patient reports last few weeks have been very difficult and states he is currently assisting his wife with getting ECT treatments in [place] but states he is fearful it is not effective.  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  The patient states he is terrified of COVID-19 and states he is not sure they are taking appropriate precautions.  Patient also reports his wife continues to make derogatory statements concerning his difficulty with work and has few positive interactions.  Patient states he is not sure how he will make the decision as to whether or not to accept this position.  Patient reports ongoing symptoms of anxiety including feeling on edge, feeling overwhelmed, trembling, and a sense of impending doom.  Patient rates current symptoms of anxiety at an 9 on a scale of 1-10 with 10 being most severe.  Patient reports symptoms of depression continue to be improved but states he continues to struggle with periods of sad mood, anhedonia, decreased energy, and a distinct sense of uncertainty.  Patient rates current symptoms of depression at a 7 on a scale of 1-10 with 10 being most severe.     Patient reports he continues to adhere to medication regimen as prescribed.  Patient adamantly and convincingly denies suicidal ideation vehemently denies any substance use.      Clinical Maneuvering/Intervention:  Assisted patient in processing above session content; acknowledged and normalized patient’s  thoughts, feelings, and concerns.  Allow the patient to discuss/process his feelings concerning ongoing difficulty at home and stress of trying to decide whether or not to accept this new position and validated his feelings.  Also discussed the concept of things we can control things he cannot control and strongly urged the patient to remind himself he cannot control the behavior or decisions of others.  Also assisted the patient with processing long history of marital strain and ongoing negative interactions with his wife.  Strongly urged patient to consider longer he remains at home more difficulty will be able to return to the job force and encouraged him to remind himself he has been in this profession for many years and is fully capable of discharging the duties of a  of a restaurant.  Utilize cognitive behavioral therapy to assist the patient in developing a strategy to counteract his automatic negative thoughts and encouraged him to find positive activities he can engage in.  Provided unconditional positive regard and a safe, supportive environment.    Allowed patient to freely discuss issues without interruption or judgment. Provided safe, confidential environment to facilitate the development of positive therapeutic relationship and encourage open, honest communication. Assisted patient in identifying risk factors which would indicate the need for higher level of care including thoughts to harm self or others and/or self-harming behavior and encouraged patient to contact this office, call 911, or present to the nearest emergency room should any of these events occur. Discussed crisis intervention services and means to access.  Patient adamantly and convincingly denies current suicidal or homicidal ideation or perceptual disturbance.    Assessment    Patient continues to struggle with significant ongoing stress including marital strain and difficulty maintaining employment which significantly  contributes to his symptomology.  The patient's symptomology continues to cause impairment in important areas of functioning.  As a result, he can be reasonably expected to continue to benefit from treatment likely be at increased risk for decompensation otherwise.    Diagnoses and all orders for this visit:    Panic disorder    Generalized anxiety disorder    Major depressive disorder, recurrent episode, moderate (CMS/HCC)    Marital/partner relational problem    Work-related stress               Mental Status Exam  Hygiene:  good  Dress:  casual  Attitude:  Cooperative  Motor Activity:  Appropriate  Speech:  Normal  Mood:  anxious   Affect:  Calm and pleasant  Thought Processes:  Linear  Thought Content:  normal  Suicidal Thoughts:  denies  Homicidal Thoughts:  denies  Crisis Safety Plan: yes, to come to the emergency room.  Hallucinations:  denies    Patient's Support Network Includes:  children and extended family    Progress toward goal: Not at goal    Functional Status: Moderate impairment     Prognosis: Fair with Ongoing Treatment     Plan         Patient will continue in individual outpatient therapy session at the [facility] in 2 weeks.  Patient will continue in pharmacotherapy as scheduled with Dr. Villareal.  Patient will adhere to medication regimen as prescribed and report any side effects. Patient will contact this office, call 911 or present to the nearest emergency room should suicidal or homicidal ideations occur. Provide Cognitive Behavioral Therapy and Integrative Therapy to improve functioning, maintain stability, and avoid decompensation and the need for higher level of care.          Return in about 2 weeks (around 7/8/2020) for Next scheduled follow up.      This document signed by Izaiah Tinoco LCSW, [facility] June 25, 2020 06:53

## 2020-07-08 ENCOUNTER — OFFICE VISIT (OUTPATIENT)
Dept: PSYCHIATRY | Facility: CLINIC | Age: 50
End: 2020-07-08

## 2020-07-08 DIAGNOSIS — F41.1 GENERALIZED ANXIETY DISORDER: ICD-10-CM

## 2020-07-08 DIAGNOSIS — F41.0 PANIC DISORDER: Primary | ICD-10-CM

## 2020-07-08 DIAGNOSIS — F43.22 ADJUSTMENT DISORDER WITH ANXIOUS MOOD: ICD-10-CM

## 2020-07-08 DIAGNOSIS — F33.1 MAJOR DEPRESSIVE DISORDER, RECURRENT EPISODE, MODERATE (HCC): ICD-10-CM

## 2020-07-08 DIAGNOSIS — Z56.6 WORK-RELATED STRESS: ICD-10-CM

## 2020-07-08 PROCEDURE — 90837 PSYTX W PT 60 MINUTES: CPT | Performed by: SOCIAL WORKER

## 2020-07-08 SDOH — HEALTH STABILITY - MENTAL HEALTH: OTHER PHYSICAL AND MENTAL STRAIN RELATED TO WORK: Z56.6

## 2020-07-08 NOTE — PROGRESS NOTES
"Date of Service: July 8, 2020  Time In: 7:45 AM  Time Out: 8:45 AM      SD 9:40 AMOGRESS NOTE  Data:  Amish Win is a 50 y.o. male who met 1: 1 with the undersigned for a regularly scheduled individual outpatient therapy session at the [facility] for follow-up of anxiety, depression, and ongoing marital strain.  The patient and the undersigned more masks throughout the session and maintained appropriate distancing.        HPI: Patient reports he did accept a position at a local [employer] and states he has been there approximately 2 weeks.  Patient reports he believes the job is feasible and states he feels this is the best position he has been in several years.  However, he states he feels on edge constantly and does not feel safe as many of the employees do not take appropriate cautions including wearing masks appropriately.  Patient states \"I never feel safe anywhere\".  He also reports ongoing difficulty due to his wife and states she elected to discontinue ECT treatments due to immediate and short-term memory loss.  The patient states he feels as though if anything his wife's depression is worse and states she primarily never leaves the couch.  The patient states they continue to have a long history of marital discord and have very little interaction.   patient reports ongoing symptoms of anxiety including feeling on edge, feeling overwhelmed, trembling, and a sense of impending doom.  Patient rates current symptoms of anxiety at a 9 on a scale of 1-10 with 10 being most severe.  Patient reports symptoms of depression continue to be improved but states he continues to struggle with periods of sad mood, anhedonia, decreased energy, and a distinct sense of uncertainty.  Patient rates current symptoms of depression at a 7 on a scale of 1-10 with 10 being most severe.     Patient reports he continues to adhere to medication regimen as prescribed.  Patient adamantly and convincingly denies " suicidal ideation vehemently denies any substance use.      Clinical Maneuvering/Intervention:  Assisted patient in processing above session content; acknowledged and normalized patient’s thoughts, feelings, and concerns.  This session was primarily focused on assisting the patient with dealing with significant increase in anxiety from his new job and the fear of COVID-19.  Utilize cognitive behavioral therapy to assist the patient in recognizing he must except there is some risk in life and he can only control the things he has control over.  Also reminded the patient he is the  of this business and has a right and responsibility to foster a culture of safety.  Also allow the patient to discuss/vent his feelings and fears concerning his wife's condition and validated his feelings.  However, also utilized motivational reviewing techniques including complex reflections to discussed concept of things we can control things he cannot control and encouraged him to remind himself he cannot control or be responsible for the decisions or behaviors of others.  Strongly focused on healthy skills of daily living and behavioral activation and strongly urged the patient to find an activity he can engage in on a weekly basis which is stress relieving and to serve as a thought blocking technique.  Provided unconditional positive regard and a safe, supportive environment.    Allowed patient to freely discuss issues without interruption or judgment. Provided safe, confidential environment to facilitate the development of positive therapeutic relationship and encourage open, honest communication. Assisted patient in identifying risk factors which would indicate the need for higher level of care including thoughts to harm self or others and/or self-harming behavior and encouraged patient to contact this office, call 911, or present to the nearest emergency room should any of these events occur. Discussed crisis  intervention services and means to access.  Patient adamantly and convincingly denies current suicidal or homicidal ideation or perceptual disturbance.    Assessment    Patient appears to be doing relatively well at his new job but is having significant anxiety and panic due to fear of kari COVID-19.  Patient symptoms also continue to be exacerbated by ongoing marital strain and various psychosocial stressors.  The patient's symptomology continues to cause impairment in important areas of functioning.  As a result, he can be reasonably expected to continue to benefit from treatment likely be at increased risk for decompensation otherwise.    Diagnoses and all orders for this visit:    Panic disorder    Generalized anxiety disorder    Major depressive disorder, recurrent episode, moderate (CMS/[facility])    Work-related stress    Adjustment disorder with anxious mood               Mental Status Exam  Hygiene:  good  Dress:  casual  Attitude:  Cooperative  Motor Activity:  Appropriate  Speech:  Normal  Mood:  anxious   Affect:  Calm and pleasant  Thought Processes:  Linear  Thought Content:  normal  Suicidal Thoughts:  denies  Homicidal Thoughts:  denies  Crisis Safety Plan: yes, to come to the emergency room.  Hallucinations:  denies    Patient's Support Network Includes:  children and extended family    Progress toward goal: Not at goal    Functional Status: Moderate impairment     Prognosis: Fair with Ongoing Treatment     Plan         Patient will continue in individual outpatient therapy session at the [facility] in 2 weeks.  Patient will continue in pharmacotherapy as scheduled with Dr. Villareal.  Patient will adhere to medication regimen as prescribed and report any side effects. Patient will contact this office, call 911 or present to the nearest emergency room should suicidal or homicidal ideations occur. Provide Cognitive Behavioral Therapy and Integrative Therapy to improve functioning, maintain  stability, and avoid decompensation and the need for higher level of care.          Return in about 2 weeks (around 7/22/2020) for Next scheduled follow up.      This document signed by Izaiah Tinoco LCSW, [facility] July 8, 2020 09:48

## 2020-08-10 ENCOUNTER — OFFICE VISIT (OUTPATIENT)
Dept: PSYCHIATRY | Facility: CLINIC | Age: 50
End: 2020-08-10

## 2020-08-10 DIAGNOSIS — F41.1 GENERALIZED ANXIETY DISORDER: ICD-10-CM

## 2020-08-10 DIAGNOSIS — F41.0 PANIC DISORDER: Primary | ICD-10-CM

## 2020-08-10 DIAGNOSIS — Z56.6 WORK-RELATED STRESS: ICD-10-CM

## 2020-08-10 DIAGNOSIS — F33.1 MAJOR DEPRESSIVE DISORDER, RECURRENT EPISODE, MODERATE (HCC): ICD-10-CM

## 2020-08-10 PROCEDURE — 90832 PSYTX W PT 30 MINUTES: CPT | Performed by: SOCIAL WORKER

## 2020-08-10 SDOH — HEALTH STABILITY - MENTAL HEALTH: OTHER PHYSICAL AND MENTAL STRAIN RELATED TO WORK: Z56.6

## 2020-08-10 NOTE — PROGRESS NOTES
Date of Service: August 10, 2020  Time In: 10:30 AM  Time Out: 10:50 AM      ID 9:40 AMOGRESS NOTE  Data:  Amish Win is a 50 y.o. male who met 1: 1 with the undersigned for emergent individual outpatient therapy session conducted via telephone.  Patient contacted the office at the [facility] and requested emergent telephone session with the undersigned.  You have chosen to receive care through a telephone visit. Do you consent to use a telephone visit for your medical care today? Yes  This visit has been rescheduled as a phone visit to comply with patient safety concerns in accordance with CDC recommendations. Total time of discussion was 20 minutes.  [place] [address]. The provider identified himself as well as his credentials.   The Patient is at his home using his phone because problems with video connection. The patient's condition being diagnosed/treated is appropriate for telemedicine. The patient gave consent to be seen remotely, and when consent is given they understand that the consent allows for patient identifiable information to be sent to a third party as needed.   They may refuse to be seen remotely at any time. The electronic data is encrypted and password protected, and the patient has been advised of the potential risks to privacy not withstanding such measures.        HPI: Patient reports he is struggling with significant symptoms of panic and anxiety due to ongoing work-related stress.  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  Patient reports ongoing symptoms of anxiety including feeling on edge, feeling overwhelmed, trembling, and a sense of impending doom.  He also reports panic attacks and states he felt as though he was unable to breathe, trembling, sweating, feeling of passing out, and a severe sense of impending doom.  Patient states he feels his current job situation is not sustainable.  Patient rates current symptoms of anxiety at a 9 on a scale of 1-10 with 10 being most severe.  Patient also reports symptoms of depression have increased over the past few weeks including symptoms of sad mood, anhedonia, decreased energy, and a distinct sense of uncertainty.  Patient rates current symptoms of depression at a 7 on a scale of 1-10 with 10 being most severe.     Patient reports he continues to adhere to medication regimen as prescribed.  Patient adamantly and convincingly denies suicidal ideation vehemently denies any substance use.      Clinical Maneuvering/Intervention:  Assisted patient in processing above session content; acknowledged and normalized patient’s thoughts, feelings, and concerns.   This session was focused on assisting the patient with addressing emergent issues and utilized cognitive behavioral therapy to assist the patient and restructuring his thought process concerning his current job and the associated stressors.  Also utilize solution focused therapy to assist patient in developing a strategy including looking into any special exceptions for COVID-19 with regard to unemployment benefits and to seek other employment.  Also utilize cognitive behavioral therapy to assist the patient in recognizing his tendency to catastrophize and automatically assume the worst and encouraged him to continue to attempt to challenge if a factual counter arguments.   Provided unconditional positive regard and a safe, supportive environment.    Allowed patient to freely discuss issues without interruption or judgment. Provided safe, confidential environment to facilitate the development of positive therapeutic relationship and encourage open, honest communication. Assisted patient in identifying risk factors which would indicate the need for higher level of care including thoughts to harm self or others and/or self-harming behavior and encouraged patient to contact this office, call 911, or present to the nearest emergency room should any of these events occur. Discussed crisis intervention services and means to access.  Patient adamantly and convincingly denies current suicidal or homicidal ideation or perceptual disturbance.    Assessment    Patient is currently struggling with significantly increased symptoms due to ongoing job stress which appears to reach the level of a hostile work environment as described by the patient.  As a result, the patient's symptomology continues to cause impairment in important areas of functioning.  As a result, he can be reasonably expected to continue to benefit from treatment likely be at increased risk for decompensation otherwise.    Diagnoses and all orders for this visit:    Panic disorder    Generalized anxiety disorder    Work-related stress    Major depressive disorder, recurrent episode, moderate (CMS/HCC)               Mental Status Exam  Hygiene: Not applicable due to telephone session  Dress:  casual and Not applicable due to telephone session  Attitude:  Cooperative  Motor Activity:  Not applicable due to telephone session  Speech:  Pressured  Mood:  anxious   Affect: Not applicable due to telephone session  Thought Processes:  Linear  Thought Content:  normal  Suicidal Thoughts:  denies  Homicidal Thoughts:  denies  Crisis Safety Plan: yes, to come to the emergency room.  Hallucinations:  denies    Patient's Support Network  Includes:  children and extended family    Progress toward goal: Not at goal    Functional Status: Severe impairment    Prognosis: Guarded with Ongoing Treatment    Plan         Patient will continue in individual outpatient therapy session at the [facility] in 2 days.  Patient will continue in pharmacotherapy as scheduled with Dr. Villareal.  Patient will adhere to medication regimen as prescribed and report any side effects. Patient will contact this office, call 911 or present to the nearest emergency room should suicidal or homicidal ideations occur. Provide Cognitive Behavioral Therapy and Integrative Therapy to improve functioning, maintain stability, and avoid decompensation and the need for higher level of care.          Return in about 2 days (around 8/12/2020) for Next scheduled follow up.      This document signed by Izaiah Tinoco LCSW, [facility] August 10, 2020 11:34

## 2020-08-11 ENCOUNTER — TELEMEDICINE (OUTPATIENT)
Dept: PSYCHIATRY | Facility: CLINIC | Age: 50
End: 2020-08-11

## 2020-08-11 DIAGNOSIS — F33.1 MAJOR DEPRESSIVE DISORDER, RECURRENT EPISODE, MODERATE (HCC): ICD-10-CM

## 2020-08-11 DIAGNOSIS — F41.1 GENERALIZED ANXIETY DISORDER: ICD-10-CM

## 2020-08-11 DIAGNOSIS — Z56.6 WORK-RELATED STRESS: ICD-10-CM

## 2020-08-11 DIAGNOSIS — F41.0 PANIC DISORDER: Primary | ICD-10-CM

## 2020-08-11 PROCEDURE — 90832 PSYTX W PT 30 MINUTES: CPT | Performed by: SOCIAL WORKER

## 2020-08-11 SDOH — HEALTH STABILITY - MENTAL HEALTH: OTHER PHYSICAL AND MENTAL STRAIN RELATED TO WORK: Z56.6

## 2020-08-11 NOTE — PROGRESS NOTES
Date of Service: August 11, 2020  Time In: 11:20 AM  Time Out: 11:38 AM      PA 9:40 AMOGRESS NOTE  Data:  Amish Win is a 50 y.o. male who met 1: 1 with the undersigned for emergent individual outpatient therapy session conducted via telephone.  Patient contacted the office at the [facility] and requested emergent telephone session with the undersigned.  You have chosen to receive care through a telephone visit. Do you consent to use a telephone visit for your medical care today? Yes  This visit has been rescheduled as a phone visit to comply with patient safety concerns in accordance with CDC recommendations. Total time of discussion was 18 minutes.  [place] [address]. The provider identified himself as well as his credentials.   The Patient is at his home using his phone because problems with video connection. The patient's condition being diagnosed/treated is appropriate for telemedicine. The patient gave consent to be seen remotely, and when consent is given they understand that the consent allows for patient identifiable information to be sent to a third party as needed.   They may refuse to be seen remotely at any time. The electronic data is encrypted and password protected, and the patient has been advised of the potential risks to privacy not withstanding such measures.        HPI: Patient states he is having what he describes as a panic attack just considering returning to work on August 12, 2020 and states he simply does not feel it is possible that he continue working in the current environment and capacity.  He states he and his wife have discussed it and they both agree he must find some other alternative.  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    Patient reports ongoing  symptoms of anxiety including feeling on edge, feeling overwhelmed, trembling, and a sense of impending doom.  He also reports panic attacks and states he felt as though he was unable to breathe, trembling, sweating, feeling of passing out, and a severe sense of impending doom.  Patient states he feels his current job situation is not sustainable.  Patient rates current symptoms of anxiety at a 9 on a scale of 1-10 with 10 being most severe.  Patient also reports symptoms of depression have increased over the past few weeks including symptoms of sad mood, anhedonia, decreased energy, and a distinct sense of uncertainty.  Patient rates current symptoms of depression at a 7 on a scale of 1-10 with 10 being most severe.     Patient reports he continues to adhere to medication regimen as prescribed.  Patient adamantly and convincingly denies suicidal ideation vehemently denies any substance use.      Clinical Maneuvering/Intervention:  Assisted patient in processing above session content; acknowledged and normalized patient’s thoughts, feelings, and concerns.   Utilized motivational reviewing techniques including complex reflections and to focus on a rational thought process to assist the patient with processing his issues along with his feelings and fears.  Also utilize cognitive behavioral therapy to assist patient in developing strategies to decrease the severity and frequency of symptoms and encouraged him to utilize relaxation techniques, deep breathing, and guided imagery.  Also validated the patient's belief that if his current position is simply unsustainable he must take steps to bring about change.  Also challenged the patient to consider although taking a significant cut in salary is not preferable, he may have to consider it in light of the current situation.  Provided unconditional positive regard and a safe, supportive environment.    Allowed patient to freely discuss issues without interruption or  judgment. Provided safe, confidential environment to facilitate the development of positive therapeutic relationship and encourage open, honest communication. Assisted patient in identifying risk factors which would indicate the need for higher level of care including thoughts to harm self or others and/or self-harming behavior and encouraged patient to contact this office, call 911, or present to the nearest emergency room should any of these events occur. Discussed crisis intervention services and means to access.  Patient adamantly and convincingly denies current suicidal or homicidal ideation or perceptual disturbance.    Assessment    Patient is currently struggling with significantly increased symptoms due to ongoing job stress which appears to reach the level of a hostile work environment as described by the patient.  As a result, the patient's symptomology continues to cause impairment in important areas of functioning.  As a result, he can be reasonably expected to continue to benefit from treatment likely be at increased risk for decompensation otherwise.    Diagnoses and all orders for this visit:    Panic disorder    Generalized anxiety disorder    Major depressive disorder, recurrent episode, moderate (CMS/HCC)    Work-related stress               Mental Status Exam  Hygiene: Not applicable due to telephone session  Dress:  casual and Not applicable due to telephone session  Attitude:  Cooperative  Motor Activity:  Not applicable due to telephone session  Speech:  Pressured  Mood:  anxious   Affect: Not applicable due to telephone session  Thought Processes:  Linear  Thought Content:  normal  Suicidal Thoughts:  denies  Homicidal Thoughts:  denies  Crisis Safety Plan: yes, to come to the emergency room.  Hallucinations:  denies    Patient's Support Network Includes:  children and extended family    Progress toward goal: Not at goal    Functional Status: Severe impairment    Prognosis: Guarded with  Ongoing Treatment    Plan         Patient will continue in individual outpatient therapy session at the [facility] in 1 day, this session will also be conducted via telephone or video visit due to the undersigned currently isolating per CDC guidelines as a result of potential exposure to COVID-19.  Patient will continue in pharmacotherapy as scheduled with Dr. Villareal.  Patient will adhere to medication regimen as prescribed and report any side effects. Patient will contact this office, call 911 or present to the nearest emergency room should suicidal or homicidal ideations occur. Provide Cognitive Behavioral Therapy and Integrative Therapy to improve functioning, maintain stability, and avoid decompensation and the need for higher level of care.          Return in about 1 day (around 8/12/2020) for Next scheduled follow up.      This document signed by Izaiah Tinoco LCSW, [facility] August 11, 2020 11:48

## 2020-08-12 ENCOUNTER — TELEMEDICINE (OUTPATIENT)
Dept: PSYCHIATRY | Facility: CLINIC | Age: 50
End: 2020-08-12

## 2020-08-12 DIAGNOSIS — F33.1 MAJOR DEPRESSIVE DISORDER, RECURRENT EPISODE, MODERATE (HCC): ICD-10-CM

## 2020-08-12 DIAGNOSIS — F41.0 PANIC DISORDER: Primary | ICD-10-CM

## 2020-08-12 DIAGNOSIS — Z56.6 WORK-RELATED STRESS: ICD-10-CM

## 2020-08-12 DIAGNOSIS — F41.1 GENERALIZED ANXIETY DISORDER: ICD-10-CM

## 2020-08-12 PROCEDURE — 90832 PSYTX W PT 30 MINUTES: CPT | Performed by: SOCIAL WORKER

## 2020-08-12 SDOH — HEALTH STABILITY - MENTAL HEALTH: OTHER PHYSICAL AND MENTAL STRAIN RELATED TO WORK: Z56.6

## 2020-08-12 NOTE — PROGRESS NOTES
Date of Service: August 11, 2020  Time In: 11:20 AM  Time Out: 11:38 AM      [place] 9:40 AMOGRESS NOTE  Data:  Amish Win is a 50 y.o. male who met 1: 1 with the undersigned for a regularly scheduled individual outpatient therapy session conducted via telephone.  You have chosen to receive care through a telephone visit. Do you consent to use a telephone visit for your medical care today? Yes  This visit has been rescheduled as a phone visit to comply with patient safety concerns in accordance with CDC recommendations. Total time of discussion was 17 minutes.  [place] [address]. The provider identified himself as well as his credentials.   The Patient is at his home using his phone because problems with video connection. The patient's condition being diagnosed/treated is appropriate for telemedicine. The patient gave consent to be seen remotely, and when consent is given they understand that the consent allows for patient identifiable information to be sent to a third party as needed.   They may refuse to be seen remotely at any time. The electronic data is encrypted and password protected, and the patient has been advised of the potential risks to privacy not withstanding such measures.        HPI: Patient states he just returned to work today and states he is very anxious and worried.  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  Patient states that he is struggling with feelings of panic and a sense of uncertainty and states at times he feels as though he simply cannot cope.    Patient reports ongoing symptoms of anxiety including feeling on edge, feeling overwhelmed, trembling, and a sense of impending doom.  He also reports panic attacks and states he felt as though he was unable to breathe, trembling, sweating, feeling of passing out, and a severe sense of  impending doom.  Patient states he feels his current job situation is not sustainable.  Patient rates current symptoms of anxiety at a 9 on a scale of 1-10 with 10 being most severe.  Patient also reports symptoms of depression have increased over the past few weeks including symptoms of sad mood, anhedonia, decreased energy, and a distinct sense of uncertainty.  Patient rates current symptoms of depression at a 7 on a scale of 1-10 with 10 being most severe.     Patient reports he continues to adhere to medication regimen as prescribed.  Patient adamantly and convincingly denies suicidal ideation vehemently denies any substance use.      Clinical Maneuvering/Intervention:  Assisted patient in processing above session content; acknowledged and normalized patient’s thoughts, feelings, and concerns.  Utilized motivational interviewing techniques including complex reflections along with a rational thought process to assist the patient in processing his current issues and validated his belief that he has a right to discontinue his employment with this entity if he feels as though it is an existential threat to his wellbeing.  Also utilize cognitive behavioral therapy to assist the patient in identifying and utilizing appropriate coping mechanisms to mitigate symptoms of anxiety and encouraged him to utilize positive self talk, relaxation techniques, and challenging faulty, irrational thoughts of factual counter arguments.  Also encouraged patient to utilize deep breathing exercises as he has in the past.  Provided unconditional positive regard and a safe, supportive environment.    Allowed patient to freely discuss issues without interruption or judgment. Provided safe, confidential environment to facilitate the development of positive therapeutic relationship and encourage open, honest communication. Assisted patient in identifying risk factors which would indicate the need for higher level of care including thoughts  to harm self or others and/or self-harming behavior and encouraged patient to contact this office, call 911, or present to the nearest emergency room should any of these events occur. Discussed crisis intervention services and means to access.  Patient adamantly and convincingly denies current suicidal or homicidal ideation or perceptual disturbance.    Assessment    Patient is currently struggling with significantly increased symptoms due to ongoing job stress which appears to reach the level of a hostile work environment as described by the patient.  As a result, the patient's symptomology continues to cause impairment in important areas of functioning.  As a result, he can be reasonably expected to continue to benefit from treatment likely be at increased risk for decompensation otherwise.    Diagnoses and all orders for this visit:    Panic disorder    Generalized anxiety disorder    Major depressive disorder, recurrent episode, moderate (CMS/HCC)    Work-related stress               Mental Status Exam  Hygiene: Not applicable due to telephone session  Dress:  casual and Not applicable due to telephone session  Attitude:  Cooperative  Motor Activity:  Not applicable due to telephone session  Speech:  Pressured  Mood:  anxious   Affect: Not applicable due to telephone session  Thought Processes:  Linear  Thought Content:  normal  Suicidal Thoughts:  denies  Homicidal Thoughts:  denies  Crisis Safety Plan: yes, to come to the emergency room.  Hallucinations:  denies    Patient's Support Network Includes:  children and extended family    Progress toward goal: Not at goal    Functional Status: Severe impairment    Prognosis: Guarded with Ongoing Treatment    Plan         Patient will continue in individual outpatient therapy session at the [facility] in 2 weeks.  Patient will continue in pharmacotherapy as scheduled with Dr. Villareal.  Patient will adhere to medication regimen as prescribed and report any side  effects. Patient will contact this office, call 911 or present to the nearest emergency room should suicidal or homicidal ideations occur. Provide Cognitive Behavioral Therapy and Integrative Therapy to improve functioning, maintain stability, and avoid decompensation and the need for higher level of care.          Return in about 2 weeks (around 8/26/2020) for Next scheduled follow up.      This document signed by Izaiah Tinoco LCSW, [facility] August 12, 2020 12:40

## 2020-08-17 ENCOUNTER — DOCUMENTATION (OUTPATIENT)
Dept: PSYCHIATRY | Facility: CLINIC | Age: 50
End: 2020-08-17

## 2020-08-17 ENCOUNTER — TELEPHONE (OUTPATIENT)
Dept: PSYCHIATRY | Facility: CLINIC | Age: 50
End: 2020-08-17

## 2020-08-17 NOTE — PROGRESS NOTES
Returned patient's call on this date and spoke to him for approximately 10 minutes per his request.  Assisted the patient in processing his decision to leave his job due to significant psychological strain and what he describes as a hostile work environment.  Encouraged the patient to pursue unemployment benefits and reminded him of telemedicine visit with the undersigned on 8/19/2020.  Patient appears stable and adamantly and convincingly denies any suicidal ideation.    Izaiah Tinoco, FAUSTO, [facility]

## 2020-08-19 ENCOUNTER — TELEMEDICINE (OUTPATIENT)
Dept: PSYCHIATRY | Facility: CLINIC | Age: 50
End: 2020-08-19

## 2020-08-19 DIAGNOSIS — F33.1 MAJOR DEPRESSIVE DISORDER, RECURRENT EPISODE, MODERATE (HCC): ICD-10-CM

## 2020-08-19 DIAGNOSIS — F41.0 PANIC DISORDER: Primary | ICD-10-CM

## 2020-08-19 DIAGNOSIS — F41.1 GENERALIZED ANXIETY DISORDER: ICD-10-CM

## 2020-08-19 PROCEDURE — 90832 PSYTX W PT 30 MINUTES: CPT | Performed by: SOCIAL WORKER

## 2020-08-19 NOTE — PROGRESS NOTES
"Date of Service: August 19, 2020  Time In: 8:00 AM  Time Out: 8:34 AM      MN 9:40 AMOGRESS NOTE  Data:  Amish Win is a 50 y.o. male who met 1: 1 with the undersigned for a regularly scheduled individual outpatient therapy session conducted via telemedicine.    This was an audio and video enabled telemedicine encounter.  [place] [address]. The provider identified himself as well as his credentials.   The Patient is at his home using his phone because problems with video connection. The patient's condition being diagnosed/treated is appropriate for telemedicine. The patient gave consent to be seen remotely, and when consent is given they understand that the consent allows for patient identifiable information to be sent to a third party as needed.   They may refuse to be seen remotely at any time. The electronic data is encrypted and password protected, and the patient has been advised of the potential risks to privacy not withstanding such measures.        HPI: Patient states he did ultimately leave his job due to his feeling it was an unsafe work environment to a significant fear of being exposed to COVID-19.  He also reports what appears to be a hostile work environment and states the owner began yelling at him over various issues.  The patient states \"I just could not stay there\".   Patient reports slight improvement since he is no longer working at this job but reports ongoing symptoms of anxiety including feeling on edge, feeling overwhelmed, trembling, and a sense of impending doom.  He also reports panic attacks and states he felt as though he was unable to breathe, trembling, sweating, feeling of passing out, and a severe sense of impending doom.   Patient rates current symptoms of anxiety at a 7 on a scale of 1-10 with 10 being most severe.  Patient also reports symptoms of depression  including symptoms of sad mood, " anhedonia, decreased energy, and a distinct sense of uncertainty.  Patient rates current symptoms of depression at a 5 on a scale of 1-10 with 10 being most severe.  Patient does report he is worried about finding another job and states he is planning to seek unemployment benefits due to the fact him leaving this job is due to the circumstances and the environment.   Patient reports he continues to adhere to medication regimen as prescribed.  Patient adamantly and convincingly denies suicidal ideation vehemently denies any substance use.      Clinical Maneuvering/Intervention:  Assisted patient in processing above session content; acknowledged and normalized patient’s thoughts, feelings, and concerns.  Allow the patient to discuss/process his feelings and fears concerning ongoing work-related issues and validated his right to decide his previous job is simply not sustainable.  Also utilize cognitive behavioral therapy to assist patient in utilizing appropriate coping mechanisms to decrease the severity and frequency of symptoms and continue to focus on healthy skills of daily living and behavioral activation.  Provided unconditional positive regard and a safe, supportive environment.    Allowed patient to freely discuss issues without interruption or judgment. Provided safe, confidential environment to facilitate the development of positive therapeutic relationship and encourage open, honest communication. Assisted patient in identifying risk factors which would indicate the need for higher level of care including thoughts to harm self or others and/or self-harming behavior and encouraged patient to contact this office, call 911, or present to the nearest emergency room should any of these events occur. Discussed crisis intervention services and means to access.  Patient adamantly and convincingly denies current suicidal or homicidal ideation or perceptual disturbance.    Assessment    Patient has had a slight  decrease in symptoms since leaving his job but continues to struggle with significant anxiety and secondary depression.  Patient's symptoms are also exacerbated by the stress and fear of the current COVID-19 pandemic.  As a result, the patient's symptomology continues to cause impairment in important areas of functioning.  As a result, he can be reasonably expected to continue to benefit from treatment likely be at increased risk for decompensation otherwise.    Diagnoses and all orders for this visit:    Panic disorder    Generalized anxiety disorder    Major depressive disorder, recurrent episode, moderate (CMS/HCC)               Mental Status Exam  Hygiene: Not applicable due to telemedicine  Dress:  casual  Attitude:  Cooperative  Motor Activity:  Appropriate  Speech:  Normal and Pressured  Mood:  anxious   Affect: Not applicable due to telephone session  Thought Processes:  Linear  Thought Content:  normal  Suicidal Thoughts:  denies  Homicidal Thoughts:  denies  Crisis Safety Plan: yes, to come to the emergency room.  Hallucinations:  denies    Patient's Support Network Includes:  children and extended family    Progress toward goal: Not at goal    Functional Status: Severe impairment    Prognosis: Guarded with Ongoing Treatment    Plan         Patient will continue in individual outpatient therapy session at the [facility] in 2 weeks.  Patient will continue in pharmacotherapy as scheduled with Dr. Villareal.  Patient will adhere to medication regimen as prescribed and report any side effects. Patient will contact this office, call 911 or present to the nearest emergency room should suicidal or homicidal ideations occur. Provide Cognitive Behavioral Therapy and Integrative Therapy to improve functioning, maintain stability, and avoid decompensation and the need for higher level of care.          Return in about 2 weeks (around 9/2/2020) for Next scheduled follow up.      This document signed by Izaiah SHELLEY  Earnest, FAUSTO, [facility] August 19, 2020 08:34

## 2020-09-02 ENCOUNTER — TELEMEDICINE (OUTPATIENT)
Dept: PSYCHIATRY | Facility: CLINIC | Age: 50
End: 2020-09-02

## 2020-09-02 DIAGNOSIS — F33.1 MAJOR DEPRESSIVE DISORDER, RECURRENT EPISODE, MODERATE (HCC): ICD-10-CM

## 2020-09-02 DIAGNOSIS — F41.0 PANIC DISORDER: Primary | ICD-10-CM

## 2020-09-02 DIAGNOSIS — F41.1 GENERALIZED ANXIETY DISORDER: ICD-10-CM

## 2020-09-02 DIAGNOSIS — Z56.6 WORK-RELATED STRESS: ICD-10-CM

## 2020-09-02 PROCEDURE — 90832 PSYTX W PT 30 MINUTES: CPT | Performed by: SOCIAL WORKER

## 2020-09-02 SDOH — HEALTH STABILITY - MENTAL HEALTH: OTHER PHYSICAL AND MENTAL STRAIN RELATED TO WORK: Z56.6

## 2020-09-02 NOTE — PROGRESS NOTES
Date of Service: September 2, 2020  Time In: 8:05 AM  Time Out: 8:23 AM      MN 9:40 AMOGRESS NOTE  Data:  Amish Win is a 50 y.o. male who met 1: 1 with the undersigned for a regularly scheduled individual outpatient therapy session conducted via telemedicine.    This provider is located at [facility], [address] [place] [address]. The provider identified himself as well as his credentials.   The Patient is at his home using his phone because problems with video connection. The patient's condition being diagnosed/treated is appropriate for telemedicine. The patient gave consent to be seen remotely, and when consent is given they understand that the consent allows for patient identifiable information to be sent to a third party as needed.   They may refuse to be seen remotely at any time. The electronic data is encrypted and password protected, and the patient has been advised of the potential risks to privacy not withstanding such measures.  You have chosen to receive care through a telephone visit. Do you consent to use a telephone visit for your medical care today? Yes  This visit has been rescheduled as a phone visit to comply with patient safety concerns in accordance with CDC recommendations. Total time of discussion was 18 minutes.          HPI: Patient states he has taken a new job at a local [employer] and states he feels as though things are going relatively well although he continues to have significant strain and a sense of uncertainty.  He also reports his wife recently left her job as a teacher and has began working in a local call center.  The patient states this will be a significant decrease in income and states he is worried about the stress this will cause.  He reports he feels as though things are somewhat sustainable at this time but continues to struggle with symptoms of anxiety including feeling on edge, feeling overwhelmed, trembling, and a sense of  impending doom.  He also reports panic attacks and states he felt as though he was unable to breathe, trembling, sweating, feeling of passing out, and a severe sense of impending doom.   Patient rates current symptoms of anxiety at a 6/7 on a scale of 1-10 with 10 being most severe.  Patient also reports symptoms of depression  including symptoms of sad mood, anhedonia, decreased energy, and a distinct sense of uncertainty.  Patient rates current symptoms of depression at a 4 on a scale of 1-10 with 10 being most severe.  Patient reports he continues to adhere to medication regimen as prescribed.  Patient adamantly and convincingly denies suicidal ideation vehemently denies any substance use.      Clinical Maneuvering/Intervention:  Assisted patient in processing above session content; acknowledged and normalized patient’s thoughts, feelings, and concerns.  Utilized motivational reviewing techniques including complex reflections to praise the patient for his efforts and for finding a new job.  This session was primarily focused on assisting the patient with developing strategies to manage his symptoms to allow him to successfully navigate this new position.  Utilize cognitive behavioral therapy to assist patient in developing appropriate coping mechanisms decrease in severity and frequency of symptoms and specifically develop strategy to cope with the patient's feelings of anxiety and uncertainty while at work.  Continue to discuss and demonstrate the use of guided imagery and positive self talk and encouraged the patient to continue to utilize as necessary.  Also continue to strongly emphasized the importance of healthy skills of daily living and behavioral activation.  Provided unconditional positive regard and a safe, supportive environment.    Allowed patient to freely discuss issues without interruption or judgment. Provided safe, confidential environment to facilitate the development of positive therapeutic  relationship and encourage open, honest communication. Assisted patient in identifying risk factors which would indicate the need for higher level of care including thoughts to harm self or others and/or self-harming behavior and encouraged patient to contact this office, call 911, or present to the nearest emergency room should any of these events occur. Discussed crisis intervention services and means to access.  Patient adamantly and convincingly denies current suicidal or homicidal ideation or perceptual disturbance.    Assessment    Patient appears to be maintaining tenuous stability at this time after having began a new job at a local [employer].  However, the patient continues to struggle with significant psychosocial stressors at home and significant stress due to COVID-19 pandemic.  As a result, the patient's symptomology continues to cause impairment in important areas of functioning.  As a result, he can be reasonably expected to continue to benefit from treatment likely be at increased risk for decompensation otherwise.    Diagnoses and all orders for this visit:    Panic disorder    Generalized anxiety disorder    Major depressive disorder, recurrent episode, moderate (CMS/HCC)    Work-related stress               Mental Status Exam  Hygiene: Not applicable due to telemedicine  Dress:  casual  Attitude:  Cooperative  Motor Activity:  Appropriate  Speech:  Normal and Pressured  Mood:  anxious   Affect: Not applicable due to telephone session  Thought Processes:  Linear  Thought Content:  normal  Suicidal Thoughts:  denies  Homicidal Thoughts:  denies  Crisis Safety Plan: yes, to come to the emergency room.  Hallucinations:  denies    Patient's Support Network Includes:  children and extended family    Progress toward goal: Not at goal    Functional Status: Severe impairment    Prognosis: Guarded with Ongoing Treatment    Plan         Patient will continue in individual outpatient therapy session at the  [facility] in 2 weeks.  Patient will continue in pharmacotherapy as scheduled with Dr. Villareal.  Patient will adhere to medication regimen as prescribed and report any side effects. Patient will contact this office, call 911 or present to the nearest emergency room should suicidal or homicidal ideations occur. Provide Cognitive Behavioral Therapy and Integrative Therapy to improve functioning, maintain stability, and avoid decompensation and the need for higher level of care.          Return in about 2 weeks (around 9/16/2020) for Next scheduled follow up.      This document signed by Izaiha Tinoco LCSW, [facility] September 2, 2020 08:21

## 2020-09-15 ENCOUNTER — TELEMEDICINE (OUTPATIENT)
Dept: PSYCHIATRY | Facility: CLINIC | Age: 50
End: 2020-09-15

## 2020-09-15 DIAGNOSIS — F41.1 GENERALIZED ANXIETY DISORDER: ICD-10-CM

## 2020-09-15 DIAGNOSIS — F41.0 PANIC DISORDER: ICD-10-CM

## 2020-09-15 DIAGNOSIS — F33.41 RECURRENT MAJOR DEPRESSIVE DISORDER, IN PARTIAL REMISSION (HCC): ICD-10-CM

## 2020-09-15 PROCEDURE — 99214 OFFICE O/P EST MOD 30 MIN: CPT | Performed by: PSYCHIATRY & NEUROLOGY

## 2020-09-15 RX ORDER — ALPRAZOLAM 2 MG/1
2 TABLET, EXTENDED RELEASE ORAL 3 TIMES DAILY
Qty: 90 TABLET | Refills: 2 | Status: SHIPPED | OUTPATIENT
Start: 2020-09-15 | End: 2020-12-01 | Stop reason: SDUPTHER

## 2020-09-15 RX ORDER — IMIPRAMINE HCL 50 MG
TABLET ORAL
Qty: 120 TABLET | Refills: 2 | Status: SHIPPED | OUTPATIENT
Start: 2020-09-15 | End: 2020-12-01 | Stop reason: SDUPTHER

## 2020-09-15 RX ORDER — MIRTAZAPINE 15 MG/1
15 TABLET, FILM COATED ORAL NIGHTLY
Qty: 30 TABLET | Refills: 2 | Status: SHIPPED | OUTPATIENT
Start: 2020-09-15 | End: 2020-12-01 | Stop reason: SDUPTHER

## 2020-09-15 NOTE — PROGRESS NOTES
This patient visit is electronic.  The patient gave consent to be seen remotely, and when consent is given they understand that the consent allows for patient identifiable information to be sent to a third party as needed.   They may refuse to be seen remotely at any time. The electronic data is encrypted and password protected, and the patient has been advised of the potential risks to privacy not withstanding such measures.    The patient is located at his home in [facility]    Clinic visit by my chart.    Patient ID: Amish Win is a 50 y.o. male    SERVICE TYPE: EVALUATION AND MANAGEMENT (greater than 50% of the time spent for supportive psychotherapy).      There were no vitals taken for this visit.    ALLERGIES:  Penicillins    CC/ Focus of the visit: Anxiety/depression    HPI: Patient reports that his anxiety and depression are both somewhat better, has been at his new job 4 to 5 weeks and feels that he has an understanding supervisor which is helped his job related anxiety.  He also practiced behavioral and cognitive techniques learned in therapy to help minimize his anxious moments as well as his depression.  He tries to exercise daily to a limited extent.  He rates his depression 6/10 anxiety 8-9/10.  He endorsed his wife change of employment although it has created some financial stress at home.    No indication of substance abuse or misuse of prescription medication.  Jose reviewed in June.    PFSH: Patient's anxiety persists at home and at work.  Home life perhaps somewhat improved with the wife's change of employment aside from the financial repercussions.    Review of Systems   Respiratory: Positive for chest tightness and shortness of breath.    Cardiovascular: Positive for palpitations.   Gastrointestinal: Negative.    Neurological: Negative.        SUPPORTIVE PSYCHOTHERAPY: continuing efforts to promote the therapeutic alliance, address the patient’s issues, and strengthen self awareness,  insights, and positive coping skills such as Exercising, listen to music, spending time in nature and utilizing resources.     Mental Status Exam  Appearance: Normal  Attitude toward clinician:  cooperative and agreeable   Speech:    Rate:  regular rate and rhythm   Volume: normal  Motor:  no abnormal movements   Mood: Mild depression   affect:  euthymic  Thought Processes:  linear, logical, and goal directed  Thought Content:  Normal   Feeling Hopeless: absent  Suicidal Thoughts or Intent:  absent  Homicidal Thoughts:  absent  Perceptual Disturbance: no perceptual disturbance  Attention and Concentration:  good  Insight and Judgement:  good  Memory:  memory appears to be intact    LABS: No results found for this or any previous visit (from the past 168 hour(s)).    MEDICATION ISSUES: Have discussed with the patient the medications Risks, Benefits, and Side effects including potential falls, possible impaired driving and  metabolic adversities among others. No medication side effects or related complaints today.     TREATMENT PLAN/GOALS: Continue supportive psychotherapy efforts and medications as indicated.     Current Outpatient Medications   Medication Sig Dispense Refill   • ALPRAZolam XR (XANAX XR) 2 MG 24 hr tablet Take 1 tablet by mouth 3 (Three) Times a Day. Fill after January 30, refill in one month. 90 tablet 2   • aspirin 81 MG EC tablet Take 81 mg by mouth 2 (Two) Times a Day.     • docusate sodium (COLACE) 100 MG capsule Take 1 capsule by mouth 2 (Two) Times a Day. 60 capsule 1   • imipramine (Tofranil) 50 MG tablet Take two bid. 120 tablet 2   • latanoprost (XALATAN) 0.005 % ophthalmic solution      • lisinopril-hydrochlorothiazide (PRINZIDE,ZESTORETIC) 10-12.5 MG per tablet Take 1 tablet by mouth Daily. 30 tablet 0   • metoprolol tartrate (LOPRESSOR) 50 MG tablet Take 1 tablet by mouth 2 (Two) Times a Day With Meals. 30 tablet 0   • mirtazapine (REMERON) 15 MG tablet Take 1 tablet by mouth Every  Night. 30 tablet 2   • pantoprazole (PROTONIX) 40 MG EC tablet Take 1 tablet by mouth Daily. 30 tablet 0   • simvastatin (ZOCOR) 20 MG tablet Take 1 tablet by mouth Every Night. 30 tablet 0   • vitamin D (ERGOCALCIFEROL) 62213 units capsule capsule        No current facility-administered medications for this visit.        COLLATERAL PSYCHOTHERAPEUTIC INTERVENTION: Continuing with [facility] every 2 weeks.    RISK: Moderate    Assessment/Plan     Diagnoses and all orders for this visit:    Panic disorder  -     ALPRAZolam XR (XANAX XR) 2 MG 24 hr tablet; Take 1 tablet by mouth 3 (Three) Times a Day. Fill after January 30, refill in one month.  -     imipramine (Tofranil) 50 MG tablet; Take two bid.  -     mirtazapine (REMERON) 15 MG tablet; Take 1 tablet by mouth Every Night.    Generalized anxiety disorder  -     ALPRAZolam XR (XANAX XR) 2 MG 24 hr tablet; Take 1 tablet by mouth 3 (Three) Times a Day. Fill after January 30, refill in one month.  -     imipramine (Tofranil) 50 MG tablet; Take two bid.  -     mirtazapine (REMERON) 15 MG tablet; Take 1 tablet by mouth Every Night.    Recurrent major depressive disorder, in partial remission (CMS/HCC)  -     imipramine (Tofranil) 50 MG tablet; Take two bid.  -     mirtazapine (REMERON) 15 MG tablet; Take 1 tablet by mouth Every Night.        No follow-ups on file.           Patient knows to call if symptoms worsen or fail to improve between appointments.     I spent a total of 26 minutes in direct patient care, greater than 18 minutes (greater than 50%) were with the patient for assessment, coordination care, and counseling  regarding his status and answering any questions the patient had about the medication and plan..      Dictated utilizing Dragon dictation    LAUREN Villareal MD

## 2020-09-16 ENCOUNTER — TELEMEDICINE (OUTPATIENT)
Dept: PSYCHIATRY | Facility: CLINIC | Age: 50
End: 2020-09-16

## 2020-09-16 DIAGNOSIS — F33.41 RECURRENT MAJOR DEPRESSIVE DISORDER, IN PARTIAL REMISSION (HCC): ICD-10-CM

## 2020-09-16 DIAGNOSIS — F41.1 GENERALIZED ANXIETY DISORDER: ICD-10-CM

## 2020-09-16 DIAGNOSIS — F41.0 PANIC DISORDER: Primary | ICD-10-CM

## 2020-09-16 DIAGNOSIS — Z56.6 WORK-RELATED STRESS: ICD-10-CM

## 2020-09-16 PROCEDURE — 90832 PSYTX W PT 30 MINUTES: CPT | Performed by: SOCIAL WORKER

## 2020-09-16 SDOH — HEALTH STABILITY - MENTAL HEALTH: OTHER PHYSICAL AND MENTAL STRAIN RELATED TO WORK: Z56.6

## 2020-09-16 NOTE — PROGRESS NOTES
Date of Service: September 6, 2020  Time In: 8:00 AM  Time Out: 8:32 AM      [place] 9:40 AMOGRESS NOTE  Data:  Amish Win is a 50 y.o. male who met 1: 1 with the undersigned for a regularly scheduled individual outpatient therapy session conducted via telemedicine.    This provider is located at [facility], [address] [place] [address]. The provider identified himself as well as his credentials.   The Patient is at his home using his device with video connection. The patient's condition being diagnosed/treated is appropriate for telemedicine. The patient gave consent to be seen remotely, and when consent is given they understand that the consent allows for patient identifiable information to be sent to a third party as needed.   They may refuse to be seen remotely at any time. The electronic data is encrypted and password protected, and the patient has been advised of the potential risks to privacy not withstanding such measures.            HPI: Patient states he has been at his new job for approximately 5 weeks and states he does believe it is better than some of the other jobs he has had states he has a significant amount of stress and anxiety related to the fact the owner of the business stated to staff that to mandate to wear a mask was unconstitutional and therefore it is not enforced in this business.  Patient also reports that his immediate boss was tested for COVID-19 yesterday with results pending which causes the patient a great deal of anxiety and worry.  Patient continues to struggle with symptoms of anxiety including feeling on edge, feeling overwhelmed, trembling, and a sense of impending doom.  He also reports panic attacks and states he felt as though he was unable to breathe, trembling, sweating, feeling of passing out, and a severe sense of impending doom.   Patient rates current symptoms of anxiety at a 7 on a scale of 1-10 with 10 being most severe.  Patient also  reports symptoms of depression  including symptoms of sad mood, anhedonia, decreased energy, and a distinct sense of uncertainty.  Patient rates current symptoms of depression at a 3/4 on a scale of 1-10 with 10 being most severe.  Patient reports he continues to adhere to medication regimen as prescribed.  Patient adamantly and convincingly denies suicidal ideation vehemently denies any substance use.      Clinical Maneuvering/Intervention:  Assisted patient in processing above session content; acknowledged and normalized patient’s thoughts, feelings, and concerns.  Acknowledged and validated the patient's ongoing concerns with her current COVID-19 pandemic and the lack of the general public taking appropriate precautions including his place of employment.  Also utilized motivational reviewing techniques including complex reflections to discussed concept of things we can control daily cannot control and encouraged him to focus on the things he can do including taking appropriate precautions and wearing his mask.  Also utilize cognitive behavioral therapy to assist the patient in recognizing that if he except the fact he needs this job but will take all appropriate precautions it could ease some of his anxiety and worry.  Continue to focus on healthy skills of daily living and behavioral activation and strongly urged the patient to actively seek activities he can engage in on a regular basis.  Provided unconditional positive regard and a safe, supportive environment.    Allowed patient to freely discuss issues without interruption or judgment. Provided safe, confidential environment to facilitate the development of positive therapeutic relationship and encourage open, honest communication. Assisted patient in identifying risk factors which would indicate the need for higher level of care including thoughts to harm self or others and/or self-harming behavior and encouraged patient to contact this office, call 911, or  present to the nearest emergency room should any of these events occur. Discussed crisis intervention services and means to access.  Patient adamantly and convincingly denies current suicidal or homicidal ideation or perceptual disturbance.    Assessment    Patient appears to be maintaining tenuous stability at this time after having began a new job at a local [employer].  However, his symptoms continue to be significantly exacerbated by the stress of the COVID-19 pandemic and his belief that very few people are taking appropriate precautions including his place of employment.  As a result, the patient's symptomology continues to cause impairment in important areas of functioning.  As a result, he can be reasonably expected to continue to benefit from treatment likely be at increased risk for decompensation otherwise.    Diagnoses and all orders for this visit:    Panic disorder    Generalized anxiety disorder    Recurrent major depressive disorder, in partial remission (CMS/[facility])    Work-related stress               Mental Status Exam  Hygiene: Not applicable due to telemedicine  Dress:  casual  Attitude:  Cooperative  Motor Activity:  Appropriate  Speech:  Normal and Pressured  Mood:  anxious   Affect: Anxious/tired  Thought Processes:  Linear  Thought Content:  normal  Suicidal Thoughts:  denies  Homicidal Thoughts:  denies  Crisis Safety Plan: yes, to come to the emergency room.  Hallucinations:  denies    Patient's Support Network Includes:  children and extended family    Progress toward goal: Not at goal    Functional Status: Severe impairment    Prognosis: Guarded with Ongoing Treatment    Plan         Patient will continue in individual outpatient therapy session at the [facility] in 2 weeks.  Patient will continue in pharmacotherapy as scheduled with Dr. Villareal.  Patient will adhere to medication regimen as prescribed and report any side effects. Patient will contact this office, call 911 or present to  the nearest emergency room should suicidal or homicidal ideations occur. Provide Cognitive Behavioral Therapy and Integrative Therapy to improve functioning, maintain stability, and avoid decompensation and the need for higher level of care.          Return in about 2 weeks (around 9/30/2020) for Next scheduled follow up.      This document signed by Izaiah Tinoco LCSW, [facility] September 16, 2020 08:36 EDT

## 2020-09-24 ENCOUNTER — TELEPHONE (OUTPATIENT)
Dept: PSYCHIATRY | Facility: CLINIC | Age: 50
End: 2020-09-24

## 2020-09-28 ENCOUNTER — TELEPHONE (OUTPATIENT)
Dept: PSYCHIATRY | Facility: CLINIC | Age: 50
End: 2020-09-28

## 2020-09-30 ENCOUNTER — TELEMEDICINE (OUTPATIENT)
Dept: PSYCHIATRY | Facility: CLINIC | Age: 50
End: 2020-09-30

## 2020-09-30 DIAGNOSIS — F41.1 GENERALIZED ANXIETY DISORDER: ICD-10-CM

## 2020-09-30 DIAGNOSIS — F33.41 RECURRENT MAJOR DEPRESSIVE DISORDER, IN PARTIAL REMISSION (HCC): ICD-10-CM

## 2020-09-30 DIAGNOSIS — F41.0 PANIC DISORDER: Primary | ICD-10-CM

## 2020-09-30 DIAGNOSIS — Z56.6 WORK-RELATED STRESS: ICD-10-CM

## 2020-09-30 PROCEDURE — 90832 PSYTX W PT 30 MINUTES: CPT | Performed by: SOCIAL WORKER

## 2020-09-30 SDOH — HEALTH STABILITY - MENTAL HEALTH: OTHER PHYSICAL AND MENTAL STRAIN RELATED TO WORK: Z56.6
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
## 2020-10-14 ENCOUNTER — TELEMEDICINE (OUTPATIENT)
Dept: PSYCHIATRY | Facility: CLINIC | Age: 50
End: 2020-10-14

## 2020-10-14 DIAGNOSIS — F33.41 RECURRENT MAJOR DEPRESSIVE DISORDER, IN PARTIAL REMISSION (HCC): ICD-10-CM

## 2020-10-14 DIAGNOSIS — F41.1 GENERALIZED ANXIETY DISORDER: ICD-10-CM

## 2020-10-14 DIAGNOSIS — F41.0 PANIC DISORDER: Primary | ICD-10-CM

## 2020-10-14 DIAGNOSIS — Z56.6 WORK-RELATED STRESS: ICD-10-CM

## 2020-10-14 PROCEDURE — 90832 PSYTX W PT 30 MINUTES: CPT | Performed by: SOCIAL WORKER

## 2020-10-14 SDOH — HEALTH STABILITY - MENTAL HEALTH: OTHER PHYSICAL AND MENTAL STRAIN RELATED TO WORK: Z56.6

## 2020-10-14 NOTE — PROGRESS NOTES
"Date of Service: October 14, 2020  Time In: 8:10 AM  Time Out: 8:34 AM      [place] 9:40 AMOGRESS NOTE  Data:  Amish Win is a 50 y.o. male who met 1: 1 with the undersigned for a regularly scheduled individual outpatient therapy session conducted via telephone.  You have chosen to receive care through a telephone visit. Do you consent to use a telephone visit for your medical care today? Yes  This visit has been rescheduled as a phone visit to comply with patient safety concerns in accordance with CDC recommendations. Total time of discussion was 24 minutes.  [place] [address]. The provider identified himself as well as his credentials.   The Patient is at his home using his phone because problems with video connection. The patient's condition being diagnosed/treated is appropriate for telemedicine. The patient gave consent to be seen remotely, and when consent is given they understand that the consent allows for patient identifiable information to be sent to a third party as needed.   They may refuse to be seen remotely at any time. The electronic data is encrypted and password protected, and the patient has been advised of the potential risks to privacy not withstanding such measures.      HPI: Patient reports he continues to feel a great deal of stress at work and states he returns to work today after 1 day off and has 6 straight days.  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  As result, patient states he feels as though he is constantly at risk which causes him to feel on age throughout the workday.  Patient states his wife has not found to work after leaving the school system but states \"I just cannot worry about that right now\".   Patient continues to struggle with symptoms of anxiety including feeling " "on edge, feeling overwhelmed, trembling, and a sense of impending doom.  He also reports panic attacks and states he felt as though he was unable to breathe, trembling, sweating, feeling of passing out, and a severe sense of impending doom.   Patient rates current symptoms of anxiety at a 7 on a scale of 1-10 with 10 being most severe.  Patient states anxiety is increased while out in public and at work due to significant fear of kari COVID-19.  Patient also reports symptoms of depression  including symptoms of sad mood, anhedonia, decreased energy, and a distinct sense of uncertainty.  Patient rates current symptoms of depression at a 4 on a scale of 1-10 with 10 being most severe.  Patient reports he continues to adhere to medication regimen as prescribed.  Patient adamantly and convincingly denies suicidal ideation vehemently denies any substance use.      Clinical Maneuvering/Intervention:  Assisted patient in processing above session content; acknowledged and normalized patient’s thoughts, feelings, and concerns.  Allow the patient to discuss/process his feelings and fears concerning the fact he feels he is being at risk on a daily basis at his place of work and validated his feelings.  Also utilized motivational reviewing techniques including complex reflections to discussed concept of things he can control daily cannot control and strongly encourage patient to remind himself all he can do is take appropriate precautions and be as careful as possible.  Continue to utilize cognitive behavioral therapy to assist patient in developing appropriate MUGA mechanisms to decrease the severity and frequency of symptoms and strongly encouraged him to actively seek activities he can engage in on a regular basis to decrease idle time and social isolation.  Challenged patient to ask himself on a daily basis \"what am I doing today to try to be happy\".  Provided unconditional positive regard and a safe, supportive " environment.    Allowed patient to freely discuss issues without interruption or judgment. Provided safe, confidential environment to facilitate the development of positive therapeutic relationship and encourage open, honest communication. Assisted patient in identifying risk factors which would indicate the need for higher level of care including thoughts to harm self or others and/or self-harming behavior and encouraged patient to contact this office, call 911, or present to the nearest emergency room should any of these events occur. Discussed crisis intervention services and means to access.  Patient adamantly and convincingly denies current suicidal or homicidal ideation or perceptual disturbance.    Assessment    Patient appears to be maintaining tenuous stability at this time after having began a new job at a local [employer].  However, his symptoms continue to be significantly exacerbated by the stress of the COVID-19 pandemic and his belief that very few people are taking appropriate precautions including his place of employment.  As a result, the patient's symptomology continues to cause impairment in important areas of functioning.  As a result, he can be reasonably expected to continue to benefit from treatment likely be at increased risk for decompensation otherwise.    Diagnoses and all orders for this visit:    1. Panic disorder (Primary)    2. Generalized anxiety disorder    3. Recurrent major depressive disorder, in partial remission (CMS/[facility])    4. Work-related stress               Mental Status Exam  Hygiene: Not applicable due to telemedicine  Dress:  casual  Attitude:  Cooperative  Motor Activity:  Appropriate  Speech:  Normal and Pressured  Mood:  anxious   Affect: Anxious/tired  Thought Processes:  Linear  Thought Content:  normal  Suicidal Thoughts:  denies  Homicidal Thoughts:  denies  Crisis Safety Plan: yes, to come to the emergency room.  Hallucinations:  denies    Patient's Support  Network Includes:  children and extended family    Progress toward goal: Not at goal    Functional Status: Severe impairment    Prognosis: Guarded with Ongoing Treatment    Plan         Patient will continue in individual outpatient therapy session at the [facility] in 2 weeks.  Patient will continue in pharmacotherapy as scheduled with Dr. Villareal.  Patient will adhere to medication regimen as prescribed and report any side effects. Patient will contact this office, call 911 or present to the nearest emergency room should suicidal or homicidal ideations occur. Provide Cognitive Behavioral Therapy and Integrative Therapy to improve functioning, maintain stability, and avoid decompensation and the need for higher level of care.          Return in about 2 weeks (around 10/28/2020) for Next scheduled follow up.      This document signed by Izaiah Tinoco LCSW, [facility] October 14, 2020 08:33 EDT

## 2020-10-15 NOTE — PLAN OF CARE
Problem: BH Patient Care Overview (Adult)  Goal: Plan of Care Review  Outcome: Ongoing (interventions implemented as appropriate)    08/15/17 8067   Coping/Psychosocial Response Interventions   Plan Of Care Reviewed With patient   Coping/Psychosocial   Patient Agreement with Plan of Care agrees   Patient Care Overview   Progress no change   Outcome Evaluation   Outcome Summary/Follow up Plan PT REPORTED ANXIETY & DEPRESSION BOTH 7, DENIED SI/HI/HALLUCINATIONS. OUT IN THE DAYROOM ALL SHIFT WATCHING TV NO COMPLAINTS.         Problem:  Overarching Goals  Goal: Adheres to Safety Considerations for Self and Others  Outcome: Ongoing (interventions implemented as appropriate)  Goal: Optimized Coping Skills in Response to Life Stressors  Outcome: Ongoing (interventions implemented as appropriate)  Goal: Develops/Participates in Therapeutic [place] to Support Successful Transition  Outcome: Ongoing (interventions implemented as appropriate)       Graft Donor Site Bandage (Optional-Leave Blank If You Don't Want In Note): Steri-strips and a pressure bandage were applied to the donor site.

## 2020-10-28 ENCOUNTER — TELEMEDICINE (OUTPATIENT)
Dept: PSYCHIATRY | Facility: CLINIC | Age: 50
End: 2020-10-28

## 2020-10-28 DIAGNOSIS — F41.0 PANIC DISORDER: Primary | ICD-10-CM

## 2020-10-28 DIAGNOSIS — F41.1 GENERALIZED ANXIETY DISORDER: ICD-10-CM

## 2020-10-28 DIAGNOSIS — F33.41 RECURRENT MAJOR DEPRESSIVE DISORDER, IN PARTIAL REMISSION (HCC): ICD-10-CM

## 2020-10-28 DIAGNOSIS — Z56.6 WORK-RELATED STRESS: ICD-10-CM

## 2020-10-28 DIAGNOSIS — F43.22 ADJUSTMENT DISORDER WITH ANXIOUS MOOD: ICD-10-CM

## 2020-10-28 DIAGNOSIS — Z63.0 MARITAL/PARTNER RELATIONAL PROBLEM: ICD-10-CM

## 2020-10-28 PROCEDURE — 90832 PSYTX W PT 30 MINUTES: CPT | Performed by: SOCIAL WORKER

## 2020-10-28 SDOH — SOCIAL STABILITY - SOCIAL INSECURITY: PROBLEMS IN RELATIONSHIP WITH SPOUSE OR PARTNER: Z63.0

## 2020-10-28 SDOH — HEALTH STABILITY - MENTAL HEALTH: OTHER PHYSICAL AND MENTAL STRAIN RELATED TO WORK: Z56.6

## 2020-10-28 NOTE — PROGRESS NOTES
"Date of Service: October 28, 2020  Time In: 8:05 AM  Time Out: 8:30 AM      MO 9:40 AMOGRESS NOTE  Data:  Amish Win is a 50 y.o. male who met 1: 1 with the undersigned for a regularly scheduled individual outpatient therapy session conducted via telephone.  You have chosen to receive care through a telephone visit. Do you consent to use a telephone visit for your medical care today? Yes  This visit has been rescheduled as a phone visit to comply with patient safety concerns in accordance with CDC recommendations. Total time of discussion was 25 minutes.  This [place] [address]. The provider identified himself as well as his credentials.   The Patient is at his home using his phone because problems with video connection. The patient's condition being diagnosed/treated is appropriate for telemedicine. The patient gave consent to be seen remotely, and when consent is given they understand that the consent allows for patient identifiable information to be sent to a third party as needed.   They may refuse to be seen remotely at any time. The electronic data is encrypted and password protected, and the patient has been advised of the potential risks to privacy not withstanding such measures.      HPI: Patient reports he simply overslept and was awakened by the undersigned's phone call.  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  In fact, the patient states the owner \"got mad at me\" when I ask employees to wear a mask appropriately.  As result, the patient states he feels on age throughout the workday and states he even refrains from eating or drinking as he does not want to remove his mask.  Patient also states he is struggling with his " shift as he normally gets off work at approximately 1 AM and states he has difficulty with feeling tired and worn down all the time.  Patient also reports his wife has not done anything but sit at home since stopping her teaching career and states he is fearful she will not be able to return to work.  As result, he states he feels a great deal of pressure as the sole earner.    Patient continues to struggle with symptoms of anxiety including feeling on edge, feeling overwhelmed, trembling, and a sense of impending doom.  He also reports panic attacks and states he felt as though he was unable to breathe, trembling, sweating, feeling of passing out, and a severe sense of impending doom.   Patient rates current symptoms of anxiety at a 7 on a scale of 1-10 with 10 being most severe.   Patient also reports symptoms of depression  including symptoms of sad mood, anhedonia, decreased energy, and a distinct sense of uncertainty.  Patient rates current symptoms of depression at a 4 on a scale of 1-10 with 10 being most severe.  Patient reports he continues to adhere to medication regimen as prescribed.  Patient adamantly and convincingly denies suicidal ideation vehemently denies any substance use.      Clinical Maneuvering/Intervention:  Assisted patient in processing above session content; acknowledged and normalized patient’s thoughts, feelings, and concerns.  Allow the patient to discuss/process his feelings concerning current COVID-19 pandemic and his perception that he is being placed at risk on a daily basis at his job.  Also utilized lower extremity techniques including complex reflections to discussed concept of things we can control things we cannot control and encourage patient to focus on taking all appropriate precautions possible.  Also encouraged patient to consider changing his appointment time to later in the day if he is to continue working the late shift.  Continue to utilize cognitive behavioral therapy  to assist patient in developing appropriate coping mechanisms decrease in severity and frequency of symptoms.  Also reiterated the importance of the patient finding enjoyable activities he can engage in on a regular basis as happiness is generally not an inherent condition and must be pursued.  Provided unconditional positive regard and a safe, supportive environment.    Allowed patient to freely discuss issues without interruption or judgment. Provided safe, confidential environment to facilitate the development of positive therapeutic relationship and encourage open, honest communication. Assisted patient in identifying risk factors which would indicate the need for higher level of care including thoughts to harm self or others and/or self-harming behavior and encouraged patient to contact this office, call 911, or present to the nearest emergency room should any of these events occur. Discussed crisis intervention services and means to access.  Patient adamantly and convincingly denies current suicidal or homicidal ideation or perceptual disturbance.    Assessment    Patient appears to be maintaining tenuous stability at this time after having began a new job at a local [employer].  However, his symptoms continue to be significantly exacerbated by the stress of the COVID-19 pandemic and his belief that very few people are taking appropriate precautions including his place of employment.  As a result, the patient's symptomology continues to cause impairment in important areas of functioning.  As a result, he can be reasonably expected to continue to benefit from treatment likely be at increased risk for decompensation otherwise.    Diagnoses and all orders for this visit:    1. Panic disorder (Primary)    2. Generalized anxiety disorder    3. Recurrent major depressive disorder, in partial remission (CMS/HCC)    4. Work-related stress    5. Adjustment disorder with anxious mood    6. Marital/partner relational  problem               Mental Status Exam  Hygiene: Not applicable due to telemedicine  Dress:  casual  Attitude:  Cooperative  Motor Activity:  Appropriate  Speech:  Normal and Pressured  Mood:  anxious   Affect: Anxious/tired  Thought Processes:  Linear  Thought Content:  normal  Suicidal Thoughts:  denies  Homicidal Thoughts:  denies  Crisis Safety Plan: yes, to come to the emergency room.  Hallucinations:  denies    Patient's Support Network Includes:  children and extended family    Progress toward goal: Not at goal    Functional Status: Severe impairment    Prognosis: Guarded with Ongoing Treatment    Plan         Patient will continue in individual outpatient therapy session at the [facility] in 2 weeks.  Patient will continue in pharmacotherapy as scheduled with Dr. Villareal.  Patient will adhere to medication regimen as prescribed and report any side effects. Patient will contact this office, call 911 or present to the nearest emergency room should suicidal or homicidal ideations occur. Provide Cognitive Behavioral Therapy and Integrative Therapy to improve functioning, maintain stability, and avoid decompensation and the need for higher level of care.          Return in about 2 weeks (around 11/11/2020) for Next scheduled follow up.      This document signed by Izaiah Tinoco LCSW, [facility] October 28, 2020 08:34 EDT

## 2020-11-18 ENCOUNTER — TELEMEDICINE (OUTPATIENT)
Dept: PSYCHIATRY | Facility: CLINIC | Age: 50
End: 2020-11-18

## 2020-11-18 DIAGNOSIS — Z56.6 WORK-RELATED STRESS: ICD-10-CM

## 2020-11-18 DIAGNOSIS — F41.0 PANIC DISORDER: Primary | ICD-10-CM

## 2020-11-18 DIAGNOSIS — F33.41 RECURRENT MAJOR DEPRESSIVE DISORDER, IN PARTIAL REMISSION (HCC): ICD-10-CM

## 2020-11-18 DIAGNOSIS — F41.1 GENERALIZED ANXIETY DISORDER: ICD-10-CM

## 2020-11-18 PROCEDURE — 90832 PSYTX W PT 30 MINUTES: CPT | Performed by: SOCIAL WORKER

## 2020-11-18 SDOH — HEALTH STABILITY - MENTAL HEALTH: OTHER PHYSICAL AND MENTAL STRAIN RELATED TO WORK: Z56.6

## 2020-11-19 NOTE — PROGRESS NOTES
Date of Service: November 18, 2020  Time In: 11:15 AM  Time Out: 11:35 AM      [place] 9:40 AMOGRESS NOTE  Data:  Amish Win is a 50 y.o. male who met 1: 1 with the undersigned for a regularly scheduled individual outpatient therapy session conducted via telephone.  You have chosen to receive care through a telephone visit. Do you consent to use a telephone visit for your medical care today? Yes  This visit has been rescheduled as a phone visit to comply with patient safety concerns in accordance with CDC recommendations. Total time of discussion was 20 minutes.  [place] [address]. The provider identified himself as well as his credentials.   The Patient is at his home using his phone because problems with video connection. The patient's condition being diagnosed/treated is appropriate for telemedicine. The patient gave consent to be seen remotely, and when consent is given they understand that the consent allows for patient identifiable information to be sent to a third party as needed.   They may refuse to be seen remotely at any time. The electronic data is encrypted and password protected, and the patient has been advised of the potential risks to privacy not withstanding such measures.      HPI: Patient states he continues to struggle to keep his appointments due to simply oversleeping as he continues to work night shift.  Patient states this week he has been very stressful and states he continues to have a great deal of fear concerning COVID-19 and the fact his workplace takes a few precautions including refusing to adhere to capacity guidelines for restaurants and states that few people wear a mask.  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  Patient states he is also fearful of new restrictions that will be announced but states some part of him would probably feel  relieved if he were laid off for the time being.  Patient reports his wife was able to begin working as a  at a local [employer] and states he is hopeful this will work out.  Patient continues to struggle with symptoms of anxiety including feeling on edge, feeling overwhelmed, trembling, and a sense of impending doom.  He also reports panic attacks and states he felt as though he was unable to breathe, trembling, sweating, feeling of passing out, and a severe sense of impending doom.   Patient rates current symptoms of anxiety at an 8 on a scale of 1-10 with 10 being most severe.   Patient also reports symptoms of depression  including symptoms of sad mood, anhedonia, decreased energy, and a distinct sense of uncertainty.  Patient rates current symptoms of depression at a 4 on a scale of 1-10 with 10 being most severe.  Patient reports he continues to adhere to medication regimen as prescribed.  Patient adamantly and convincingly denies suicidal ideation vehemently denies any substance use.      Clinical Maneuvering/Intervention:  Assisted patient in processing above session content; acknowledged and normalized patient’s thoughts, feelings, and concerns.  Assisted the patient in processing his fears concerning COVID-19 pandemic and what he perceives to be an unsafe work environment and validated his feelings.  Also utilized motivational event techniques including complex reflections to discuss the concept of things we can control things he cannot control strongly urged the patient to remind himself he can only take appropriate precautions.  Further encouraged patient to utilize appropriate sources of support although he states he and his wife have few meaningful conversations.  Also continue to utilize cognitive behavioral therapy to develop appropriate coping mechanisms and reminded the patient of the importance of regular physical activity and having some time to himself.  Provided unconditional positive  regard and a safe, supportive environment.    Allowed patient to freely discuss issues without interruption or judgment. Provided safe, confidential environment to facilitate the development of positive therapeutic relationship and encourage open, honest communication. Assisted patient in identifying risk factors which would indicate the need for higher level of care including thoughts to harm self or others and/or self-harming behavior and encouraged patient to contact this office, call 911, or present to the nearest emergency room should any of these events occur. Discussed crisis intervention services and means to access.  Patient adamantly and convincingly denies current suicidal or homicidal ideation or perceptual disturbance.    Assessment    Patient appears to be maintaining tenuous stability at this time after having began a new job at a local [employer].  However, his symptoms continue to be significantly exacerbated by the stress of the COVID-19 pandemic and his belief that very few people are taking appropriate precautions including his place of employment.  As a result, the patient's symptomology continues to cause impairment in important areas of functioning.  As a result, he can be reasonably expected to continue to benefit from treatment likely be at increased risk for decompensation otherwise.    Diagnoses and all orders for this visit:    1. Panic disorder (Primary)    2. Generalized anxiety disorder    3. Recurrent major depressive disorder, in partial remission (CMS/[facility])    4. Work-related stress               Mental Status Exam  Hygiene: Not applicable due to telemedicine  Dress:  casual  Attitude:  Cooperative  Motor Activity:  Appropriate  Speech:  Normal and Pressured  Mood:  anxious   Affect: Anxious/tired  Thought Processes:  Linear  Thought Content:  normal  Suicidal Thoughts:  denies  Homicidal Thoughts:  denies  Crisis Safety Plan: yes, to come to the emergency room.  Hallucinations:   denies    Patient's Support Network Includes:  children and extended family    Progress toward goal: Not at goal    Functional Status: Severe impairment    Prognosis: Guarded with Ongoing Treatment    Plan         Patient will continue in individual outpatient therapy session at the [facility] in 2 weeks.  Patient will continue in pharmacotherapy as scheduled with Dr. Villareal.  Patient will adhere to medication regimen as prescribed and report any side effects. Patient will contact this office, call 911 or present to the nearest emergency room should suicidal or homicidal ideations occur. Provide Cognitive Behavioral Therapy and Integrative Therapy to improve functioning, maintain stability, and avoid decompensation and the need for higher level of care.          Return in about 2 weeks (around 12/2/2020) for Next scheduled follow up.      This document signed by Izaiah Tinoco LCSW, [facility] November 19, 2020 07:01 EST

## 2020-11-25 ENCOUNTER — TELEMEDICINE (OUTPATIENT)
Dept: PSYCHIATRY | Facility: CLINIC | Age: 50
End: 2020-11-25

## 2020-11-25 DIAGNOSIS — F41.1 GENERALIZED ANXIETY DISORDER: ICD-10-CM

## 2020-11-25 DIAGNOSIS — F41.0 PANIC DISORDER: Primary | ICD-10-CM

## 2020-11-25 DIAGNOSIS — F43.22 ADJUSTMENT DISORDER WITH ANXIOUS MOOD: ICD-10-CM

## 2020-11-25 DIAGNOSIS — Z56.6 WORK-RELATED STRESS: ICD-10-CM

## 2020-11-25 DIAGNOSIS — F33.41 RECURRENT MAJOR DEPRESSIVE DISORDER, IN PARTIAL REMISSION (HCC): ICD-10-CM

## 2020-11-25 PROCEDURE — 90832 PSYTX W PT 30 MINUTES: CPT | Performed by: SOCIAL WORKER

## 2020-11-25 SDOH — HEALTH STABILITY - MENTAL HEALTH: OTHER PHYSICAL AND MENTAL STRAIN RELATED TO WORK: Z56.6

## 2020-11-25 NOTE — PROGRESS NOTES
"Date of Service: November 25, 2020  Time In: 3:50 PM  Time Out: 4:07 PM      [place] 9:40 AMOGRESS NOTE  Data:  Amish Win is a 50 y.o. male who met 1: 1 with the undersigned for a emergent individual outpatient therapy session at the request of the patient conducted via telephone.  You have chosen to receive care through a telephone visit. Do you consent to use a telephone visit for your medical care today? Yes  This visit has been rescheduled as a phone visit to comply with patient safety concerns in accordance with CDC recommendations. Total time of discussion was 17 minutes.  This provider is located at [facility], [address] [place] [address]. The provider identified himself as well as his credentials.   The Patient is at his home using his phone because problems with video connection. The patient's condition being diagnosed/treated is appropriate for telemedicine. The patient gave consent to be seen remotely, and when consent is given they understand that the consent allows for patient identifiable information to be sent to a third party as needed.   They may refuse to be seen remotely at any time. The electronic data is encrypted and password protected, and the patient has been advised of the potential risks to privacy not withstanding such measures.      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  The patient states he feels a significant sense of uncertainty and impending doom along with increased heart rate, mind goes blank, feeling overwhelmed, and a distinct sense of fear.  Patient states he has a distinct fear of kari COVID-19 and states the fact few people in his place of employment is taking appropriate precautions increases his fear.  Patient reports he continues to adhere to medication regimen as prescribed.  Patient adamantly and convincingly denies suicidal ideation vehemently denies any substance " use.      Clinical Maneuvering/Intervention:  Assisted patient in processing above session content; acknowledged and normalized patient’s thoughts, feelings, and concerns.   allow the patient to discuss/process his feelings and fears concerning several staff members at his place of employment testing positive for COVID-19 and his fear of kari the virus in father's feelings.  Also utilized motivational interviewing techniques including complex reflections to assist the patient in recognizing he cannot control his situation and can only take appropriate precautions as he sees fit.  Also discussed concept of thought blocking techniques and encouraged the patient to utilize techniques to avoid constant obsessive thoughts about this issue.  Provided unconditional positive regard and a safe, supportive environment.    Allowed patient to freely discuss issues without interruption or judgment. Provided safe, confidential environment to facilitate the development of positive therapeutic relationship and encourage open, honest communication. Assisted patient in identifying risk factors which would indicate the need for higher level of care including thoughts to harm self or others and/or self-harming behavior and encouraged patient to contact this office, call 911, or present to the nearest emergency room should any of these events occur. Discussed crisis intervention services and means to access.  Patient adamantly and convincingly denies current suicidal or homicidal ideation or perceptual disturbance.    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  As a result, the patient's symptomology continues to cause impairment in important areas of functioning.  As a result, he can be reasonably expected to continue to  benefit from treatment likely be at increased risk for decompensation otherwise.    Diagnoses and all orders for this visit:    1. Panic disorder (Primary)    2. Generalized anxiety disorder    3. Recurrent major depressive disorder, in partial remission (CMS/HCC)    4. Work-related stress    5. Adjustment disorder with anxious mood               Mental Status Exam  Hygiene: Not applicable due to telemedicine  Dress:  casual  Attitude:  Cooperative  Motor Activity:  Appropriate  Speech:  Normal and Pressured  Mood:  anxious   Affect: Anxious/tired  Thought Processes:  Linear  Thought Content:  normal  Suicidal Thoughts:  denies  Homicidal Thoughts:  denies  Crisis Safety Plan: yes, to come to the emergency room.  Hallucinations:  denies    Patient's Support Network Includes:  children and extended family    Progress toward goal: Not at goal    Functional Status: Severe impairment    Prognosis: Guarded with Ongoing Treatment    Plan         Patient will continue in individual outpatient therapy session at the [facility] in 2 weeks.  Patient will continue in pharmacotherapy as scheduled with Dr. Villareal.  Patient will adhere to medication regimen as prescribed and report any side effects. Patient will contact this office, call 911 or present to the nearest emergency room should suicidal or homicidal ideations occur. Provide Cognitive Behavioral Therapy and Integrative Therapy to improve functioning, maintain stability, and avoid decompensation and the need for higher level of care.          Return in about 2 weeks (around 12/9/2020) for Next scheduled follow up.      This document signed by Izaiah Tinoco LCSW, [facility] November 25, 2020 16:28 EST

## 2020-12-01 ENCOUNTER — TELEMEDICINE (OUTPATIENT)
Dept: PSYCHIATRY | Facility: CLINIC | Age: 50
End: 2020-12-01

## 2020-12-01 DIAGNOSIS — F41.0 PANIC DISORDER: ICD-10-CM

## 2020-12-01 DIAGNOSIS — F33.41 RECURRENT MAJOR DEPRESSIVE DISORDER, IN PARTIAL REMISSION (HCC): Primary | ICD-10-CM

## 2020-12-01 DIAGNOSIS — F41.1 GENERALIZED ANXIETY DISORDER: ICD-10-CM

## 2020-12-01 DIAGNOSIS — F33.41 MAJOR DEPRESSIVE DISORDER, RECURRENT EPISODE, IN PARTIAL REMISSION (HCC): ICD-10-CM

## 2020-12-01 PROCEDURE — 99215 OFFICE O/P EST HI 40 MIN: CPT | Performed by: PSYCHIATRY & NEUROLOGY

## 2020-12-01 RX ORDER — IMIPRAMINE HCL 50 MG
TABLET ORAL
Qty: 120 TABLET | Refills: 0 | Status: SHIPPED | OUTPATIENT
Start: 2020-12-01 | End: 2021-02-02 | Stop reason: SDUPTHER

## 2020-12-01 RX ORDER — MIRTAZAPINE 15 MG/1
15 TABLET, FILM COATED ORAL NIGHTLY
Qty: 30 TABLET | Refills: 0 | Status: SHIPPED | OUTPATIENT
Start: 2020-12-01 | End: 2021-02-02 | Stop reason: SDUPTHER

## 2020-12-01 RX ORDER — ALPRAZOLAM 2 MG/1
2 TABLET, EXTENDED RELEASE ORAL 3 TIMES DAILY
Qty: 90 TABLET | Refills: 0 | Status: SHIPPED | OUTPATIENT
Start: 2020-12-01 | End: 2021-01-14 | Stop reason: SDUPTHER

## 2020-12-01 NOTE — PROGRESS NOTES
"Patient ID: Amish Win is a 50 y.o. male     This patient visit is electronic.  The patient gave consent to be seen remotely, and when consent is given they understand that the consent allows for patient identifiable information to be sent to a third party as needed.   They may refuse to be seen remotely at any time. The electronic data is encrypted and password protected, and the patient has been advised of the potential risks to privacy not withstanding such measures.    The patient is located at his home in [facility].    Clinic visit by My Chart.  Problem         SERVICE TYPE: EVALUATION AND MANAGEMENT (greater than 50% of the time spent for supportive psychotherapy).      There were no vitals taken for this visit.    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 See therapist notes from 11/25. Reviewed his situation and symptom diversion. The dinning room closed now this past week and this has given him some relief from washing his hands, wearing his mask to the extent he does not eat working 12 hours shift.   Depressive symptoms less of an issue at this time. Discouraged by his inability to better control his compulsive behavior    Overall is improved \"but I have my moments\" referring to anxiety. .    No indication of Rx misuse or Substance Abuse.      PFSH:wife retired from her teaching job, working at [employer] as a . TG limited to immediate family at home, very pleasant.     Review of Systems   Respiratory: Negative.    Cardiovascular: Negative.    Neurological: Negative.     Physically improved, worries less: Weight down, lipids improved, tries to exercise days off.     SUPPORTIVE PSYCHOTHERAPY: continuing efforts to promote the therapeutic alliance, address the patient’s issues, and strengthen self awareness, insights, and positive coping skills such as Exercising, listen to music, " spending time in nature and utilizing resources. Struggling to utilize thought re framing with his ego alien obsessional thoughts.     Mental Status Exam  Appearance:  Normal.   Attitude toward clinician:  cooperative and agreeable   Speech:    Rate:  regular rate and rhythm   Volume: normal  Motor:  no abnormal movements   Mood:  Good  Affect:  euthymic  Thought Processes:  linear, logical, and goal directed  Thought Content:  Normal   Feeling Hopeless: absent  Suicidal Thoughts or Intent:  absent  Homicidal Thoughts:  absent  Perceptual Disturbance: no perceptual disturbance  Attention and Concentration:  good  Insight and Judgement:  good  Memory:  memory appears to be intact    LABS: No results found for this or any previous visit (from the past 168 hour(s)).    MEDICATION ISSUES: Have discussed with the patient the medications Risks, Benefits, and Side effects including potential falls, possible impaired driving and  metabolic adversities among others. No medication side effects or related complaints today.     TREATMENT PLAN/GOALS: Continue supportive psychotherapy efforts and medications as indicated.     Current Outpatient Medications   Medication Sig Dispense Refill   • ALPRAZolam XR (XANAX XR) 2 MG 24 hr tablet Take 1 tablet by mouth 3 (Three) Times a Day. Fill after December 13, 2020 90 tablet 0   • aspirin 81 MG EC tablet Take 81 mg by mouth 2 (Two) Times a Day.     • docusate sodium (COLACE) 100 MG capsule Take 1 capsule by mouth 2 (Two) Times a Day. 60 capsule 1   • imipramine (Tofranil) 50 MG tablet Take two bid. 120 tablet 0   • latanoprost (XALATAN) 0.005 % ophthalmic solution      • lisinopril-hydrochlorothiazide (PRINZIDE,ZESTORETIC) 10-12.5 MG per tablet Take 1 tablet by mouth Daily. 30 tablet 0   • metoprolol tartrate (LOPRESSOR) 50 MG tablet Take 1 tablet by mouth 2 (Two) Times a Day With Meals. 30 tablet 0   • mirtazapine (REMERON) 15 MG tablet Take 1 tablet by mouth Every Night. 30 tablet 0    • pantoprazole (PROTONIX) 40 MG EC tablet Take 1 tablet by mouth Daily. 30 tablet 0   • simvastatin (ZOCOR) 20 MG tablet Take 1 tablet by mouth Every Night. 30 tablet 0   • vitamin D (ERGOCALCIFEROL) 02832 units capsule capsule        No current facility-administered medications for this visit.        COLLATERAL PSYCHOTHERAPEUTIC INTERVENTION: will be continuing with therapist Izaiah Tinoco LCSW, continues to be essential and beneficial.      RISK:  moderate    Assessment/Plan     Diagnoses and all orders for this visit:    1. Recurrent major depressive disorder, in partial remission (CMS/HCC) (Primary)  -     imipramine (Tofranil) 50 MG tablet; Take two bid.  Dispense: 120 tablet; Refill: 0  -     mirtazapine (REMERON) 15 MG tablet; Take 1 tablet by mouth Every Night.  Dispense: 30 tablet; Refill: 0    2. Generalized anxiety disorder  -     ALPRAZolam XR (XANAX XR) 2 MG 24 hr tablet; Take 1 tablet by mouth 3 (Three) Times a Day. Fill after December 13, 2020  Dispense: 90 tablet; Refill: 0  -     imipramine (Tofranil) 50 MG tablet; Take two bid.  Dispense: 120 tablet; Refill: 0  -     mirtazapine (REMERON) 15 MG tablet; Take 1 tablet by mouth Every Night.  Dispense: 30 tablet; Refill: 0            Return in about 6 weeks (around 1/12/2021).           Patient knows to call if symptoms worsen or fail to improve between appointments.     I spent a total of 45 minutes in direct patient care, greater than 35 minutes (greater than 50%) were with the patient for assessment, coordination care, and counseling  regarding his status and answering any questions the patient had about the medication and plan..      Dictated utilizing [employer] dictation    LAUREN Villareal MD

## 2020-12-02 ENCOUNTER — TELEMEDICINE (OUTPATIENT)
Dept: PSYCHIATRY | Facility: CLINIC | Age: 50
End: 2020-12-02

## 2020-12-02 DIAGNOSIS — F41.0 PANIC DISORDER: ICD-10-CM

## 2020-12-02 DIAGNOSIS — F33.41 MAJOR DEPRESSIVE DISORDER, RECURRENT EPISODE, IN PARTIAL REMISSION (HCC): Primary | ICD-10-CM

## 2020-12-02 DIAGNOSIS — F41.1 GENERALIZED ANXIETY DISORDER: ICD-10-CM

## 2020-12-02 DIAGNOSIS — Z56.6 WORK-RELATED STRESS: ICD-10-CM

## 2020-12-02 PROCEDURE — 90832 PSYTX W PT 30 MINUTES: CPT | Performed by: SOCIAL WORKER

## 2020-12-02 SDOH — HEALTH STABILITY - MENTAL HEALTH: OTHER PHYSICAL AND MENTAL STRAIN RELATED TO WORK: Z56.6

## 2020-12-02 NOTE — PROGRESS NOTES
"Date of Service: December 2, 2020  Time In: 8:01 AM  Time Out: 8:20 AM      IN 9:40 AMOGRESS NOTE  Data:  Amish Win is a 50 y.o. male who met 1: 1 with the undersigned for a emergent individual outpatient therapy session at the request of the patient conducted via telephone.  You have chosen to receive care through a telephone visit. Do you consent to use a telephone visit for your medical care today? Yes  This visit has been rescheduled as a phone visit to comply with patient safety concerns in accordance with CDC recommendations. Total time of discussion was 19 minutes.  This provider is located at [facility], [address] [place] [address]. The provider identified himself as well as his credentials.   The Patient is at his home using his phone because problems with video connection. The patient's condition being diagnosed/treated is appropriate for telemedicine. The patient gave consent to be seen remotely, and when consent is given they understand that the consent allows for patient identifiable information to be sent to a third party as needed.   They may refuse to be seen remotely at any time. The electronic data is encrypted and password protected, and the patient has been advised of the potential risks to privacy not withstanding such measures.      HPI: Patient states he does realize he is \"maintaining\" but states he continues to have difficult days and moments.  The patient does state he feels as though he is struggling with what he describes as \"OCD \"symptoms and states he is taking his blood pressure pill multiple times throughout the day.  In fact, he states he will take it repeatedly until he gets a result he believes is acceptable.  Patient also reports he is washing his hands obsessively throughout the day to the point that they are sore and bleeding.  The patient states he feels a significant sense of uncertainty and impending doom along with increased heart rate, mind goes blank, feeling " overwhelmed, and a distinct sense of fear.  Patient states he continues to struggle with a distinct sense of uncertainty and states he continues to have fear of kari COVID-19 due to the fact that many people are not taking appropriate precautions.  Patient reports he continues to adhere to medication regimen as prescribed.  Patient adamantly and convincingly denies suicidal ideation vehemently denies any substance use.      Clinical Maneuvering/Intervention:  Assisted patient in processing above session content; acknowledged and normalized patient’s thoughts, feelings, and concerns.   Utilized motivational interviewing techniques including complex reflections to assist the patient and identifying and acknowledging his progress and his ability to maintain stability.  In fact, specifically pointed out the fact the patient has not had to miss work due to his symptoms in several months.  Also assisted the patient with mitigating his obsessive-compulsive tendencies which appear to be primarily due to anxiety and worry by utilizing cognitive behavioral therapy to develop appropriate coping mechanisms.  Also discussed the cycle of anxiety in the process of habituation and encouraged patient to remind himself if he gives them to his obsessions he makes him stronger.  Also discussed the importance of the patient finding activities he can engage in while not at work to keep himself occupied and to find some sense of enjoyment.  Provided unconditional positive regard and a safe, supportive environment.    Allowed patient to freely discuss issues without interruption or judgment. Provided safe, confidential environment to facilitate the development of positive therapeutic relationship and encourage open, honest communication. Assisted patient in identifying risk factors which would indicate the need for higher level of care including thoughts to harm self or others and/or self-harming behavior and encouraged patient to  contact this office, call 911, or present to the nearest emergency room should any of these events occur. Discussed crisis intervention services and means to access.  Patient adamantly and convincingly denies current suicidal or homicidal ideation or perceptual disturbance.    Assessment    Patient continues to struggle with significant depression and anxiety which continues to be exacerbated by COVID-19 pandemic.  However, he does continue to maintain relative stability as compared to his baseline.  As a result, the patient's symptomology continues to cause impairment in important areas of functioning.  As a result, he can be reasonably expected to continue to benefit from treatment likely be at increased risk for decompensation otherwise.    Diagnoses and all orders for this visit:    1. Major depressive disorder, recurrent episode, in partial remission (CMS/[facility]) (Primary)    2. Generalized anxiety disorder    3. Panic disorder    4. Work-related stress               Mental Status Exam  Hygiene: Not applicable due to telemedicine  Dress:  casual  Attitude:  Cooperative  Motor Activity:  Appropriate  Speech:  Normal and Pressured  Mood:  anxious   Affect: Anxious/tired  Thought Processes:  Linear  Thought Content:  normal  Suicidal Thoughts:  denies  Homicidal Thoughts:  denies  Crisis Safety Plan: yes, to come to the emergency room.  Hallucinations:  denies    Patient's Support Network Includes:  children and extended family    Progress toward goal: Not at goal    Functional Status: Severe impairment    Prognosis: Guarded with Ongoing Treatment    Plan         Patient will continue in individual outpatient therapy session at the [facility] in 2 weeks.  Patient will continue in pharmacotherapy as scheduled with Dr. Villareal.  Patient will adhere to medication regimen as prescribed and report any side effects. Patient will contact this office, call 911 or present to the nearest emergency room should suicidal or  homicidal ideations occur. Provide Cognitive Behavioral Therapy and Integrative Therapy to improve functioning, maintain stability, and avoid decompensation and the need for higher level of care.          Return in about 2 weeks (around 12/16/2020) for Next scheduled follow up.      This document signed by Izaiah Tinoco, FAUSTO, [facility] December 2, 2020 08:26 EST

## 2020-12-30 ENCOUNTER — TELEMEDICINE (OUTPATIENT)
Dept: PSYCHIATRY | Facility: CLINIC | Age: 50
End: 2020-12-30

## 2020-12-30 DIAGNOSIS — Z56.6 WORK-RELATED STRESS: ICD-10-CM

## 2020-12-30 DIAGNOSIS — F41.0 PANIC DISORDER: ICD-10-CM

## 2020-12-30 DIAGNOSIS — F33.41 MAJOR DEPRESSIVE DISORDER, RECURRENT EPISODE, IN PARTIAL REMISSION (HCC): Primary | ICD-10-CM

## 2020-12-30 DIAGNOSIS — F43.22 ADJUSTMENT DISORDER WITH ANXIOUS MOOD: ICD-10-CM

## 2020-12-30 DIAGNOSIS — F41.1 GENERALIZED ANXIETY DISORDER: ICD-10-CM

## 2020-12-30 PROCEDURE — 90832 PSYTX W PT 30 MINUTES: CPT | Performed by: SOCIAL WORKER

## 2020-12-30 SDOH — HEALTH STABILITY - MENTAL HEALTH: OTHER PHYSICAL AND MENTAL STRAIN RELATED TO WORK: Z56.6

## 2021-01-13 ENCOUNTER — TELEMEDICINE (OUTPATIENT)
Dept: PSYCHIATRY | Facility: CLINIC | Age: 51
End: 2021-01-13

## 2021-01-13 DIAGNOSIS — F33.41 MAJOR DEPRESSIVE DISORDER, RECURRENT EPISODE, IN PARTIAL REMISSION (HCC): Primary | ICD-10-CM

## 2021-01-13 DIAGNOSIS — F41.0 PANIC DISORDER: ICD-10-CM

## 2021-01-13 DIAGNOSIS — Z56.6 WORK-RELATED STRESS: ICD-10-CM

## 2021-01-13 DIAGNOSIS — F41.1 GENERALIZED ANXIETY DISORDER: ICD-10-CM

## 2021-01-13 PROCEDURE — 90834 PSYTX W PT 45 MINUTES: CPT | Performed by: SOCIAL WORKER

## 2021-01-13 SDOH — HEALTH STABILITY - MENTAL HEALTH: OTHER PHYSICAL AND MENTAL STRAIN RELATED TO WORK: Z56.6

## 2021-01-14 DIAGNOSIS — F41.1 GENERALIZED ANXIETY DISORDER: ICD-10-CM

## 2021-01-14 DIAGNOSIS — F41.0 PANIC DISORDER: ICD-10-CM

## 2021-01-14 RX ORDER — ALPRAZOLAM 2 MG/1
2 TABLET, EXTENDED RELEASE ORAL 3 TIMES DAILY
Qty: 90 TABLET | Refills: 0 | Status: SHIPPED | OUTPATIENT
Start: 2021-01-14 | End: 2021-02-02 | Stop reason: SDUPTHER

## 2021-01-21 NOTE — PROGRESS NOTES
Date of Service: January 13, 2021  Time In: 8:00 AM  Time Out: 8:40 AM      UT 9:40 AMOGRESS NOTE  Data:  Amish Win is a 50 y.o. male who met 1: 1 with the undersigned for a emergent individual outpatient therapy session via [email or web address] video.  This was an audio and video enabled telemedicine encounter.  This provider is located at [facility], [address] [place] [address]. The provider identified himself as well as his credentials.   The Patient is at his home using his phonewith video connection. The patient's condition being diagnosed/treated is appropriate for telemedicine. The patient gave consent to be seen remotely, and when consent is given they understand that the consent allows for patient identifiable information to be sent to a third party as needed.   They may refuse to be seen remotely at any time. 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  Patient states he feels he is not maintaining relative stability and states he believes this is somewhat due to the fact business is slower and he is having less contact with customers.  He states he also believes this is somewhat due to the fact there are few employees around which means he is exposed to fear people.  However, he states he continues to feel a great deal of uncertainty and states he continues to feel constant stress due to the COVID-19 pandemic.  However, he states he has been able to reduce the number of times he washes his hands daily and they are no longer drying out to the point of bleeding.  Patient reports ongoing symptoms of anxiety including sense of uncertainty and impending doom along with increased heart rate, mind goes blank, feeling overwhelmed, and a  distinct sense of fear. Patient rates current anxiety at a 6 on a scale 1-10 with 10 being most severe.  Patient also reports ongoing symptoms of depression although he admits they are somewhat milder including sad mood, anhedonia, anergia, and feelings of hopelessness.  Patient rates current symptoms of depression at a 3 on a scale of 1-10 with 10 being most severe.   Patient reports he continues to adhere to medication regimen as prescribed.  Patient adamantly and convincingly denies suicidal ideation vehemently denies any substance use.      Clinical Maneuvering/Intervention:  Assisted patient in processing above session content; acknowledged and normalized patient’s thoughts, feelings, and concerns.  Utilized motivational interviewing techniques including complex reflections to assist the patient in identifying acknowledging the fact he has made some improvement as evidenced by his report of symptoms and the fact he has been able to reduce the number of times he washes his hands nightly which is a significant improvement.  Also pointed out the fact although the patient has been working throughout the pandemic he has not contracted it as of yet.  Further encouraged the patient to consider getting the vaccine as soon as it is available.  Continue to utilize cognitive behavioral therapy to assist the patient in developing appropriate coping mechanisms to decrease the severity and frequency of symptoms with primary mechanism being relaxation techniques and identifying faulty, irrational thoughts and challenging with factual counter arguments.  Continue to focus on healthy skills of daily living and behavioral activation.  Provided unconditional positive regard and a safe, supportive environment.    Allowed patient to freely discuss issues without interruption or judgment. Provided safe, confidential environment to facilitate the development of positive therapeutic relationship and encourage open, honest  communication. Assisted patient in identifying risk factors which would indicate the need for higher level of care including thoughts to harm self or others and/or self-harming behavior and encouraged patient to contact this office, call 911, or present to the nearest emergency room should any of these events occur. Discussed crisis intervention services and means to access.  Patient adamantly and convincingly denies current suicidal or homicidal ideation or perceptual disturbance.    Assessment    Patient continues to struggle with significant depression and anxiety which continues to be exacerbated by COVID-19 pandemic.  However, he does continue to maintain relative stability as compared to his baseline.  As a result, the patient's symptomology continues to cause impairment in important areas of functioning.  As a result, he can be reasonably expected to continue to benefit from treatment likely be at increased risk for decompensation otherwise.    Diagnoses and all orders for this visit:    1. Major depressive disorder, recurrent episode, in partial remission (CMS/HCC) (Primary)    2. Generalized anxiety disorder    3. Panic disorder    4. Work-related stress               Mental Status Exam  Hygiene: Not applicable due to telemedicine  Dress:  casual  Attitude:  Cooperative  Motor Activity:  Appropriate  Speech:  Normal and Pressured  Mood:  anxious   Affect: Anxious/tired  Thought Processes:  Linear  Thought Content:  normal  Suicidal Thoughts:  denies  Homicidal Thoughts:  denies  Crisis Safety Plan: yes, to come to the emergency room.  Hallucinations:  denies    Patient's Support Network Includes:  children and extended family    Progress toward goal: Not at goal    Functional Status: Severe impairment    Prognosis: Guarded with Ongoing Treatment    Plan         Patient will continue in individual outpatient therapy session at the [facility] in 2 weeks.  Patient will continue in pharmacotherapy as  scheduled with Dr. Villareal.  Patient will adhere to medication regimen as prescribed and report any side effects. Patient will contact this office, call 911 or present to the nearest emergency room should suicidal or homicidal ideations occur. Provide Cognitive Behavioral Therapy and Integrative Therapy to improve functioning, maintain stability, and avoid decompensation and the need for higher level of care.          Return in about 3 weeks (around 2/3/2021) for Next scheduled follow up.      This document signed by Izaiah Tinoco, FAUSTO, JOAQUIN January 21, 2021 07:10 EST

## 2021-02-02 ENCOUNTER — TELEMEDICINE (OUTPATIENT)
Dept: PSYCHIATRY | Facility: CLINIC | Age: 51
End: 2021-02-02

## 2021-02-02 DIAGNOSIS — F33.41 RECURRENT MAJOR DEPRESSIVE DISORDER, IN PARTIAL REMISSION (HCC): ICD-10-CM

## 2021-02-02 DIAGNOSIS — F41.1 GENERALIZED ANXIETY DISORDER: ICD-10-CM

## 2021-02-02 DIAGNOSIS — F41.0 PANIC DISORDER: ICD-10-CM

## 2021-02-02 PROCEDURE — 99214 OFFICE O/P EST MOD 30 MIN: CPT | Performed by: PSYCHIATRY & NEUROLOGY

## 2021-02-02 RX ORDER — HALOPERIDOL 2 MG/1
2 TABLET ORAL DAILY
Qty: 30 TABLET | Refills: 1 | Status: SHIPPED | OUTPATIENT
Start: 2021-02-02 | End: 2021-03-10 | Stop reason: SDUPTHER

## 2021-02-02 RX ORDER — ALPRAZOLAM 2 MG/1
2 TABLET, EXTENDED RELEASE ORAL 3 TIMES DAILY
Qty: 90 TABLET | Refills: 1 | Status: SHIPPED | OUTPATIENT
Start: 2021-02-02 | End: 2021-03-10 | Stop reason: SDUPTHER

## 2021-02-02 RX ORDER — MIRTAZAPINE 15 MG/1
15 TABLET, FILM COATED ORAL NIGHTLY
Qty: 30 TABLET | Refills: 0 | Status: SHIPPED | OUTPATIENT
Start: 2021-02-02 | End: 2021-03-10 | Stop reason: SDUPTHER

## 2021-02-02 RX ORDER — IMIPRAMINE HCL 50 MG
TABLET ORAL
Qty: 120 TABLET | Refills: 0 | Status: SHIPPED | OUTPATIENT
Start: 2021-02-02 | End: 2021-03-10 | Stop reason: SDUPTHER

## 2021-02-02 NOTE — PROGRESS NOTES
"Patient ID: Amish Win is a 50 y.o. male    SERVICE TYPE: EVALUATION AND MANAGEMENT (greater than 50% of the time spent for supportive psychotherapy).    This patient visit is electronic.  The patient gave consent to be seen remotely, and when consent is given they understand that the consent allows for patient identifiable information to be sent to a third party as needed.   They may refuse to be seen remotely at any time. The electronic data is encrypted and password protected, and the patient has been advised of the potential risks to privacy not withstanding such measures.    The patient is located at his home and [facility].    Clinic visit by face time due to lack of alternative.      There were no vitals taken for this visit.    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 Discussed his current job situation. Worries about Covid. Sleeping fairly well although working second shift.  Obsessional concerns seen possibly worse, discussed possible medication change, had not utilized Anafranil but has not used the SSRIs.  Tic moments worse associated with stress. Might try low dose Haldol.     PFSH: Wife's job change has been beneficial for her but not for the family finances.  Home situation stable    Review of Systems   Respiratory: Positive for shortness of breath.         Anxiety related   Cardiovascular: Positive for palpitations.        Anxiety related   Gastrointestinal: Negative.    Neurological:        Tic Movements       SUPPORTIVE PSYCHOTHERAPY: continuing efforts to promote the therapeutic alliance, address the patient’s issues, and strengthen self awareness, insights, and positive coping skills such as Exercising, listen to music, spending time in nature and utilizing resources.     Mental Status Exam  Appearance: Normal  Attitude toward " clinician:  cooperative and agreeable   Speech:    Rate:  regular rate and rhythm   Volume: normal  Motor:  no abnormal movements   Mood: Dysphoric   affect:  euthymic  Thought Processes:  linear, logical, and goal directed  Thought Content:  Normal   Feeling Hopeless: absent  Suicidal Thoughts or Intent:  absent  Homicidal Thoughts:  absent  Perceptual Disturbance: no perceptual disturbance  Attention and Concentration:  good  Insight and Judgement:  good  Memory:  memory appears to be intact    LABS: No results found for this or any previous visit (from the past 168 hour(s)).    MEDICATION ISSUES: Have discussed with the patient the medications Risks, Benefits, and Side effects including potential falls, possible impaired driving and  metabolic adversities among others. No medication side effects or related complaints today.     TREATMENT PLAN/GOALS: Continue supportive psychotherapy efforts and medications as indicated.     Current Outpatient Medications   Medication Sig Dispense Refill   • ALPRAZolam XR (XANAX XR) 2 MG 24 hr tablet Take 1 tablet by mouth 3 (Three) Times a Day. Fill after December 13, 2020 90 tablet 1   • aspirin 81 MG EC tablet Take 81 mg by mouth 2 (Two) Times a Day.     • docusate sodium (COLACE) 100 MG capsule Take 1 capsule by mouth 2 (Two) Times a Day. 60 capsule 1   • haloperidol (HALDOL) 2 MG tablet Take 1 tablet by mouth Daily. 30 tablet 1   • imipramine (Tofranil) 50 MG tablet Take two bid. 120 tablet 0   • latanoprost (XALATAN) 0.005 % ophthalmic solution      • lisinopril-hydrochlorothiazide (PRINZIDE,ZESTORETIC) 10-12.5 MG per tablet Take 1 tablet by mouth Daily. 30 tablet 0   • metoprolol tartrate (LOPRESSOR) 50 MG tablet Take 1 tablet by mouth 2 (Two) Times a Day With Meals. 30 tablet 0   • mirtazapine (REMERON) 15 MG tablet Take 1 tablet by mouth Every Night. 30 tablet 0   • pantoprazole (PROTONIX) 40 MG EC tablet Take 1 tablet by mouth Daily. 30 tablet 0   • simvastatin (ZOCOR)  20 MG tablet Take 1 tablet by mouth Every Night. 30 tablet 0   • vitamin D (ERGOCALCIFEROL) 92717 units capsule capsule        No current facility-administered medications for this visit.        COLLATERAL PSYCHOTHERAPEUTIC INTERVENTION: Continuing with [facility] every 2 weeks intervals.  Has proven to be very beneficial for the patient    RISK: Moderate    Assessment/Plan     Diagnoses and all orders for this visit:    1. Panic disorder  -     ALPRAZolam XR (XANAX XR) 2 MG 24 hr tablet; Take 1 tablet by mouth 3 (Three) Times a Day. Fill after December 13, 2020  Dispense: 90 tablet; Refill: 1  -     haloperidol (HALDOL) 2 MG tablet; Take 1 tablet by mouth Daily.  Dispense: 30 tablet; Refill: 1  -     imipramine (Tofranil) 50 MG tablet; Take two bid.  Dispense: 120 tablet; Refill: 0  -     mirtazapine (REMERON) 15 MG tablet; Take 1 tablet by mouth Every Night.  Dispense: 30 tablet; Refill: 0    2. Generalized anxiety disorder  -     ALPRAZolam XR (XANAX XR) 2 MG 24 hr tablet; Take 1 tablet by mouth 3 (Three) Times a Day. Fill after December 13, 2020  Dispense: 90 tablet; Refill: 1  -     imipramine (Tofranil) 50 MG tablet; Take two bid.  Dispense: 120 tablet; Refill: 0  -     mirtazapine (REMERON) 15 MG tablet; Take 1 tablet by mouth Every Night.  Dispense: 30 tablet; Refill: 0    3. Recurrent major depressive disorder, in partial remission (CMS/HCC)  -     imipramine (Tofranil) 50 MG tablet; Take two bid.  Dispense: 120 tablet; Refill: 0  -     mirtazapine (REMERON) 15 MG tablet; Take 1 tablet by mouth Every Night.  Dispense: 30 tablet; Refill: 0        Return in about 5 weeks (around 3/9/2021).           Patient knows to call if symptoms worsen or fail to improve between appointments.     I spent a total of 30 minutes in direct patient care.    Dictated utilizing Dragon dictation    LAUREN Villareal MD

## 2021-02-10 ENCOUNTER — TELEMEDICINE (OUTPATIENT)
Dept: PSYCHIATRY | Facility: CLINIC | Age: 51
End: 2021-02-10

## 2021-02-10 DIAGNOSIS — Z56.6 WORK-RELATED STRESS: ICD-10-CM

## 2021-02-10 DIAGNOSIS — F41.0 PANIC DISORDER: Primary | ICD-10-CM

## 2021-02-10 DIAGNOSIS — F41.1 GENERALIZED ANXIETY DISORDER: ICD-10-CM

## 2021-02-10 DIAGNOSIS — F33.41 RECURRENT MAJOR DEPRESSIVE DISORDER, IN PARTIAL REMISSION (HCC): ICD-10-CM

## 2021-02-10 PROCEDURE — 90834 PSYTX W PT 45 MINUTES: CPT | Performed by: SOCIAL WORKER

## 2021-02-10 SDOH — HEALTH STABILITY - MENTAL HEALTH: OTHER PHYSICAL AND MENTAL STRAIN RELATED TO WORK: Z56.6

## 2021-02-10 NOTE — PROGRESS NOTES
"Date of Service: February 10, 2021  Time In: 8:00 AM  Time Out: 8:42 AM      IN 9:40 AMOGRESS NOTE  Data:  Amish Win is a 50 y.o. male who met 1: 1 with the undersigned for a emergent individual outpatient therapy session via [employer] video.  This was an audio and video enabled telemedicine encounter.  This provider is located at [facility], [address] [place] [address]. The provider identified himself as well as his credentials.   The Patient is at his home using his phonewith video connection. The patient's condition being diagnosed/treated is appropriate for telemedicine. The patient gave consent to be seen remotely, and when consent is given they understand that the consent allows for patient identifiable information to be sent to a third party as needed.   They may refuse to be seen remotely at any time. The electronic data is encrypted and password protected, and the patient has been advised of the potential risks to privacy not withstanding such measures    HPI: Patient states things are going about the same and states he believes he is doing relatively well at work but reports he continues to believe the fact they are serving fewer customers has helped his anxiety concerning his fear of kari COVID-19.  Patient states things are about the same at home and states he believes his wife is doing better with the stress at her job at [employer] as compared to when she was a .  Patient states he continues to have significant fear of kari COVID-19 and states he spends the vast majority of his time at home when he is not working.  Patient does report he feels he is having increased \"tics\" and states he is constantly feeling of his heart and his pulse and tapping himself on the chest.  He states he believes this is due to his fear of having catastrophic health problems although all medical indications is his health is relatively stable.  Patient reports ongoing symptoms of " anxiety including sense of uncertainty and impending doom along with increased heart rate, mind goes blank, feeling overwhelmed, and a distinct sense of fear. Patient rates current anxiety at a 6 on a scale 1-10 with 10 being most severe.  Patient also reports ongoing symptoms of depression including sad mood, anhedonia, anergia, and feelings of hopelessness.  Patient rates current symptoms of depression at a 3 on a scale of 1-10 with 10 being most severe.   Patient reports he continues to adhere to medication regimen as prescribed.  Patient noted for Dr. Villareal added Haldol 2 mg to his medication regimen but states he is planning to wait until he has day off to try it in case of any adverse side effects.  Patient adamantly and convincingly denies suicidal ideation vehemently denies any substance use.      Clinical Maneuvering/Intervention:  Assisted patient in processing above session content; acknowledged and normalized patient’s thoughts, feelings, and concerns.  Utilized motivational interviewing techniques including complex reflections to point out the fact the patient is likely coped better than he feels like he has and pointed out the fact the patient has not missed work for many months and has not even discussed his feeling that he thought he would need to be hospitalized as he has in the past.  Also continued to utilize cognitive behavioral therapy to assist the patient in developing appropriate coping mechanisms to decrease the severity and frequency of symptoms including relaxation techniques and thought blocking techniques to assist the patient in decreasing his obsessive behaviors which appear to be primarily due to anxiety and worry.  Further encouraged patient to remind himself all he can do is take appropriate precautions including double masking and maintaining appropriate social distancing.  Provided unconditional positive regard and a safe, supportive environment.    Allowed patient to freely  discuss issues without interruption or judgment. Provided safe, confidential environment to facilitate the development of positive therapeutic relationship and encourage open, honest communication. Assisted patient in identifying risk factors which would indicate the need for higher level of care including thoughts to harm self or others and/or self-harming behavior and encouraged patient to contact this office, call 911, or present to the nearest emergency room should any of these events occur. Discussed crisis intervention services and means to access.  Patient adamantly and convincingly denies current suicidal or homicidal ideation or perceptual disturbance.    Assessment    Patient continues to struggle with significant depression and anxiety which continues to be exacerbated by COVID-19 pandemic.  However, he does continue to maintain relative stability as compared to his baseline.  As a result, the patient's symptomology continues to cause impairment in important areas of functioning.  As a result, he can be reasonably expected to continue to benefit from treatment likely be at increased risk for decompensation otherwise.    Diagnoses and all orders for this visit:    1. Panic disorder (Primary)    2. Generalized anxiety disorder    3. Recurrent major depressive disorder, in partial remission (CMS/[facility])    4. Work-related stress               Mental Status Exam  Hygiene: Not applicable due to telemedicine  Dress:  casual  Attitude:  Cooperative  Motor Activity:  Appropriate  Speech:  Normal and Pressured  Mood:  anxious   Affect: Anxious/tired  Thought Processes:  Linear  Thought Content:  normal  Suicidal Thoughts:  denies  Homicidal Thoughts:  denies  Crisis Safety Plan: yes, to come to the emergency room.  Hallucinations:  denies    Patient's Support Network Includes:  children and extended family    Progress toward goal: Not at goal    Functional Status: Severe impairment    Prognosis: Guarded with Ongoing  Treatment    Plan         Patient will continue in individual outpatient therapy session at the [facility] in 2 weeks.  Patient will continue in pharmacotherapy as scheduled with Dr. Villareal.  Patient will adhere to medication regimen as prescribed and report any side effects. Patient will contact this office, call 911 or present to the nearest emergency room should suicidal or homicidal ideations occur. Provide Cognitive Behavioral Therapy and Integrative Therapy to improve functioning, maintain stability, and avoid decompensation and the need for higher level of care.          Return in about 2 weeks (around 2/24/2021) for Next scheduled follow up.      This document signed by Izaiah Tinoco LCSW, [facility] February 10, 2021 09:43 EST

## 2021-02-24 ENCOUNTER — TELEMEDICINE (OUTPATIENT)
Dept: PSYCHIATRY | Facility: CLINIC | Age: 51
End: 2021-02-24

## 2021-02-24 DIAGNOSIS — F33.41 RECURRENT MAJOR DEPRESSIVE DISORDER, IN PARTIAL REMISSION (HCC): ICD-10-CM

## 2021-02-24 DIAGNOSIS — F41.0 PANIC DISORDER: Primary | ICD-10-CM

## 2021-02-24 DIAGNOSIS — F41.1 GENERALIZED ANXIETY DISORDER: ICD-10-CM

## 2021-02-24 PROCEDURE — 90834 PSYTX W PT 45 MINUTES: CPT | Performed by: SOCIAL WORKER

## 2021-03-08 NOTE — PROGRESS NOTES
"Date of Service: February 24, 2021  Time In: 8:00 AM  Time Out: 8:40 AM      RI 9:40 AMOGRESS NOTE  Data:  Amish Win is a 50 y.o. male who met 1: 1 with the undersigned for a emergent individual outpatient therapy session via [employer] video.  This was an audio and video enabled telemedicine encounter.  This provider is located at [facility], [address] [place] [address]. The provider identified himself as well as his credentials.   The Patient is at his home using his phonewith video connection. The patient's condition being diagnosed/treated is appropriate for telemedicine. The patient gave consent to be seen remotely, and when consent is given they understand that the consent allows for patient identifiable information to be sent to a third party as needed.   They may refuse to be seen remotely at any time. The electronic data is encrypted and password protected, and the patient has been advised of the potential risks to privacy not withstanding such measures    HPI: Patient states he feels as though things are going \"okay\" and states although he continues to have significant fear of kari COVID-19 and other various medical issues, he feels things are going better since it is clear COVID numbers are decreasing and numbers of people vaccinated are increasing significantly. However, he states he continues to have great concern at work as many customers either do not wear a mask or do not wear it properly and he continues to struggle to get employees also comply with appropriate precautions. Patient does state he feels as though his wife is doing better since she left the school system and is now working at [employer]. However, he states their relationship has not changed and report they have minimal interaction and no intimate contact. Patient reports ongoing symptoms of anxiety including sense of uncertainty and impending doom along with increased heart rate, mind goes blank, feeling overwhelmed, and " "a distinct sense of fear. Patient rates current anxiety at a five on a scale 1-10 with 10 being most severe.  Patient also reports ongoing symptoms of depression including sad mood, anhedonia, anergia, and feelings of hopelessness.  Patient rates current symptoms of depression at a 3 on a scale of 1-10 with 10 being most severe.   Patient reports he continues to adhere to medication regimen as prescribed. Patient reports he is taking Haldol as prescribed and states he believes that has been helpful with reducing what he describes as \"tics\". Patient adamantly and convincingly denies suicidal ideation vehemently denies any substance use.      Clinical Maneuvering/Intervention:  Assisted patient in processing above session content; acknowledged and normalized patient’s thoughts, feelings, and concerns. Utilized motivational interviewing techniques including complex reflections to assist the patient in recognizing his modest improvement and the fact things appear to be going better in general including improvement and COVID-19. Also focused on the patient's behavioral activation and strongly urged him to actively seek activities he can engage in on a regular basis including taking a walk and doing things outside as the weather improves. Continue to utilize cognitive behavioral therapy to assist the patient in developing appropriate coping mechanisms including thought blocking techniques and cognitive restructuring to decrease the severity and frequency of symptoms and to combat the patient's irrational fear of medical issues. Provided unconditional positive regard and a safe, supportive environment.    Allowed patient to freely discuss issues without interruption or judgment. Provided safe, confidential environment to facilitate the development of positive therapeutic relationship and encourage open, honest communication. Assisted patient in identifying risk factors which would indicate the need for higher level of care " including thoughts to harm self or others and/or self-harming behavior and encouraged patient to contact this office, call 911, or present to the nearest emergency room should any of these events occur. Discussed crisis intervention services and means to access.  Patient adamantly and convincingly denies current suicidal or homicidal ideation or perceptual disturbance.    Assessment    Patient continues to struggle with significant depression and anxiety which continues to be exacerbated by COVID-19 pandemic.  However, he does continue to maintain relative stability as compared to his baseline.  As a result, the patient's symptomology continues to cause impairment in important areas of functioning.  As a result, he can be reasonably expected to continue to benefit from treatment likely be at increased risk for decompensation otherwise.    Diagnoses and all orders for this visit:    1. Panic disorder (Primary)    2. Generalized anxiety disorder    3. Recurrent major depressive disorder, in partial remission (CMS/[facility])               Mental Status Exam  Hygiene: Not applicable due to telemedicine  Dress:  casual  Attitude:  Cooperative  Motor Activity:  Appropriate  Speech:  Normal and Pressured  Mood:  anxious   Affect: Anxious/tired  Thought Processes:  Linear  Thought Content:  normal  Suicidal Thoughts:  denies  Homicidal Thoughts:  denies  Crisis Safety Plan: yes, to come to the emergency room.  Hallucinations:  denies    Patient's Support Network Includes:  children and extended family    Progress toward goal: Not at goal    Functional Status: Severe impairment    Prognosis: Guarded with Ongoing Treatment    Plan         Patient will continue in individual outpatient therapy session at the [facility] in 2 weeks.  Patient will continue in pharmacotherapy as scheduled with Dr. Villareal.  Patient will adhere to medication regimen as prescribed and report any side effects. Patient will contact this office, call [phone]  or present to the nearest emergency room should suicidal or homicidal ideations occur. Provide Cognitive Behavioral Therapy and Integrative Therapy to improve functioning, maintain stability, and avoid decompensation and the need for higher level of care.          Return in about 2 weeks (around 3/10/2021) for Next scheduled follow up.      This document signed by Izaiah Tinoco, FAUSTO, JOAQUIN March 8, 2021 07:12 EST

## 2021-03-10 ENCOUNTER — TELEMEDICINE (OUTPATIENT)
Dept: PSYCHIATRY | Facility: CLINIC | Age: 51
End: 2021-03-10

## 2021-03-10 DIAGNOSIS — F41.1 GENERALIZED ANXIETY DISORDER: ICD-10-CM

## 2021-03-10 DIAGNOSIS — F33.41 RECURRENT MAJOR DEPRESSIVE DISORDER, IN PARTIAL REMISSION (HCC): ICD-10-CM

## 2021-03-10 DIAGNOSIS — F41.1 GENERALIZED ANXIETY DISORDER: Primary | ICD-10-CM

## 2021-03-10 DIAGNOSIS — F95.9 TIC DISORDER, UNSPECIFIED: ICD-10-CM

## 2021-03-10 DIAGNOSIS — F41.0 PANIC DISORDER: Primary | ICD-10-CM

## 2021-03-10 DIAGNOSIS — Z56.6 WORK-RELATED STRESS: ICD-10-CM

## 2021-03-10 DIAGNOSIS — Z63.0 MARITAL/PARTNER RELATIONAL PROBLEM: ICD-10-CM

## 2021-03-10 DIAGNOSIS — F43.22 ADJUSTMENT DISORDER WITH ANXIOUS MOOD: ICD-10-CM

## 2021-03-10 DIAGNOSIS — F41.0 PANIC DISORDER: ICD-10-CM

## 2021-03-10 PROCEDURE — 99214 OFFICE O/P EST MOD 30 MIN: CPT | Performed by: PSYCHIATRY & NEUROLOGY

## 2021-03-10 PROCEDURE — 90832 PSYTX W PT 30 MINUTES: CPT | Performed by: SOCIAL WORKER

## 2021-03-10 RX ORDER — HALOPERIDOL 2 MG/1
2 TABLET ORAL DAILY
Qty: 30 TABLET | Refills: 1 | Status: SHIPPED | OUTPATIENT
Start: 2021-03-10 | End: 2021-06-03 | Stop reason: SDUPTHER

## 2021-03-10 RX ORDER — IMIPRAMINE HCL 50 MG
TABLET ORAL
Qty: 120 TABLET | Refills: 2 | Status: SHIPPED | OUTPATIENT
Start: 2021-03-10 | End: 2021-06-08

## 2021-03-10 RX ORDER — MIRTAZAPINE 15 MG/1
15 TABLET, FILM COATED ORAL NIGHTLY
Qty: 30 TABLET | Refills: 2 | Status: SHIPPED | OUTPATIENT
Start: 2021-03-10 | End: 2021-06-28 | Stop reason: SDUPTHER

## 2021-03-10 RX ORDER — ALPRAZOLAM 2 MG/1
2 TABLET, EXTENDED RELEASE ORAL 3 TIMES DAILY
Qty: 90 TABLET | Refills: 1 | Status: SHIPPED | OUTPATIENT
Start: 2021-03-10 | End: 2021-06-03 | Stop reason: SDUPTHER

## 2021-03-10 SDOH — HEALTH STABILITY - MENTAL HEALTH: OTHER PHYSICAL AND MENTAL STRAIN RELATED TO WORK: Z56.6

## 2021-03-10 SDOH — SOCIAL STABILITY - SOCIAL INSECURITY: PROBLEMS IN RELATIONSHIP WITH SPOUSE OR PARTNER: Z63.0

## 2021-03-10 NOTE — PROGRESS NOTES
"Date of Service: March 10, 2021  Time In: 7:55 AM  Time Out: 8:30 AM      AR 9:40 AMOGRESS NOTE  Data:  Amish Win is a 50 y.o. male who met 1: 1 with the undersigned for a emergent individual outpatient therapy session via [employer] video.  This was an audio and video enabled telemedicine encounter.  This provider is located at [facility], [address] [place] [address]. The provider identified himself as well as his credentials.   The Patient is at his home using his phonewith video connection. The patient's condition being diagnosed/treated is appropriate for telemedicine. The patient gave consent to be seen remotely, and when consent is given they understand that the consent allows for patient identifiable information to be sent to a third party as needed.   They may refuse to be seen remotely at any time. The electronic data is encrypted and password protected, and the patient has been advised of the potential risks to privacy not withstanding such measures    HPI: Patient states he was able to get the first dose of his covert vaccine in the past couple of days and states this makes him feel significantly better.  In fact, he states after getting his second dose he plans to stop wearing 2 masks and return to only wearing one.  Patient states they are getting busier at work and states this is a curse and a black thing as he is glad to see things getting back to normal but he also worries that people are not taking appropriate precautions.  Patient states he feels as though he is doing somewhat better and reports haloperidol has been somewhat helpful in reducing what he describes as \"tics\".  Patient reports ongoing symptoms of anxiety including sense of uncertainty and impending doom along with increased heart rate, mind goes blank, feeling overwhelmed, and a distinct sense of fear. Patient rates current anxiety at a 4/5 on a scale 1-10 with 10 being most severe.  Patient also reports ongoing symptoms of " depression including sad mood, anhedonia, anergia, and feelings of hopelessness.  Patient rates current symptoms of depression at a 2/3 on a scale of 1-10 with 10 being most severe.  Patient reports he continues to adhere to medication regimen as prescribed.  Patient adamantly and convincingly denies suicidal ideation vehemently denies any substance use.      Clinical Maneuvering/Intervention:  Assisted patient in processing above session content; acknowledged and normalized patient’s thoughts, feelings, and concerns.  Allow the patient to discuss/process his feelings and gratitude at being able to get his first dose of the vaccine and validated his feelings.  Also utilized motivational interviewing techniques including complex reflections to point out the fact the patient has lived throughout the entire pandemic and has been able to protect himself and his family for taking appropriate precautions.  Continue to utilize cognitive behavioral therapy to assist the patient in developing appropriate coping mechanisms decrease in severity and frequency of symptoms and specifically assisted the patient in becoming aware of when he is obsessively worrying and engaging in faulty, irrational thoughts and challenging with factual counter arguments.  Continue to focus on healthy skills of daily living and behavioral activation.  Provided unconditional positive regard and a safe, supportive environment.    Allowed patient to freely discuss issues without interruption or judgment. Provided safe, confidential environment to facilitate the development of positive therapeutic relationship and encourage open, honest communication. Assisted patient in identifying risk factors which would indicate the need for higher level of care including thoughts to harm self or others and/or self-harming behavior and encouraged patient to contact this office, call 911, or present to the nearest emergency room should any of these events occur.  Discussed crisis intervention services and means to access.  Patient adamantly and convincingly denies current suicidal or homicidal ideation or perceptual disturbance.    Assessment    Patient continues to struggle with significant depression and anxiety which continues to be exacerbated by COVID-19 pandemic.  However, he does continue to maintain relative stability as compared to his baseline.  As a result, the patient's symptomology continues to cause impairment in important areas of functioning.  As a result, he can be reasonably expected to continue to benefit from treatment likely be at increased risk for decompensation otherwise.    Diagnoses and all orders for this visit:    1. Panic disorder (Primary)    2. Generalized anxiety disorder    3. Recurrent major depressive disorder, in partial remission (CMS/[facility])    4. Work-related stress    5. Adjustment disorder with anxious mood    6. Marital/partner relational problem               Mental Status Exam  Hygiene: Not applicable due to telemedicine  Dress:  casual  Attitude:  Cooperative  Motor Activity:  Appropriate  Speech:  Normal and Pressured  Mood:  anxious   Affect: Anxious/tired  Thought Processes:  Linear  Thought Content:  normal  Suicidal Thoughts:  denies  Homicidal Thoughts:  denies  Crisis Safety Plan: yes, to come to the emergency room.  Hallucinations:  denies    Patient's Support Network Includes:  children and extended family    Progress toward goal: Not at goal    Functional Status: Severe impairment    Prognosis: Guarded with Ongoing Treatment    Plan         Patient will continue in individual outpatient therapy session at the [facility] in 2 weeks.  Patient will continue in pharmacotherapy as scheduled with Dr. Villareal.  Patient will adhere to medication regimen as prescribed and report any side effects. Patient will contact this office, call 911 or present to the nearest emergency room should suicidal or homicidal ideations occur.  Provide Cognitive Behavioral Therapy and Integrative Therapy to improve functioning, maintain stability, and avoid decompensation and the need for higher level of care.          Return in about 2 weeks (around 3/24/2021) for Next scheduled follow up.      This document signed by Izaiah Tinoco LCSW, [facility] March 10, 2021 12:20 EST

## 2021-03-10 NOTE — PROGRESS NOTES
Patient ID: Amish Win is a 50 y.o. male    SERVICE TYPE: EVALUATION AND MANAGEMENT (greater than 50% of the time spent for supportive psychotherapy).    This patient visit is electronic.  The patient gave consent to be seen remotely, and when consent is given they understand that the consent allows for patient identifiable information to be sent to a third party as needed.   They may refuse to be seen remotely at any time. The electronic data is encrypted and password protected, and the patient has been advised of the potential risks to privacy not withstanding such measures.    The patient is located at his home in [facility] .    Clinic visit by My Chart video.       There were no vitals taken for this visit.    ALLERGIES:  Penicillins    CC/ Focus of the visit: depression/ anxiety     HPI:     Patient reports that he is doing fairly well with minimum difficulties with depressive symptomatology and manageable anxiety.  Is working with his therapist towards addressing his obsessional concerns.  He states his tic movements involving his head and neck has improved with the low-dose Haldol prescribed since mid February.  He is sleeping well.  He is functioning at his job as a  fairly well without marked distress that has been the case in the past.  He is recently been concerned about his wife's mental status with her depression, she is seeing a psychiatrist in [place] and therapist here At the [facility].  Patient denies significant issues with side effects from his current medication, no suggestion of any misuse or abuse of the prescribed alprazolam.    PFSH: Patient's home life is fairly stable although as noted above he is concerned about his wife being depressed.    Review of Systems   Respiratory: Negative.    Cardiovascular: Negative.    Gastrointestinal: Negative.    Neurological: Negative.        SUPPORTIVE PSYCHOTHERAPY: continuing efforts to promote the therapeutic alliance,  address the patient’s issues, and strengthen self awareness, insights, and positive coping skills such as Exercising, listen to music, spending time in nature and utilizing resources.     Mental Status Exam  Appearance: Appropriate  Attitude toward clinician:  cooperative and agreeable   Speech:    Rate:  regular rate and rhythm   Volume: normal  Motor:  no abnormal movements   Mood:  Good  Affect:  euthymic  Thought Processes:  linear, logical, and goal directed  Thought Content:  Normal   Feeling Hopeless: absent  Suicidal Thoughts or Intent:  absent  Homicidal Thoughts:  absent  Perceptual Disturbance: no perceptual disturbance  Attention and Concentration:  good  Insight and Judgement:  good  Memory:  memory appears to be intact    LABS: No results found for this or any previous visit (from the past 168 hour(s)).    MEDICATION ISSUES: Have discussed with the patient the medications Risks, Benefits, and Side effects including potential falls, possible impaired driving and  metabolic adversities among others. No medication side effects or related complaints today.     TREATMENT PLAN/GOALS: Continue supportive psychotherapy efforts and medications as indicated.     Current Outpatient Medications   Medication Sig Dispense Refill   • ALPRAZolam XR (XANAX XR) 2 MG 24 hr tablet Take 1 tablet by mouth 3 (Three) Times a Day. Fill after December 13, 2020 90 tablet 1   • aspirin 81 MG EC tablet Take 81 mg by mouth 2 (Two) Times a Day.     • docusate sodium (COLACE) 100 MG capsule Take 1 capsule by mouth 2 (Two) Times a Day. 60 capsule 1   • haloperidol (HALDOL) 2 MG tablet Take 1 tablet by mouth Daily. 30 tablet 1   • imipramine (Tofranil) 50 MG tablet Take two bid. 120 tablet 2   • latanoprost (XALATAN) 0.005 % ophthalmic solution      • lisinopril-hydrochlorothiazide (PRINZIDE,ZESTORETIC) 10-12.5 MG per tablet Take 1 tablet by mouth Daily. 30 tablet 0   • metoprolol tartrate (LOPRESSOR) 50 MG tablet Take 1 tablet by  mouth 2 (Two) Times a Day With Meals. 30 tablet 0   • mirtazapine (REMERON) 15 MG tablet Take 1 tablet by mouth Every Night. 30 tablet 2   • pantoprazole (PROTONIX) 40 MG EC tablet Take 1 tablet by mouth Daily. 30 tablet 0   • simvastatin (ZOCOR) 20 MG tablet Take 1 tablet by mouth Every Night. 30 tablet 0   • vitamin D (ERGOCALCIFEROL) 08296 units capsule capsule        No current facility-administered medications for this visit.       COLLATERAL PSYCHOTHERAPEUTIC INTERVENTION: Patient is continuing with his therapist Izaiah Tinoco LCSW on a biweekly basis, this is been very helpful and he is a cornerstone of his treatment aside from the medication.    RISK: Moderate    Assessment/Plan     Diagnoses and all orders for this visit:    1. Generalized anxiety disorder (Primary)  -     ALPRAZolam XR (XANAX XR) 2 MG 24 hr tablet; Take 1 tablet by mouth 3 (Three) Times a Day. Fill after December 13, 2020  Dispense: 90 tablet; Refill: 1  -     mirtazapine (REMERON) 15 MG tablet; Take 1 tablet by mouth Every Night.  Dispense: 30 tablet; Refill: 2  -     imipramine (Tofranil) 50 MG tablet; Take two bid.  Dispense: 120 tablet; Refill: 2    2. Panic disorder  -     ALPRAZolam XR (XANAX XR) 2 MG 24 hr tablet; Take 1 tablet by mouth 3 (Three) Times a Day. Fill after December 13, 2020  Dispense: 90 tablet; Refill: 1  -     mirtazapine (REMERON) 15 MG tablet; Take 1 tablet by mouth Every Night.  Dispense: 30 tablet; Refill: 2  -     imipramine (Tofranil) 50 MG tablet; Take two bid.  Dispense: 120 tablet; Refill: 2    3. Recurrent major depressive disorder, in partial remission (CMS/HCC)  -     mirtazapine (REMERON) 15 MG tablet; Take 1 tablet by mouth Every Night.  Dispense: 30 tablet; Refill: 2  -     imipramine (Tofranil) 50 MG tablet; Take two bid.  Dispense: 120 tablet; Refill: 2    4. Tic disorder, unspecified  -     haloperidol (HALDOL) 2 MG tablet; Take 1 tablet by mouth Daily.  Dispense: 30 tablet; Refill: 1        Return  in about 3 months (around 6/10/2021).           Patient knows to call if symptoms worsen or fail to improve between appointments.     I spent a total of 30 minutes in direct patient care.    Dictated utilizing BackTrack dictation    LAUREN Villareal MD

## 2021-03-18 ENCOUNTER — TELEPHONE (OUTPATIENT)
Dept: PSYCHIATRY | Facility: CLINIC | Age: 51
End: 2021-03-18

## 2021-03-18 ENCOUNTER — BULK ORDERING (OUTPATIENT)
Dept: CASE MANAGEMENT | Facility: OTHER | Age: 51
End: 2021-03-18

## 2021-03-18 DIAGNOSIS — Z23 IMMUNIZATION DUE: ICD-10-CM

## 2021-03-24 ENCOUNTER — TELEMEDICINE (OUTPATIENT)
Dept: PSYCHIATRY | Facility: CLINIC | Age: 51
End: 2021-03-24

## 2021-03-24 DIAGNOSIS — F41.0 PANIC DISORDER: ICD-10-CM

## 2021-03-24 DIAGNOSIS — F41.1 GENERALIZED ANXIETY DISORDER: Primary | ICD-10-CM

## 2021-03-24 DIAGNOSIS — F33.41 RECURRENT MAJOR DEPRESSIVE DISORDER, IN PARTIAL REMISSION (HCC): ICD-10-CM

## 2021-03-24 DIAGNOSIS — Z56.6 WORK-RELATED STRESS: ICD-10-CM

## 2021-03-24 PROCEDURE — 90834 PSYTX W PT 45 MINUTES: CPT | Performed by: SOCIAL WORKER

## 2021-03-24 SDOH — HEALTH STABILITY - MENTAL HEALTH: OTHER PHYSICAL AND MENTAL STRAIN RELATED TO WORK: Z56.6

## 2021-03-24 NOTE — PROGRESS NOTES
Date of Service: March 24, 2021  Time In: 8:00 AM  Time Out: 8:40 AM      KS 9:40 AMOGRESS NOTE  Data:  Amish Win is a 50 y.o. male who met 1: 1 with the undersigned for a emergent individual outpatient therapy session via Park[employer] video.  This was an audio and video enabled telemedicine encounter.  This provider is located at [facility], [address] [place] [address]. The provider identified himself as well as his credentials.   The Patient is at his home using his phonewith video connection. The patient's condition being diagnosed/treated is appropriate for telemedicine. The patient gave consent to be seen remotely, and when consent is given they understand that the consent allows for patient identifiable information to be sent to a third party as needed.   They may refuse to be seen remotely at any time. The electronic data is encrypted and password protected, and the patient has been advised of the potential risks to privacy not withstanding such measures    HPI: Patient states things continue to be difficult at work and states they are having difficulty finding employees which they interpret to be due to the fact they are able to make more money at home with the enhanced unemployment and they get paid working.  As result, the patient states he is working 6 days weekly 10 to 12 hours daily.  Patient states he feels very stressed out and physically tired.  He does state he feels better and safer after having gotten his initial dose of the COVID 19 vaccine and states he is scheduled to get his final dose within the next 2 weeks.  Patient reports he feels as though he is maintaining relative stability at this time and states he has significant fear that ongoing situation at work could lead to decompensation at some point.  Patient reports ongoing symptoms of anxiety including sense of uncertainty and impending doom along with increased heart rate, mind goes blank, feeling overwhelmed, and a distinct sense  of fear. Patient rates current anxiety at a 6 on a scale 1-10 with 10 being most severe.  Patient also reports ongoing symptoms of depression including sad mood, anhedonia, anergia, and feelings of hopelessness.  Patient rates current symptoms of depression at a 3 on a scale of 1-10 with 10 being most severe.  Patient reports he continues to adhere to medication regimen as prescribed.  Patient adamantly and convincingly denies suicidal ideation vehemently denies any substance use.      Clinical Maneuvering/Intervention:  Assisted patient in processing above session content; acknowledged and normalized patient’s thoughts, feelings, and concerns.  Focused on utilizing motivational interviewing techniques including complex reflections to assist the patient in recognizing he is worrying about things in the future that he has absolutely no control over and encouraged him to remind himself to focus on the here and now.  Also validated the patient's belief that he feels much better and safer after getting the initial dose of the vaccine and encouraged him to continue to take appropriate precautions.  Also encouraged patient to take care of himself and to attempt to engage in appropriate self-care.  Provided unconditional positive regard and a safe, supportive environment.    Allowed patient to freely discuss issues without interruption or judgment. Provided safe, confidential environment to facilitate the development of positive therapeutic relationship and encourage open, honest communication. Assisted patient in identifying risk factors which would indicate the need for higher level of care including thoughts to harm self or others and/or self-harming behavior and encouraged patient to contact this office, call 911, or present to the nearest emergency room should any of these events occur. Discussed crisis intervention services and means to access.  Patient adamantly and convincingly denies current suicidal or homicidal  ideation or perceptual disturbance.    Assessment    Patient appears to be doing somewhat better with significant worry concerning the COVID-19 pandemic after receiving his initial dose of the vaccine.  However, he continues to struggle with significant work-related stress as he is currently working 6 days weekly.  As a result, the patient's symptomology continues to cause impairment in important areas of functioning.  As a result, he can be reasonably expected to continue to benefit from treatment likely be at increased risk for decompensation otherwise.    Diagnoses and all orders for this visit:    1. Generalized anxiety disorder (Primary)    2. Panic disorder    3. Recurrent major depressive disorder, in partial remission (CMS/[facility])    4. Work-related stress               Mental Status Exam  Hygiene: Not applicable due to telemedicine  Dress:  casual  Attitude:  Cooperative  Motor Activity:  Appropriate  Speech:  Normal and Pressured  Mood:  anxious   Affect: Anxious/tired  Thought Processes:  Linear  Thought Content:  normal  Suicidal Thoughts:  denies  Homicidal Thoughts:  denies  Crisis Safety Plan: yes, to come to the emergency room.  Hallucinations:  denies    Patient's Support Network Includes:  children and extended family    Progress toward goal: Not at goal    Functional Status: Severe impairment    Prognosis: Guarded with Ongoing Treatment    Plan         Patient will continue in individual outpatient therapy session at the [facility] in 2 weeks.  Patient will continue in pharmacotherapy as scheduled with Dr. Villareal.  Patient will adhere to medication regimen as prescribed and report any side effects. Patient will contact this office, call 911 or present to the nearest emergency room should suicidal or homicidal ideations occur. Provide Cognitive Behavioral Therapy and Integrative Therapy to improve functioning, maintain stability, and avoid decompensation and the need for higher level of care.           Return in about 2 weeks (around 4/7/2021).      This document signed by Izaiah Tinoco, FAUSTO, [facility] March 24, 2021 11:44 EDT

## 2021-04-21 ENCOUNTER — TELEMEDICINE (OUTPATIENT)
Dept: PSYCHIATRY | Facility: CLINIC | Age: 51
End: 2021-04-21

## 2021-04-21 DIAGNOSIS — Z56.6 WORK-RELATED STRESS: ICD-10-CM

## 2021-04-21 DIAGNOSIS — F33.41 RECURRENT MAJOR DEPRESSIVE DISORDER, IN PARTIAL REMISSION (HCC): ICD-10-CM

## 2021-04-21 DIAGNOSIS — F41.1 GENERALIZED ANXIETY DISORDER: Primary | ICD-10-CM

## 2021-04-21 DIAGNOSIS — F43.22 ADJUSTMENT DISORDER WITH ANXIOUS MOOD: ICD-10-CM

## 2021-04-21 DIAGNOSIS — F41.0 PANIC DISORDER: ICD-10-CM

## 2021-04-21 DIAGNOSIS — Z63.0 MARITAL/PARTNER RELATIONAL PROBLEM: ICD-10-CM

## 2021-04-21 PROCEDURE — 90832 PSYTX W PT 30 MINUTES: CPT | Performed by: SOCIAL WORKER

## 2021-04-21 SDOH — HEALTH STABILITY - MENTAL HEALTH: OTHER PHYSICAL AND MENTAL STRAIN RELATED TO WORK: Z56.6

## 2021-04-21 SDOH — SOCIAL STABILITY - SOCIAL INSECURITY: PROBLEMS IN RELATIONSHIP WITH SPOUSE OR PARTNER: Z63.0

## 2021-04-26 NOTE — PROGRESS NOTES
Date of Service: April 21, 2021  Time In: 8:00 AM  Time Out: 8:35 AM      [place] 9:40 AMOGRESS NOTE  Data:  Amish Win is a 51 y.o. male who met 1: 1 with the undersigned for a emergent individual outpatient therapy session via [employer] video.  This was an audio and video enabled telemedicine encounter.  This provider is located at [facility], [address] [place] [address]. The provider identified himself as well as his credentials.   The Patient is at his home using his phonewith video connection. The patient's condition being diagnosed/treated is appropriate for telemedicine. The patient gave consent to be seen remotely, and when consent is given they understand that the consent allows for patient identifiable information to be sent to a third party as needed.   They may refuse to be seen remotely at any time. The electronic data is encrypted and password protected, and the patient has been advised of the potential risks to privacy not withstanding such measures    HPI: Patient states he continues to work long hours due to the fact they are having difficulty finding employees.  Patient does state he does feel better since getting both of his doses of the vaccine but states he continues to wear his mask and take appropriate precautions.  Patient states things continues to be difficult at home and states he and his wife have a limited interaction.  Patient states he feels as though his depression has increased somewhat and states he continues to have difficulty with seeing positive things in the future.  Patient reports ongoing symptoms of anxiety including sense of uncertainty and impending doom along with increased heart rate, mind goes blank, feeling overwhelmed, and a distinct sense of fear. Patient rates current anxiety at a 6 on a scale 1-10 with 10 being most severe.  Patient also reports ongoing symptoms of depression including sad mood, anhedonia, anergia, and feelings of hopelessness.  Patient rates  current symptoms of depression at a 3/4 on a scale of 1-10 with 10 being most severe.  Patient reports he continues to adhere to medication regimen as prescribed.  Patient adamantly and convincingly denies suicidal ideation vehemently denies any substance use.      Clinical Maneuvering/Intervention:  Assisted patient in processing above session content; acknowledged and normalized patient’s thoughts, feelings, and concerns.  Allow the patient to discuss/process his feelings of frustration with the fact he continues to have to work 6 days weekly and validated his feelings.  Also utilize cognitive behavioral therapy to assist the patient in restructuring his thought process and strongly urged him to actively seek activities he can engage in while off from work that he enjoys.  Further assisted patient with processing his feelings concerning his marriage and at least encouraged him to consider the fact he has a right to decide what is right for him.  Provided unconditional positive regard and a safe, supportive environment.    Allowed patient to freely discuss issues without interruption or judgment. Provided safe, confidential environment to facilitate the development of positive therapeutic relationship and encourage open, honest communication. Assisted patient in identifying risk factors which would indicate the need for higher level of care including thoughts to harm self or others and/or self-harming behavior and encouraged patient to contact this office, call 911, or present to the nearest emergency room should any of these events occur. Discussed crisis intervention services and means to access.  Patient adamantly and convincingly denies current suicidal or homicidal ideation or perceptual disturbance.    Assessment    Patient appears to be doing somewhat better with significant worry concerning the COVID-19 pandemic after receiving his initial dose of the vaccine.  However, he continues to struggle with  significant work-related stress as he is currently working 6 days weekly.  As a result, the patient's symptomology continues to cause impairment in important areas of functioning.  As a result, he can be reasonably expected to continue to benefit from treatment likely be at increased risk for decompensation otherwise.    Diagnoses and all orders for this visit:    1. Generalized anxiety disorder (Primary)    2. Panic disorder    3. Recurrent major depressive disorder, in partial remission (CMS/HCC)    4. Work-related stress    5. Adjustment disorder with anxious mood    6. Marital/partner relational problem               Mental Status Exam  Hygiene: Not applicable due to telemedicine  Dress:  casual  Attitude:  Cooperative  Motor Activity:  Appropriate  Speech:  Normal and Pressured  Mood:  anxious   Affect: Anxious/tired  Thought Processes:  Linear  Thought Content:  normal  Suicidal Thoughts:  denies  Homicidal Thoughts:  denies  Crisis Safety Plan: yes, to come to the emergency room.  Hallucinations:  denies    Patient's Support Network Includes:  children and extended family    Progress toward goal: Not at goal    Functional Status: Severe impairment    Prognosis: Guarded with Ongoing Treatment    Plan         Patient will continue in individual outpatient therapy session at the [facility] in 2 weeks.  Patient will continue in pharmacotherapy as scheduled with Dr. Villareal.  Patient will adhere to medication regimen as prescribed and report any side effects. Patient will contact this office, call 911 or present to the nearest emergency room should suicidal or homicidal ideations occur. Provide Cognitive Behavioral Therapy and Integrative Therapy to improve functioning, maintain stability, and avoid decompensation and the need for higher level of care.          Return in about 3 weeks (around 5/12/2021) for Next scheduled follow up.      This document signed by Izaiah Tinoco LCSW, JOAQUIN April 26, 2021 15:34  EDT

## 2021-05-05 ENCOUNTER — TELEMEDICINE (OUTPATIENT)
Dept: PSYCHIATRY | Facility: CLINIC | Age: 51
End: 2021-05-05

## 2021-05-05 DIAGNOSIS — Z56.6 WORK-RELATED STRESS: ICD-10-CM

## 2021-05-05 DIAGNOSIS — F43.22 ADJUSTMENT DISORDER WITH ANXIOUS MOOD: ICD-10-CM

## 2021-05-05 DIAGNOSIS — F41.1 GENERALIZED ANXIETY DISORDER: Primary | ICD-10-CM

## 2021-05-05 DIAGNOSIS — F41.0 PANIC DISORDER: ICD-10-CM

## 2021-05-05 DIAGNOSIS — Z63.0 MARITAL/PARTNER RELATIONAL PROBLEM: ICD-10-CM

## 2021-05-05 DIAGNOSIS — F33.41 RECURRENT MAJOR DEPRESSIVE DISORDER, IN PARTIAL REMISSION (HCC): ICD-10-CM

## 2021-05-05 PROCEDURE — 90832 PSYTX W PT 30 MINUTES: CPT | Performed by: SOCIAL WORKER

## 2021-05-05 SDOH — SOCIAL STABILITY - SOCIAL INSECURITY: PROBLEMS IN RELATIONSHIP WITH SPOUSE OR PARTNER: Z63.0

## 2021-05-05 SDOH — HEALTH STABILITY - MENTAL HEALTH: OTHER PHYSICAL AND MENTAL STRAIN RELATED TO WORK: Z56.6

## 2021-05-06 NOTE — PROGRESS NOTES
"Date of Service: May 5, 2021  Time In: 8:30 AM  Time Out: 8:50 AM      MS 9:40 AMOGRESS NOTE  Data:  Amish Win is a 51 y.o. male who met 1: 1 with the undersigned for a emergent individual outpatient therapy session via [employer] video.  This was an audio and video enabled telemedicine encounter.  This provider is located at [facility], [address] [place] [address]. The provider identified himself as well as his credentials.   The Patient is at his home using his phonewith video connection. The patient's condition being diagnosed/treated is appropriate for telemedicine. The patient gave consent to be seen remotely, and when consent is given they understand that the consent allows for patient identifiable information to be sent to a third party as needed.   They may refuse to be seen remotely at any time. The electronic data is encrypted and password protected, and the patient has been advised of the potential risks to privacy not withstanding such measures    HPI: Patient states he feels as though his depression has increased somewhat and states he just feels \"blah\" and has little motivation.  Patient also states he feels tired and lethargic and does little other than go to work.  Patient reports symptoms of anxiety as evidenced by sense of uncertainty and impending doom along with increased heart rate, mind goes blank, feeling overwhelmed, and a distinct sense of fear. Patient rates current anxiety at a 6 on a scale 1-10 with 10 being most severe.  Patient also reports ongoing symptoms of depression including sad mood, anhedonia, anergia, and feelings of hopelessness.  Patient rates current symptoms of depression at a 4 on a scale of 1-10 with 10 being most severe.  Patient reports he continues to adhere to medication regimen as prescribed.  Patient adamantly and convincingly denies suicidal ideation vehemently denies any substance use.      Clinical Maneuvering/Intervention:  Assisted patient in " processing above session content; acknowledged and normalized patient’s thoughts, feelings, and concerns.  This session was primarily utilized to focus on precipitants of the patient's symptomology and the importance of behavioral activation.  Provided education/information concerning a positive impact of engaging in enjoyable activities from a neurobiological standpoint and encourage the patient to return to activities he identifies that he is always enjoyed including taking a walk and spending time outside.  Also continue to assist the patient in processing long history of marital discord which obviously contributes to the patient's pathology.  Continue to focus on healthy skills of leg living and behavioral activation.  Provided unconditional positive regard and a safe, supportive environment.    Allowed patient to freely discuss issues without interruption or judgment. Provided safe, confidential environment to facilitate the development of positive therapeutic relationship and encourage open, honest communication. Assisted patient in identifying risk factors which would indicate the need for higher level of care including thoughts to harm self or others and/or self-harming behavior and encouraged patient to contact this office, call 911, or present to the nearest emergency room should any of these events occur. Discussed crisis intervention services and means to access.  Patient adamantly and convincingly denies current suicidal or homicidal ideation or perceptual disturbance.    Assessment    The patient continues to struggle with significant work-related stress.  In addition, he continues to have significant marital discord which exacerbates his symptomology.  As a result, the patient's symptomology continues to cause impairment in important areas of functioning.  As a result, he can be reasonably expected to continue to benefit from treatment likely be at increased risk for decompensation  otherwise.    Diagnoses and all orders for this visit:    1. Generalized anxiety disorder (Primary)    2. Panic disorder    3. Recurrent major depressive disorder, in partial remission (CMS/HCC)    4. Work-related stress    5. Adjustment disorder with anxious mood    6. Marital/partner relational problem               Mental Status Exam  Hygiene: Not applicable due to telemedicine  Dress:  casual  Attitude:  Cooperative  Motor Activity:  Appropriate  Speech:  Normal and Pressured  Mood:  anxious   Affect: Anxious/tired  Thought Processes:  Linear  Thought Content:  normal  Suicidal Thoughts:  denies  Homicidal Thoughts:  denies  Crisis Safety Plan: yes, to come to the emergency room.  Hallucinations:  denies    Patient's Support Network Includes:  children and extended family    Progress toward goal: Not at goal    Functional Status: Severe impairment    Prognosis: Guarded with Ongoing Treatment    Plan         Patient will [facility] in [facility].  Patient will continue in pharmacotherapy as scheduled with Dr. Villareal.  Patient will adhere to medication regimen as prescribed and report any side effects. Patient will contact this office, call 911 or present to the nearest emergency room should suicidal or homicidal ideations occur. Provide Cognitive Behavioral Therapy and Integrative Therapy to improve functioning, maintain stability, and avoid decompensation and the need for higher level of care.          Return in about 2 weeks (around 5/19/2021) for Next scheduled follow up.      This document signed by Izaiah Tinoco LCSW, JOAQUIN May 6, 2021 07:10 EDT

## 2021-05-19 ENCOUNTER — TELEMEDICINE (OUTPATIENT)
Dept: PSYCHIATRY | Facility: CLINIC | Age: 51
End: 2021-05-19

## 2021-05-19 DIAGNOSIS — Z63.0 MARITAL/PARTNER RELATIONAL PROBLEM: ICD-10-CM

## 2021-05-19 DIAGNOSIS — F33.41 RECURRENT MAJOR DEPRESSIVE DISORDER, IN PARTIAL REMISSION (HCC): ICD-10-CM

## 2021-05-19 DIAGNOSIS — F41.0 PANIC DISORDER: ICD-10-CM

## 2021-05-19 DIAGNOSIS — F41.1 GENERALIZED ANXIETY DISORDER: Primary | ICD-10-CM

## 2021-05-19 DIAGNOSIS — Z56.6 WORK-RELATED STRESS: ICD-10-CM

## 2021-05-19 PROCEDURE — 90785 PSYTX COMPLEX INTERACTIVE: CPT | Performed by: SOCIAL WORKER

## 2021-05-19 PROCEDURE — 90832 PSYTX W PT 30 MINUTES: CPT | Performed by: SOCIAL WORKER

## 2021-05-19 SDOH — SOCIAL STABILITY - SOCIAL INSECURITY: PROBLEMS IN RELATIONSHIP WITH SPOUSE OR PARTNER: Z63.0

## 2021-05-19 SDOH — HEALTH STABILITY - MENTAL HEALTH: OTHER PHYSICAL AND MENTAL STRAIN RELATED TO WORK: Z56.6

## 2021-05-19 NOTE — PROGRESS NOTES
"Date of Service: May 19, 2021  Time In: 8:05 AM  Time Out: 8:30 AM      [place] 9:40 AMOGRESS NOTE  Data:  Amish Win is a 51 y.o. male who met 1: 1 with the undersigned for a emergent individual outpatient therapy session via [email or web address] video.  This was an audio and video enabled telemedicine encounter.  This provider is located at [facility], [address] [place] [address]. The provider identified himself as well as his credentials.   The Patient is at his home using his phonewith video connection. The patient's condition being diagnosed/treated is appropriate for telemedicine. The patient gave consent to be seen remotely, and when consent is given they understand that the consent allows for patient identifiable information to be sent to a third party as needed.   They may refuse to be seen remotely at any time. The electronic data is encrypted and password protected, and the patient has been advised of the potential risks to privacy not withstanding such measures    HPI: Patient states he feels as though things are \"about the same\" and states he has been feeling \"blah\" lately.  Patient states he does not have much to say and states he feels as though he has probably been sleeping too much as of late.  Patient reports work is going \"okay I guess\" and states he is simply working coming home and not doing much else.  Patient reports symptoms of anxiety as evidenced by sense of uncertainty and impending doom along with increased heart rate, mind goes blank, feeling overwhelmed, and a distinct sense of fear. Patient rates current anxiety at a 6 on a scale 1-10 with 10 being most severe.  Patient also reports ongoing symptoms of depression including sad mood, anhedonia, anergia, and feelings of hopelessness.  Patient rates current symptoms of depression at a 4/5 on a scale of 1-10 with 10 being most severe.  Patient reports he continues to adhere to medication regimen as prescribed.  Patient adamantly and convincingly " denies suicidal ideation vehemently denies any substance use.      Clinical Maneuvering/Intervention:  Assisted patient in processing above session content; acknowledged and normalized patient’s thoughts, feelings, and concerns.  This session was focused on assisting the patient with identifying coping mechanisms and strategies he is found to be effective in reducing the severity frequency of symptoms and challenged him to consider that at one point he was being more physically active which he found to be beneficial.  Discussed concept of behavioral activation and encouraged patient to consider he appears to be adhering to the path of least resistance at this time which is likely exacerbating symptomology.  Encouraged patient to make a plan to take steps to bring about positive change including being more active and to set a specific date and plan to do this.  Provided unconditional positive regard and a safe, supportive environment.    Allowed patient to freely discuss issues without interruption or judgment. Provided safe, confidential environment to facilitate the development of positive therapeutic relationship and encourage open, honest communication. Assisted patient in identifying risk factors which would indicate the need for higher level of care including thoughts to harm self or others and/or self-harming behavior and encouraged patient to contact this office, call 911, or present to the nearest emergency room should any of these events occur. Discussed crisis intervention services and means to access.  Patient adamantly and convincingly denies current suicidal or homicidal ideation or perceptual disturbance.    Assessment    The patient continues to struggle with significant work-related stress.  In addition, he continues to have significant marital discord which exacerbates his symptomology.  As a result, the patient's symptomology continues to cause impairment in important areas of functioning.  As a  result, he can be reasonably expected to continue to benefit from treatment likely be at increased risk for decompensation otherwise.    Diagnoses and all orders for this visit:    1. Generalized anxiety disorder (Primary)    2. Panic disorder    3. Recurrent major depressive disorder, in partial remission (CMS/HCC)    4. Work-related stress    5. Marital/partner relational problem               Mental Status Exam  Hygiene: Not applicable due to telemedicine  Dress:  casual  Attitude:  Cooperative  Motor Activity:  Appropriate  Speech:  Normal and Pressured  Mood:  anxious   Affect: Anxious/tired  Thought Processes:  Linear  Thought Content:  normal  Suicidal Thoughts:  denies  Homicidal Thoughts:  denies  Crisis Safety Plan: yes, to come to the emergency room.  Hallucinations:  denies    Patient's Support Network Includes:  children and extended family    Progress toward goal: Not at goal    Functional Status: Severe impairment    Prognosis: Guarded with Ongoing Treatment    Plan         Patient will continue in individual outpatient therapy session at the [facility] in 2 weeks.  Patient will continue in pharmacotherapy as scheduled with Dr. Villareal.  Patient will adhere to medication regimen as prescribed and report any side effects. Patient will contact this office, call 911 or present to the nearest emergency room should suicidal or homicidal ideations occur. Provide Cognitive Behavioral Therapy and Integrative Therapy to improve functioning, maintain stability, and avoid decompensation and the need for higher level of care.          Return in about 2 weeks (around 6/2/2021) for Next scheduled follow up.      This document signed by Izaiah Tinoco LCSW, [facility] May 19, 2021 09:02 EDT

## 2021-06-03 DIAGNOSIS — F41.0 PANIC DISORDER: ICD-10-CM

## 2021-06-03 DIAGNOSIS — F95.9 TIC DISORDER, UNSPECIFIED: ICD-10-CM

## 2021-06-03 DIAGNOSIS — F41.1 GENERALIZED ANXIETY DISORDER: ICD-10-CM

## 2021-06-03 DIAGNOSIS — F33.41 RECURRENT MAJOR DEPRESSIVE DISORDER, IN PARTIAL REMISSION (HCC): ICD-10-CM

## 2021-06-03 RX ORDER — MIRTAZAPINE 15 MG/1
15 TABLET, FILM COATED ORAL NIGHTLY
Qty: 30 TABLET | Refills: 2 | OUTPATIENT
Start: 2021-06-03

## 2021-06-03 RX ORDER — ALPRAZOLAM 2 MG/1
2 TABLET, EXTENDED RELEASE ORAL 3 TIMES DAILY
Qty: 90 TABLET | Refills: 0 | Status: SHIPPED | OUTPATIENT
Start: 2021-06-03 | End: 2021-06-28 | Stop reason: SDUPTHER

## 2021-06-03 RX ORDER — IMIPRAMINE HCL 50 MG
TABLET ORAL
Qty: 120 TABLET | Refills: 2 | OUTPATIENT
Start: 2021-06-03

## 2021-06-03 RX ORDER — HALOPERIDOL 2 MG/1
2 TABLET ORAL DAILY
Qty: 30 TABLET | Refills: 0 | Status: SHIPPED | OUTPATIENT
Start: 2021-06-03 | End: 2021-08-31 | Stop reason: SDUPTHER

## 2021-06-08 DIAGNOSIS — F41.1 GENERALIZED ANXIETY DISORDER: ICD-10-CM

## 2021-06-08 DIAGNOSIS — F33.41 RECURRENT MAJOR DEPRESSIVE DISORDER, IN PARTIAL REMISSION (HCC): ICD-10-CM

## 2021-06-08 DIAGNOSIS — F41.0 PANIC DISORDER: ICD-10-CM

## 2021-06-08 RX ORDER — IMIPRAMINE HCL 50 MG
TABLET ORAL
Qty: 120 TABLET | Refills: 0 | Status: SHIPPED | OUTPATIENT
Start: 2021-06-08 | End: 2021-06-28 | Stop reason: SDUPTHER

## 2021-06-09 RX ORDER — IMIPRAMINE HCL 50 MG
TABLET ORAL
Qty: 120 TABLET | Refills: 0 | Status: CANCELLED | OUTPATIENT
Start: 2021-06-09

## 2021-06-23 ENCOUNTER — TELEMEDICINE (OUTPATIENT)
Dept: PSYCHIATRY | Facility: CLINIC | Age: 51
End: 2021-06-23

## 2021-06-23 DIAGNOSIS — F41.0 PANIC DISORDER: Primary | ICD-10-CM

## 2021-06-23 DIAGNOSIS — Z63.0 MARITAL/PARTNER RELATIONAL PROBLEM: ICD-10-CM

## 2021-06-23 DIAGNOSIS — F33.41 RECURRENT MAJOR DEPRESSIVE DISORDER, IN PARTIAL REMISSION (HCC): ICD-10-CM

## 2021-06-23 DIAGNOSIS — Z56.6 WORK-RELATED STRESS: ICD-10-CM

## 2021-06-23 DIAGNOSIS — F41.1 GENERALIZED ANXIETY DISORDER: ICD-10-CM

## 2021-06-23 PROCEDURE — 90785 PSYTX COMPLEX INTERACTIVE: CPT | Performed by: SOCIAL WORKER

## 2021-06-23 PROCEDURE — 90832 PSYTX W PT 30 MINUTES: CPT | Performed by: SOCIAL WORKER

## 2021-06-23 SDOH — SOCIAL STABILITY - SOCIAL INSECURITY: PROBLEMS IN RELATIONSHIP WITH SPOUSE OR PARTNER: Z63.0

## 2021-06-23 SDOH — HEALTH STABILITY - MENTAL HEALTH: OTHER PHYSICAL AND MENTAL STRAIN RELATED TO WORK: Z56.6

## 2021-06-23 NOTE — PROGRESS NOTES
"Date of Service: June 23, 2021  Time In: 8:05 AM  Time Out: 8:40 AM      [place] 9:40 AMOGRESS NOTE  Data:  Amish Win is a 51 y.o. male who met 1: 1 with the undersigned for a emergent individual outpatient therapy session via telephone session due to the patient being locked out of his [employer] account and being unable to connect a video session.  Interactive complexity due to failure of video session and being forced to transition to telephone session.  This visit has been rescheduled as a phone visit to comply with patient safety concerns in accordance with CDC recommendations. Total time of discussion was 35 minutes.      This provider is located at [facility], [address] [place] [address]. The provider identified himself as well as his credentials.   The Patient is at his home using his phone due to difficulties with video connection.  The patient's condition being diagnosed/treated is appropriate for telemedicine. The patient gave consent to be seen remotely, and when consent is given they understand that the consent allows for patient identifiable information to be sent to a third party as needed.   They may refuse to be seen remotely at any time. The electronic data is encrypted and password protected, and the patient has been advised of the potential risks to privacy not withstanding such measures    HPI: Patient states things are going \"okay I guess\" and states work continues to be stressful as they are understaffed due to the fact they are having difficulty hiring staff.  Patient states he attributes this to the fact that there are many other businesses in the area who are offering higher starting salary.  As result, he states work is very stressful and states he is even having been having to do other jobs due to shortage of staff.  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  Patient reports symptoms of anxiety as evidenced by sense of uncertainty and impending doom along with increased heart rate, mind goes blank, feeling overwhelmed, and a distinct sense of fear. Patient rates current anxiety at a 6 on a scale 1-10 with 10 being most severe.  Patient also reports ongoing symptoms of depression including sad mood, anhedonia, anergia, and feelings of hopelessness.  Patient rates current symptoms of depression at a 4 on a scale of 1-10 with 10 being most severe.  Patient reports he continues to adhere to medication regimen as prescribed.  Patient adamantly and convincingly denies suicidal ideation vehemently denies any substance use.      Clinical Maneuvering/Intervention:  Assisted patient in processing above session content; acknowledged and normalized patient’s thoughts, feelings, and concerns.  Allow the patient to discuss/process his feelings concerning ongoing difficulties at work and validated his feelings.  Also utilized motivational interviewing techniques including complex reflections to assist patient in recognizing he can only do as much as he can and assisted him in restructuring his thought process as such.  Also utilize cognitive behavioral therapy and reality based therapy to assist the patient with recognizing the scientific data indicates that those who are vaccinated should not have to wear a mask at this time and encouraged him to consider beginning to decrease the time he spends wearing a mask.  To that end, utilize cognitive behavioral therapy to assist the patient in developing a strategy to reduce and mitigate anxiety that will likely be caused by the patient not wearing a mask.  Provided unconditional positive regard and a safe, supportive environment.    Allowed patient to freely discuss issues without interruption or judgment. Provided safe, confidential environment to facilitate the development of positive therapeutic  relationship and encourage open, honest communication. Assisted patient in identifying risk factors which would indicate the need for higher level of care including thoughts to harm self or others and/or self-harming behavior and encouraged patient to contact this office, call 911, or present to the nearest emergency room should any of these events occur. Discussed crisis intervention services and means to access.  Patient adamantly and convincingly denies current suicidal or homicidal ideation or perceptual disturbance.    Assessment    The patient continues to struggle with significant work-related stress.  In addition, he continues to have significant marital discord which exacerbates his symptomology.  As a result, the patient's symptomology continues to cause impairment in important areas of functioning.  As a result, he can be reasonably expected to continue to benefit from treatment likely be at increased risk for decompensation otherwise.    Diagnoses and all orders for this visit:    1. Panic disorder (Primary)    2. Generalized anxiety disorder    3. Recurrent major depressive disorder, in partial remission (CMS/HCC)    4. Work-related stress    5. Marital/partner relational problem               Mental Status Exam  Hygiene: Not applicable due to telemedicine  Dress:  casual  Attitude:  Cooperative  Motor Activity:  Appropriate  Speech:  Normal and Pressured  Mood:  anxious   Affect: Anxious/tired  Thought Processes:  Linear  Thought Content:  normal  Suicidal Thoughts:  denies  Homicidal Thoughts:  denies  Crisis Safety Plan: yes, to come to the emergency room.  Hallucinations:  denies    Patient's Support Network Includes:  children and extended family    Progress toward goal: Not at goal    Functional Status: Severe impairment    Prognosis: Guarded with Ongoing Treatment    Plan         Patient will continue in individual outpatient therapy session at the [facility] in 2 weeks.  Patient will  continue in pharmacotherapy as scheduled with Dr. Villareal.  Patient will adhere to medication regimen as prescribed and report any side effects. Patient will contact this office, call 911 or present to the nearest emergency room should suicidal or homicidal ideations occur. Provide Cognitive Behavioral Therapy and Integrative Therapy to improve functioning, maintain stability, and avoid decompensation and the need for higher level of care.          Return in about 2 weeks (around 7/7/2021) for Next scheduled follow up.      This document signed by Izaiah Tinoco LCSW, [facility] June 23, 2021 08:37 EDT

## 2021-06-28 ENCOUNTER — TELEMEDICINE (OUTPATIENT)
Dept: PSYCHIATRY | Facility: CLINIC | Age: 51
End: 2021-06-28

## 2021-06-28 ENCOUNTER — TELEPHONE (OUTPATIENT)
Dept: PSYCHIATRY | Facility: CLINIC | Age: 51
End: 2021-06-28

## 2021-06-28 DIAGNOSIS — F33.41 RECURRENT MAJOR DEPRESSIVE DISORDER, IN PARTIAL REMISSION (HCC): ICD-10-CM

## 2021-06-28 DIAGNOSIS — F41.1 GENERALIZED ANXIETY DISORDER: ICD-10-CM

## 2021-06-28 DIAGNOSIS — F41.0 PANIC DISORDER: Primary | ICD-10-CM

## 2021-06-28 PROCEDURE — 99214 OFFICE O/P EST MOD 30 MIN: CPT | Performed by: PSYCHIATRY & NEUROLOGY

## 2021-06-28 RX ORDER — IMIPRAMINE HCL 50 MG
TABLET ORAL
Qty: 120 TABLET | Refills: 2 | Status: SHIPPED | OUTPATIENT
Start: 2021-06-28 | End: 2021-08-31 | Stop reason: SDUPTHER

## 2021-06-28 RX ORDER — MIRTAZAPINE 15 MG/1
15 TABLET, FILM COATED ORAL NIGHTLY
Qty: 30 TABLET | Refills: 2 | Status: SHIPPED | OUTPATIENT
Start: 2021-06-28 | End: 2021-08-31 | Stop reason: SDUPTHER

## 2021-06-28 RX ORDER — ALPRAZOLAM 2 MG/1
TABLET, EXTENDED RELEASE ORAL
Qty: 70 TABLET | Refills: 0 | Status: SHIPPED | OUTPATIENT
Start: 2021-06-28 | End: 2021-08-24 | Stop reason: SDUPTHER

## 2021-06-28 NOTE — PROGRESS NOTES
Patient ID: Amish Win is a 51 y.o. male    SERVICE TYPE: EVALUATION AND MANAGEMENT (greater than 50% of the time spent for supportive psychotherapy).      This patient visit is electronic.  The patient gave consent to be seen remotely, and when consent is given they understand that the consent allows for patient identifiable information to be sent to a third party as needed.   They may refuse to be seen remotely at any time. The electronic data is encrypted and password protected, and the patient has been advised of the potential risks to privacy not withstanding such measures.    The patient is located at his home [facility].    Clinic visit by my chart video    There were no vitals taken for this visit.    ALLERGIES:  Penicillins    CC/ Focus of the visit: Anxiety/depression    HPI: Reviewed the recent clinical notes from June 23 session with his therapist Izaiah Tinoco LCSW.  Patient has reduced his mask wearing at his place of employment.  Today patient states that he is doing fairly well although continuing to experience moderate levels of anxiety associated with obsessional thinking and somatization.  Patient has a persistent tendency to become discouraged with a sense of hopelessness that does not persist and certainly no thoughts of self-harm.  Overall it sounds like he is doing fairly well considering the severity of his anxiety disorder.  He is now had his current job for over a year and has no particular complaints today regarding that situation.  His home life is relatively stable, his wife continues to have difficulty with chronic depression.    Patient is willing to consider tapering back the dosage of the alprazolam.  Will primarily go with 2 of the 2 mg extended release alprazolam daily plus an additional #10/month to use if needed.    PFSH: For now patient's home and seems stable.    Review of Systems  No cardiopulmonary, GI or neurological complaints.  No trips to the emergency room with  cardiac symptomatology.    SUPPORTIVE PSYCHOTHERAPY: continuing efforts to promote the therapeutic alliance, address the patient’s issues, and strengthen self awareness, insights, and positive coping skills such as Exercising, listen to music, spending time in nature and utilizing resources.     Mental Status Exam  Appearance: Appropriate  Attitude toward clinician:  cooperative and agreeable   Speech:    Rate:  regular rate and rhythm   Volume: normal  Motor:  no abnormal movements   Mood: Mildly dysphoric   affect:  euthymic  Thought Processes:  linear, logical, and goal directed  Thought Content:  Normal   Feeling Hopeless: absent  Suicidal Thoughts or Intent:  absent  Homicidal Thoughts:  absent  Perceptual Disturbance: no perceptual disturbance  Attention and Concentration:  good  Insight and Judgement:  good  Memory:  memory appears to be intact    LABS: No results found for this or any previous visit (from the past 168 hour(s)).    MEDICATION ISSUES: Have discussed with the patient the medications Risks, Benefits, and Side effects including potential falls, possible impaired driving and  metabolic adversities among others. No medication side effects or related complaints today.     TREATMENT PLAN/GOALS: Continue supportive psychotherapy efforts and medications as indicated.     Current Outpatient Medications   Medication Sig Dispense Refill   • ALPRAZolam XR (XANAX XR) 2 MG 24 hr tablet Take 1 twice daily plus an additional 1 in the evening if needed for anxiety 70 tablet 0   • aspirin 81 MG EC tablet Take 81 mg by mouth 2 (Two) Times a Day.     • docusate sodium (COLACE) 100 MG capsule Take 1 capsule by mouth 2 (Two) Times a Day. 60 capsule 1   • haloperidol (HALDOL) 2 MG tablet Take 1 tablet by mouth Daily. 30 tablet 0   • imipramine (TOFRANIL) 50 MG tablet Take 2 tablets by mouth twice daily 120 tablet 2   • latanoprost (XALATAN) 0.005 % ophthalmic solution      • lisinopril-hydrochlorothiazide  (PRINZIDE,ZESTORETIC) 10-12.5 MG per tablet Take 1 tablet by mouth Daily. 30 tablet 0   • metoprolol tartrate (LOPRESSOR) 50 MG tablet Take 1 tablet by mouth 2 (Two) Times a Day With Meals. 30 tablet 0   • mirtazapine (REMERON) 15 MG tablet Take 1 tablet by mouth Every Night. 30 tablet 2   • pantoprazole (PROTONIX) 40 MG EC tablet Take 1 tablet by mouth Daily. 30 tablet 0   • vitamin D (ERGOCALCIFEROL) 27009 units capsule capsule        No current facility-administered medications for this visit.       COLLATERAL PSYCHOTHERAPEUTIC INTERVENTION: Patient continuing with [facility] that has been a major component of therapeutic intervention.    RISK: Moderate    Assessment/Plan     Diagnoses and all orders for this visit:    1. Panic disorder (Primary)  -     ALPRAZolam XR (XANAX XR) 2 MG 24 hr tablet; Take 1 twice daily plus an additional 1 in the evening if needed for anxiety  Dispense: 70 tablet; Refill: 0  -     mirtazapine (REMERON) 15 MG tablet; Take 1 tablet by mouth Every Night.  Dispense: 30 tablet; Refill: 2  -     imipramine (TOFRANIL) 50 MG tablet; Take 2 tablets by mouth twice daily  Dispense: 120 tablet; Refill: 2    2. Generalized anxiety disorder  -     ALPRAZolam XR (XANAX XR) 2 MG 24 hr tablet; Take 1 twice daily plus an additional 1 in the evening if needed for anxiety  Dispense: 70 tablet; Refill: 0  -     mirtazapine (REMERON) 15 MG tablet; Take 1 tablet by mouth Every Night.  Dispense: 30 tablet; Refill: 2  -     imipramine (TOFRANIL) 50 MG tablet; Take 2 tablets by mouth twice daily  Dispense: 120 tablet; Refill: 2    3. Recurrent major depressive disorder, in partial remission (CMS/HCC)  -     mirtazapine (REMERON) 15 MG tablet; Take 1 tablet by mouth Every Night.  Dispense: 30 tablet; Refill: 2  -     imipramine (TOFRANIL) 50 MG tablet; Take 2 tablets by mouth twice daily  Dispense: 120 tablet; Refill: 2        Return in about 3 months (around 9/28/2021).           Patient knows to call  if symptoms worsen or fail to improve between appointments.    I spent 30 minutes caring for Amish on this date of service. This time includes time spent by me in the following activities: Patient evaluation, support psychotherapy, decisions, medications, and documentation.     Dictated utilizing FeedHenry dictation    LAUREN Villareal MD

## 2021-07-07 ENCOUNTER — TELEMEDICINE (OUTPATIENT)
Dept: PSYCHIATRY | Facility: CLINIC | Age: 51
End: 2021-07-07

## 2021-07-07 DIAGNOSIS — F41.1 GENERALIZED ANXIETY DISORDER: ICD-10-CM

## 2021-07-07 DIAGNOSIS — F41.0 PANIC DISORDER: Primary | ICD-10-CM

## 2021-07-07 DIAGNOSIS — F33.41 RECURRENT MAJOR DEPRESSIVE DISORDER, IN PARTIAL REMISSION (HCC): ICD-10-CM

## 2021-07-07 DIAGNOSIS — Z63.0 MARITAL/PARTNER RELATIONAL PROBLEM: ICD-10-CM

## 2021-07-07 DIAGNOSIS — Z56.6 WORK-RELATED STRESS: ICD-10-CM

## 2021-07-07 PROCEDURE — 90785 PSYTX COMPLEX INTERACTIVE: CPT | Performed by: SOCIAL WORKER

## 2021-07-07 PROCEDURE — 90832 PSYTX W PT 30 MINUTES: CPT | Performed by: SOCIAL WORKER

## 2021-07-07 SDOH — SOCIAL STABILITY - SOCIAL INSECURITY: PROBLEMS IN RELATIONSHIP WITH SPOUSE OR PARTNER: Z63.0

## 2021-07-07 SDOH — HEALTH STABILITY - MENTAL HEALTH: OTHER PHYSICAL AND MENTAL STRAIN RELATED TO WORK: Z56.6

## 2021-07-07 NOTE — PROGRESS NOTES
"Date of Service: July 7, 2021  Time In: 8:10 AM  Time Out: 8:40 AM      NM 9:40 AMOGRESS NOTE  Data:  Amish Win is a 51 y.o. male who met 1: 1 with the undersigned for a emergent individual outpatient therapy session via telephone session due to the patientbeing unable to connect a video session.  The undersigned also attempted multiple times to connect unsuccessfully.  Interactive complexity due to failure of video session and being forced to transition to telephone session.  This visit has been rescheduled as a phone visit to comply with patient safety concerns in accordance with CDC recommendations. Total time of discussion was 35 minutes.      This provider is located at [facility], [address] [place] [address]. The provider identified himself as well as his credentials.   The Patient is at his home using his phone due to difficulties with video connection.  The patient's condition being diagnosed/treated is appropriate for telemedicine. The patient gave consent to be seen remotely, and when consent is given they understand that the consent allows for patient identifiable information to be sent to a third party as needed.   They may refuse to be seen remotely at any time. The electronic data is encrypted and password protected, and the patient has been advised of the potential risks to privacy not withstanding such measures    HPI: Patient states things are \"about the same\" and states he continues to struggle with working at night and often feels tired the next day.  Patient does state although he continues to feel some degree of anxiety, he has discontinued wearing his mask at work and agrees that this is in agreement with the current scientific knowledge.  Patient does state he is struggling with the fact that his alprazolam was reduced from 3 daily to 2 daily.  Patient states this is been a struggle.  Patient reports symptoms of anxiety as evidenced by sense of uncertainty and impending doom along " with increased heart rate, mind goes blank, feeling overwhelmed, and a distinct sense of fear. Patient rates current anxiety at a 5 on a scale 1-10 with 10 being most severe.  Patient also reports ongoing symptoms of depression including sad mood, anhedonia, anergia, and feelings of hopelessness.  Patient rates current symptoms of depression at a 4 on a scale of 1-10 with 10 being most severe.  Patient reports he continues to adhere to medication regimen as prescribed.  Patient adamantly and convincingly denies suicidal ideation vehemently denies any substance use.      Clinical Maneuvering/Intervention:  Assisted patient in processing above session content; acknowledged and normalized patient’s thoughts, feelings, and concerns.  Allow the patient to discuss/process his feelings of being able to stop wearing a mask at work and praised this as progress.  Also assisted the patient with processing his feelings concerning having alprazolam reduced and encouraged him to consider that his struggles are likely psychological and also challenged him to consider the fewer of these types of medications he takes the more his brain would be able to adjust and learn how to cope.  Continue to utilize cognitive behavioral therapy to assist the patient in developing appropriate coping mechanisms decrease in severity and frequency of symptoms.  Provided unconditional positive regard and a safe, supportive environment.    Allowed patient to freely discuss issues without interruption or judgment. Provided safe, confidential environment to facilitate the development of positive therapeutic relationship and encourage open, honest communication. Assisted patient in identifying risk factors which would indicate the need for higher level of care including thoughts to harm self or others and/or self-harming behavior and encouraged patient to contact this office, call 911, or present to the nearest emergency room should any of these events  occur. Discussed crisis intervention services and means to access.  Patient adamantly and convincingly denies current suicidal or homicidal ideation or perceptual disturbance.    Assessment    The patient continues to struggle with significant work-related stress.  In addition, he continues to have significant marital discord which exacerbates his symptomology.  As a result, the patient's symptomology continues to cause impairment in important areas of functioning.  As a result, he can be reasonably expected to continue to benefit from treatment likely be at increased risk for decompensation otherwise.    Diagnoses and all orders for this visit:    1. Panic disorder (Primary)    2. Generalized anxiety disorder    3. Recurrent major depressive disorder, in partial remission (CMS/[facility])    4. Work-related stress    5. Marital/partner relational problem               Mental Status Exam  Hygiene: Not applicable due to telemedicine  Dress:  casual  Attitude:  Cooperative  Motor Activity:  Appropriate  Speech:  Normal and Pressured  Mood:  anxious   Affect: Anxious/tired  Thought Processes:  Linear  Thought Content:  normal  Suicidal Thoughts:  denies  Homicidal Thoughts:  denies  Crisis Safety Plan: yes, to come to the emergency room.  Hallucinations:  denies    Patient's Support Network Includes:  children and extended family    Progress toward goal: Not at goal    Functional Status: Severe impairment    Prognosis: Guarded with Ongoing Treatment    Plan         Patient will continue in [facility].  Patient will continue in pharmacotherapy as scheduled with Dr. Villareal.  Patient will adhere to medication regimen as prescribed and report any side effects. Patient will contact this office, call 911 or present to the nearest emergency room should suicidal or homicidal ideations occur. Provide Cognitive Behavioral Therapy and Integrative Therapy to improve  functioning, maintain stability, and avoid decompensation and the need for higher level of care.          Return in about 2 weeks (around 7/21/2021) for Next scheduled follow up.      This document signed by Izaiah Tinoco LCSW, [facility] July 7, 2021 13:38 EDT

## 2021-07-09 DIAGNOSIS — F95.9 TIC DISORDER, UNSPECIFIED: ICD-10-CM

## 2021-07-12 RX ORDER — HALOPERIDOL 2 MG/1
TABLET ORAL
Qty: 30 TABLET | Refills: 0 | OUTPATIENT
Start: 2021-07-12

## 2021-07-21 ENCOUNTER — TELEMEDICINE (OUTPATIENT)
Dept: PSYCHIATRY | Facility: CLINIC | Age: 51
End: 2021-07-21

## 2021-07-21 DIAGNOSIS — F41.0 PANIC DISORDER: Primary | ICD-10-CM

## 2021-07-21 DIAGNOSIS — F41.1 GENERALIZED ANXIETY DISORDER: ICD-10-CM

## 2021-07-21 DIAGNOSIS — F33.41 RECURRENT MAJOR DEPRESSIVE DISORDER, IN PARTIAL REMISSION (HCC): ICD-10-CM

## 2021-07-21 PROCEDURE — 90785 PSYTX COMPLEX INTERACTIVE: CPT | Performed by: SOCIAL WORKER

## 2021-07-21 PROCEDURE — 90832 PSYTX W PT 30 MINUTES: CPT | Performed by: SOCIAL WORKER

## 2021-07-21 NOTE — PROGRESS NOTES
"Date of Service: July 21, 2021  Time In: 8:10 AM  Time Out: 8:30 AM      UT 9:40 AMOGRESS NOTE  Data:  Amish Win is a 51 y.o. male who met 1: 1 with the undersigned [facility] anxiety and panic disorder.    This was an audio and video enabled telemedicine encounter.    This provider is located at [facility], [address] [place] [address]. The provider identified himself as well as his credentials.   The Patient is at his home using his device with video connection.  The patient's condition being diagnosed/treated is appropriate for telemedicine. The patient gave consent to be seen remotely, and when consent is given they understand that the consent allows for patient identifiable information to be sent to a third party as needed.   They may refuse to be seen remotely at any time. The electronic data is encrypted and password protected, and the patient has been advised of the potential risks to privacy not withstanding such measures    HPI: Patient states he was late connecting to the session simply due to being sleepy as he does not get in bed until approximately 1:00 AM to 2:00 AM in the morning due to working the late shift.  Patient states he continues to maintain the status quo and reports he has had some \"rough days at work\" and attributes this somewhat to the fact his alprazolam has been decreased.  Patient also reports he has not been able to see his autistic son in approximately 1-1/2 years due to the fact he never gets a Saturday off work.  He states he feels bad but does not know what to do about this.  Patient also reports he continues to struggle to engage in activities on his days off and states he is primarily \"lazy\".  Patient reports symptoms of anxiety as evidenced by sense of uncertainty and impending doom along with increased heart rate, mind goes blank, feeling overwhelmed, and a distinct sense of fear. Patient rates current anxiety at a 5 on a scale " 1-10 with 10 being most severe.  Patient also reports ongoing symptoms of depression including sad mood, anhedonia, anergia, and feelings of hopelessness.  Patient rates current symptoms of depression at a 4 on a scale of 1-10 with 10 being most severe.  Patient reports he continues to adhere to medication regimen as prescribed.  Patient adamantly and convincingly denies suicidal ideation vehemently denies any substance use.      Clinical Maneuvering/Intervention:  Assisted patient in processing above session content; acknowledged and normalized patient’s thoughts, feelings, and concerns.  A discussed the fact the patient needs to be able to sleep uninterrupted and encouraged the patient to consider changing his appointment times from first thing in the morning until later in the day.  Further discussed the concept of psychological dependence and challenged patient to remind himself he is likely not having significant physiological symptoms as a result of a very slight decrease and his daily alprazolam.  Continue to utilize cognitive behavioral therapy to assist the patient in developing appropriate coping mechanisms to decrease the severity and frequency of symptoms.  Also strongly focused on behavioral activation and encouraged patient to actively seek activities he can engage him that he enjoys, especially on his days off.  Provided unconditional positive regard and a safe, supportive environment.    Allowed patient to freely discuss issues without interruption or judgment. Provided safe, confidential environment to facilitate the development of positive therapeutic relationship and encourage open, honest communication. Assisted patient in identifying risk factors which would indicate the need for higher level of care including thoughts to harm self or others and/or self-harming behavior and encouraged patient to contact this office, call 911, or present to the nearest emergency room should any of these events  occur. Discussed crisis intervention services and means to access.  Patient adamantly and convincingly denies current suicidal or homicidal ideation or perceptual disturbance.    Assessment    The patient continues to struggle with significant work-related stress.  In addition, he continues to have significant marital discord which exacerbates his symptomology.  As a result, the patient's symptomology continues to cause impairment in important areas of functioning.  As a result, he can be reasonably expected to continue to benefit from treatment likely be at increased risk for decompensation otherwise.    Diagnoses and all orders for this visit:    1. Panic disorder (Primary)    2. Generalized anxiety disorder    3. Recurrent major depressive disorder, in partial remission (CMS/[facility])               Mental Status Exam  Hygiene: Not applicable due to telemedicine  Dress:  casual  Attitude:  Cooperative  Motor Activity:  Appropriate  Speech:  Normal and Pressured  Mood:  anxious   Affect: Anxious/tired  Thought Processes:  Linear  Thought Content:  normal  Suicidal Thoughts:  denies  Homicidal Thoughts:  denies  Crisis Safety Plan: yes, to come to the emergency room.  Hallucinations:  denies    Patient's Support Network Includes:  children and extended family    Progress toward goal: Not at goal    Functional Status: Severe impairment    Prognosis: Guarded with Ongoing Treatment    Plan         Patient will continue in individual outpatient therapy session at the [facility] in 2 weeks.  Patient will continue in pharmacotherapy as scheduled with Dr. Villareal.  Patient will adhere to medication regimen as prescribed and report any side effects. Patient will contact this office, call 911 or present to the nearest emergency room should suicidal or homicidal ideations occur. Provide Cognitive Behavioral Therapy and Integrative Therapy to improve functioning, maintain stability, and avoid decompensation and the need for  higher level of care.          Return in about 2 weeks (around 8/4/2021) for Next scheduled follow up.      This document signed by Izaiah Tinoco LCSW, [facility] July 21, 2021 08:40 EDT

## 2021-08-18 ENCOUNTER — TELEMEDICINE (OUTPATIENT)
Dept: PSYCHIATRY | Facility: CLINIC | Age: 51
End: 2021-08-18

## 2021-08-18 DIAGNOSIS — F41.0 PANIC DISORDER: Primary | ICD-10-CM

## 2021-08-18 DIAGNOSIS — F41.1 GENERALIZED ANXIETY DISORDER: ICD-10-CM

## 2021-08-18 DIAGNOSIS — Z56.6 WORK-RELATED STRESS: ICD-10-CM

## 2021-08-18 DIAGNOSIS — F43.22 ADJUSTMENT DISORDER WITH ANXIOUS MOOD: ICD-10-CM

## 2021-08-18 DIAGNOSIS — F33.41 RECURRENT MAJOR DEPRESSIVE DISORDER, IN PARTIAL REMISSION (HCC): ICD-10-CM

## 2021-08-18 PROCEDURE — 90834 PSYTX W PT 45 MINUTES: CPT | Performed by: SOCIAL WORKER

## 2021-08-18 SDOH — HEALTH STABILITY - MENTAL HEALTH: OTHER PHYSICAL AND MENTAL STRAIN RELATED TO WORK: Z56.6

## 2021-08-18 NOTE — PROGRESS NOTES
"Date of Service: August 18, 2021  Time In: 8:00 AM  Time Out: 8:40 AM      AK 9:40 AMOGRESS NOTE  Data:  Amish Win is a 51 y.o. male who met 1: 1 with the undersigned for scheduled MyChart video visit follow-up of anxiety and panic disorder.    This was an audio and video enabled telemedicine encounter.    This provider is located at [facility], [address] [place] [address]. The provider identified himself as well as his credentials.   The Patient is at his home using his device with video connection.  The patient's condition being diagnosed/treated is appropriate for telemedicine. The patient gave consent to be seen remotely, and when consent is given they understand that the consent allows for patient identifiable information to be sent to a third party as needed.   They may refuse to be seen remotely at any time. The electronic data is encrypted and password protected, and the patient has been advised of the potential risks to privacy not withstanding such measures    HPI: Patient states things are \"about the same\" and states he has returned to wearing a mask in the face of the current surge in COVID-19 cases.  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  Patient states he continues to have significant sense of uncertainty and worry as to the coming months with regard to COVID-19 pandemic.  Patient also reports he is worrying about his daughter returning to [employer] school the day after this session and states he fears she will be at risk.  Patient continues to discussed the fact his alprazolam was reduced to 2 daily and states he believes this has some impact on the days that are \"rough\".  Patient reports symptoms of anxiety as evidenced by sense of uncertainty and impending doom along with increased heart rate, mind goes blank, feeling overwhelmed, and a distinct sense of fear. Patient rates current anxiety at a 5 on a scale 1-10 with " "10 being most severe.  Patient also reports ongoing symptoms of depression including sad mood, anhedonia, anergia, and feelings of hopelessness.  Patient rates current symptoms of depression at a 4 on a scale of 1-10 with 10 being most severe.  Patient reports he continues to adhere to medication regimen as prescribed.  Patient adamantly and convincingly denies suicidal ideation vehemently denies any substance use.      Clinical Maneuvering/Intervention:  Assisted patient in processing above session content; acknowledged and normalized patient’s thoughts, feelings, and concerns.  Allow the patient to discuss/process his feelings concerning the current COVID-19 surge and validated his feelings.  Also utilized motivational interviewing techniques including complex reflections to assist the patient in recognizing his concerns are not unrealistic and encouraged him to continue to take appropriate precautions.  Further discussed the importance of finding activities he can engage in on a regular basis on his days off and encouraged him to think of things he can do that he will enjoys and that will make him feel \"normal\".  Continue to utilize cognitive behavioral therapy to assist the patient in developing appropriate coping mechanisms to decrease the severity frequency of symptoms.  Also utilize reality based therapy to remind the patient that the fact he is vaccinated and is wearing a mask provides him significant protection.  Provided unconditional positive regard and a safe, supportive environment.    Allowed patient to freely discuss issues without interruption or judgment. Provided safe, confidential environment to facilitate the development of positive therapeutic relationship and encourage open, honest communication. Assisted patient in identifying risk factors which would indicate the need for higher level of care including thoughts to harm self or others and/or self-harming behavior and encouraged patient to " contact this office, call 911, or present to the nearest emergency room should any of these events occur. Discussed crisis intervention services and means to access.  Patient adamantly and convincingly denies current suicidal or homicidal ideation or perceptual disturbance.    Assessment    The patient continues to struggle with significant work-related stress.  In addition, he continues to have significant marital discord which exacerbates his symptomology.  As a result, the patient's symptomology continues to cause impairment in important areas of functioning.  As a result, he can be reasonably expected to continue to benefit from treatment likely be at increased risk for decompensation otherwise.    Diagnoses and all orders for this visit:    1. Panic disorder (Primary)    2. Generalized anxiety disorder    3. Recurrent major depressive disorder, in partial remission (CMS/LTAC, located within [facility])    4. Work-related stress    5. Adjustment disorder with anxious mood               Mental Status Exam  Hygiene: Not applicable due to telemedicine  Dress:  casual  Attitude:  Cooperative  Motor Activity:  Appropriate  Speech:  Normal and Pressured  Mood:  anxious   Affect: Anxious/tired  Thought Processes:  Linear  Thought Content:  normal  Suicidal Thoughts:  denies  Homicidal Thoughts:  denies  Crisis Safety Plan: yes, to come to the emergency room.  Hallucinations:  denies    Patient's Support Network Includes:  children and extended family    Progress toward goal: Not at goal    Functional Status: Severe impairment    Prognosis: Guarded with Ongoing Treatment    Plan         Patient will continue in individual outpatient therapy session at the [facility] in 2 weeks.  Patient will continue in pharmacotherapy as scheduled with Dr. Villareal.  Patient will adhere to medication regimen as prescribed and report any side effects. Patient will contact this office, call 911 or present to the nearest emergency room should suicidal or  homicidal ideations occur. Provide Cognitive Behavioral Therapy and Integrative Therapy to improve functioning, maintain stability, and avoid decompensation and the need for higher level of care.          Return in about 2 weeks (around 9/1/2021) for Next scheduled follow up.      This document signed by Izaiah Tinoco, FAUSTO, [facility] August 18, 2021 08:38 EDT

## 2021-08-24 DIAGNOSIS — F41.1 GENERALIZED ANXIETY DISORDER: ICD-10-CM

## 2021-08-24 DIAGNOSIS — F41.0 PANIC DISORDER: ICD-10-CM

## 2021-08-24 RX ORDER — ALPRAZOLAM 2 MG/1
TABLET, EXTENDED RELEASE ORAL
Qty: 70 TABLET | Refills: 0 | Status: SHIPPED | OUTPATIENT
Start: 2021-08-24 | End: 2021-08-31 | Stop reason: SDUPTHER

## 2021-08-31 ENCOUNTER — TELEMEDICINE (OUTPATIENT)
Dept: PSYCHIATRY | Facility: CLINIC | Age: 51
End: 2021-08-31

## 2021-08-31 DIAGNOSIS — F41.1 GENERALIZED ANXIETY DISORDER: ICD-10-CM

## 2021-08-31 DIAGNOSIS — F41.0 PANIC DISORDER: Primary | ICD-10-CM

## 2021-08-31 DIAGNOSIS — F33.40 RECURRENT MAJOR DEPRESSIVE DISORDER, IN REMISSION (HCC): ICD-10-CM

## 2021-08-31 DIAGNOSIS — F95.9 TIC DISORDER, UNSPECIFIED: ICD-10-CM

## 2021-08-31 PROCEDURE — 99214 OFFICE O/P EST MOD 30 MIN: CPT | Performed by: PSYCHIATRY & NEUROLOGY

## 2021-08-31 RX ORDER — HALOPERIDOL 2 MG/1
2 TABLET ORAL DAILY
Qty: 90 TABLET | Refills: 0 | Status: SHIPPED | OUTPATIENT
Start: 2021-08-31 | End: 2021-12-07 | Stop reason: SDUPTHER

## 2021-08-31 RX ORDER — MIRTAZAPINE 15 MG/1
15 TABLET, FILM COATED ORAL NIGHTLY
Qty: 90 TABLET | Refills: 0 | Status: SHIPPED | OUTPATIENT
Start: 2021-08-31 | End: 2021-12-14 | Stop reason: SDUPTHER

## 2021-08-31 RX ORDER — IMIPRAMINE HCL 50 MG
TABLET ORAL
Qty: 120 TABLET | Refills: 2 | Status: SHIPPED | OUTPATIENT
Start: 2021-08-31 | End: 2021-12-07 | Stop reason: SDUPTHER

## 2021-08-31 RX ORDER — ALPRAZOLAM 2 MG/1
TABLET, EXTENDED RELEASE ORAL
Qty: 60 TABLET | Refills: 2 | Status: SHIPPED | OUTPATIENT
Start: 2021-08-31 | End: 2021-12-14 | Stop reason: SDUPTHER

## 2021-08-31 NOTE — PROGRESS NOTES
"Patient ID: Amish Win is a 51 y.o. male    SERVICE TYPE: EVALUATION AND MANAGEMENT (greater than 50% of the time spent for supportive psychotherapy).    This patient visit is electronic.  The patient gave consent to be seen remotely, and when consent is given they understand that the consent allows for patient identifiable information to be sent to a third party as needed.   They may refuse to be seen remotely at any time. The electronic data is encrypted and password protected, and the patient has been advised of the potential risks to privacy not withstanding such measures.    The patient is located at his home [facility].    Clinic visit by My Chart Video.       There were no vitals taken for this visit.    ALLERGIES:  Penicillins    CC/ Focus of the visit: Anxiety/ Depression.      HPI: \"Been managing on two of the Xanax per day\". Depression 6-7/10, Anxiety 7-8/10; Sleeping normally.  Patient reports reports that he is doing fairly well with less preoccupation with his cardiac status.  He reports that the low-dose Haldol did help with his tic (head jerk) we will restart.  Patient continues to see therapist Izaiah Tinoco on a every 2 week basis.  Patient seems much less distressed than he has been with prior contacts.  Patient agreeable with further reduction of the alprazolam prescription to 60/month.    PFSH: continues at his job, double masking, has been vaccinated. Family heathy, wife doing well at her [employer].     Review of Systems  No cardiopulmonary, GI or neurological complaints.    SUPPORTIVE PSYCHOTHERAPY: continuing efforts to promote the therapeutic alliance, address the patient’s issues, and strengthen self awareness, insights, and positive coping skills such as Exercising, listen to music, spending time in nature and utilizing resources.     Mental Status Exam  Appearance: Normal  Attitude toward clinician:  cooperative and agreeable   Speech:    Rate:  regular rate and rhythm   Volume: " normal  Motor:  no abnormal movements   Mood:  Good  Affect:  euthymic  Thought Processes:  linear, logical, and goal directed  Thought Content:  Normal   Feeling Hopeless: absent  Suicidal Thoughts or Intent:  absent  Homicidal Thoughts:  absent  Perceptual Disturbance: no perceptual disturbance  Attention and Concentration:  good  Insight and Judgement:  good  Memory:  memory appears to be intact    LABS: No results found for this or any previous visit (from the past 168 hour(s)).    MEDICATION ISSUES: Have discussed with the patient the medications Risks, Benefits, and Side effects including potential falls, possible impaired driving and  metabolic adversities among others. No medication side effects or related complaints today.     TREATMENT PLAN/GOALS: Continue supportive psychotherapy efforts and medications as indicated.     Current Outpatient Medications   Medication Sig Dispense Refill   • haloperidol (HALDOL) 2 MG tablet Take 1 tablet by mouth Daily. 90 tablet 0   • ALPRAZolam XR (XANAX XR) 2 MG 24 hr tablet Take 1 twice daily 60 tablet 2   • aspirin 81 MG EC tablet Take 81 mg by mouth 2 (Two) Times a Day.     • docusate sodium (COLACE) 100 MG capsule Take 1 capsule by mouth 2 (Two) Times a Day. 60 capsule 1   • imipramine (TOFRANIL) 50 MG tablet Take 2 tablets by mouth twice daily 120 tablet 2   • latanoprost (XALATAN) 0.005 % ophthalmic solution      • lisinopril-hydrochlorothiazide (PRINZIDE,ZESTORETIC) 10-12.5 MG per tablet Take 1 tablet by mouth Daily. 30 tablet 0   • metoprolol tartrate (LOPRESSOR) 50 MG tablet Take 1 tablet by mouth 2 (Two) Times a Day With Meals. 30 tablet 0   • mirtazapine (REMERON) 15 MG tablet Take 1 tablet by mouth Every Night. 90 tablet 0   • pantoprazole (PROTONIX) 40 MG EC tablet Take 1 tablet by mouth Daily. 30 tablet 0   • vitamin D (ERGOCALCIFEROL) 39330 units capsule capsule        No current facility-administered medications for this visit.       COLLATERAL  PSYCHOTHERAPEUTIC INTERVENTION: Continuing with Izaiah Tinoco [facility], has proven to be very beneficial.    RISK: Moderate    Assessment/Plan     Diagnoses and all orders for this visit:    1. Panic disorder (Primary)  -     imipramine (TOFRANIL) 50 MG tablet; Take 2 tablets by mouth twice daily  Dispense: 120 tablet; Refill: 2  -     mirtazapine (REMERON) 15 MG tablet; Take 1 tablet by mouth Every Night.  Dispense: 90 tablet; Refill: 0  -     ALPRAZolam XR (XANAX XR) 2 MG 24 hr tablet; Take 1 twice daily  Dispense: 60 tablet; Refill: 2    2. Generalized anxiety disorder  -     imipramine (TOFRANIL) 50 MG tablet; Take 2 tablets by mouth twice daily  Dispense: 120 tablet; Refill: 2  -     mirtazapine (REMERON) 15 MG tablet; Take 1 tablet by mouth Every Night.  Dispense: 90 tablet; Refill: 0  -     ALPRAZolam XR (XANAX XR) 2 MG 24 hr tablet; Take 1 twice daily  Dispense: 60 tablet; Refill: 2    3. Recurrent major depressive disorder, in remission (CMS/HCC)  -     imipramine (TOFRANIL) 50 MG tablet; Take 2 tablets by mouth twice daily  Dispense: 120 tablet; Refill: 2  -     mirtazapine (REMERON) 15 MG tablet; Take 1 tablet by mouth Every Night.  Dispense: 90 tablet; Refill: 0    4. Tic disorder, unspecified  -     haloperidol (HALDOL) 2 MG tablet; Take 1 tablet by mouth Daily.  Dispense: 90 tablet; Refill: 0        Return in about 14 weeks (around 12/7/2021).           Patient knows to call if symptoms worsen or fail to improve between appointments.    I spent 30 minutes caring for Amish on this date of service. This time includes time spent by me in the following activities: Patient evaluation, support psychotherapy, decisions, medications, and documentation.     Dictated utilizing [employer] dictation    LAUREN Villareal MD

## 2021-09-01 ENCOUNTER — TELEMEDICINE (OUTPATIENT)
Dept: PSYCHIATRY | Facility: CLINIC | Age: 51
End: 2021-09-01

## 2021-09-01 DIAGNOSIS — F43.22 ADJUSTMENT DISORDER WITH ANXIOUS MOOD: ICD-10-CM

## 2021-09-01 DIAGNOSIS — Z56.6 WORK-RELATED STRESS: ICD-10-CM

## 2021-09-01 DIAGNOSIS — Z63.0 MARITAL/PARTNER RELATIONAL PROBLEM: ICD-10-CM

## 2021-09-01 DIAGNOSIS — F33.41 RECURRENT MAJOR DEPRESSIVE DISORDER, IN PARTIAL REMISSION (HCC): ICD-10-CM

## 2021-09-01 DIAGNOSIS — F41.0 PANIC DISORDER: Primary | ICD-10-CM

## 2021-09-01 DIAGNOSIS — F41.1 GENERALIZED ANXIETY DISORDER: ICD-10-CM

## 2021-09-01 PROCEDURE — 90834 PSYTX W PT 45 MINUTES: CPT | Performed by: SOCIAL WORKER

## 2021-09-01 SDOH — SOCIAL STABILITY - SOCIAL INSECURITY: PROBLEMS IN RELATIONSHIP WITH SPOUSE OR PARTNER: Z63.0

## 2021-09-01 SDOH — HEALTH STABILITY - MENTAL HEALTH: OTHER PHYSICAL AND MENTAL STRAIN RELATED TO WORK: Z56.6

## 2021-09-02 NOTE — PROGRESS NOTES
"Date of Service: September 1, 2021  Time In: 8:00 AM  Time Out: 8:42 AM      [place] 9:40 AMOGRESS NOTE  Data:  Amish Win is a 51 y.o. male who met 1: 1 with the undersigned for scheduled MyChart video visit follow-up of anxiety and panic disorder.    This was an audio and video enabled telemedicine encounter.    This provider is located at [facility], [address] [place] [address]. The provider identified himself as well as his credentials.   The Patient is at his home using his device with video connection.  The patient's condition being diagnosed/treated is appropriate for telemedicine. The patient gave consent to be seen remotely, and when consent is given they understand that the consent allows for patient identifiable information to be sent to a third party as needed.   They may refuse to be seen remotely at any time. The electronic data is encrypted and password protected, and the patient has been advised of the potential risks to privacy not withstanding such measures    HPI: Patient states things are \"about the same\" and states he has returned to wearing a mask in the face of the current surge in COVID-19 cases.  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  Patient reports he continues to be most of his free time primarily at home and states he realizes he is not getting it out enough or being active and left.  Patient reports symptoms of anxiety as evidenced by sense of uncertainty and impending doom along with increased heart rate, mind goes blank, feeling overwhelmed, and a distinct sense of fear. Patient rates current anxiety at a 6 on a scale 1-10 with 10 being most severe.  Patient also reports ongoing symptoms of depression including sad mood, anhedonia, anergia, and feelings of hopelessness.  Patient rates current symptoms of depression at a 4 on a scale of 1-10 with 10 being most severe.  Patient reports he continues to adhere to " medication regimen as prescribed.  Patient adamantly and convincingly denies suicidal ideation vehemently denies any substance use.      Clinical Maneuvering/Intervention:  Assisted patient in processing above session content; acknowledged and normalized patient’s thoughts, feelings, and concerns.  Allow the patient to discuss/process his feelings concerning the current COVID-19 surge and validated his feelings.  Also utilized motivational interviewing techniques including complex reflections to assist the patient in recognizing his concerns are not unrealistic and encouraged him to continue to take appropriate precautions.  Focused on healthy skills of daily living and behavioral activation and strongly urged the patient to began seeking activities he can engage in on a regular basis to decrease idle time and social isolation.  Also utilize reality based therapy to remind the patient that the fact he is vaccinated and is wearing a mask provides him significant protection.  Provided unconditional positive regard and a safe, supportive environment.    Allowed patient to freely discuss issues without interruption or judgment. Provided safe, confidential environment to facilitate the development of positive therapeutic relationship and encourage open, honest communication. Assisted patient in identifying risk factors which would indicate the need for higher level of care including thoughts to harm self or others and/or self-harming behavior and encouraged patient to contact this office, call 911, or present to the nearest emergency room should any of these events occur. Discussed crisis intervention services and means to access.  Patient adamantly and convincingly denies current suicidal or homicidal ideation or perceptual disturbance.    Assessment    The patient continues to struggle with significant work-related stress.  In addition, he continues to have significant marital discord which exacerbates his  symptomology.  As a result, the patient's symptomology continues to cause impairment in important areas of functioning.  As a result, he can be reasonably expected to continue to benefit from treatment likely be at increased risk for decompensation otherwise.    Diagnoses and all orders for this visit:    1. Panic disorder (Primary)    2. Generalized anxiety disorder    3. Recurrent major depressive disorder, in partial remission (CMS/HCC)    4. Work-related stress    5. Adjustment disorder with anxious mood    6. Marital/partner relational problem               Mental Status Exam  Hygiene: Not applicable due to telemedicine  Dress:  casual  Attitude:  Cooperative  Motor Activity:  Appropriate  Speech:  Normal and Pressured  Mood:  anxious   Affect: Anxious/tired  Thought Processes:  Linear  Thought Content:  normal  Suicidal Thoughts:  denies  Homicidal Thoughts:  denies  Crisis Safety Plan: yes, to come to the emergency room.  Hallucinations:  denies    Patient's Support Network Includes:  children and extended family    Progress toward goal: Not at goal    Functional Status: Severe impairment    Prognosis: Guarded with Ongoing Treatment    Plan         Patient will continue in individual outpatient therapy session at the [facility] in 2 weeks.  Patient will continue in pharmacotherapy as scheduled with Dr. Villareal.  Patient will adhere to medication regimen as prescribed and report any side effects. Patient will contact this office, call 911 or present to the nearest emergency room should suicidal or homicidal ideations occur. Provide Cognitive Behavioral Therapy and Integrative Therapy to improve functioning, maintain stability, and avoid decompensation and the need for higher level of care.          Return in about 2 weeks (around 9/15/2021) for Next scheduled follow up.      This document signed by Izaiah Tinoco LCSW, JOAQUIN September 2, 2021 06:49 EDT

## 2021-10-18 NOTE — PROGRESS NOTES
Date of Service: December 30, 2020  Time In: 8:05 AM  Time Out: 8:40 AM      NH 9:40 AMOGRESS NOTE  Data:  Amish Win is a 50 y.o. male who met 1: 1 with the undersigned for a emergent individual outpatient therapy session via [employer] video.  This was an audio and video enabled telemedicine encounter.  This provider is located at [facility], [address] [place] [address]. The provider identified himself as well as his credentials.   The Patient is at his home using his phonewith video connection. The patient's condition being diagnosed/treated is appropriate for telemedicine. The patient gave consent to be seen remotely, and when consent is given they understand that the consent allows for patient identifiable information to be sent to a third party as needed.   They may refuse to be seen remotely at any time. The electronic data is encrypted and password protected, and the patient has been advised of the potential risks to privacy not withstanding such measures    HPI: Patient reports he feels well this week is been somewhat better but admits he has been struggling over the past 2 to 3 weeks after restaurants opened back up.  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  Patient also reports he had been washing his hands approximately 20 times daily which was resulting in drying his hands out to the point of bleeding.  Patient states he continues to have significant fear and anxiety concerning COVID-19 and states he continues to be fearful of the future.   the patient states he feels a significant sense of uncertainty and impending doom along with increased heart rate, mind goes blank, feeling overwhelmed, and a distinct sense of fear. Patient rates current anxiety at a 6 on a scale 1-10 with 10 being most severe.  Patient also reports ongoing symptoms of depression although he admits  they are somewhat milder including sad mood, anhedonia, anergia, and feelings of hopelessness.  Patient rates current symptoms of depression at a 3 on a scale of 1-10 with 10 being most severe.  Patient reports his wife continues to work at [employer] states he believes she is doing better since she left the school system.  However, he admits this also causes him a great deal of anxiety concerning COVID as he realizes she is subjected to many customers throughout the day.  Patient reports he continues to adhere to medication regimen as prescribed.  Patient adamantly and convincingly denies suicidal ideation vehemently denies any substance use.      Clinical Maneuvering/Intervention:  Assisted patient in processing above session content; acknowledged and normalized patient’s thoughts, feelings, and concerns.   Utilized cognitive behavioral therapy and motivational interviewing techniques including complex reflections to attempt to build some discrepancy with the patient's belief he needs to wash his hands obsessively throughout the day and challenged him to resist washing his hands more than once an hour.  Also continue to assist the patient in processing his feelings and fears and validated his fears as legitimate.  However, also continue to discussed the concept of things we can control things we cannot control and encourage the patient to remind himself he can only take appropriate precautions and cannot be responsible for or control the decisions or behaviors of others.  Continue to focus on healthy skills of daily living.  Provided unconditional positive regard and a safe, supportive environment.    Allowed patient to freely discuss issues without interruption or judgment. Provided safe, confidential environment to facilitate the development of positive therapeutic relationship and encourage open, honest communication. Assisted patient in identifying risk factors which would indicate the need for higher level of  care including thoughts to harm self or others and/or self-harming behavior and encouraged patient to contact this office, call 911, or present to the nearest emergency room should any of these events occur. Discussed crisis intervention services and means to access.  Patient adamantly and convincingly denies current suicidal or homicidal ideation or perceptual disturbance.    Assessment    Patient continues to struggle with significant depression and anxiety which continues to be exacerbated by COVID-19 pandemic.  However, he does continue to maintain relative stability as compared to his baseline.  As a result, the patient's symptomology continues to cause impairment in important areas of functioning.  As a result, he can be reasonably expected to continue to benefit from treatment likely be at increased risk for decompensation otherwise.    Diagnoses and all orders for this visit:    1. Major depressive disorder, recurrent episode, in partial remission (CMS/[facility]) (Primary)    2. Generalized anxiety disorder    3. Panic disorder    4. Work-related stress    5. Adjustment disorder with anxious mood               Mental Status Exam  Hygiene: Not applicable due to telemedicine  Dress:  casual  Attitude:  Cooperative  Motor Activity:  Appropriate  Speech:  Normal and Pressured  Mood:  anxious   Affect: Anxious/tired  Thought Processes:  Linear  Thought Content:  normal  Suicidal Thoughts:  denies  Homicidal Thoughts:  denies  Crisis Safety Plan: yes, to come to the emergency room.  Hallucinations:  denies    Patient's Support Network Includes:  children and extended family    Progress toward goal: Not at goal    Functional Status: Severe impairment    Prognosis: Guarded with Ongoing Treatment    Plan         Patient will continue in individual outpatient therapy session at the [facility] in 2 weeks.  Patient will continue in pharmacotherapy as scheduled with Dr. Villareal.  Patient will adhere to medication regimen  as prescribed and report any side effects. Patient will contact this office, call 911 or present to the nearest emergency room should suicidal or homicidal ideations occur. Provide Cognitive Behavioral Therapy and Integrative Therapy to improve functioning, maintain stability, and avoid decompensation and the need for higher level of care.          Return in about 2 weeks (around 1/13/2021) for Video visit.      This document signed by Izaiah Tinoco LCSW, [facility] December 30, 2020 09:52 EST   Additional Complaints

## 2021-10-27 ENCOUNTER — TELEMEDICINE (OUTPATIENT)
Dept: PSYCHIATRY | Facility: CLINIC | Age: 51
End: 2021-10-27

## 2021-10-27 DIAGNOSIS — Z56.6 WORK-RELATED STRESS: ICD-10-CM

## 2021-10-27 DIAGNOSIS — F41.0 PANIC DISORDER: Primary | ICD-10-CM

## 2021-10-27 DIAGNOSIS — F33.41 RECURRENT MAJOR DEPRESSIVE DISORDER, IN PARTIAL REMISSION (HCC): ICD-10-CM

## 2021-10-27 DIAGNOSIS — F41.1 GENERALIZED ANXIETY DISORDER: ICD-10-CM

## 2021-10-27 PROCEDURE — 90832 PSYTX W PT 30 MINUTES: CPT | Performed by: SOCIAL WORKER

## 2021-10-27 SDOH — HEALTH STABILITY - MENTAL HEALTH: OTHER PHYSICAL AND MENTAL STRAIN RELATED TO WORK: Z56.6

## 2021-10-27 NOTE — PROGRESS NOTES
"Date of Service: October 27, 2021  Time In: 1:50 PM  Time Out: 2:18 PM      TX 9:40 AMOGRESS NOTE  Data:  Amish Win is a 51 y.o. male who met 1: 1 with the undersigned for scheduled MyChart video visit follow-up of anxiety and panic disorder.    This was an audio and video enabled telemedicine encounter.    This provider is located at [facility], [address] [place] [address]. The provider identified himself as well as his credentials.   The Patient is at his home using his device with video connection.  The patient's condition being diagnosed/treated is appropriate for telemedicine. The patient gave consent to be seen remotely, and when consent is given they understand that the consent allows for patient identifiable information to be sent to a third party as needed.   They may refuse to be seen remotely at any time. The electronic data is encrypted and password protected, and the patient has been advised of the potential risks to privacy not withstanding such measures    HPI: Patient states the reason he has not seen the undersigned in some time and the fact that there was no appointments made and it took a while to get back in.  Patient states he feels as though he is doing relatively well and states he continues to work a great deal.  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  He does state he is planning to get his booster and the flu vaccine.  Patient also states he and his wife have at least called what he describes as a \"truth\" and states although they continue not to interact a great deal they at least are not arguing.  Patient does report he continues to have significant hesitancy concerning reducing his intake of alprazolam but states he also realizes he cannot be on this long-term.  Patient reports symptoms of anxiety as evidenced by sense of uncertainty and impending doom along " with increased heart rate, mind goes blank, feeling overwhelmed, and a distinct sense of fear. Patient rates current anxiety at a 6 on a scale 1-10 with 10 being most severe.  Patient also reports ongoing symptoms of depression including sad mood, anhedonia, anergia, and feelings of hopelessness.  Patient rates current symptoms of depression at a 4 on a scale of 1-10 with 10 being most severe.  Patient reports he continues to adhere to medication regimen as prescribed.  Patient adamantly and convincingly denies suicidal ideation vehemently denies any substance use.      Clinical Maneuvering/Intervention:  Assisted patient in processing above session content; acknowledged and normalized patient’s thoughts, feelings, and concerns.  Allow the patient to discuss/process his feelings concerning the current COVID-19 surge and validated his feelings.  Also provided information/education concerning the concept of psychological dependence and encouraged the patient to remind himself part of his hesitancy and difficulty decreasing his intake of alprazolam is simply his expectation.  As such, encouraged him to remind himself he cannot change how he feels about this and can use psychological coping mechanisms to decrease the impact.  Also continued to discussed the importance of the patient becoming more physically active and focusing on healthy skills of daily living.  Provided unconditional positive regard and a safe, supportive environment.    Allowed patient to freely discuss issues without interruption or judgment. Provided safe, confidential environment to facilitate the development of positive therapeutic relationship and encourage open, honest communication. Assisted patient in identifying risk factors which would indicate the need for higher level of care including thoughts to harm self or others and/or self-harming behavior and encouraged patient to contact this office, call 911, or present to the nearest emergency  room should any of these events occur. Discussed crisis intervention services and means to access.  Patient adamantly and convincingly denies current suicidal or homicidal ideation or perceptual disturbance.    Assessment    The patient continues to struggle with significant work-related stress.  In addition, he continues to have significant marital discord which exacerbates his symptomology.  As a result, the patient's symptomology continues to cause impairment in important areas of functioning.  As a result, he can be reasonably expected to continue to benefit from treatment likely be at increased risk for decompensation otherwise.    Diagnoses and all orders for this visit:    1. Panic disorder (Primary)    2. Generalized anxiety disorder    3. Recurrent major depressive disorder, in partial remission (HCC)    4. Work-related stress               Mental Status Exam  Hygiene: Not applicable due to telemedicine  Dress:  casual  Attitude:  Cooperative  Motor Activity:  Appropriate  Speech:  Normal and Pressured  Mood:  anxious   Affect: Anxious/tired  Thought Processes:  Linear  Thought Content:  normal  Suicidal Thoughts:  denies  Homicidal Thoughts:  denies  Crisis Safety Plan: yes, to come to the emergency room.  Hallucinations:  denies    Patient's Support Network Includes:  children and extended family    Progress toward goal: Not at goal    Functional Status: Severe impairment    Prognosis: Guarded with Ongoing Treatment    Plan         Patient will continue in individual outpatient therapy session at the [facility] in 2 weeks.  Patient will continue in pharmacotherapy as scheduled with Dr. Villareal.  Patient will adhere to medication regimen as prescribed and report any side effects. Patient will contact this office, call 911 or present to the nearest emergency room should suicidal or homicidal ideations occur. Provide Cognitive Behavioral Therapy and Integrative Therapy to improve functioning, maintain  stability, and avoid decompensation and the need for higher level of care.          Return in about 3 weeks (around 11/17/2021) for Next scheduled follow up.      This document signed by Izaiah Tinoco LCSW, [facility] October 27, 2021 14:26 EDT

## 2021-11-24 ENCOUNTER — TELEMEDICINE (OUTPATIENT)
Dept: PSYCHIATRY | Facility: CLINIC | Age: 51
End: 2021-11-24

## 2021-11-24 DIAGNOSIS — F41.1 GENERALIZED ANXIETY DISORDER: ICD-10-CM

## 2021-11-24 DIAGNOSIS — F33.41 RECURRENT MAJOR DEPRESSIVE DISORDER, IN PARTIAL REMISSION (HCC): ICD-10-CM

## 2021-11-24 DIAGNOSIS — F41.0 PANIC DISORDER: Primary | ICD-10-CM

## 2021-11-24 DIAGNOSIS — Z56.6 WORK-RELATED STRESS: ICD-10-CM

## 2021-11-24 DIAGNOSIS — F43.22 ADJUSTMENT DISORDER WITH ANXIOUS MOOD: ICD-10-CM

## 2021-11-24 PROCEDURE — 90785 PSYTX COMPLEX INTERACTIVE: CPT | Performed by: SOCIAL WORKER

## 2021-11-24 PROCEDURE — 90832 PSYTX W PT 30 MINUTES: CPT | Performed by: SOCIAL WORKER

## 2021-11-24 SDOH — HEALTH STABILITY - MENTAL HEALTH: OTHER PHYSICAL AND MENTAL STRAIN RELATED TO WORK: Z56.6

## 2021-11-29 NOTE — PROGRESS NOTES
"Date of Service: November 24, 2021  Time In: 11:00 AM  Time Out: 11:30 AM      DC 9:40 AMOGRESS NOTE  Data:  Amish Win is a 51 y.o. male who met 1: 1 with the undersigned for scheduled MyChart video visit follow-up of anxiety and panic disorder.    This was an audio and video enabled telemedicine encounter.    This provider is located at [facility], [address] [place] [address]. The provider identified himself as well as his credentials.   The Patient is at his home using his device with video connection.  The patient's condition being diagnosed/treated is appropriate for telemedicine. The patient gave consent to be seen remotely, and when consent is given they understand that the consent allows for patient identifiable information to be sent to a third party as needed.   They may refuse to be seen remotely at any time. The electronic data is encrypted and password protected, and the patient has been advised of the potential risks to privacy not withstanding such measures    HPI: Patient states things continue to be difficult at work and states they have hired several new staff members and management and states he fears they are \"getting ready to get rid of someone\".  Patient states he does not have any reason to believe this would be him as he has never had a right and has been congratulated on a job well done.  Patient states he also continues to struggle with fear of COVID and states he has recently reverted to wearing double masks.  Patient also reports his alprazolam has been reduced and states he has been attempting not to take one at the end of the day.  However, he states he wakes the next morning shaking and trembling and feeling a significant sense of being on edge.  Patient reports symptoms of anxiety as evidenced by sense of uncertainty and impending doom along with increased heart rate, mind goes blank, feeling overwhelmed, and a distinct sense of fear. Patient rates current anxiety at a 6 " on a scale 1-10 with 10 being most severe.  Patient also reports ongoing symptoms of depression including sad mood, anhedonia, anergia, and feelings of hopelessness.  Patient rates current symptoms of depression at a 4 on a scale of 1-10 with 10 being most severe.  Patient reports he continues to adhere to medication regimen as prescribed.  Patient adamantly and convincingly denies suicidal ideation vehemently denies any substance use.    Interactive complexity due to poor video signaling being disconnected twice throughout the session.      Clinical Maneuvering/Intervention:  Assisted patient in processing above session content; acknowledged and normalized patient’s thoughts, feelings, and concerns.  Allowed the patient to discuss and process his feelings concerning fear of kari COVID-19 and the fact he feels as though few people in his workplace take appropriate precautions or have been vaccinated.  Also discussed the fact the patient could be experiencing mild benzodiazepine withdrawal and encouraged him to discuss this with Dr. Villareal.  Also continue to utilize cognitive behavioral therapy and motivational interviewing techniques including complex reflections to assist the patient and developing appropriate coping mechanisms to decrease the severity and frequency of symptoms and encouraged him to remind himself he cannot control anyone but himself.  Continue to focus on healthy skills of daily living and behavioral activation.  Provided unconditional positive regard and a safe, supportive environment.    Allowed patient to freely discuss issues without interruption or judgment. Provided safe, confidential environment to facilitate the development of positive therapeutic relationship and encourage open, honest communication. Assisted patient in identifying risk factors which would indicate the need for higher level of care including thoughts to harm self or others and/or self-harming behavior and  encouraged patient to contact this office, call 911, or present to the nearest emergency room should any of these events occur. Discussed crisis intervention services and means to access.  Patient adamantly and convincingly denies current suicidal or homicidal ideation or perceptual disturbance.    Assessment    The patient continues to struggle with significant work-related stress.  In addition, it appears he could be struggling with mild benzodiazepine withdrawal in the mornings as he has had his dosage decreased.  As a result, the patient's symptomology continues to cause impairment in important areas of functioning.  As a result, he can be reasonably expected to continue to benefit from treatment likely be at increased risk for decompensation otherwise.    Diagnoses and all orders for this visit:    1. Panic disorder (Primary)    2. Generalized anxiety disorder    3. Recurrent major depressive disorder, in partial remission (HCC)    4. Work-related stress    5. Adjustment disorder with anxious mood               Mental Status Exam  Hygiene: Not applicable due to telemedicine  Dress:  casual  Attitude:  Cooperative  Motor Activity:  Appropriate  Speech:  Normal and Pressured  Mood:  anxious   Affect: Anxious/tired  Thought Processes:  Linear  Thought Content:  normal  Suicidal Thoughts:  denies  Homicidal Thoughts:  denies  Crisis Safety Plan: yes, to come to the emergency room.  Hallucinations:  denies    Patient's Support Network Includes:  children and extended family    Progress toward goal: Not at goal    Functional Status: Severe impairment    Prognosis: Guarded with Ongoing Treatment    Plan         Patient will continue in individual outpatient therapy session at the [facility] in 3 weeks.  Patient will continue in pharmacotherapy as scheduled with Dr. Villareal.  Patient will discuss his experiences in symptomology concerning decreased alprazolam with Dr. Villareal and follow all recommendations.   Patient will adhere to medication regimen as prescribed and report any side effects. Patient will contact this office, call 911 or present to the nearest emergency room should suicidal or homicidal ideations occur. Provide Cognitive Behavioral Therapy and Integrative Therapy to improve functioning, maintain stability, and avoid decompensation and the need for higher level of care.          Return in about 3 weeks (around 12/15/2021) for Next scheduled follow up.      This document signed by Izaiah Tinoco, FAUSTO, JOAQUIN November 29, 2021 07:44 EST

## 2021-12-07 DIAGNOSIS — F41.0 PANIC DISORDER: ICD-10-CM

## 2021-12-07 DIAGNOSIS — F41.1 GENERALIZED ANXIETY DISORDER: ICD-10-CM

## 2021-12-07 DIAGNOSIS — F95.9 TIC DISORDER, UNSPECIFIED: ICD-10-CM

## 2021-12-07 DIAGNOSIS — F33.40 RECURRENT MAJOR DEPRESSIVE DISORDER, IN REMISSION (HCC): ICD-10-CM

## 2021-12-08 ENCOUNTER — TELEMEDICINE (OUTPATIENT)
Dept: PSYCHIATRY | Facility: CLINIC | Age: 51
End: 2021-12-08

## 2021-12-08 DIAGNOSIS — Z56.6 WORK-RELATED STRESS: ICD-10-CM

## 2021-12-08 DIAGNOSIS — F41.0 PANIC DISORDER: Primary | ICD-10-CM

## 2021-12-08 DIAGNOSIS — F43.22 ADJUSTMENT DISORDER WITH ANXIOUS MOOD: ICD-10-CM

## 2021-12-08 DIAGNOSIS — F33.41 RECURRENT MAJOR DEPRESSIVE DISORDER, IN PARTIAL REMISSION (HCC): ICD-10-CM

## 2021-12-08 DIAGNOSIS — F41.1 GENERALIZED ANXIETY DISORDER: ICD-10-CM

## 2021-12-08 PROCEDURE — 90832 PSYTX W PT 30 MINUTES: CPT | Performed by: SOCIAL WORKER

## 2021-12-08 RX ORDER — IMIPRAMINE HCL 50 MG
TABLET ORAL
Qty: 120 TABLET | Refills: 0 | Status: SHIPPED | OUTPATIENT
Start: 2021-12-08 | End: 2021-12-14 | Stop reason: SDUPTHER

## 2021-12-08 RX ORDER — HALOPERIDOL 2 MG/1
2 TABLET ORAL DAILY
Qty: 30 TABLET | Refills: 0 | Status: SHIPPED | OUTPATIENT
Start: 2021-12-08 | End: 2021-12-14 | Stop reason: SDUPTHER

## 2021-12-08 SDOH — HEALTH STABILITY - MENTAL HEALTH: OTHER PHYSICAL AND MENTAL STRAIN RELATED TO WORK: Z56.6

## 2021-12-09 NOTE — PROGRESS NOTES
"Date of Service: December 8, 2021  Time In: 11:15 AM  Time Out: 11:45 AM      TN 9:40 AMOGRESS NOTE  Data:  Amish Win is a 51 y.o. male who met 1: 1 with the undersigned for scheduled MyChart video visit follow-up of anxiety and panic disorder.    This was an audio and video enabled telemedicine encounter.    This provider is located at [facility], [address] [place] [address]. The provider identified himself as well as his credentials.   The Patient is at his home using his device with video connection.  The patient's condition being diagnosed/treated is appropriate for telemedicine. The patient gave consent to be seen remotely, and when consent is given they understand that the consent allows for patient identifiable information to be sent to a third party as needed.   They may refuse to be seen remotely at any time. The electronic data is encrypted and password protected, and the patient has been advised of the potential risks to privacy not withstanding such measures    HPI: Patient states \"work is about the same\".  He states he continues to have significant feelings of stress and anxiety while at work and also reports although he is fully vaccinated he continues to wear double masks.  Patient reports that he and his wife were informed recently that they are 20-year-old autistic son will be discharged from a residential treatment facility he has been in for approximately 3 years and we will return home January 20, 2022.  The patient reports this is very concerning to him as he feels as though there is no reason to believe his son will not revert to behavior he exhibited before being placed in a residential treatment program and states he is afraid he and his wife simply cannot handle him.  Patient also cites a lack of resources as one of the primary reasons they feel he may not be capable of caring for him at home.  Patient also reports his alprazolam has been reduced and states he has been " attempting not to take one at the end of the day.  However, he states he wakes the next morning shaking and trembling and feeling a significant sense of being on edge.  Patient reports symptoms of anxiety as evidenced by sense of uncertainty and impending doom along with increased heart rate, mind goes blank, feeling overwhelmed, and a distinct sense of fear. Patient rates current anxiety at a 6 on a scale 1-10 with 10 being most severe.  Patient also reports ongoing symptoms of depression including sad mood, anhedonia, anergia, and feelings of hopelessness.  Patient rates current symptoms of depression at a 4 on a scale of 1-10 with 10 being most severe.  Patient also reports he continues to spend the vast majority of his time at home and is not working states on his days off he feels as though he is just sitting there.  Patient reports he continues to adhere to medication regimen as prescribed.  Patient adamantly and convincingly denies suicidal ideation vehemently denies any substance use.        Clinical Maneuvering/Intervention:  Assisted patient in processing above session content; acknowledged and normalized patient’s thoughts, feelings, and concerns.  Allow the patient to discuss/process his feelings and fears concerning the fact that his son will be returning home and was able to identify the fact the patient is likely feeling guilty because as much as he loves his son he is fearful they will simply not be able to care for him.  Challenged the patient to remind himself he should not feel guilty for feeling this way as this has been demonstrated on multiple occasions.  Further utilize cognitive behavioral therapy and dialectical behavioral therapy to attempt to challenge the patient's fear of only wearing one mask at work and encouraged him to attempt to only wear 1 mask during his next scheduled workday.  Also validated the patient's belief that his decreased alprazolam could be affecting his mood and  ability to sleep and encouraged him to discuss this with Dr. Villareal on his next session.  Also validated the fact that it would be beneficial if he could reduce his intake of benzodiazepines and encouraged him to continue to work with Dr. Villareal to this end.  Continue to focus on healthy skills of daily living and behavioral activation.  Provided unconditional positive regard and a safe, supportive environment.    Allowed patient to freely discuss issues without interruption or judgment. Provided safe, confidential environment to facilitate the development of positive therapeutic relationship and encourage open, honest communication. Assisted patient in identifying risk factors which would indicate the need for higher level of care including thoughts to harm self or others and/or self-harming behavior and encouraged patient to contact this office, call 911, or present to the nearest emergency room should any of these events occur. Discussed crisis intervention services and means to access.  Patient adamantly and convincingly denies current suicidal or homicidal ideation or perceptual disturbance.    Assessment    The patient continues to struggle with significant work-related stress.  In addition, it appears he could be struggling with mild benzodiazepine withdrawal in the mornings as he has had his dosage decreased.  As a result, the patient's symptomology continues to cause impairment in important areas of functioning.  As a result, he can be reasonably expected to continue to benefit from treatment likely be at increased risk for decompensation otherwise.    Diagnoses and all orders for this visit:    1. Panic disorder (Primary)    2. Generalized anxiety disorder    3. Recurrent major depressive disorder, in partial remission (HCC)    4. Work-related stress    5. Adjustment disorder with anxious mood               Mental Status Exam  Hygiene: Not applicable due to telemedicine  Dress:  casual  Attitude:   Cooperative  Motor Activity:  Appropriate  Speech:  Normal and Pressured  Mood:  anxious   Affect: Anxious/tired  Thought Processes:  Linear  Thought Content:  normal  Suicidal Thoughts:  denies  Homicidal Thoughts:  denies  Crisis Safety Plan: yes, to come to the emergency room.  Hallucinations:  denies    Patient's Support Network Includes:  children and extended family    Progress toward goal: Not at goal    Functional Status: Severe impairment    Prognosis: Guarded with Ongoing Treatment    Plan         Patient will continue in individual outpatient therapy session at the [facility] in 2 weeks.  Patient will continue in pharmacotherapy as scheduled with Dr. Villareal.  Patient will discuss his experiences in symptomology concerning decreased alprazolam with Dr. Villareal and follow all recommendations.  Patient will adhere to medication regimen as prescribed and report any side effects. Patient will contact this office, call 911 or present to the nearest emergency room should suicidal or homicidal ideations occur. Provide Cognitive Behavioral Therapy and Integrative Therapy to improve functioning, maintain stability, and avoid decompensation and the need for higher level of care.          Return in about 2 weeks (around 12/22/2021) for Next scheduled follow up.      This document signed by Izaiah Tinoco LCSW, JOAQUIN December 9, 2021 07:03 EST

## 2021-12-14 ENCOUNTER — TELEMEDICINE (OUTPATIENT)
Dept: PSYCHIATRY | Facility: CLINIC | Age: 51
End: 2021-12-14

## 2021-12-14 DIAGNOSIS — F33.41 RECURRENT MAJOR DEPRESSIVE DISORDER, IN PARTIAL REMISSION (HCC): ICD-10-CM

## 2021-12-14 DIAGNOSIS — F95.9 TIC DISORDER, UNSPECIFIED: ICD-10-CM

## 2021-12-14 DIAGNOSIS — F33.40 RECURRENT MAJOR DEPRESSIVE DISORDER, IN REMISSION (HCC): ICD-10-CM

## 2021-12-14 DIAGNOSIS — F41.1 GENERALIZED ANXIETY DISORDER: Primary | ICD-10-CM

## 2021-12-14 DIAGNOSIS — F41.0 PANIC DISORDER: ICD-10-CM

## 2021-12-14 PROCEDURE — 99214 OFFICE O/P EST MOD 30 MIN: CPT | Performed by: PSYCHIATRY & NEUROLOGY

## 2021-12-14 RX ORDER — IMIPRAMINE HCL 50 MG
TABLET ORAL
Qty: 120 TABLET | Refills: 2 | Status: SHIPPED | OUTPATIENT
Start: 2021-12-14 | End: 2022-04-05

## 2021-12-14 RX ORDER — MIRTAZAPINE 15 MG/1
15 TABLET, FILM COATED ORAL NIGHTLY
Qty: 90 TABLET | Refills: 0 | Status: SHIPPED | OUTPATIENT
Start: 2021-12-14 | End: 2022-04-05

## 2021-12-14 RX ORDER — ALPRAZOLAM 2 MG/1
TABLET, EXTENDED RELEASE ORAL
Qty: 60 TABLET | Refills: 2 | Status: SHIPPED | OUTPATIENT
Start: 2021-12-14 | End: 2022-04-05

## 2021-12-14 RX ORDER — HALOPERIDOL 2 MG/1
2 TABLET ORAL DAILY
Qty: 30 TABLET | Refills: 2 | Status: SHIPPED | OUTPATIENT
Start: 2021-12-14 | End: 2022-04-05

## 2021-12-14 NOTE — PROGRESS NOTES
Patient ID: Amish Win is a 51 y.o. male    SERVICE TYPE: EVALUATION AND MANAGEMENT (greater than 50% of the time spent for supportive psychotherapy).  This patient visit is electronic.  The patient gave consent to be seen remotely, and when consent is given they understand that the consent allows for patient identifiable information to be sent to a third party as needed.   They may refuse to be seen remotely at any time. The electronic data is encrypted and password protected, and the patient has been advised of the potential risks to privacy not withstanding such measures.    The patient is located at his home in [facility].    Clinic visit by my chart video    There were no vitals taken for this visit.    ALLERGIES:  Penicillins    CC/ Focus of the visit: OCD/ Depression    HPI: Reviewed therapist note from December 8.  Today patient does not seem to be distressed with the lesser alprazolam dose as he was when he talked to [facility] December 8.  He rates his depression as mild to moderate primarily being concerned about the pending return home of his autistic son next month.  Patient has had 1 meeting with the state and is anticipating second meeting soon, will have the Jaclyn P wave  restored if the son does return home.  Patient feels he is respected in his job  thus not experiencing the antagonism he has experienced at other similar places of employment.  He has been at his current job now a year and a half.  Patient sleeps well.  Cross-sectionally he actually seems to be doing fairly well, in much better status than in the recent past.  His affect was appropriate.     Discussed tapering the alprazolam dose over the next year with the patient, to hold on 4 mg a day till his next appointment.     PFSH: Home situation stable.    Review of Systems  No  GI or neurological complaints.  Much less somatic preoccupation with his cardiac status.    SUPPORTIVE PSYCHOTHERAPY: continuing efforts to  promote the therapeutic alliance, address the patient’s issues, and strengthen self awareness, insights, and positive coping skills such as Exercising, listen to music, spending time in nature and utilizing resources.     Mental Status Exam  Appearance:  appropriate  Attitude toward clinician:  cooperative and agreeable   Speech:    Rate:  regular rate and rhythm   Volume: normal  Motor:  no abnormal movements   Mood:  Good  Affect:  euthymic  Thought Processes:  linear, logical, and goal directed  Thought Content:  Normal   Feeling Hopeless: absent  Suicidal Thoughts or Intent:  absent  Homicidal Thoughts:  absent  Perceptual Disturbance: no perceptual disturbance  Attention and Concentration:  good  Insight and Judgement:  good  Memory:  memory appears to be intact    LABS: No results found for this or any previous visit (from the past 168 hour(s)).    MEDICATION ISSUES: Have discussed with the patient the medications Risks, Benefits, and Side effects including potential falls, possible impaired driving and  metabolic adversities among others. No medication side effects or related complaints today.     TREATMENT PLAN/GOALS: Continue supportive psychotherapy efforts and medications as indicated.     Current Outpatient Medications   Medication Sig Dispense Refill   • ALPRAZolam XR (XANAX XR) 2 MG 24 hr tablet Take 1 twice daily 60 tablet 2   • aspirin 81 MG EC tablet Take 81 mg by mouth 2 (Two) Times a Day.     • docusate sodium (COLACE) 100 MG capsule Take 1 capsule by mouth 2 (Two) Times a Day. 60 capsule 1   • haloperidol (HALDOL) 2 MG tablet Take 1 tablet by mouth Daily. 30 tablet 2   • imipramine (TOFRANIL) 50 MG tablet Take 2 tablets by mouth twice daily 120 tablet 2   • latanoprost (XALATAN) 0.005 % ophthalmic solution      • lisinopril-hydrochlorothiazide (PRINZIDE,ZESTORETIC) 10-12.5 MG per tablet Take 1 tablet by mouth Daily. 30 tablet 0   • metoprolol tartrate (LOPRESSOR) 50 MG tablet Take 1 tablet by  mouth 2 (Two) Times a Day With Meals. 30 tablet 0   • mirtazapine (REMERON) 15 MG tablet Take 1 tablet by mouth Every Night. 90 tablet 0   • pantoprazole (PROTONIX) 40 MG EC tablet Take 1 tablet by mouth Daily. 30 tablet 0   • vitamin D (ERGOCALCIFEROL) 99849 units capsule capsule        No current facility-administered medications for this visit.       COLLATERAL PSYCHOTHERAPEUTIC INTERVENTION: Patient continuing with [facility] with contacts on a every 2 week basis.    RISK: Moderate    Assessment/Plan     Diagnoses and all orders for this visit:    1. Generalized anxiety disorder (Primary)  -     ALPRAZolam XR (XANAX XR) 2 MG 24 hr tablet; Take 1 twice daily  Dispense: 60 tablet; Refill: 2  -     mirtazapine (REMERON) 15 MG tablet; Take 1 tablet by mouth Every Night.  Dispense: 90 tablet; Refill: 0  -     imipramine (TOFRANIL) 50 MG tablet; Take 2 tablets by mouth twice daily  Dispense: 120 tablet; Refill: 2    2. Panic disorder  -     ALPRAZolam XR (XANAX XR) 2 MG 24 hr tablet; Take 1 twice daily  Dispense: 60 tablet; Refill: 2  -     mirtazapine (REMERON) 15 MG tablet; Take 1 tablet by mouth Every Night.  Dispense: 90 tablet; Refill: 0  -     imipramine (TOFRANIL) 50 MG tablet; Take 2 tablets by mouth twice daily  Dispense: 120 tablet; Refill: 2    3. Recurrent major depressive disorder, in partial remission (HCC)  -     mirtazapine (REMERON) 15 MG tablet; Take 1 tablet by mouth Every Night.  Dispense: 90 tablet; Refill: 0  -     imipramine (TOFRANIL) 50 MG tablet; Take 2 tablets by mouth twice daily  Dispense: 120 tablet; Refill: 2    4. Tic disorder, unspecified  -     haloperidol (HALDOL) 2 MG tablet; Take 1 tablet by mouth Daily.  Dispense: 30 tablet; Refill: 2            Return in about 3 months (around 3/14/2022).           Patient knows to call if symptoms worsen or fail to improve between appointments.    I spent 30 minutes caring for Amish on this date of service. This time includes time spent by me in  the following activities: Patient evaluation, support psychotherapy, decisions, medications, and documentation.     Dictated utilizing [email or web address] dictation    LAUREN Villareal MD

## 2021-12-22 ENCOUNTER — TELEMEDICINE (OUTPATIENT)
Dept: PSYCHIATRY | Facility: CLINIC | Age: 51
End: 2021-12-22

## 2021-12-22 DIAGNOSIS — F95.9 TIC DISORDER, UNSPECIFIED: ICD-10-CM

## 2021-12-22 DIAGNOSIS — F41.1 GENERALIZED ANXIETY DISORDER: Primary | ICD-10-CM

## 2021-12-22 DIAGNOSIS — Z63.79 STRESS DUE TO ILLNESS OF FAMILY MEMBER: ICD-10-CM

## 2021-12-22 DIAGNOSIS — Z56.6 WORK-RELATED STRESS: ICD-10-CM

## 2021-12-22 DIAGNOSIS — F33.41 RECURRENT MAJOR DEPRESSIVE DISORDER, IN PARTIAL REMISSION (HCC): ICD-10-CM

## 2021-12-22 DIAGNOSIS — F43.22 ADJUSTMENT DISORDER WITH ANXIOUS MOOD: ICD-10-CM

## 2021-12-22 DIAGNOSIS — F41.0 PANIC DISORDER: ICD-10-CM

## 2021-12-22 PROCEDURE — 90785 PSYTX COMPLEX INTERACTIVE: CPT | Performed by: SOCIAL WORKER

## 2021-12-22 PROCEDURE — 90834 PSYTX W PT 45 MINUTES: CPT | Performed by: SOCIAL WORKER

## 2021-12-22 SDOH — HEALTH STABILITY - MENTAL HEALTH: OTHER PHYSICAL AND MENTAL STRAIN RELATED TO WORK: Z56.6

## 2021-12-23 NOTE — PROGRESS NOTES
"Date of Service: December 22, 2021  Time In: 11:05 AM  Time Out: 11:47 AM      ND 9:40 AMOGRESS NOTE  Data:  Amish Win is a 51 y.o. male who met 1: 1 with the undersigned for scheduled MyChart video visit follow-up of anxiety and panic disorder.    This was an audio and video enabled telemedicine encounter.    This provider is located at [facility], [address] [place] [address]. The provider identified himself as well as his credentials.   The Patient is at his home using his device with video connection.  The patient's condition being diagnosed/treated is appropriate for telemedicine. The patient gave consent to be seen remotely, and when consent is given they understand that the consent allows for patient identifiable information to be sent to a third party as needed.   They may refuse to be seen remotely at any time. The electronic data is encrypted and password protected, and the patient has been advised of the potential risks to privacy not withstanding such measures    HPI: Patient states \"work is about the same\".  Patient reports recent appointment with Dr. Villareal resulted in Dr. Villareal suggested the patient continue on his current dose as Dr. Villareal is \"in no hurry to reduce it at this time\".  However, the patient states he is take it upon himself to reduce his dose at night by half which the patient states he has recognized is increasing his symptoms.  Patient also states he and his wife are going to be able to go see his son on Saturday which is [place] day.  The patient states this will be the first time he is seen his son over 2 years.  Patient states he is excited but also nervous as he realizes his son is almost 20 years old and will have changed significantly.  Patient also reports he and his wife are equally nervous that they are not sure whether their son will be returning home after the first of the year.  Patient continues to state he believes it will be a \"disaster\" as they are " simply not capable of caring for him in the home.  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  Patient reports symptoms of anxiety as evidenced by sense of uncertainty and impending doom along with increased heart rate, mind goes blank, feeling overwhelmed, and a distinct sense of fear. Patient rates current anxiety at a 7 on a scale 1-10 with 10 being most severe.  Patient also reports ongoing symptoms of depression including sad mood, anhedonia, anergia, and feelings of hopelessness.  Patient rates current symptoms of depression at a 5 on a scale of 1-10 with 10 being most severe.  Patient also reports he continues to spend the vast majority of his time at home and is not working states on his days off he feels as though he is just sitting there.  Patient reports he continues to adhere to medication regimen as prescribed.  Patient adamantly and convincingly denies suicidal ideation vehemently denies any substance use.        Clinical Maneuvering/Intervention:  Assisted patient in processing above session content; acknowledged and normalized patient’s thoughts, feelings, and concerns.  Allow the patient to discuss/process his feelings and fears concerning the current increase in COVID-19 cases and his situation at work.  However, also utilized motivational interviewing techniques including complex reflections to assist the patient in recognizing and accepting all he can do is take all appropriate precautions.  Also agreed with the patient it could be appropriate at this time to double mask and be cognizant of distancing.  Also allow the patient to discuss/process his feelings and fears concerning his upcoming visit with his son and the prospect of his son returning home.  Also encouraged the patient to follow   Mono was advised of taking his alprazolam as prescribed to be further assessed and discussed in the future.  Continue to utilize cognitive behavioral therapy and dialectical behavioral therapy to assist the patient in developing appropriate coping mechanisms to decrease the severity and frequency of symptoms.  Also continue to focus on healthy skills of daily living and behavioral activation.  Provided unconditional positive regard and a safe, supportive environment.    Allowed patient to freely discuss issues without interruption or judgment. Provided safe, confidential environment to facilitate the development of positive therapeutic relationship and encourage open, honest communication. Assisted patient in identifying risk factors which would indicate the need for higher level of care including thoughts to harm self or others and/or self-harming behavior and encouraged patient to contact this office, call 911, or present to the nearest emergency room should any of these events occur. Discussed crisis intervention services and means to access.  Patient adamantly and convincingly denies current suicidal or homicidal ideation or perceptual disturbance.    Assessment    The patient continues to struggle with significant work-related stress.  In addition, it appears he could be struggling with mild benzodiazepine withdrawal in the mornings as he has had his dosage decreased.  As a result, the patient's symptomology continues to cause impairment in important areas of functioning.  As a result, he can be reasonably expected to continue to benefit from treatment likely be at increased risk for decompensation otherwise.    Diagnoses and all orders for this visit:    1. Generalized anxiety disorder (Primary)    2. Panic disorder    3. Recurrent major depressive disorder, in partial remission (HCC)    4. Tic disorder, unspecified    5. Work-related stress    6. Adjustment disorder with anxious mood    7. Stress due to  illness of family member               Mental Status Exam  Hygiene: Not applicable due to telemedicine  Dress:  casual  Attitude:  Cooperative  Motor Activity:  Appropriate  Speech:  Normal and Pressured  Mood:  anxious   Affect: Anxious/tired  Thought Processes:  Linear  Thought Content:  normal  Suicidal Thoughts:  denies  Homicidal Thoughts:  denies  Crisis Safety Plan: yes, to come to the emergency room.  Hallucinations:  denies    Patient's Support Network Includes:  children and extended family    Progress toward goal: Not at goal    Functional Status: Severe impairment    Prognosis: Guarded with Ongoing Treatment    Plan         Patient will continue in individual outpatient therapy session at the [facility] in 2 weeks.  Patient will continue in pharmacotherapy as scheduled with Dr. Villareal.  Patient will discuss his experiences in symptomology concerning decreased alprazolam with Dr. Villareal and follow all recommendations.  Patient will adhere to medication regimen as prescribed and report any side effects. Patient will contact this office, call 911 or present to the nearest emergency room should suicidal or homicidal ideations occur. Provide Cognitive Behavioral Therapy and Integrative Therapy to improve functioning, maintain stability, and avoid decompensation and the need for higher level of care.          Return in about 3 weeks (around 1/12/2022) for Next scheduled follow up.      This document signed by Izaiah Tinoco LCSW, JOAQUIN December 23, 2021 07:25 EST

## 2022-01-05 ENCOUNTER — TELEMEDICINE (OUTPATIENT)
Dept: PSYCHIATRY | Facility: CLINIC | Age: 52
End: 2022-01-05

## 2022-01-05 DIAGNOSIS — Z56.6 WORK-RELATED STRESS: ICD-10-CM

## 2022-01-05 DIAGNOSIS — F43.22 ADJUSTMENT DISORDER WITH ANXIOUS MOOD: ICD-10-CM

## 2022-01-05 DIAGNOSIS — Z63.79 STRESS DUE TO ILLNESS OF FAMILY MEMBER: ICD-10-CM

## 2022-01-05 DIAGNOSIS — F41.0 PANIC DISORDER: ICD-10-CM

## 2022-01-05 DIAGNOSIS — F33.41 RECURRENT MAJOR DEPRESSIVE DISORDER, IN PARTIAL REMISSION: ICD-10-CM

## 2022-01-05 DIAGNOSIS — F41.1 GENERALIZED ANXIETY DISORDER: Primary | ICD-10-CM

## 2022-01-05 PROCEDURE — 90834 PSYTX W PT 45 MINUTES: CPT | Performed by: SOCIAL WORKER

## 2022-01-05 SDOH — HEALTH STABILITY - MENTAL HEALTH: OTHER PHYSICAL AND MENTAL STRAIN RELATED TO WORK: Z56.6

## 2022-02-02 ENCOUNTER — TELEMEDICINE (OUTPATIENT)
Dept: PSYCHIATRY | Facility: CLINIC | Age: 52
End: 2022-02-02

## 2022-02-02 DIAGNOSIS — F41.1 GENERALIZED ANXIETY DISORDER: Primary | ICD-10-CM

## 2022-02-02 DIAGNOSIS — F33.41 RECURRENT MAJOR DEPRESSIVE DISORDER, IN PARTIAL REMISSION: ICD-10-CM

## 2022-02-02 DIAGNOSIS — F43.22 ADJUSTMENT DISORDER WITH ANXIOUS MOOD: ICD-10-CM

## 2022-02-02 DIAGNOSIS — F41.0 PANIC DISORDER: ICD-10-CM

## 2022-02-02 DIAGNOSIS — Z56.6 WORK-RELATED STRESS: ICD-10-CM

## 2022-02-02 DIAGNOSIS — Z63.79 STRESS DUE TO ILLNESS OF FAMILY MEMBER: ICD-10-CM

## 2022-02-02 PROCEDURE — 90834 PSYTX W PT 45 MINUTES: CPT | Performed by: SOCIAL WORKER

## 2022-02-02 SDOH — HEALTH STABILITY - MENTAL HEALTH: OTHER PHYSICAL AND MENTAL STRAIN RELATED TO WORK: Z56.6

## 2022-02-03 NOTE — PROGRESS NOTES
"Date of Service: January 5, 2022  Time In: 11:05 AM  Time Out: 11:45 AM      NC 9:40 AMOGRESS NOTE  Data:  Amish Win is a 51 y.o. male who met 1: 1 with the undersigned for scheduled MyChart video visit follow-up of anxiety and panic disorder.    This was an audio and video enabled telemedicine encounter.    This provider is located at [facility], [address] [place] [address]. The provider identified himself as well as his credentials.   The Patient is at his home using his device with video connection.  The patient's condition being diagnosed/treated is appropriate for telemedicine. The patient gave consent to be seen remotely, and when consent is given they understand that the consent allows for patient identifiable information to be sent to a third party as needed.   They may refuse to be seen remotely at any time. The electronic data is encrypted and password protected, and the patient has been advised of the potential risks to privacy not withstanding such measures    HPI: Patient states \"work is about the same\".  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  Patient reports symptoms of anxiety as evidenced by sense of uncertainty and impending doom along with increased heart rate, mind goes blank, feeling overwhelmed, and a distinct sense of fear. Patient rates current anxiety at a 7 on a scale 1-10 with 10 being most severe.  Patient also reports ongoing symptoms of depression including sad mood, anhedonia, anergia, and feelings of hopelessness.  Patient rates current symptoms of depression at a 5 on a scale of 1-10 with 10 being most severe.  Patient also reports he continues to spend the vast majority of his time at home and is not working states on his days off he feels as though he is just " sitting there.  Patient reports he continues to adhere to medication regimen as prescribed.  Patient adamantly and convincingly denies suicidal ideation vehemently denies any substance use.        Clinical Maneuvering/Intervention:  Assisted patient in processing above session content; acknowledged and normalized patient’s thoughts, feelings, and concerns.  Allow the patient to discuss/process his feelings and fears concerning the current increase in COVID-19 cases and his situation at work.  However, also utilized motivational interviewing techniques including complex reflections to assist the patient in recognizing and accepting all he can do is take all appropriate precautions.  Also agreed with the patient it could be appropriate at this time to double mask and be cognizant of distancing.  Also allow the patient to discuss/process his feelings and fears concerning his upcoming visit with his son and the prospect of his son returning home.  Also encouraged the patient to follow Dr. Villareal was advised of taking his alprazolam as prescribed to be further assessed and discussed in the future.  Continue to utilize cognitive behavioral therapy and dialectical behavioral therapy to assist the patient in developing appropriate coping mechanisms to decrease the severity and frequency of symptoms.  Also continue to focus on healthy skills of daily living and behavioral activation.  Provided unconditional positive regard and a safe, supportive environment.    Allowed patient to freely discuss issues without interruption or judgment. Provided safe, confidential environment to facilitate the development of positive therapeutic relationship and encourage open, honest communication. Assisted patient in identifying risk factors which would indicate the need for higher level of care including thoughts to harm self or others and/or self-harming behavior and encouraged patient to contact this office, call 911, or present to the  nearest emergency room should any of these events occur. Discussed crisis intervention services and means to access.  Patient adamantly and convincingly denies current suicidal or homicidal ideation or perceptual disturbance.    Assessment    The patient continues to struggle with significant work-related stress.  In addition, it appears he could be struggling with mild benzodiazepine withdrawal in the mornings as he has had his dosage decreased.  As a result, the patient's symptomology continues to cause impairment in important areas of functioning.  As a result, he can be reasonably expected to continue to benefit from treatment likely be at increased risk for decompensation otherwise.    Diagnoses and all orders for this visit:    1. Generalized anxiety disorder (Primary)    2. Panic disorder    3. Recurrent major depressive disorder, in partial remission (HCC)    4. Work-related stress    5. Adjustment disorder with anxious mood    6. Stress due to illness of family member               Mental Status Exam  Hygiene: Not applicable due to telemedicine  Dress:  casual  Attitude:  Cooperative  Motor Activity:  Appropriate  Speech:  Normal and Pressured  Mood:  anxious   Affect: Anxious/tired  Thought Processes:  Linear  Thought Content:  normal  Suicidal Thoughts:  denies  Homicidal Thoughts:  denies  Crisis Safety Plan: yes, to come to the emergency room.  Hallucinations:  denies    Patient's Support Network Includes:  children and extended family    Progress toward goal: Not at goal    Functional Status: Severe impairment    Prognosis: Guarded with Ongoing Treatment    Plan         Patient will continue in individual outpatient therapy session at the [facility] in 2 weeks.  Patient will continue in pharmacotherapy as scheduled with Dr. Villareal.  Patient will discuss his experiences in symptomology concerning decreased alprazolam with Dr. Villareal and follow all recommendations.  Patient will adhere to  medication regimen as prescribed and report any side effects. Patient will contact this office, call 911 or present to the nearest emergency room should suicidal or homicidal ideations occur. Provide Cognitive Behavioral Therapy and Integrative Therapy to improve functioning, maintain stability, and avoid decompensation and the need for higher level of care.          Return in about 2 weeks (around 1/19/2022) for Next scheduled follow up.      This document signed by Izaiah Tinoco LCSW, JOAQUIN February 3, 2022 07:15 EST

## 2022-02-16 ENCOUNTER — TELEMEDICINE (OUTPATIENT)
Dept: PSYCHIATRY | Facility: CLINIC | Age: 52
End: 2022-02-16

## 2022-02-16 DIAGNOSIS — Z56.6 WORK-RELATED STRESS: ICD-10-CM

## 2022-02-16 DIAGNOSIS — F41.1 GENERALIZED ANXIETY DISORDER: Primary | ICD-10-CM

## 2022-02-16 DIAGNOSIS — Z63.0 MARITAL/PARTNER RELATIONAL PROBLEM: ICD-10-CM

## 2022-02-16 DIAGNOSIS — F41.0 PANIC DISORDER: ICD-10-CM

## 2022-02-16 DIAGNOSIS — Z63.79 STRESS DUE TO ILLNESS OF FAMILY MEMBER: ICD-10-CM

## 2022-02-16 DIAGNOSIS — F33.41 RECURRENT MAJOR DEPRESSIVE DISORDER, IN PARTIAL REMISSION: ICD-10-CM

## 2022-02-16 PROCEDURE — 90834 PSYTX W PT 45 MINUTES: CPT | Performed by: SOCIAL WORKER

## 2022-02-16 SDOH — SOCIAL STABILITY - SOCIAL INSECURITY: PROBLEMS IN RELATIONSHIP WITH SPOUSE OR PARTNER: Z63.0

## 2022-02-16 SDOH — HEALTH STABILITY - MENTAL HEALTH: OTHER PHYSICAL AND MENTAL STRAIN RELATED TO WORK: Z56.6

## 2022-02-17 NOTE — PROGRESS NOTES
Date of Service: February 16, 2022  Time In: 1:45 PM  Time Out: 2:25 PM      DC 9:40 AMOGRESS NOTE  Data:  Amish Win is a 51 y.o. male who met 1: 1 with the undersigned for scheduled MyChart video visit follow-up of anxiety and panic disorder.    This was an audio and video enabled telemedicine encounter.    This provider is located at [facility], [address] [place] [address]. The provider identified himself as well as his credentials.   The Patient is at his home using his device with video connection.  The patient's condition being diagnosed/treated is appropriate for telemedicine. The patient gave consent to be seen remotely, and when consent is given they understand that the consent allows for patient identifiable information to be sent to a third party as needed.   They may refuse to be seen remotely at any time. The electronic data is encrypted and password protected, and the patient has been advised of the potential risks to privacy not withstanding such measures    HPI: Patient immediately states his teenage daughter was recently diagnosed with COVID-19 states she is currently quarantined at home.  Patient states he feels as though they are doing relatively well and they are attempting to maintain as much separation as possible and attempt to keep the rest of the family from being infected.  Patient states he does not care to admit he was very distraught at the thought of her being positive for COVID-19 the first day or 2 but states he has had some time to digested and feels as though he is doing relatively well.  Patient states he does continue to struggle from time to time at work and states he is the only person who is wearing a mask and states he continues to be somewhat ridiculed by other staff members including subordinates who suggested he take his mask off as they have difficulty understanding him.  Patient states he does believe he has been able to adopt the attitude that he is doing the  best he can take in all appropriate precautions and he is no longer allowing it to consume his life.  However, he reports ongoing symptoms of anxiety as evidenced by sense of uncertainty and impending doom along with increased heart rate, mind goes blank, feeling overwhelmed, and a distinct sense of fear. Patient rates current anxiety at a 6 on a scale 1-10 with 10 being most severe.  Patient also reports ongoing symptoms of depression including sad mood, anhedonia, anergia, and feelings of hopelessness.  Patient rates current symptoms of depression at a 5 on a scale of 1-10 with 10 being most severe.  Patient also reports he continues to spend the vast majority of his time at home and is not working states on his days off he feels as though he is just sitting there.  Patient reports he continues to adhere to medication regimen as prescribed.  However, he does state he has been able to only take 1 2mg Xanax XR in the morning and states he has been able to refrain from taking it at night as he has been able to fall asleep and sleep relatively well.  The patient states he has hopes at some point in the future of even decreasing farther.  Patient adamantly and convincingly denies suicidal ideation vehemently denies any substance use.        Clinical Maneuvering/Intervention:  Assisted patient in processing above session content; acknowledged and normalized patient’s thoughts, feelings, and concerns.  Allow the patient to discuss/process his feelings concerning the fact his daughter has COVID-19 and validated his feelings.  Also utilize reality based therapy to assist the patient in recognizing his daughter fully vaccinated and boosted and is having only mild symptoms and would likely be perfectly fine.  Also praised the patient for his ability to refrain from allowing his fear and worry of COVID-19 he can Salim his life and for his ability to continue to carry on his daily activities.  Continue to utilize cognitive  behavioral therapy and dialectical behavioral therapy to assist the patient in developing appropriate coping mechanisms to decrease the severity and frequency of symptoms.  Also continue to focus on healthy skills of daily living and behavioral activation.  Provided unconditional positive regard and a safe, supportive environment.    Allowed patient to freely discuss issues without interruption or judgment. Provided safe, confidential environment to facilitate the development of positive therapeutic relationship and encourage open, honest communication. Assisted patient in identifying risk factors which would indicate the need for higher level of care including thoughts to harm self or others and/or self-harming behavior and encouraged patient to contact this office, call 911, or present to the nearest emergency room should any of these events occur. Discussed crisis intervention services and means to access.  Patient adamantly and convincingly denies current suicidal or homicidal ideation or perceptual disturbance.    Assessment    The patient continues to struggle with significant work-related stress.  In addition, it appears he could be struggling with mild benzodiazepine withdrawal in the mornings as he has had his dosage decreased.  As a result, the patient's symptomology continues to cause impairment in important areas of functioning.  As a result, he can be reasonably expected to continue to benefit from treatment likely be at increased risk for decompensation otherwise.    Diagnoses and all orders for this visit:    1. Generalized anxiety disorder (Primary)    2. Panic disorder    3. Recurrent major depressive disorder, in partial remission (HCC)    4. Work-related stress    5. Stress due to illness of family member    6. Marital/partner relational problem               Mental Status Exam  Hygiene: Not applicable due to telemedicine  Dress:  casual  Attitude:  Cooperative  Motor Activity:   Appropriate  Speech:  Normal and Pressured  Mood:  anxious   Affect: Anxious/tired  Thought Processes:  Linear  Thought Content:  normal  Suicidal Thoughts:  denies  Homicidal Thoughts:  denies  Crisis Safety Plan: yes, to come to the emergency room.  Hallucinations:  denies    Patient's Support Network Includes:  children and extended family    Progress toward goal: Not at goal    Functional Status: Severe impairment    Prognosis: Guarded with Ongoing Treatment    Plan         Patient will continue in individual outpatient therapy session at the [facility] in 2 weeks.  Patient will continue in pharmacotherapy as scheduled with Dr. Villareal.  Patient will discuss his experiences in symptomology concerning decreased alprazolam with Dr. Villareal and follow all recommendations.  Patient will adhere to medication regimen as prescribed and report any side effects. Patient will contact this office, call 911 or present to the nearest emergency room should suicidal or homicidal ideations occur. Provide Cognitive Behavioral Therapy and Integrative Therapy to improve functioning, maintain stability, and avoid decompensation and the need for higher level of care.          Return in about 3 weeks (around 3/9/2022) for Next scheduled follow up.      This document signed by Izaiah Tinoco LCSW, JOAQUIN February 17, 2022 07:16 EST

## 2022-02-24 NOTE — PROGRESS NOTES
"Date of Service: February 2, 2022  Time In: 10:20 AM  Time Out: 11:00 AM      MA 9:40 AMOGRESS NOTE  Data:  Amish Win is a 51 y.o. male who met 1: 1 with the undersigned for scheduled MyChart video visit follow-up of anxiety and panic disorder.    This was an audio and video enabled telemedicine encounter.    This provider is located at [facility], [address] [place] [address]. The provider identified himself as well as his credentials.   The Patient is at his home using his device with video connection.  The patient's condition being diagnosed/treated is appropriate for telemedicine. The patient gave consent to be seen remotely, and when consent is given they understand that the consent allows for patient identifiable information to be sent to a third party as needed.   They may refuse to be seen remotely at any time. The electronic data is encrypted and password protected, and the patient has been advised of the potential risks to privacy not withstanding such measures    HPI: Patient states \"work is about the same\".  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  Patient reports symptoms of anxiety as evidenced by sense of uncertainty and impending doom along with increased heart rate, mind goes blank, feeling overwhelmed, and a distinct sense of fear. Patient rates current anxiety at a 6 on a scale 1-10 with 10 being most severe.  Patient also reports ongoing symptoms of depression including sad mood, anhedonia, anergia, and feelings of hopelessness.  Patient rates current symptoms of depression at a 4/5 on a scale of 1-10 with 10 being most severe.  Patient also reports he continues to spend the vast majority of his time at home and is not working states on his days off he feels as though he is just sitting there.  " Patient reports he continues to adhere to medication regimen as prescribed.  Patient adamantly and convincingly denies suicidal ideation vehemently denies any substance use.        Clinical Maneuvering/Intervention:  Assisted patient in processing above session content; acknowledged and normalized patient’s thoughts, feelings, and concerns.  Allow the patient to discuss/process his feelings and frustrations ongoing difficulties with COVID-19 pandemic and his ongoing stress at work and validated his feelings.  Also utilized motivational reviewing techniques including complex reflections to discussed the concept of things we can control things he cannot control strongly urged patient to remind himself he can only take appropriate precautions and cannot control decisions or behaviors of others.  Continue to utilize cognitive behavioral therapy and dialectical behavioral therapy to assist the patient in developing appropriate coping mechanisms to decrease the severity and frequency of symptoms.  Also continue to focus on healthy skills of daily living and behavioral activation.  Provided unconditional positive regard and a safe, supportive environment.    Allowed patient to freely discuss issues without interruption or judgment. Provided safe, confidential environment to facilitate the development of positive therapeutic relationship and encourage open, honest communication. Assisted patient in identifying risk factors which would indicate the need for higher level of care including thoughts to harm self or others and/or self-harming behavior and encouraged patient to contact this office, call 911, or present to the nearest emergency room should any of these events occur. Discussed crisis intervention services and means to access.  Patient adamantly and convincingly denies current suicidal or homicidal ideation or perceptual disturbance.    Assessment    The patient continues to struggle with significant work-related  stress. As a result, the patient's symptomology continues to cause impairment in important areas of functioning.  As a result, he can be reasonably expected to continue to benefit from treatment likely be at increased risk for decompensation otherwise.    Diagnoses and all orders for this visit:    1. Generalized anxiety disorder (Primary)    2. Panic disorder    3. Recurrent major depressive disorder, in partial remission (HCC)    4. Work-related stress    5. Adjustment disorder with anxious mood    6. Stress due to illness of family member               Mental Status Exam  Hygiene: Not applicable due to telemedicine  Dress:  casual  Attitude:  Cooperative  Motor Activity:  Appropriate  Speech:  Normal and Pressured  Mood:  anxious   Affect: Anxious/tired  Thought Processes:  Linear  Thought Content:  normal  Suicidal Thoughts:  denies  Homicidal Thoughts:  denies  Crisis Safety Plan: yes, to come to the emergency room.  Hallucinations:  denies    Patient's Support Network Includes:  children and extended family    Progress toward goal: Not at goal    Functional Status: Severe impairment    Prognosis: Guarded with Ongoing Treatment    Plan         Patient will continue in individual outpatient therapy session at the [facility] in 2 weeks.  Patient will continue in pharmacotherapy as scheduled with Dr. Villareal.  Patient will discuss his experiences in symptomology concerning decreased alprazolam with Dr. Villareal and follow all recommendations.  Patient will adhere to medication regimen as prescribed and report any side effects. Patient will contact this office, call 911 or present to the nearest emergency room should suicidal or homicidal ideations occur. Provide Cognitive Behavioral Therapy and Integrative Therapy to improve functioning, maintain stability, and avoid decompensation and the need for higher level of care.          Return in about 3 weeks (around 2/23/2022) for Next scheduled follow up.      This  document signed by Izaiah Tinoco, FAUSTO, [facility] February 24, 2022 08:29 EST

## 2022-03-02 ENCOUNTER — TELEMEDICINE (OUTPATIENT)
Dept: PSYCHIATRY | Facility: CLINIC | Age: 52
End: 2022-03-02

## 2022-03-02 DIAGNOSIS — F33.41 RECURRENT MAJOR DEPRESSIVE DISORDER, IN PARTIAL REMISSION: ICD-10-CM

## 2022-03-02 DIAGNOSIS — F41.0 PANIC DISORDER: ICD-10-CM

## 2022-03-02 DIAGNOSIS — Z63.0 MARITAL/PARTNER RELATIONAL PROBLEM: ICD-10-CM

## 2022-03-02 DIAGNOSIS — F41.1 GENERALIZED ANXIETY DISORDER: Primary | ICD-10-CM

## 2022-03-02 DIAGNOSIS — Z63.79 STRESS DUE TO ILLNESS OF FAMILY MEMBER: ICD-10-CM

## 2022-03-02 DIAGNOSIS — Z56.6 WORK-RELATED STRESS: ICD-10-CM

## 2022-03-02 PROCEDURE — 90834 PSYTX W PT 45 MINUTES: CPT | Performed by: SOCIAL WORKER

## 2022-03-02 SDOH — SOCIAL STABILITY - SOCIAL INSECURITY: PROBLEMS IN RELATIONSHIP WITH SPOUSE OR PARTNER: Z63.0

## 2022-03-02 SDOH — HEALTH STABILITY - MENTAL HEALTH: OTHER PHYSICAL AND MENTAL STRAIN RELATED TO WORK: Z56.6
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
## 2022-03-16 ENCOUNTER — TELEMEDICINE (OUTPATIENT)
Dept: PSYCHIATRY | Facility: CLINIC | Age: 52
End: 2022-03-16

## 2022-03-16 DIAGNOSIS — Z63.79 STRESS DUE TO ILLNESS OF FAMILY MEMBER: ICD-10-CM

## 2022-03-16 DIAGNOSIS — Z56.6 WORK-RELATED STRESS: ICD-10-CM

## 2022-03-16 DIAGNOSIS — F41.0 PANIC DISORDER: ICD-10-CM

## 2022-03-16 DIAGNOSIS — Z63.0 MARITAL/PARTNER RELATIONAL PROBLEM: ICD-10-CM

## 2022-03-16 DIAGNOSIS — F41.1 GENERALIZED ANXIETY DISORDER: Primary | ICD-10-CM

## 2022-03-16 DIAGNOSIS — F33.41 RECURRENT MAJOR DEPRESSIVE DISORDER, IN PARTIAL REMISSION: ICD-10-CM

## 2022-03-16 PROCEDURE — 90834 PSYTX W PT 45 MINUTES: CPT | Performed by: SOCIAL WORKER

## 2022-03-16 SDOH — HEALTH STABILITY - MENTAL HEALTH: OTHER PHYSICAL AND MENTAL STRAIN RELATED TO WORK: Z56.6

## 2022-03-16 SDOH — SOCIAL STABILITY - SOCIAL INSECURITY: PROBLEMS IN RELATIONSHIP WITH SPOUSE OR PARTNER: Z63.0
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
## 2022-03-30 ENCOUNTER — TELEMEDICINE (OUTPATIENT)
Dept: PSYCHIATRY | Facility: CLINIC | Age: 52
End: 2022-03-30

## 2022-03-30 DIAGNOSIS — F33.41 RECURRENT MAJOR DEPRESSIVE DISORDER, IN PARTIAL REMISSION: ICD-10-CM

## 2022-03-30 DIAGNOSIS — F41.1 GENERALIZED ANXIETY DISORDER: Primary | ICD-10-CM

## 2022-03-30 DIAGNOSIS — Z56.6 WORK-RELATED STRESS: ICD-10-CM

## 2022-03-30 DIAGNOSIS — Z63.0 MARITAL/PARTNER RELATIONAL PROBLEM: ICD-10-CM

## 2022-03-30 DIAGNOSIS — F41.0 PANIC DISORDER: ICD-10-CM

## 2022-03-30 PROCEDURE — 90832 PSYTX W PT 30 MINUTES: CPT | Performed by: SOCIAL WORKER

## 2022-03-30 SDOH — SOCIAL STABILITY - SOCIAL INSECURITY: PROBLEMS IN RELATIONSHIP WITH SPOUSE OR PARTNER: Z63.0

## 2022-03-30 SDOH — HEALTH STABILITY - MENTAL HEALTH: OTHER PHYSICAL AND MENTAL STRAIN RELATED TO WORK: Z56.6
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
## 2022-04-05 ENCOUNTER — TELEMEDICINE (OUTPATIENT)
Dept: PSYCHIATRY | Facility: CLINIC | Age: 52
End: 2022-04-05

## 2022-04-05 DIAGNOSIS — F33.1 MAJOR DEPRESSIVE DISORDER, RECURRENT EPISODE, MODERATE: Primary | ICD-10-CM

## 2022-04-05 DIAGNOSIS — F41.0 PANIC DISORDER: ICD-10-CM

## 2022-04-05 DIAGNOSIS — F41.1 GENERALIZED ANXIETY DISORDER: ICD-10-CM

## 2022-04-05 PROCEDURE — 99214 OFFICE O/P EST MOD 30 MIN: CPT | Performed by: PSYCHIATRY & NEUROLOGY

## 2022-04-05 RX ORDER — ALPRAZOLAM 1 MG/1
1 TABLET ORAL 3 TIMES DAILY PRN
Qty: 30 TABLET | Refills: 0 | Status: SHIPPED | OUTPATIENT
Start: 2022-04-05 | End: 2022-04-05

## 2022-04-05 RX ORDER — ALPRAZOLAM 1 MG/1
1 TABLET, EXTENDED RELEASE ORAL EVERY MORNING
Qty: 30 TABLET | Refills: 0 | Status: SHIPPED | OUTPATIENT
Start: 2022-04-05 | End: 2022-06-28 | Stop reason: SDUPTHER

## 2022-04-05 RX ORDER — IMIPRAMINE HCL 50 MG
TABLET ORAL
Qty: 120 TABLET | Refills: 2 | Status: SHIPPED | OUTPATIENT
Start: 2022-04-05 | End: 2022-06-28 | Stop reason: SDUPTHER

## 2022-04-05 RX ORDER — ARIPIPRAZOLE 5 MG/1
2.5 TABLET ORAL DAILY
Qty: 30 TABLET | Refills: 2 | Status: SHIPPED | OUTPATIENT
Start: 2022-04-05 | End: 2022-06-28 | Stop reason: SDUPTHER

## 2022-04-05 NOTE — PROGRESS NOTES
Patient ID: Amish Win is a 52 y.o. male    SERVICE TYPE: EVALUATION AND MANAGEMENT (greater than 50% of the time spent for supportive psychotherapy).  This patient visit is electronic.  The patient gave consent to be seen remotely, and when consent is given they understand that the consent allows for patient identifiable information to be sent to a third party as needed.   They may refuse to be seen remotely at any time. The electronic data is encrypted and password protected, and the patient has been advised of the potential risks to privacy not withstanding such measures.    The patient is located at his home [facility].    Clinic visit by my chart video         There were no vitals taken for this visit.    ALLERGIES:  Penicillins    CC/ Focus of the visit:     HPI:    Patient initiated the interview saying that everything was about the same.  However, as the interview progressed  became apparent by his restricted affect and slow response and speech that he was depressed and subsequently described how he has been depressed for the past 2 weeks without an obvious acute psychosocial stressor.  Did review the therapist note and patient admitted to recent marital discord.  Patient denied that he had experienced feeling hopeless but has lost enthusiasm and interest.  With this said decided to supplement his medications with adjunct Abilify discontinuing the Haldol that had been helpful with his tic-like head and neck movements.  Patient had stopped taking the mirtazapine number of months ago.  Patient has continued to see his therapist bimonthly and promises to update his status with his therapist in a couple weeks.  He has been working he is way off the Xanax, taking one 2 mg daily and has a 2-month supply.  Agrees with plan to reduce the dose to 1 mg daily 1 June.      PFSH: Patient states he is functioning on his job with moderate stress, will leave resolution of his marital discord to he and his  therapist.    Review of Systems  No cardiopulmonary, GI or neurological complaints.    SUPPORTIVE PSYCHOTHERAPY: continuing efforts to promote the therapeutic alliance, address the patient’s issues, and strengthen self awareness, insights, and positive coping skills such as Exercising, listen to music, spending time in nature and utilizing resources.     Mental Status Exam  Appearance:  appropriate  Attitude toward clinician:  cooperative and agreeable   Speech:    Rate:  regular rate and rhythm   Volume: normal  Motor:  no abnormal movements   Mood:  Good  Affect:  euthymic  Thought Processes:  linear, logical, and goal directed  Thought Content:  Normal   Feeling Hopeless: absent  Suicidal Thoughts or Intent:  absent  Homicidal Thoughts:  absent  Perceptual Disturbance: no perceptual disturbance  Attention and Concentration:  good  Insight and Judgement:  good  Memory:  memory appears to be intact    LABS: No results found for this or any previous visit (from the past 168 hour(s)).    MEDICATION ISSUES: Have discussed with the patient the medications Risks, Benefits, and Side effects including potential falls, possible impaired driving and  metabolic adversities among others. No medication side effects or related complaints today.     TREATMENT PLAN/GOALS: Continue supportive psychotherapy efforts and medications as indicated.     Current Outpatient Medications   Medication Sig Dispense Refill   • imipramine (TOFRANIL) 50 MG tablet Take 2 tablets by mouth twice daily 120 tablet 2   • ALPRAZolam (Xanax) 1 MG tablet Take 1 tablet by mouth 3 (Three) Times a Day As Needed for Anxiety. 30 tablet 0   • ARIPiprazole (ABILIFY) 5 MG tablet Take 0.5 tablets by mouth Daily. 30 tablet 2   • aspirin 81 MG EC tablet Take 81 mg by mouth 2 (Two) Times a Day.     • docusate sodium (COLACE) 100 MG capsule Take 1 capsule by mouth 2 (Two) Times a Day. 60 capsule 1   • latanoprost (XALATAN) 0.005 % ophthalmic solution      •  lisinopril-hydrochlorothiazide (PRINZIDE,ZESTORETIC) 10-12.5 MG per tablet Take 1 tablet by mouth Daily. 30 tablet 0   • metoprolol tartrate (LOPRESSOR) 50 MG tablet Take 1 tablet by mouth 2 (Two) Times a Day With Meals. 30 tablet 0   • pantoprazole (PROTONIX) 40 MG EC tablet Take 1 tablet by mouth Daily. 30 tablet 0   • vitamin D (ERGOCALCIFEROL) 90065 units capsule capsule        No current facility-administered medications for this visit.       COLLATERAL PSYCHOTHERAPEUTIC INTERVENTION: Continuing with Izaiah Tinoco LCSW    RISK: Moderate to significant    Assessment/Plan     Diagnoses and all orders for this visit:    1. Major depressive disorder, recurrent episode, moderate (HCC) (Primary)  -     imipramine (TOFRANIL) 50 MG tablet; Take 2 tablets by mouth twice daily  Dispense: 120 tablet; Refill: 2  -     ARIPiprazole (ABILIFY) 5 MG tablet; Take 0.5 tablets by mouth Daily.  Dispense: 30 tablet; Refill: 2    2. Panic disorder  -     imipramine (TOFRANIL) 50 MG tablet; Take 2 tablets by mouth twice daily  Dispense: 120 tablet; Refill: 2      3. Generalized anxiety disorder  -     imipramine (TOFRANIL) 50 MG tablet; Take 2 tablets by mouth twice daily  Dispense: 120 tablet; Refill: 2  Xanax XR 1 mg to take 1 daily starting after May 29.  #30 no refill.  Pharmacy also instructed to cancel the recent prior prescription for the 1 mg Xanax.              Return in about 3 months (around 7/5/2022).           Patient knows to call if symptoms worsen or fail to improve between appointments.    I spent 30 minutes caring for Amish on this date of service. This time includes time spent by me in the following activities: Patient evaluation, support psychotherapy, decisions, medications, and documentation.     Dictated utilizing [employer] dictation    LAUREN Villareal MD

## 2022-04-27 ENCOUNTER — TELEMEDICINE (OUTPATIENT)
Dept: PSYCHIATRY | Facility: CLINIC | Age: 52
End: 2022-04-27

## 2022-04-27 DIAGNOSIS — F41.1 GENERALIZED ANXIETY DISORDER: ICD-10-CM

## 2022-04-27 DIAGNOSIS — Z56.6 WORK-RELATED STRESS: ICD-10-CM

## 2022-04-27 DIAGNOSIS — Z63.0 MARITAL/PARTNER RELATIONAL PROBLEM: ICD-10-CM

## 2022-04-27 DIAGNOSIS — Z63.79 STRESS DUE TO ILLNESS OF FAMILY MEMBER: ICD-10-CM

## 2022-04-27 DIAGNOSIS — F33.1 MAJOR DEPRESSIVE DISORDER, RECURRENT EPISODE, MODERATE: Primary | ICD-10-CM

## 2022-04-27 DIAGNOSIS — F41.0 PANIC DISORDER: ICD-10-CM

## 2022-04-27 PROCEDURE — 90832 PSYTX W PT 30 MINUTES: CPT | Performed by: SOCIAL WORKER

## 2022-04-27 SDOH — HEALTH STABILITY - MENTAL HEALTH: OTHER PHYSICAL AND MENTAL STRAIN RELATED TO WORK: Z56.6

## 2022-04-27 SDOH — SOCIAL STABILITY - SOCIAL INSECURITY: PROBLEMS IN RELATIONSHIP WITH SPOUSE OR PARTNER: Z63.0
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
## 2022-06-28 ENCOUNTER — TELEMEDICINE (OUTPATIENT)
Dept: PSYCHIATRY | Facility: CLINIC | Age: 52
End: 2022-06-28

## 2022-06-28 DIAGNOSIS — F41.1 GENERALIZED ANXIETY DISORDER: ICD-10-CM

## 2022-06-28 DIAGNOSIS — F41.0 PANIC DISORDER: ICD-10-CM

## 2022-06-28 DIAGNOSIS — F33.41 RECURRENT MAJOR DEPRESSIVE DISORDER, IN PARTIAL REMISSION: Primary | ICD-10-CM

## 2022-06-28 DIAGNOSIS — F33.1 MAJOR DEPRESSIVE DISORDER, RECURRENT EPISODE, MODERATE: ICD-10-CM

## 2022-06-28 PROCEDURE — 99214 OFFICE O/P EST MOD 30 MIN: CPT | Performed by: PSYCHIATRY & NEUROLOGY

## 2022-06-28 RX ORDER — IMIPRAMINE HCL 50 MG
TABLET ORAL
Qty: 120 TABLET | Refills: 3 | Status: SHIPPED | OUTPATIENT
Start: 2022-06-28 | End: 2022-10-03 | Stop reason: SDUPTHER

## 2022-06-28 RX ORDER — ALPRAZOLAM 1 MG/1
1 TABLET, EXTENDED RELEASE ORAL EVERY MORNING
Qty: 30 TABLET | Refills: 3 | Status: SHIPPED | OUTPATIENT
Start: 2022-06-28 | End: 2022-10-03

## 2022-06-28 RX ORDER — ARIPIPRAZOLE 5 MG/1
2.5 TABLET ORAL DAILY
Qty: 30 TABLET | Refills: 3 | Status: SHIPPED | OUTPATIENT
Start: 2022-06-28 | End: 2022-10-03

## 2022-06-28 NOTE — PROGRESS NOTES
Patient ID: Amish Win is a 52 y.o. male    SERVICE TYPE: EVALUATION AND MANAGEMENT (greater than 50% of the time spent for supportive psychotherapy).  This patient visit is electronic.  The patient gave consent to be seen remotely, and when consent is given they understand that the consent allows for patient identifiable information to be sent to a third party as needed.   They may refuse to be seen remotely at any time. The electronic data is encrypted and password protected, and the patient has been advised of the potential risks to privacy not withstanding such measures.    The patient is located at his home [facility].    Clinic visit by [employer] video    There were no vitals taken for this visit.    ALLERGIES:  Penicillins    CC/ Focus of the visit: Depression/anxiety    HPI: Patient reports that he is doing fairly well with minimal issues with depression with some residual problems with anxiety having reduced his alprazolam dose now to 1 mg daily.  Patient functioning well at his job and describes his home situation as reasonable without major stress at this point in time.  Patient is sleeping well and plans to resume his bimonthly therapeutic contact with NANETTE Tinoco since his insurance has been reestablished.  Agreed to medication change increasing the Abilify to 5 mg and continuing with the 1 mg alprazolam ER daily.    PFSH: Patient's autistic son will be remaining in state custody until he is 21 next summer, he is doing well in the facility there,    Review of Systems  No cardiopulmonary, GI or neurological complaints.    SUPPORTIVE PSYCHOTHERAPY: continuing efforts to promote the therapeutic alliance, address the patient’s issues, and strengthen self awareness, insights, and positive coping skills such as Exercising, listen to music, spending time in nature and utilizing resources.     Mental Status Exam  Appearance:  appropriate  Attitude toward clinician:  cooperative and  agreeable   Speech:    Rate:  regular rate and rhythm   Volume: normal  Motor:  no abnormal movements   Mood:  Good  Affect:  euthymic  Thought Processes:  linear, logical, and goal directed  Thought Content:  Normal   Feeling Hopeless: absent  Suicidal Thoughts or Intent:  absent  Homicidal Thoughts:  absent  Perceptual Disturbance: no perceptual disturbance  Attention and Concentration:  good  Insight and Judgement:  good  Memory:  memory appears to be intact    LABS: No results found for this or any previous visit (from the past 168 hour(s)).    MEDICATION ISSUES: Have discussed with the patient the medications Risks, Benefits, and Side effects including potential falls, possible impaired driving and  metabolic adversities among others. No medication side effects or related complaints today.     TREATMENT PLAN/GOALS: Continue supportive psychotherapy efforts and medications as indicated.     Current Outpatient Medications   Medication Sig Dispense Refill   • ALPRAZolam XR (XANAX XR) 1 MG 24 hr tablet Take 1 tablet by mouth Every Morning. 30 tablet 3   • ARIPiprazole (ABILIFY) 5 MG tablet Take 0.5 tablets by mouth Daily. 30 tablet 3   • aspirin 81 MG EC tablet Take 81 mg by mouth 2 (Two) Times a Day.     • docusate sodium (COLACE) 100 MG capsule Take 1 capsule by mouth 2 (Two) Times a Day. 60 capsule 1   • imipramine (TOFRANIL) 50 MG tablet Take 2 tablets by mouth twice daily 120 tablet 3   • latanoprost (XALATAN) 0.005 % ophthalmic solution      • lisinopril-hydrochlorothiazide (PRINZIDE,ZESTORETIC) 10-12.5 MG per tablet Take 1 tablet by mouth Daily. 30 tablet 0   • metoprolol tartrate (LOPRESSOR) 50 MG tablet Take 1 tablet by mouth 2 (Two) Times a Day With Meals. 30 tablet 0   • pantoprazole (PROTONIX) 40 MG EC tablet Take 1 tablet by mouth Daily. 30 tablet 0   • vitamin D (ERGOCALCIFEROL) 93249 units capsule capsule        No current facility-administered medications for this visit.       COLLATERAL  PSYCHOTHERAPEUTIC INTERVENTION: Patient to resume with Izaiah Tinoco [facility]    RISK: Moderate    Assessment & Plan     Diagnoses and all orders for this visit:    1. Recurrent major depressive disorder, in partial remission (HCC) (Primary)  -     imipramine (TOFRANIL) 50 MG tablet; Take 2 tablets by mouth twice daily  Dispense: 120 tablet; Refill: 3  -     ARIPiprazole (ABILIFY) 5 MG tablet; Take 0.5 tablets by mouth Daily.  Dispense: 30 tablet; Refill: 3    2. Generalized anxiety disorder  -     ALPRAZolam XR (XANAX XR) 1 MG 24 hr tablet; Take 1 tablet by mouth Every Morning.  Dispense: 30 tablet; Refill: 3  -     imipramine (TOFRANIL) 50 MG tablet; Take 2 tablets by mouth twice daily  Dispense: 120 tablet; Refill: 3    3. Panic disorder  -     ALPRAZolam XR (XANAX XR) 1 MG 24 hr tablet; Take 1 tablet by mouth Every Morning.  Dispense: 30 tablet; Refill: 3  -     imipramine (TOFRANIL) 50 MG tablet; Take 2 tablets by mouth twice daily  Dispense: 120 tablet; Refill: 3    4. Major depressive disorder, recurrent episode, moderate (HCC)        Return in about 4 months (around 10/28/2022).           Patient knows to call if symptoms worsen or fail to improve between appointments.    I spent 30 minutes caring for Amish on this date of service. This time includes time spent by me in the following activities: Patient evaluation, support psychotherapy, decisions, medications, and documentation.     Dictated utilizing [employer] dictation    LAUREN Villareal MD

## 2022-07-06 ENCOUNTER — TELEMEDICINE (OUTPATIENT)
Dept: PSYCHIATRY | Facility: CLINIC | Age: 52
End: 2022-07-06

## 2022-07-06 DIAGNOSIS — Z63.0 MARITAL/PARTNER RELATIONAL PROBLEM: ICD-10-CM

## 2022-07-06 DIAGNOSIS — F41.0 PANIC DISORDER: ICD-10-CM

## 2022-07-06 DIAGNOSIS — F41.1 GENERALIZED ANXIETY DISORDER: Primary | ICD-10-CM

## 2022-07-06 DIAGNOSIS — F33.1 MAJOR DEPRESSIVE DISORDER, RECURRENT EPISODE, MODERATE: ICD-10-CM

## 2022-07-06 DIAGNOSIS — Z56.6 WORK-RELATED STRESS: ICD-10-CM

## 2022-07-06 PROCEDURE — 90832 PSYTX W PT 30 MINUTES: CPT | Performed by: SOCIAL WORKER

## 2022-07-06 PROCEDURE — 90785 PSYTX COMPLEX INTERACTIVE: CPT | Performed by: SOCIAL WORKER

## 2022-07-06 SDOH — SOCIAL STABILITY - SOCIAL INSECURITY: PROBLEMS IN RELATIONSHIP WITH SPOUSE OR PARTNER: Z63.0

## 2022-07-06 SDOH — HEALTH STABILITY - MENTAL HEALTH: OTHER PHYSICAL AND MENTAL STRAIN RELATED TO WORK: Z56.6
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
## 2022-08-03 ENCOUNTER — TELEMEDICINE (OUTPATIENT)
Dept: PSYCHIATRY | Facility: CLINIC | Age: 52
End: 2022-08-03

## 2022-08-03 DIAGNOSIS — F95.9 TIC DISORDER, UNSPECIFIED: ICD-10-CM

## 2022-08-03 DIAGNOSIS — F41.0 PANIC DISORDER: ICD-10-CM

## 2022-08-03 DIAGNOSIS — Z56.6 WORK-RELATED STRESS: ICD-10-CM

## 2022-08-03 DIAGNOSIS — Z63.0 MARITAL/PARTNER RELATIONAL PROBLEM: ICD-10-CM

## 2022-08-03 DIAGNOSIS — F33.1 MAJOR DEPRESSIVE DISORDER, RECURRENT EPISODE, MODERATE: ICD-10-CM

## 2022-08-03 DIAGNOSIS — F41.1 GENERALIZED ANXIETY DISORDER: Primary | ICD-10-CM

## 2022-08-03 PROCEDURE — 90832 PSYTX W PT 30 MINUTES: CPT | Performed by: SOCIAL WORKER

## 2022-08-03 SDOH — HEALTH STABILITY - MENTAL HEALTH: OTHER PHYSICAL AND MENTAL STRAIN RELATED TO WORK: Z56.6

## 2022-08-03 SDOH — SOCIAL STABILITY - SOCIAL INSECURITY: PROBLEMS IN RELATIONSHIP WITH SPOUSE OR PARTNER: Z63.0

## 2022-08-17 ENCOUNTER — TELEMEDICINE (OUTPATIENT)
Dept: PSYCHIATRY | Facility: CLINIC | Age: 52
End: 2022-08-17

## 2022-08-17 DIAGNOSIS — Z56.6 WORK-RELATED STRESS: ICD-10-CM

## 2022-08-17 DIAGNOSIS — F13.930 BENZODIAZEPINE WITHDRAWAL WITHOUT COMPLICATION: ICD-10-CM

## 2022-08-17 DIAGNOSIS — Z63.0 MARITAL/PARTNER RELATIONAL PROBLEM: ICD-10-CM

## 2022-08-17 DIAGNOSIS — F41.1 GENERALIZED ANXIETY DISORDER: Primary | ICD-10-CM

## 2022-08-17 DIAGNOSIS — F95.9 TIC DISORDER, UNSPECIFIED: ICD-10-CM

## 2022-08-17 DIAGNOSIS — F41.0 PANIC DISORDER: ICD-10-CM

## 2022-08-17 DIAGNOSIS — F33.1 MAJOR DEPRESSIVE DISORDER, RECURRENT EPISODE, MODERATE: ICD-10-CM

## 2022-08-17 PROCEDURE — 90832 PSYTX W PT 30 MINUTES: CPT | Performed by: SOCIAL WORKER

## 2022-08-17 SDOH — HEALTH STABILITY - MENTAL HEALTH: OTHER PHYSICAL AND MENTAL STRAIN RELATED TO WORK: Z56.6

## 2022-08-17 SDOH — SOCIAL STABILITY - SOCIAL INSECURITY: PROBLEMS IN RELATIONSHIP WITH SPOUSE OR PARTNER: Z63.0
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
## 2022-08-29 ENCOUNTER — TELEMEDICINE (OUTPATIENT)
Dept: PSYCHIATRY | Facility: CLINIC | Age: 52
End: 2022-08-29

## 2022-08-29 DIAGNOSIS — F95.9 TIC DISORDER, UNSPECIFIED: ICD-10-CM

## 2022-08-29 DIAGNOSIS — Z56.6 WORK-RELATED STRESS: ICD-10-CM

## 2022-08-29 DIAGNOSIS — Z63.0 MARITAL/PARTNER RELATIONAL PROBLEM: ICD-10-CM

## 2022-08-29 DIAGNOSIS — F41.0 PANIC DISORDER: ICD-10-CM

## 2022-08-29 DIAGNOSIS — F41.1 GENERALIZED ANXIETY DISORDER: Primary | ICD-10-CM

## 2022-08-29 DIAGNOSIS — F33.1 MAJOR DEPRESSIVE DISORDER, RECURRENT EPISODE, MODERATE: ICD-10-CM

## 2022-08-29 PROCEDURE — 90832 PSYTX W PT 30 MINUTES: CPT | Performed by: SOCIAL WORKER

## 2022-08-29 SDOH — SOCIAL STABILITY - SOCIAL INSECURITY: PROBLEMS IN RELATIONSHIP WITH SPOUSE OR PARTNER: Z63.0

## 2022-08-29 SDOH — HEALTH STABILITY - MENTAL HEALTH: OTHER PHYSICAL AND MENTAL STRAIN RELATED TO WORK: Z56.6
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
## 2022-09-14 ENCOUNTER — TELEMEDICINE (OUTPATIENT)
Dept: PSYCHIATRY | Facility: CLINIC | Age: 52
End: 2022-09-14

## 2022-09-14 DIAGNOSIS — F41.0 PANIC DISORDER: ICD-10-CM

## 2022-09-14 DIAGNOSIS — Z56.6 WORK-RELATED STRESS: ICD-10-CM

## 2022-09-14 DIAGNOSIS — F41.1 GENERALIZED ANXIETY DISORDER: Primary | ICD-10-CM

## 2022-09-14 DIAGNOSIS — F33.1 MAJOR DEPRESSIVE DISORDER, RECURRENT EPISODE, MODERATE: ICD-10-CM

## 2022-09-14 DIAGNOSIS — Z63.0 MARITAL/PARTNER RELATIONAL PROBLEM: ICD-10-CM

## 2022-09-14 PROCEDURE — 90832 PSYTX W PT 30 MINUTES: CPT | Performed by: SOCIAL WORKER

## 2022-09-14 SDOH — SOCIAL STABILITY - SOCIAL INSECURITY: PROBLEMS IN RELATIONSHIP WITH SPOUSE OR PARTNER: Z63.0

## 2022-09-14 SDOH — HEALTH STABILITY - MENTAL HEALTH: OTHER PHYSICAL AND MENTAL STRAIN RELATED TO WORK: Z56.6

## 2022-09-23 ENCOUNTER — TELEPHONE (OUTPATIENT)
Dept: PSYCHIATRY | Facility: CLINIC | Age: 52
End: 2022-09-23

## 2022-09-28 ENCOUNTER — TELEMEDICINE (OUTPATIENT)
Dept: PSYCHIATRY | Facility: CLINIC | Age: 52
End: 2022-09-28

## 2022-09-28 DIAGNOSIS — F41.0 PANIC DISORDER: ICD-10-CM

## 2022-09-28 DIAGNOSIS — Z63.0 MARITAL/PARTNER RELATIONAL PROBLEM: ICD-10-CM

## 2022-09-28 DIAGNOSIS — F41.1 GENERALIZED ANXIETY DISORDER: Primary | ICD-10-CM

## 2022-09-28 DIAGNOSIS — F33.1 MAJOR DEPRESSIVE DISORDER, RECURRENT EPISODE, MODERATE: ICD-10-CM

## 2022-09-28 DIAGNOSIS — Z56.6 WORK-RELATED STRESS: ICD-10-CM

## 2022-09-28 PROCEDURE — 90832 PSYTX W PT 30 MINUTES: CPT | Performed by: SOCIAL WORKER

## 2022-09-28 SDOH — HEALTH STABILITY - MENTAL HEALTH: OTHER PHYSICAL AND MENTAL STRAIN RELATED TO WORK: Z56.6

## 2022-09-28 SDOH — SOCIAL STABILITY - SOCIAL INSECURITY: PROBLEMS IN RELATIONSHIP WITH SPOUSE OR PARTNER: Z63.0

## 2022-10-03 ENCOUNTER — TELEMEDICINE (OUTPATIENT)
Dept: PSYCHIATRY | Facility: CLINIC | Age: 52
End: 2022-10-03

## 2022-10-03 DIAGNOSIS — F33.41 RECURRENT MAJOR DEPRESSIVE DISORDER, IN PARTIAL REMISSION: ICD-10-CM

## 2022-10-03 DIAGNOSIS — F41.0 PANIC DISORDER: ICD-10-CM

## 2022-10-03 DIAGNOSIS — F41.1 GENERALIZED ANXIETY DISORDER: Primary | ICD-10-CM

## 2022-10-03 PROCEDURE — 99214 OFFICE O/P EST MOD 30 MIN: CPT | Performed by: PSYCHIATRY & NEUROLOGY

## 2022-10-03 RX ORDER — IMIPRAMINE HCL 50 MG
TABLET ORAL
Qty: 120 TABLET | Refills: 0 | Status: SHIPPED | OUTPATIENT
Start: 2022-10-03 | End: 2022-11-10 | Stop reason: SDUPTHER

## 2022-10-03 RX ORDER — BUSPIRONE HYDROCHLORIDE 15 MG/1
15 TABLET ORAL 2 TIMES DAILY
Qty: 60 TABLET | Refills: 0 | Status: SHIPPED | OUTPATIENT
Start: 2022-10-03 | End: 2022-10-25 | Stop reason: SDUPTHER

## 2022-10-03 RX ORDER — QUETIAPINE FUMARATE 25 MG/1
25 TABLET, FILM COATED ORAL 2 TIMES DAILY
Qty: 60 TABLET | Refills: 0 | Status: SHIPPED | OUTPATIENT
Start: 2022-10-03 | End: 2022-10-25 | Stop reason: SDUPTHER

## 2022-10-03 NOTE — PROGRESS NOTES
"Patient ID: Amish Win is a 52 y.o. male    SERVICE TYPE: EVALUATION AND MANAGEMENT (greater than 50% of the time spent for supportive psychotherapy).      There were no vitals taken for this visit.    ALLERGIES:  Penicillins    CC/ Focus of the visit: anxiety/ depression    HPI:   Patient reports that he has been having difficulties with anxiety such as constant worry, feeling like his \"muscles are in knots\", stuttering with his speech even with simple words, plus hearing his name called.  No psychotic symptoms and no predominant depressed mood.  He is functioning at his job fairly well and he has not had the cardiac concerns that had prompted his frequent return to the emergency rooms in the past.  Patient stopped the alprazolam in July and would like to consider alternatives before restarting that class of medications, the benzodiazepines.  Discussed options concluding with low-dose quetiapine and buspirone.    PFSH: As noted patient functioning at his job which requires him being very active and on his feet 5 days a week.  His autistic son has been placed in a residence in [place] that facilitates frequent visits by he and his wife.  All this seemed to be relatively positive.    Review of Systems  No cardiopulmonary, GI or neurological complaints.    SUPPORTIVE PSYCHOTHERAPY: continuing efforts to promote the therapeutic alliance, address the patient’s issues, and strengthen self awareness, insights, and positive coping skills such as Exercising, listen to music, spending time in nature and utilizing resources.     Mental Status Exam  Appearance:  appropriate  Attitude toward clinician:  cooperative and agreeable   Speech:    Rate:  regular rate and rhythm   Volume: normal  Motor:  no abnormal movements   Mood: Anxious   affect:  euthymic cross-sectionally  Thought Processes:  linear, logical, and goal directed  Thought Content:  Normal   Feeling Hopeless: absent  Suicidal Thoughts or Intent:  " absent  Homicidal Thoughts:  absent  Perceptual Disturbance: no perceptual disturbance  Attention and Concentration:  good  Insight and Judgement:  good  Memory:  memory appears to be intact    LABS: No results found for this or any previous visit (from the past 168 hour(s)).    MEDICATION ISSUES: Have discussed with the patient the medications Risks, Benefits, and Side effects including potential falls, possible impaired driving and  metabolic adversities among others. No medication side effects or related complaints today.     TREATMENT PLAN/GOALS: Continue supportive psychotherapy efforts and medications as indicated.     Current Outpatient Medications   Medication Sig Dispense Refill   • imipramine (TOFRANIL) 50 MG tablet Take 2 tablets by mouth twice daily 120 tablet 0   • aspirin 81 MG EC tablet Take 81 mg by mouth 2 (Two) Times a Day.     • busPIRone (BUSPAR) 15 MG tablet Take 1 tablet by mouth 2 (Two) Times a Day. 60 tablet 0   • docusate sodium (COLACE) 100 MG capsule Take 1 capsule by mouth 2 (Two) Times a Day. 60 capsule 1   • latanoprost (XALATAN) 0.005 % ophthalmic solution      • lisinopril-hydrochlorothiazide (PRINZIDE,ZESTORETIC) 10-12.5 MG per tablet Take 1 tablet by mouth Daily. 30 tablet 0   • metoprolol tartrate (LOPRESSOR) 50 MG tablet Take 1 tablet by mouth 2 (Two) Times a Day With Meals. 30 tablet 0   • pantoprazole (PROTONIX) 40 MG EC tablet Take 1 tablet by mouth Daily. 30 tablet 0   • QUEtiapine (SEROquel) 25 MG tablet Take 1 tablet by mouth 2 (Two) Times a Day. 60 tablet 0   • vitamin D (ERGOCALCIFEROL) 97260 units capsule capsule        No current facility-administered medications for this visit.       COLLATERAL PSYCHOTHERAPEUTIC INTERVENTION: Patient continuing with therapist Izaiah Tinoco LCSW, this has been the cornerstone of our therapeutic effort with this patient aside from the medication.  Has been very helpful.    RISK: Moderate to significant dysfunction associated with his  anxiety.    Assessment & Plan     Diagnoses and all orders for this visit:    1. Generalized anxiety disorder (Primary)  -     imipramine (TOFRANIL) 50 MG tablet; Take 2 tablets by mouth twice daily  Dispense: 120 tablet; Refill: 0  -     QUEtiapine (SEROquel) 25 MG tablet; Take 1 tablet by mouth 2 (Two) Times a Day.  Dispense: 60 tablet; Refill: 0  -     busPIRone (BUSPAR) 15 MG tablet; Take 1 tablet by mouth 2 (Two) Times a Day.  Dispense: 60 tablet; Refill: 0    2. Panic disorder  -     imipramine (TOFRANIL) 50 MG tablet; Take 2 tablets by mouth twice daily  Dispense: 120 tablet; Refill: 0  -     QUEtiapine (SEROquel) 25 MG tablet; Take 1 tablet by mouth 2 (Two) Times a Day.  Dispense: 60 tablet; Refill: 0  -     busPIRone (BUSPAR) 15 MG tablet; Take 1 tablet by mouth 2 (Two) Times a Day.  Dispense: 60 tablet; Refill: 0    3. Recurrent major depressive disorder, in partial remission (HCC)  -     imipramine (TOFRANIL) 50 MG tablet; Take 2 tablets by mouth twice daily  Dispense: 120 tablet; Refill: 0  -     QUEtiapine (SEROquel) 25 MG tablet; Take 1 tablet by mouth 2 (Two) Times a Day.  Dispense: 60 tablet; Refill: 0  -     busPIRone (BUSPAR) 15 MG tablet; Take 1 tablet by mouth 2 (Two) Times a Day.  Dispense: 60 tablet; Refill: 0        Return in about 4 weeks (around 10/31/2022).           Patient knows to call if symptoms worsen or fail to improve between appointments.    I spent 30 minutes caring for Amish on this date of service. This time includes time spent by me in the following activities: Patient evaluation, support psychotherapy, decisions, medications, and documentation.     Dictated utilizing [facility] dictation    LAUREN Villareal MD

## 2022-10-12 ENCOUNTER — TELEMEDICINE (OUTPATIENT)
Dept: PSYCHIATRY | Facility: CLINIC | Age: 52
End: 2022-10-12

## 2022-10-12 DIAGNOSIS — F33.41 RECURRENT MAJOR DEPRESSIVE DISORDER, IN PARTIAL REMISSION: ICD-10-CM

## 2022-10-12 DIAGNOSIS — Z63.0 MARITAL/PARTNER RELATIONAL PROBLEM: ICD-10-CM

## 2022-10-12 DIAGNOSIS — Z56.6 WORK-RELATED STRESS: ICD-10-CM

## 2022-10-12 DIAGNOSIS — F41.1 GENERALIZED ANXIETY DISORDER: Primary | ICD-10-CM

## 2022-10-12 DIAGNOSIS — F41.0 PANIC DISORDER: ICD-10-CM

## 2022-10-12 DIAGNOSIS — Z63.79 STRESS DUE TO ILLNESS OF FAMILY MEMBER: ICD-10-CM

## 2022-10-12 PROCEDURE — 90832 PSYTX W PT 30 MINUTES: CPT | Performed by: SOCIAL WORKER

## 2022-10-12 SDOH — HEALTH STABILITY - MENTAL HEALTH: OTHER PHYSICAL AND MENTAL STRAIN RELATED TO WORK: Z56.6

## 2022-10-12 SDOH — SOCIAL STABILITY - SOCIAL INSECURITY: PROBLEMS IN RELATIONSHIP WITH SPOUSE OR PARTNER: Z63.0
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
## 2022-10-25 DIAGNOSIS — F33.41 RECURRENT MAJOR DEPRESSIVE DISORDER, IN PARTIAL REMISSION: ICD-10-CM

## 2022-10-25 DIAGNOSIS — F41.0 PANIC DISORDER: ICD-10-CM

## 2022-10-25 DIAGNOSIS — F41.1 GENERALIZED ANXIETY DISORDER: ICD-10-CM

## 2022-10-25 RX ORDER — QUETIAPINE FUMARATE 25 MG/1
25 TABLET, FILM COATED ORAL 2 TIMES DAILY
Qty: 60 TABLET | Refills: 0 | Status: SHIPPED | OUTPATIENT
Start: 2022-10-25 | End: 2022-11-10 | Stop reason: SDUPTHER

## 2022-10-25 RX ORDER — BUSPIRONE HYDROCHLORIDE 15 MG/1
15 TABLET ORAL 2 TIMES DAILY
Qty: 60 TABLET | Refills: 0 | Status: SHIPPED | OUTPATIENT
Start: 2022-10-25 | End: 2022-11-10 | Stop reason: SDUPTHER

## 2022-10-26 ENCOUNTER — TELEMEDICINE (OUTPATIENT)
Dept: PSYCHIATRY | Facility: CLINIC | Age: 52
End: 2022-10-26

## 2022-10-26 DIAGNOSIS — Z63.0 MARITAL/PARTNER RELATIONAL PROBLEM: ICD-10-CM

## 2022-10-26 DIAGNOSIS — F41.1 GENERALIZED ANXIETY DISORDER: Primary | ICD-10-CM

## 2022-10-26 DIAGNOSIS — F33.41 RECURRENT MAJOR DEPRESSIVE DISORDER, IN PARTIAL REMISSION: ICD-10-CM

## 2022-10-26 DIAGNOSIS — Z56.6 WORK-RELATED STRESS: ICD-10-CM

## 2022-10-26 DIAGNOSIS — F41.0 PANIC DISORDER: ICD-10-CM

## 2022-10-26 PROCEDURE — 90832 PSYTX W PT 30 MINUTES: CPT | Performed by: SOCIAL WORKER

## 2022-10-26 SDOH — HEALTH STABILITY - MENTAL HEALTH: OTHER PHYSICAL AND MENTAL STRAIN RELATED TO WORK: Z56.6

## 2022-10-26 SDOH — SOCIAL STABILITY - SOCIAL INSECURITY: PROBLEMS IN RELATIONSHIP WITH SPOUSE OR PARTNER: Z63.0
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
## 2022-11-09 ENCOUNTER — TELEMEDICINE (OUTPATIENT)
Dept: PSYCHIATRY | Facility: CLINIC | Age: 52
End: 2022-11-09

## 2022-11-09 DIAGNOSIS — Z56.6 WORK-RELATED STRESS: ICD-10-CM

## 2022-11-09 DIAGNOSIS — F33.41 RECURRENT MAJOR DEPRESSIVE DISORDER, IN PARTIAL REMISSION: ICD-10-CM

## 2022-11-09 DIAGNOSIS — F41.1 GENERALIZED ANXIETY DISORDER: Primary | ICD-10-CM

## 2022-11-09 DIAGNOSIS — Z63.0 MARITAL/PARTNER RELATIONAL PROBLEM: ICD-10-CM

## 2022-11-09 DIAGNOSIS — Z63.79 STRESS DUE TO ILLNESS OF FAMILY MEMBER: ICD-10-CM

## 2022-11-09 DIAGNOSIS — F41.0 PANIC DISORDER: ICD-10-CM

## 2022-11-09 PROCEDURE — 90834 PSYTX W PT 45 MINUTES: CPT | Performed by: SOCIAL WORKER

## 2022-11-09 SDOH — HEALTH STABILITY - MENTAL HEALTH: OTHER PHYSICAL AND MENTAL STRAIN RELATED TO WORK: Z56.6

## 2022-11-09 SDOH — SOCIAL STABILITY - SOCIAL INSECURITY: PROBLEMS IN RELATIONSHIP WITH SPOUSE OR PARTNER: Z63.0

## 2022-11-10 ENCOUNTER — TELEMEDICINE (OUTPATIENT)
Dept: PSYCHIATRY | Facility: CLINIC | Age: 52
End: 2022-11-10

## 2022-11-10 DIAGNOSIS — F33.41 RECURRENT MAJOR DEPRESSIVE DISORDER, IN PARTIAL REMISSION: ICD-10-CM

## 2022-11-10 DIAGNOSIS — F33.40 RECURRENT MAJOR DEPRESSIVE DISORDER, IN REMISSION: ICD-10-CM

## 2022-11-10 DIAGNOSIS — F41.0 PANIC DISORDER: Primary | ICD-10-CM

## 2022-11-10 DIAGNOSIS — F41.1 GENERALIZED ANXIETY DISORDER: ICD-10-CM

## 2022-11-10 PROCEDURE — 99214 OFFICE O/P EST MOD 30 MIN: CPT | Performed by: PSYCHIATRY & NEUROLOGY

## 2022-11-10 RX ORDER — QUETIAPINE FUMARATE 25 MG/1
25 TABLET, FILM COATED ORAL 2 TIMES DAILY
Qty: 180 TABLET | Refills: 0 | Status: SHIPPED | OUTPATIENT
Start: 2022-11-10 | End: 2023-02-28 | Stop reason: SDUPTHER

## 2022-11-10 RX ORDER — IMIPRAMINE HCL 50 MG
TABLET ORAL
Qty: 120 TABLET | Refills: 2 | Status: SHIPPED | OUTPATIENT
Start: 2022-11-10 | End: 2023-02-28 | Stop reason: SDUPTHER

## 2022-11-10 RX ORDER — BUSPIRONE HYDROCHLORIDE 15 MG/1
15 TABLET ORAL 2 TIMES DAILY
Qty: 180 TABLET | Refills: 0 | Status: SHIPPED | OUTPATIENT
Start: 2022-11-10 | End: 2023-02-28 | Stop reason: SDUPTHER
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
## 2022-11-10 NOTE — PROGRESS NOTES
Patient ID: Amish Win is a 52 y.o. male    SERVICE TYPE: EVALUATION AND MANAGEMENT (greater than 50% of the time spent for supportive psychotherapy).    This patient visit is electronic.  The patient gave consent to be seen remotely, and when consent is given they understand that the consent allows for patient identifiable information to be sent to a third party as needed.   They may refuse to be seen remotely at any time. The electronic data is encrypted and password protected, and the patient has been advised of the potential risks to privacy not withstanding such measures.    The patient is located at his home in [facility].    Clinic visit by [employer].           There were no vitals taken for this visit.    ALLERGIES:  Penicillins    CC/ Focus of the visit: anxiety/ depression.     HPI: Patient reports that he is doing well finding his employment satisfactory and home life recently stable.  Occasionally has difficulties with resurgence of anxiety but has found effective ways with the help of his therapist on de-escalating.  He has not been significantly depressed since last contact.  Patient has continued with fairly regular exercise (walks) along with activities at his work.  Patient has weathered doing without the benzodiazepine, and this particular case BuSpar has been effective along with the imipramine and low-dose Seroquel.  No intervening new medical issues.    PFSH: Patient reports home life is reasonably stable, planning a family Thanksgiving.    Review of Systems  No cardiopulmonary, GI or neurological complaints.    SUPPORTIVE PSYCHOTHERAPY: continuing efforts to promote the therapeutic alliance, address the patient’s issues, and strengthen self awareness, insights, and positive coping skills such as Exercising, listen to music, spending time in nature and utilizing resources.     Mental Status Exam  Appearance:  appropriate  Attitude toward clinician:  cooperative and agreeable    Speech:    Rate:  regular rate and rhythm   Volume: normal  Motor:  no abnormal movements   Mood:  Good  Affect:  euthymic  Thought Processes:  linear, logical, and goal directed  Thought Content:  Normal   Feeling Hopeless: absent  Suicidal Thoughts or Intent:  absent  Homicidal Thoughts:  absent  Perceptual Disturbance: no perceptual disturbance  Attention and Concentration:  good  Insight and Judgement:  good  Memory:  memory appears to be intact    LABS: No results found for this or any previous visit (from the past 168 hour(s)).    MEDICATION ISSUES: Have discussed with the patient the medications Risks, Benefits, and Side effects including potential falls, possible impaired driving and  metabolic adversities among others. No medication side effects or related complaints today.     TREATMENT PLAN/GOALS: Continue supportive psychotherapy efforts and medications as indicated.     Current Outpatient Medications   Medication Sig Dispense Refill   • busPIRone (BUSPAR) 15 MG tablet Take 1 tablet by mouth 2 (Two) Times a Day. 180 tablet 0   • imipramine (TOFRANIL) 50 MG tablet Take 2 tablets by mouth twice daily 120 tablet 2   • QUEtiapine (SEROquel) 25 MG tablet Take 1 tablet by mouth 2 (Two) Times a Day. 180 tablet 0   • aspirin 81 MG EC tablet Take 81 mg by mouth 2 (Two) Times a Day.     • docusate sodium (COLACE) 100 MG capsule Take 1 capsule by mouth 2 (Two) Times a Day. 60 capsule 1   • latanoprost (XALATAN) 0.005 % ophthalmic solution      • metoprolol tartrate (LOPRESSOR) 50 MG tablet Take 1 tablet by mouth 2 (Two) Times a Day With Meals. 30 tablet 0   • pantoprazole (PROTONIX) 40 MG EC tablet Take 1 tablet by mouth Daily. 30 tablet 0   • vitamin D (ERGOCALCIFEROL) 52559 units capsule capsule        No current facility-administered medications for this visit.       COLLATERAL PSYCHOTHERAPEUTIC INTERVENTION: Patient continuing with Izaiah Tinoco LCSW, has been cornerstone for our treatment effort    RISK:   Moderate.     Assessment & Plan     Diagnoses and all orders for this visit:    1. Panic disorder (Primary)  -     busPIRone (BUSPAR) 15 MG tablet; Take 1 tablet by mouth 2 (Two) Times a Day.  Dispense: 180 tablet; Refill: 0  -     QUEtiapine (SEROquel) 25 MG tablet; Take 1 tablet by mouth 2 (Two) Times a Day.  Dispense: 180 tablet; Refill: 0  -     imipramine (TOFRANIL) 50 MG tablet; Take 2 tablets by mouth twice daily  Dispense: 120 tablet; Refill: 2    2. Generalized anxiety disorder  -     busPIRone (BUSPAR) 15 MG tablet; Take 1 tablet by mouth 2 (Two) Times a Day.  Dispense: 180 tablet; Refill: 0  -     QUEtiapine (SEROquel) 25 MG tablet; Take 1 tablet by mouth 2 (Two) Times a Day.  Dispense: 180 tablet; Refill: 0  -     imipramine (TOFRANIL) 50 MG tablet; Take 2 tablets by mouth twice daily  Dispense: 120 tablet; Refill: 2    3. Recurrent major depressive disorder, in partial remission (HCC)    4. Recurrent major depressive disorder, in remission (HCC)  -     busPIRone (BUSPAR) 15 MG tablet; Take 1 tablet by mouth 2 (Two) Times a Day.  Dispense: 180 tablet; Refill: 0  -     QUEtiapine (SEROquel) 25 MG tablet; Take 1 tablet by mouth 2 (Two) Times a Day.  Dispense: 180 tablet; Refill: 0  -     imipramine (TOFRANIL) 50 MG tablet; Take 2 tablets by mouth twice daily  Dispense: 120 tablet; Refill: 2        Return in about 3 months (around 2/10/2023).           Patient knows to call if symptoms worsen or fail to improve between appointments.    I spent 30 minutes caring for Amish on this date of service. This time includes time spent by me in the following activities: Patient evaluation, support psychotherapy, decisions, medications, and documentation.     Dictated utilizing Curse dictation    LAUREN Villareal MD

## 2022-12-21 ENCOUNTER — TELEMEDICINE (OUTPATIENT)
Dept: PSYCHIATRY | Facility: CLINIC | Age: 52
End: 2022-12-21

## 2022-12-21 DIAGNOSIS — Z56.6 WORK-RELATED STRESS: ICD-10-CM

## 2022-12-21 DIAGNOSIS — F41.0 PANIC DISORDER: ICD-10-CM

## 2022-12-21 DIAGNOSIS — F41.1 GENERALIZED ANXIETY DISORDER: Primary | ICD-10-CM

## 2022-12-21 DIAGNOSIS — Z63.79 STRESS DUE TO ILLNESS OF FAMILY MEMBER: ICD-10-CM

## 2022-12-21 DIAGNOSIS — Z63.0 MARITAL/PARTNER RELATIONAL PROBLEM: ICD-10-CM

## 2022-12-21 DIAGNOSIS — F33.41 RECURRENT MAJOR DEPRESSIVE DISORDER, IN PARTIAL REMISSION: ICD-10-CM

## 2022-12-21 PROCEDURE — 90832 PSYTX W PT 30 MINUTES: CPT | Performed by: SOCIAL WORKER

## 2022-12-21 SDOH — HEALTH STABILITY - MENTAL HEALTH: OTHER PHYSICAL AND MENTAL STRAIN RELATED TO WORK: Z56.6

## 2022-12-21 SDOH — SOCIAL STABILITY - SOCIAL INSECURITY: PROBLEMS IN RELATIONSHIP WITH SPOUSE OR PARTNER: Z63.0
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
## 2023-01-03 NOTE — PROGRESS NOTES
"Amish Win     VITALS: Blood pressure 137/89, pulse 70, height 70\" (177.8 cm), weight 211 lb (95.7 kg), SpO2 99 %.    Subjective  Chief Complaint:   Chief Complaint   Patient presents with   • Sinusitis   • Anxiety        History of Present Illness:  Patient is a 46 y.o.  male with a medical history significant for depression and hyperlipidemia who presents to clinic secondary to establishment of care. Patient states that his mental health is not well. He has been talking to Dr. Meléndez over the last week about being admitted to the [facility]. He states that he has been having some palpitations with his heart, but not really any chest pain. No nausea, vomiting, sweats, chills, or numbness or tingling down his left upper extremity.     Patient has a history of hypertension and is on atenolol 50 mg orally BID. Denies any side effects of the medications. Denies any dizziness, lightheadedness, blurry vision, chest pain, or edema.   Home blood pressure: No  Low salt diet: Yes    Patient also has a history of hyperlipidemia and is currently on simvastatin 20 mg orally daily. Denies any side effects of the medications. Last lipid panel in ?? and was ??. Denies any muscle weakness, jaundice or itching.   Low cholesterol diet: Yes    Patient has a history of GERD and is currently on prevacid 30 mg orally daily. Patient denies any side effects of the medication. Denies metallic tastes and has not been having increased symptoms during sleep.Has not had any recent exacerbations. Denies any nausea, vomiting, burping, hiccuping, or midepigastric pain.    Patient has a history of depression with anxiety and is currently on clonazepam 0.5 every night, gabapentin 800 mg orally QID, and effexor XR 75 mg orally daily. Patient states that these medications are somewhat working but he needs more help. Denies any side effects of the medication. Patient states that he is sleeping less and can get out of bed daily to accomplish " daily activities. Patient has anhedonia. Mood is depressed. Has no feelings of guilt. Has poor concentration and memory ability. Has no energy. Is not able to make goals. Is not crying a lot. Appetite is poor. Denies any suicidal or homicidal ideations. Does not have any auditory or visual hallucinations.    The following portions of the patient's history were reviewed and updated as appropriate: allergies, current medications, past family history, past medical history, past social history, past surgical history and problem list.    Past Medical History  Past Medical History:   Diagnosis Date   • Anxiety    • Depression    • GERD (gastroesophageal reflux disease)    • Hyperlipidemia    • Hypertension    • Hypertension    • Low back pain        Review of Systems  Constitutional: Denies any recent history of HAs, dizziness, fevers, chills, itching.  Eyes: Denies any changes in vision. Denies any blurry vision or diplopia.  Ears, Nose, Mouth, Throat: Denies any sore throat, rhinorrhea, or cough.  Cardiovascular: Denies any chest pain, pressure.  Respiratory: Denies any shortness of breath or wheezing.  Gastrointestinal: Denies any abdominal pain, nausea, vomiting, diarrhea, or constipation.  Genitourinary: Denies any changes in urination.  Musculoskeletal: Denies any muscle weakness.  Skin and/or breasts: Denies any rashes.  Neurological: Denies any changes in balance or gait.  Psychiatric: Denies any suicidal or homicidal ideations.  Endocrine: Denies any heat or cold intolerance. Denies any voice changes, polydipsia, or polyuria.  Hematologic/Lymphatic: Denies any anemia or easy bruising.    Surgical History  Past Surgical History:   Procedure Laterality Date   • CLAVICLE SURGERY         Family History  Family History   Problem Relation Age of Onset   • Depression Father    • Anxiety disorder Father    • Alcohol abuse Father    • No Known Problems Daughter    • No Known Problems Son        Social History  Social  History     Social History   • Marital status:      Spouse name: N/A   • Number of children: N/A   • Years of education: N/A     Occupational History   • Not on file.     Social History Main Topics   • Smoking status: Never Smoker   • Smokeless tobacco: Never Used   • Alcohol use No   • Drug use: No   • Sexual activity: Not on file     Other Topics Concern   • Not on file     Social History Narrative   • No narrative on file       Objective  Physical Exam  Gen: Patient in NAD. Pleasant and answers appropriately. A&Ox3.    Skin: Warm and dry with normal turgor. No purpura, rashes, or unusual pigmentation noted. Hair is normal in appearance and distribution.    HEENT: NC/AT. No lesions noted. Conjunctiva clear, sclera nonicteric. PERRL. EOMI without nystagmus or strabismus. Fundi appear benign. No hemorrhages or exudates of eyes. Auditory canals are patent bilaterally without lesions. TMs intact, nonerythematous, nonbulging without lesions. Nasal mucosa pink, nonerythematous, and nonedematous.  Frontal and maxillary sinuses are nontender. O/P nonerythematous and moist without exudate.    Neck: Supple without lymph nodes palpated. FROM. No evidence of tracheal deviation or thyromegaly. Carotid pulses 2+/4 B/L without bruits.     Lungs: CTA B/L without rales, rhonchi, crackles, or wheezes.    Heart: RRR. S1 and S2 normal. No S3 or S4. No MRGT.    Abd: Soft, nontender,nondistended. (+)BSx4 quadrants. No HSM, masses, or bruits noted.    Extrem: No CCE. Radial pulses 2+/4 and equal B/L. FROMx4. No bone, joint, or muscle tenderness noted.    Neuro: CNs II-XII grossly intact. Speech, memory, and expression WNL. Muscle strength 5/5. DTRs 2+/4 and equal in both UE and LE B/L. No muscle atrophy or involuntary movement noted. Cerebellar function is intact. No focal motor/sensory deficits.      ECG 12 Lead  Date/Time: 3/23/2017 10:11 AM  Performed by: TOMI BANERJEE  Authorized by: TOMI BANERJEE   Comparison: not  compared with previous ECG   Previous ECG: no previous ECG available  Rhythm: sinus bradycardia  Rate: bradycardic  QRS axis: normal  Clinical impression: abnormal ECG  Comments: Sinus bradycardia with ST elevation V3, II, V5, ?V4.          Assessment/Plan  Amish Win is a 46 y.o. here for establishment of care.    1) Abnormal EKG  Concerning for MI. Will send to [facility] ER for further evaluation. Discussed with patient. Called ER to give report. Aspirin 325 mg orally x 1 dose given here in office. Oxygen started.     2) Depression with anxiety  Continue clonazepam 0.5 every night, gabapentin 800 mg orally QID, and effexor XR 75 mg orally daily. Patient has been in discussions with [facility] to be admitted. Called [facility] inpatient to let them know he is coming to the ER secondary to abnormal EKG with palpitations along for [facility] inpatient treatment.    3) Hypertension  Elevated today. Will continue atenolol 50 mg orally BID for now.    4) Hyperlipidemia   On simvastatin 20 mg orally daily. Will need lipid panel.    5) GERD  On prevacid 30 mg orally daily.     Findings and plans discussed with patient who verbalizes understanding and agreement. Will followup with patient once results are in. Patient to followup at clinic PRN or in one month  for medical followup.    Dayna Cooper MD   Assistance with ambulation/Communicate Risk of Fall with Harm to all staff/Reinforce activity limits and safety measures with patient and family/Tailored Fall Risk Interventions/Visual Cue: Yellow wristband and red socks/Bed in lowest position, wheels locked, appropriate side rails in place/Call bell, personal items and telephone in reach/Instruct patient to call for assistance before getting out of bed or chair/Non-slip footwear when patient is out of bed/[place] to call system/Physically safe environment - no spills, clutter or unnecessary equipment/Purposeful Proactive Rounding/Room/bathroom lighting operational, light cord in reach

## 2023-02-28 DIAGNOSIS — F41.1 GENERALIZED ANXIETY DISORDER: ICD-10-CM

## 2023-02-28 DIAGNOSIS — F33.40 RECURRENT MAJOR DEPRESSIVE DISORDER, IN REMISSION: ICD-10-CM

## 2023-02-28 DIAGNOSIS — F41.0 PANIC DISORDER: ICD-10-CM

## 2023-03-01 RX ORDER — IMIPRAMINE HCL 50 MG
TABLET ORAL
Qty: 120 TABLET | Refills: 2 | Status: SHIPPED | OUTPATIENT
Start: 2023-03-01

## 2023-03-01 RX ORDER — BUSPIRONE HYDROCHLORIDE 15 MG/1
15 TABLET ORAL 2 TIMES DAILY
Qty: 180 TABLET | Refills: 0 | Status: SHIPPED | OUTPATIENT
Start: 2023-03-01

## 2023-03-01 RX ORDER — QUETIAPINE FUMARATE 25 MG/1
25 TABLET, FILM COATED ORAL 2 TIMES DAILY
Qty: 180 TABLET | Refills: 0 | Status: SHIPPED | OUTPATIENT
Start: 2023-03-01

## 2023-03-08 ENCOUNTER — TELEMEDICINE (OUTPATIENT)
Dept: PSYCHIATRY | Facility: CLINIC | Age: 53
End: 2023-03-08
Payer: COMMERCIAL

## 2023-03-08 DIAGNOSIS — Z63.79 STRESS DUE TO ILLNESS OF FAMILY MEMBER: ICD-10-CM

## 2023-03-08 DIAGNOSIS — Z63.0 MARITAL/PARTNER RELATIONAL PROBLEM: ICD-10-CM

## 2023-03-08 DIAGNOSIS — F41.0 PANIC DISORDER: ICD-10-CM

## 2023-03-08 DIAGNOSIS — F33.41 RECURRENT MAJOR DEPRESSIVE DISORDER, IN PARTIAL REMISSION: ICD-10-CM

## 2023-03-08 DIAGNOSIS — F41.1 GENERALIZED ANXIETY DISORDER: Primary | ICD-10-CM

## 2023-03-08 PROCEDURE — 90832 PSYTX W PT 30 MINUTES: CPT | Performed by: SOCIAL WORKER

## 2023-03-08 SDOH — SOCIAL STABILITY - SOCIAL INSECURITY: PROBLEMS IN RELATIONSHIP WITH SPOUSE OR PARTNER: Z63.0
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
## 2023-04-05 ENCOUNTER — TELEMEDICINE (OUTPATIENT)
Dept: PSYCHIATRY | Facility: CLINIC | Age: 53
End: 2023-04-05
Payer: COMMERCIAL

## 2023-04-05 DIAGNOSIS — F33.41 RECURRENT MAJOR DEPRESSIVE DISORDER, IN PARTIAL REMISSION: ICD-10-CM

## 2023-04-05 DIAGNOSIS — F41.1 GENERALIZED ANXIETY DISORDER: Primary | ICD-10-CM

## 2023-04-05 DIAGNOSIS — F41.0 PANIC DISORDER: ICD-10-CM

## 2023-04-05 DIAGNOSIS — Z56.6 WORK-RELATED STRESS: ICD-10-CM

## 2023-04-05 DIAGNOSIS — Z63.0 MARITAL/PARTNER RELATIONAL PROBLEM: ICD-10-CM

## 2023-04-05 DIAGNOSIS — Z63.79 STRESS DUE TO ILLNESS OF FAMILY MEMBER: ICD-10-CM

## 2023-04-05 PROCEDURE — 90832 PSYTX W PT 30 MINUTES: CPT | Performed by: SOCIAL WORKER

## 2023-04-05 SDOH — SOCIAL STABILITY - SOCIAL INSECURITY: PROBLEMS IN RELATIONSHIP WITH SPOUSE OR PARTNER: Z63.0

## 2023-04-05 SDOH — HEALTH STABILITY - MENTAL HEALTH: OTHER PHYSICAL AND MENTAL STRAIN RELATED TO WORK: Z56.6
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
## 2023-04-13 ENCOUNTER — TELEMEDICINE (OUTPATIENT)
Dept: PSYCHIATRY | Facility: CLINIC | Age: 53
End: 2023-04-13
Payer: COMMERCIAL

## 2023-04-13 DIAGNOSIS — F41.0 PANIC DISORDER: ICD-10-CM

## 2023-04-13 DIAGNOSIS — F41.1 GENERALIZED ANXIETY DISORDER: Primary | ICD-10-CM

## 2023-04-13 DIAGNOSIS — F33.40 RECURRENT MAJOR DEPRESSIVE DISORDER, IN REMISSION: ICD-10-CM

## 2023-04-13 PROCEDURE — 99214 OFFICE O/P EST MOD 30 MIN: CPT | Performed by: PSYCHIATRY & NEUROLOGY

## 2023-04-13 RX ORDER — BUSPIRONE HYDROCHLORIDE 15 MG/1
15 TABLET ORAL 2 TIMES DAILY
Qty: 180 TABLET | Refills: 0 | Status: SHIPPED | OUTPATIENT
Start: 2023-04-13

## 2023-04-13 RX ORDER — IMIPRAMINE HCL 50 MG
TABLET ORAL
Qty: 120 TABLET | Refills: 2 | Status: SHIPPED | OUTPATIENT
Start: 2023-04-13

## 2023-04-13 RX ORDER — QUETIAPINE FUMARATE 25 MG/1
25 TABLET, FILM COATED ORAL 2 TIMES DAILY
Qty: 180 TABLET | Refills: 0 | Status: SHIPPED | OUTPATIENT
Start: 2023-04-13

## 2023-04-13 NOTE — PROGRESS NOTES
"Patient ID: Amish Win is a 53 y.o. male    SERVICE TYPE: EVALUATION AND MANAGEMENT (greater than 50% of the time spent for supportive psychotherapy).  This patient visit is electronic.  The patient gave consent to be seen remotely, and when consent is given they understand that the consent allows for patient identifiable information to be sent to a third party as needed.   They may refuse to be seen remotely at any time. The electronic data is encrypted and password protected, and the patient has been advised of the potential risks to privacy not withstanding such measures.    The patient is located at His home [facility].    Clinic visit by [employer] alexander. Provider at the [facility], [facility].     There were no vitals taken for this visit.    ALLERGIES:  Penicillins    CC/ Focus of the visit: Anxiety/Depression     HPI: Patient reports daily he occasionally has experienced panic attack \"nothing like I used to be\", is functioning well on his job, seems content with his status at home and at his place of employment.  No longer wearing a mask everywhere.  Sleep appetite activities interest disposition all at their baseline, relatively normal. Patient would prefer to stay on his medication not experiencing any significant side effects and with the fact he is fairly well.    PFSH: Home life improved    Review of Systems  No cardiopulmonary, GI or neurological complaints.    SUPPORTIVE PSYCHOTHERAPY: continuing efforts to promote the therapeutic alliance, address the patient’s issues, and strengthen self awareness, insights, and positive coping skills such as Exercising, listen to music, spending time in nature and utilizing resources.     Mental Status Exam  Appearance:  appropriate  Attitude toward clinician:  cooperative and agreeable   Speech:    Rate:  regular rate and rhythm   Volume: normal  Motor:  no abnormal movements   Mood:  Good  Affect:  euthymic  Thought Processes:  linear, " logical, and goal directed  Thought Content:  Normal   Feeling Hopeless: absent  Suicidal Thoughts or Intent:  absent  Homicidal Thoughts:  absent  Perceptual Disturbance: no perceptual disturbance  Attention and Concentration:  good  Insight and Judgement:  good  Memory:  memory appears to be intact    LABS: No results found for this or any previous visit (from the past 168 hour(s)).    MEDICATION ISSUES: Have discussed with the patient the medications Risks, Benefits, and Side effects including potential falls, possible impaired driving and  metabolic adversities among others. No medication side effects or related complaints today.     TREATMENT PLAN/GOALS: Continue supportive psychotherapy efforts and medications as indicated.     Current Outpatient Medications   Medication Sig Dispense Refill   • busPIRone (BUSPAR) 15 MG tablet Take 1 tablet by mouth 2 (Two) Times a Day. 180 tablet 0   • imipramine (TOFRANIL) 50 MG tablet Take 2 tablets by mouth twice daily 120 tablet 2   • QUEtiapine (SEROquel) 25 MG tablet Take 1 tablet by mouth 2 (Two) Times a Day. 180 tablet 0   • aspirin 81 MG EC tablet Take 81 mg by mouth 2 (Two) Times a Day.     • docusate sodium (COLACE) 100 MG capsule Take 1 capsule by mouth 2 (Two) Times a Day. 60 capsule 1   • latanoprost (XALATAN) 0.005 % ophthalmic solution      • metoprolol tartrate (LOPRESSOR) 50 MG tablet Take 1 tablet by mouth 2 (Two) Times a Day With Meals. 30 tablet 0   • pantoprazole (PROTONIX) 40 MG EC tablet Take 1 tablet by mouth Daily. 30 tablet 0   • vitamin D (ERGOCALCIFEROL) 20782 units capsule capsule        No current facility-administered medications for this visit.       COLLATERAL PSYCHOTHERAPEUTIC INTERVENTION: Patient continuing with Izaiah Tinoco LCSW    RISK:  Moderate    Assessment & Plan     Diagnoses and all orders for this visit:    1. Generalized anxiety disorder (Primary)  -     QUEtiapine (SEROquel) 25 MG tablet; Take 1 tablet by mouth 2 (Two) Times a  Day.  Dispense: 180 tablet; Refill: 0  -     imipramine (TOFRANIL) 50 MG tablet; Take 2 tablets by mouth twice daily  Dispense: 120 tablet; Refill: 2  -     busPIRone (BUSPAR) 15 MG tablet; Take 1 tablet by mouth 2 (Two) Times a Day.  Dispense: 180 tablet; Refill: 0    2. Panic disorder  -     QUEtiapine (SEROquel) 25 MG tablet; Take 1 tablet by mouth 2 (Two) Times a Day.  Dispense: 180 tablet; Refill: 0  -     imipramine (TOFRANIL) 50 MG tablet; Take 2 tablets by mouth twice daily  Dispense: 120 tablet; Refill: 2  -     busPIRone (BUSPAR) 15 MG tablet; Take 1 tablet by mouth 2 (Two) Times a Day.  Dispense: 180 tablet; Refill: 0    3. Recurrent major depressive disorder, in remission  -     QUEtiapine (SEROquel) 25 MG tablet; Take 1 tablet by mouth 2 (Two) Times a Day.  Dispense: 180 tablet; Refill: 0  -     imipramine (TOFRANIL) 50 MG tablet; Take 2 tablets by mouth twice daily  Dispense: 120 tablet; Refill: 2  -     busPIRone (BUSPAR) 15 MG tablet; Take 1 tablet by mouth 2 (Two) Times a Day.  Dispense: 180 tablet; Refill: 0        Return in about 3 months (around 7/13/2023).           Patient knows to call if symptoms worsen or fail to improve between appointments.    I spent 30 minutes caring for Amish on this date of service. This time includes time spent by me in the following activities: Patient evaluation, support psychotherapy, decisions, medications, and documentation.     Dictated utilizing Dragon dictation    LAUREN Villareal MD

## 2023-04-26 ENCOUNTER — TELEMEDICINE (OUTPATIENT)
Dept: PSYCHIATRY | Facility: CLINIC | Age: 53
End: 2023-04-26
Payer: COMMERCIAL

## 2023-04-26 DIAGNOSIS — F33.41 RECURRENT MAJOR DEPRESSIVE DISORDER, IN PARTIAL REMISSION: ICD-10-CM

## 2023-04-26 DIAGNOSIS — Z56.6 WORK-RELATED STRESS: ICD-10-CM

## 2023-04-26 DIAGNOSIS — F41.1 GENERALIZED ANXIETY DISORDER: Primary | ICD-10-CM

## 2023-04-26 DIAGNOSIS — Z63.0 MARITAL/PARTNER RELATIONAL PROBLEM: ICD-10-CM

## 2023-04-26 DIAGNOSIS — F41.0 PANIC DISORDER: ICD-10-CM

## 2023-04-26 SDOH — SOCIAL STABILITY - SOCIAL INSECURITY: PROBLEMS IN RELATIONSHIP WITH SPOUSE OR PARTNER: Z63.0

## 2023-04-26 SDOH — HEALTH STABILITY - MENTAL HEALTH: OTHER PHYSICAL AND MENTAL STRAIN RELATED TO WORK: Z56.6
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
## 2023-06-21 ENCOUNTER — TELEMEDICINE (OUTPATIENT)
Dept: PSYCHIATRY | Facility: CLINIC | Age: 53
End: 2023-06-21
Payer: COMMERCIAL

## 2023-06-21 DIAGNOSIS — F41.1 GENERALIZED ANXIETY DISORDER: Primary | ICD-10-CM

## 2023-06-21 DIAGNOSIS — Z63.0 MARITAL/PARTNER RELATIONAL PROBLEM: ICD-10-CM

## 2023-06-21 DIAGNOSIS — F41.0 PANIC DISORDER: ICD-10-CM

## 2023-06-21 DIAGNOSIS — F33.41 RECURRENT MAJOR DEPRESSIVE DISORDER, IN PARTIAL REMISSION: ICD-10-CM

## 2023-06-21 SDOH — SOCIAL STABILITY - SOCIAL INSECURITY: PROBLEMS IN RELATIONSHIP WITH SPOUSE OR PARTNER: Z63.0
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
## 2023-08-02 ENCOUNTER — TELEMEDICINE (OUTPATIENT)
Dept: PSYCHIATRY | Facility: CLINIC | Age: 53
End: 2023-08-02
Payer: COMMERCIAL

## 2023-08-02 DIAGNOSIS — F41.1 GENERALIZED ANXIETY DISORDER: Primary | ICD-10-CM

## 2023-08-02 DIAGNOSIS — Z63.0 MARITAL/PARTNER RELATIONAL PROBLEM: ICD-10-CM

## 2023-08-02 DIAGNOSIS — F41.0 PANIC DISORDER: ICD-10-CM

## 2023-08-02 DIAGNOSIS — F33.41 RECURRENT MAJOR DEPRESSIVE DISORDER, IN PARTIAL REMISSION: ICD-10-CM

## 2023-08-02 SDOH — SOCIAL STABILITY - SOCIAL INSECURITY: PROBLEMS IN RELATIONSHIP WITH SPOUSE OR PARTNER: Z63.0

## 2023-08-06 NOTE — PROGRESS NOTES
Date of Service: August 2, 2023  Time In: 10:46 AM  Time Out: 11:10 AM          PROGRESS NOTE  Data:  Amish Win is a 53 y.o. male who met 1: 1 with the undersigned for scheduled MyChart video visit follow-up of anxiety and panic disorder.    This was an audio and video enabled telemedicine encounter.    This provider is located at [facility], [address] [place] [address]. The provider identified himself as well as his credentials.   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  The patient's condition being diagnosed/treated is appropriate for telemedicine. The patient gave consent to be seen remotely, and when consent is given they understand that the consent allows for patient identifiable information to be sent to a third party as needed.   They may refuse to be seen remotely at any time. The electronic data is encrypted and password protected, and the patient has been advised of the potential risks to privacy not withstanding such measures    Chief Complaint: Depression; anxiety; marital discord    HPI: Patient states he feels as though he is doing better than he has in a while and states his level of anxiety is down significantly and reports he is no longer wearing a mask or concerned about COVID.  The patient states he simply goes to work and does his job and has significantly less anxiety and worry.  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  Patient reports ongoing symptoms of anxiety as evidenced by sense of uncertainty and impending doom along with increased heart rate, mind goes blank, feeling overwhelmed, and a distinct sense of fear. Patient rates current anxiety at a 2/3 on a scale 1-10 with 10 being most severe.  Patient also reports ongoing symptoms of depression including sad mood, anhedonia, anergia, and feelings of  hopelessness.  Patient rates current symptoms of depression at a 2/3 on a scale of 1-10 with 10 being most severe.  Patient also reports he continues to spend the vast majority of his time at home and is not working states on his days off he feels as though he is just sitting there.    Patient adamantly and convincingly denies suicidal ideation vehemently denies any substance use.        Clinical Maneuvering/Intervention:  Assisted patient in processing above session content; acknowledged and normalized patient's thoughts, feelings, and concerns.    Also continue to assist the patient in identifying and developing appropriate coping mechanisms to decrease in severity and frequency of symptoms including thought blocking techniques and challenging faulty, irrational thoughts of factual counter arguments.  Also led the patient through a brief session centering techniques including paying attention to his breathing and identifying items in the room to assist with decreasing symptomology.  Provided unconditional positive regard and a safe, supportive environment.    Allowed patient to freely discuss issues without interruption or judgment. Provided safe, confidential environment to facilitate the development of positive therapeutic relationship and encourage open, honest communication. Assisted patient in identifying risk factors which would indicate the need for higher level of care including thoughts to harm self or others and/or self-harming behavior and encouraged patient to contact this office, call 911, or present to the nearest emergency room should any of these events occur. Discussed crisis intervention services and means to access.  Patient adamantly and convincingly denies current suicidal or homicidal ideation or perceptual disturbance.    Assessment    Patient appears to be doing somewhat better as evidenced by his subjective report.  However, he continues to be susceptible to an external locus of control.   As a result, the patient's symptomology continues to cause impairment in important areas of functioning.  As a result, he can be reasonably expected to continue to benefit from treatment likely be at increased risk for decompensation otherwise.    Diagnoses and all orders for this visit:    1. Generalized anxiety disorder (Primary)    2. Panic disorder    3. Recurrent major depressive disorder, in partial remission    4. Marital/partner relational problem               Mental Status Exam  Hygiene: Not applicable due to telemedicine  Dress:  casual  Attitude:  Cooperative  Motor Activity:  Appropriate  Speech:  Normal and Pressured  Mood:  anxious   Affect: Anxious/tired  Thought Processes:  Linear  Thought Content:  normal  Suicidal Thoughts:  denies  Homicidal Thoughts:  denies  Crisis Safety Plan: yes, to come to the emergency room.  Hallucinations:  denies    Patient's Support Network Includes:  children and extended family    Progress toward goal: Not at goal    Functional Status: Severe impairment    Prognosis: Guarded with Ongoing Treatment    Plan         Patient will continue in individual outpatient therapy session at the [facility] in 2 weeks.  Patient will continue in pharmacotherapy as scheduled with Dr. Villareal.  Patient will discuss his experiences in symptomology concerning decreased alprazolam with Dr. Villareal and follow all recommendations.  Patient will adhere to medication regimen as prescribed and report any side effects. Patient will contact this office, call 911 or present to the nearest emergency room should suicidal or homicidal ideations occur. Provide Cognitive Behavioral Therapy and Integrative Therapy to improve functioning, maintain stability, and avoid decompensation and the need for higher level of care.          Return in about 3 weeks (around 8/23/2023) for Next scheduled follow up.      This document signed by Izaiah Tinoco LCSW, [facility] August 6, 2023 13:18 EDT

## 2023-08-14 DIAGNOSIS — F41.0 PANIC DISORDER: ICD-10-CM

## 2023-08-14 DIAGNOSIS — F33.40 RECURRENT MAJOR DEPRESSIVE DISORDER, IN REMISSION: ICD-10-CM

## 2023-08-14 DIAGNOSIS — F41.1 GENERALIZED ANXIETY DISORDER: ICD-10-CM

## 2023-08-15 RX ORDER — IMIPRAMINE HCL 50 MG
TABLET ORAL
Qty: 120 TABLET | Refills: 0 | Status: SHIPPED | OUTPATIENT
Start: 2023-08-15

## 2023-08-29 DIAGNOSIS — F41.1 GENERALIZED ANXIETY DISORDER: ICD-10-CM

## 2023-08-29 DIAGNOSIS — F33.40 RECURRENT MAJOR DEPRESSIVE DISORDER, IN REMISSION: ICD-10-CM

## 2023-08-29 DIAGNOSIS — F41.0 PANIC DISORDER: ICD-10-CM

## 2023-08-30 ENCOUNTER — TELEMEDICINE (OUTPATIENT)
Dept: PSYCHIATRY | Facility: CLINIC | Age: 53
End: 2023-08-30
Payer: COMMERCIAL

## 2023-08-30 DIAGNOSIS — F41.0 PANIC DISORDER: ICD-10-CM

## 2023-08-30 DIAGNOSIS — F33.41 RECURRENT MAJOR DEPRESSIVE DISORDER, IN PARTIAL REMISSION: ICD-10-CM

## 2023-08-30 DIAGNOSIS — F41.1 GENERALIZED ANXIETY DISORDER: Primary | ICD-10-CM

## 2023-08-30 DIAGNOSIS — Z63.79 STRESS DUE TO ILLNESS OF FAMILY MEMBER: ICD-10-CM

## 2023-08-30 RX ORDER — QUETIAPINE FUMARATE 25 MG/1
25 TABLET, FILM COATED ORAL 2 TIMES DAILY
Qty: 180 TABLET | Refills: 0 | Status: SHIPPED | OUTPATIENT
Start: 2023-08-30

## 2023-08-30 RX ORDER — BUSPIRONE HYDROCHLORIDE 15 MG/1
15 TABLET ORAL 2 TIMES DAILY
Qty: 180 TABLET | Refills: 0 | Status: SHIPPED | OUTPATIENT
Start: 2023-08-30

## 2023-09-13 NOTE — PROGRESS NOTES
Date of Service: August 30, 2023  Time In: 2:00 PM  Time Out: 2:30 PM          PROGRESS NOTE  Data:  Amish Win is a 53 y.o. male who met 1: 1 with the undersigned for scheduled MyChart video visit follow-up of anxiety and panic disorder.    This was an audio and video enabled telemedicine encounter.    This provider is located at [facility], [address] [place] [address]. The provider identified himself as well as his credentials.   The Patient is at his home using his device with video connection.  The patient's condition being diagnosed/treated is appropriate for telemedicine. The patient gave consent to be seen remotely, and when consent is given they understand that the consent allows for patient identifiable information to be sent to a third party as needed.   They may refuse to be seen remotely at any time. The electronic data is encrypted and password protected, and the patient has been advised of the potential risks to privacy not withstanding such measures    Chief Complaint: Depression; anxiety; marital discord    HPI: Patient states he feels as though he is doing better than he has in a while and states his level of anxiety is down significantly and reports he is no longer wearing a mask or concerned about COVID.  The patient states he simply goes to work and does his job and has significantly less anxiety and worry.  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  Patient also reports he and his wife appear to be doing somewhat better and states they have plans to attend a concert in the coming days.  Patient reports ongoing symptoms of anxiety as evidenced by sense of uncertainty and impending doom along with increased heart rate, mind goes blank, feeling overwhelmed, and a distinct sense of fear. Patient rates current anxiety at a 2/3 on a scale 1-10 with 10 being most severe.  Patient also reports ongoing symptoms  of depression including sad mood, anhedonia, anergia, and feelings of hopelessness.  Patient rates current symptoms of depression at a 2/3 on a scale of 1-10 with 10 being most severe.  Patient also reports he continues to spend the vast majority of his time at home and is not working states on his days off he feels as though he is just sitting there.    Patient adamantly and convincingly denies suicidal ideation vehemently denies any substance use.        Clinical Maneuvering/Intervention:  Assisted patient in processing above session content; acknowledged and normalized patient’s thoughts, feelings, and concerns.    Also continue to assist the patient in identifying and developing appropriate coping mechanisms to decrease in severity and frequency of symptoms including thought blocking techniques and challenging faulty, irrational thoughts of factual counter arguments.  Also led the patient through a brief session centering techniques including paying attention to his breathing and identifying items in the room to assist with decreasing symptomology.  Provided unconditional positive regard and a safe, supportive environment.    Allowed patient to freely discuss issues without interruption or judgment. Provided safe, confidential environment to facilitate the development of positive therapeutic relationship and encourage open, honest communication. Assisted patient in identifying risk factors which would indicate the need for higher level of care including thoughts to harm self or others and/or self-harming behavior and encouraged patient to contact this office, call 911, or present to the nearest emergency room should any of these events occur. Discussed crisis intervention services and means to access.  Patient adamantly and convincingly denies current suicidal or homicidal ideation or perceptual disturbance.    Assessment    Patient appears to be doing somewhat better as evidenced by his subjective report.   However, he continues to be susceptible to an external locus of control.  As a result, the patient's symptomology continues to cause impairment in important areas of functioning.  As a result, he can be reasonably expected to continue to benefit from treatment likely be at increased risk for decompensation otherwise.    Diagnoses and all orders for this visit:    1. Generalized anxiety disorder (Primary)    2. Panic disorder    3. Recurrent major depressive disorder, in partial remission    4. Stress due to illness of family member               Mental Status Exam  Hygiene: Not applicable due to telemedicine  Dress:  casual  Attitude:  Cooperative  Motor Activity:  Appropriate  Speech:  Normal and Pressured  Mood:  anxious   Affect: Anxious/tired  Thought Processes:  Linear  Thought Content:  normal  Suicidal Thoughts:  denies  Homicidal Thoughts:  denies  Crisis Safety Plan: yes, to come to the emergency room.  Hallucinations:  denies    Patient's Support Network Includes:  children and extended family    Progress toward goal: Not at goal    Functional Status: Severe impairment    Prognosis: Guarded with Ongoing Treatment    Plan         Patient will continue in individual outpatient therapy session at the [facility] in 2 weeks.  Patient will continue in pharmacotherapy as scheduled with Dr. Villareal.  Patient will discuss his experiences in symptomology concerning decreased alprazolam with Dr. Villareal and follow all recommendations.  Patient will adhere to medication regimen as prescribed and report any side effects. Patient will contact this office, call 911 or present to the nearest emergency room should suicidal or homicidal ideations occur. Provide Cognitive Behavioral Therapy and Integrative Therapy to improve functioning, maintain stability, and avoid decompensation and the need for higher level of care.          Return in about 2 weeks (around 9/13/2023) for Next scheduled follow up.      This document  signed by Izaiah Tinoco, FAUSTO, [facility] September 13, 2023 07:07 EDT

## 2023-10-05 NOTE — PLAN OF CARE
Problem:  Patient Care Overview (Adult)  Goal: Plan of Care Review  Outcome: Ongoing (interventions implemented as appropriate)    08/17/17 1531   Coping/Psychosocial Response Interventions   Plan Of Care Reviewed With patient   Coping/Psychosocial   Patient Agreement with Plan of Care agrees   Patient Care Overview   Progress no change   Outcome Evaluation   Outcome Summary/Follow up Plan Calm and cooperative. Reports he feels slightly paranoid. Rates anxiety and depression levels both 6. Denies si/hi.         Problem:  Overarching Goals  Goal: Adheres to Safety Considerations for Self and Others  Outcome: Ongoing (interventions implemented as appropriate)  Goal: Optimized Coping Skills in Response to Life Stressors  Outcome: Ongoing (interventions implemented as appropriate)  Goal: Develops/Participates in Therapeutic [place] to Support Successful Transition  Outcome: Ongoing (interventions implemented as appropriate)       RHEUMATOLOGY FOLLOW UP VISIT      10/5/2023  Patient Name: Angeli Marc  : 1975  Medical Record: [id number]    CHIEF COMPLAINT:   Chief Complaint   Patient presents with   • Rheumatoid Arthritis     Multiple joints pain and stiffness.          HISTORY OF PRESENT ILLNESS  Angeli Marc is a 48 year old female who is being seen for follow up evaluation of SLE with nephritis and SS, seropositive RA.  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    She does HD at home on Mon, Tues, Thurs and Friday every week.   helps her with it    Joints involved: knees, hands, shoulder  AM stiffness: Variable,  Flares: Yes  Medications used:  mg daily, prednisone 5 mg (takes pred 10 intermittently for flare ups)  Relieving factors: rest  Worsening factors: Activity  Associated symptoms: No  Difficulty with ADLs: Sometimes  Pain level today: variable    Follows with ID Dr Dowd and will be taking azithromycin, ethambutol and rifampin and plan is to do this for 1 year given MAC infection      Has not seen an ophthalmologist yet.  Also needs to see them for monitoring for ethambutol    Past Medical History:   Diagnosis Date   • Abnormal finding on breast imaging 09/10/2020   • Acute superficial gastritis without hemorrhage 2022   • Anxiety attack 2022   • Benign colonic polyp 2013   • Cholecystitis 2021   • Disseminated zoster 2022   • ESBL (extended spectrum beta-lactamase) producing bacteria infection 2022   • Fibroids 09/15/2020   • Gastrostomy tube in place (CMD) 2022   • Herpes zoster without complication 2022    Right side of back.   • History of cholecystectomy    • Iron deficiency anemia due to chronic blood loss 2020   • Lymph node histopathology and flow cytometry consistent with benign reactive lymphadenitis from Sjogren's disease.  2021   • Oropharyngeal dysphagia 2022   • Pneumonia of right lower  lobe due to infectious organism 2022   • Renal tubular acidosis, type 1    • Thrush 2022   • Type 2 diabetes mellitus with diabetic polyneuropathy (CMD) 2022   • Zoster ophthalmicus 04/15/2022     Past Surgical History:   Procedure Laterality Date   • Appendectomy     • Breast biopsy Right    • Colonoscopy     • Egd     • Hysterectomy     • Intussusception repair      PE Tubes   • Laparoscopic cholecystectomy  2021    ROBOTIC-ASSISTED LAPAROSCOPIC CHOLECYSTECTOMY, -[facility]   • Tubal ligation       Social History     Socioeconomic History   • Marital status: /Civil Union     Spouse name: Not on file   • Number of children: 3   • Years of education: Not on file   • Highest education level: Not on file   Occupational History   • Occupation: on medical leave now   Tobacco Use   • Smoking status: Former     Current packs/day: 0.00     Types: Cigarettes     Start date: 1993     Quit date: 10/11/2021     Years since quittin.9   • Smokeless tobacco: Never   Vaping Use   • Vaping Use: never used   Substance and Sexual Activity   • Alcohol use: Never   • Drug use: Never   • Sexual activity: Not Currently   Other Topics Concern   • Not on file   Social History Narrative    Lives with 1 daughter and      Social Determinants of Health     Financial Resource Strain: Not on file   Food Insecurity: Not on file   Transportation Needs: Not on file   Physical Activity: Unknown (2020)    Exercise Vital Sign    • Days of Exercise per Week: 0 days    • Minutes of Exercise per Session: Not on file   Stress: Not on file   Social Connections: Not on file   Intimate Partner Violence: Not At Risk (2022)    Intimate Partner Violence    • Social Determinants: Intimate Partner Violence Past Fear: No    • Social Determinants: Intimate Partner Violence Current Fear: No     Family History   Problem Relation Age of Onset   • Cancer, Stomach Mother    • Cancer Father         lung   • Other  Neg Hx         periodic paralysis   • Cancer, Breast Neg Hx    • Cancer, Colon Neg Hx    • Cancer, Ovarian Neg Hx    • Cancer, Rectal Neg Hx            MEDICATIONS  Current Outpatient Medications   Medication Sig Dispense Refill   • predniSONE (DELTASONE) 5 MG tablet Take 1 tablet by mouth daily. 90 tablet 0   • hydroxychloroquine (PLAQUENIL) 200 MG tablet Take 1.5 tablets by mouth daily. 90 tablet 3   • ethambutol (MYAMBUTOL) 400 MG tablet TAKE 2 TABLETS BY MOUTH THREE DAYS A WEEK ON TUESDAYS, THURSDAYS AND SATURDAYS 72 tablet 11   • sertraline (ZOLOFT) 25 MG tablet TAKE 1 TABLET BY MOUTH DAILY 30 tablet 3   • calcium acetate gelcap (PHOSLO) 667 MG capsule Take 1 capsule by mouth.     • albuterol 108 (90 Base) MCG/ACT inhaler Inhale 2 puffs into the lungs every 4 hours as needed for Shortness of Breath. 1 each 11   • famotidine (PEPCID) 20 MG tablet TAKE 1 TABLET BY MOUTH DAILY 90 tablet 0   • ondansetron (ZOFRAN ODT) 4 MG disintegrating tablet Take 1 tablet by mouth.     • rifAMPin (RIFADIN) 300 MG capsule Take 2 capsules by mouth.     • carvedilol (COREG) 25 MG tablet Take 1 tablet by mouth every 12 hours. 180 tablet 3   • Entresto 24-26 MG per tablet Take 0.5 tablets by mouth in the morning and 0.5 tablets in the evening. 90 tablet 1   • HYDROcodone-acetaminophen (NORCO) 5-325 MG per tablet      • amLODIPine (NORVASC) 10 MG tablet Take 0.5 tablets by mouth daily. 45 tablet 3   • azithromycin (ZITHROMAX) 500 MG tablet Take 1 tablet by mouth 3 days a week. On Tuesdays. Thursdays, and Saturdays 36 tablet 3   • rifAMPin (RIFADIN) 300 MG capsule Take 2 tablets on Mon, Wed, & Fri 30 capsule 11   • busPIRone (BUSPAR) 5 MG tablet Take 1 tablet by mouth daily. 90 tablet 3   • gabapentin (NEURONTIN) 100 MG capsule Take 3 capsules by mouth 3 days a week. Take after dialysis. 117 capsule 3   • Methoxy PEG-Epoetin Beta (MIRCERA IJ) 30 mcg.     • calcitRIOL (ROCALTROL) 0.25 MCG capsule Take 0.25 mcg by mouth 2 days a week.      • acetaminophen (TYLENOL) 325 MG tablet Take 2 tablets by mouth every 4 hours as needed for Pain. 30 tablet 0   • ondansetron (ZOFRAN ODT) 4 MG disintegrating tablet Place 1 tablet onto the tongue every 8 hours as needed for Nausea. 10 tablet 0   • CARBOXYMethylcellulose (REFRESH TEARS) 0.5 % ophthalmic solution 1 drop.     • clindamycin (CLEOCIN T) 1 % lotion Apply 1 application topically 2 times daily. 60 mL 11   • LORazepam (ATIVAN) 0.5 MG tablet Take 1 tablet by mouth every 6 hours as needed for Anxiety. 30 tablet 0   • sevelamer carbonate (RENVELA) 800 MG tablet Take 800 mg by mouth. Two tablet three times a day     • epoetin nino-epbx (RETACRIT) 77300 UNIT/ML injection Inject 1 mL into the vein. Do not start before July 11, 2022. 6.6 mL    • guaiFENesin-DM (ROBITUSSIN DM) 100-10 MG/5ML syrup Take 10 mLs by mouth every 6 hours as needed for Cough.     • loperamide (IMODIUM) 2 MG capsule Take 2 mg by mouth as needed for Diarrhea.     • B complex-vitamin C-folic acid (NEPHRO-DEAN) 0.8 MG Tab Take 1 tablet by mouth daily. Do not start before April 27, 2022. 30 tablet 1     No current facility-administered medications for this visit.       ALLERGIES  ALLERGIES:   Allergen Reactions   • Bee Sting ANAPHYLAXIS   • Shellfish Allergy   (Food Or Med) SHORTNESS OF BREATH   • Chlorhexidine Gluconate Other (See Comments)   • Dapsone Other (See Comments)     methemoglobinemia   • Alcohol   (Food Or Med) RASH   • Fish   (Food Or Med) RASH         Review of Systems   Constitutional: Negative for chills and fever.   HENT: Negative for mouth sores.    Respiratory: Positive for shortness of breath.    Cardiovascular: Negative for chest pain and leg swelling.   Gastrointestinal: Negative for abdominal pain, constipation and diarrhea.   Musculoskeletal: Positive for arthralgias and myalgias. Negative for gait problem, joint swelling and neck pain.   Skin: Negative for rash.   Neurological: Positive for weakness.          PHYSICAL  EXAM   Vitals:    10/05/23 1148   BP: 113/73   Pulse: 66   Temp: 99 °F (37.2 °C)   TempSrc: Temporal   Weight: 64 kg (141 lb)   LMP: 09/08/2020         General appearance: Pt appears well developed, well nourished with attention to grooming  Skin: No rash  Eyes: Conjunctiva and lids moist and without inflammation, no icterus noted  Chest-GI to auscultation bilateral  [id number] heard, no murmurs    Musculoskeletal:    No flank vitals noted to the joints of upper or lower extremity.  Tenderness to palpation noted of bilateral knee along the joint line  Scar from hemodialysis fistula on the left arm   Neuro: A&Ox4, no gross motor deficits  Psychiatric: Mood and affect are appropriate    LABS:  Component      Latest Ref Rng 8/3/2023  10:00 AM   Albumin      3.6 - 5.1 g/dL 3.5 (L)    TOTAL BILIRUBIN      0.2 - 1.0 mg/dL 0.6    DIRECT BILIRUBIN      0.0 - 0.2 mg/dL 0.2    ALK PHOSPHATASE      45 - 117 Units/L 72    ALT/SGPT      <64 Units/L 34    AST/SGOT      <=37 Units/L 26    TOTAL PROTEIN      6.4 - 8.2 g/dL 7.9    ESR      0 - 20 mm/hr 36 (H)    C-REACTIVE PROTEIN      <=1.0 mg/dL <0.3       Legend:  (L) Low  (H) High      IMAGING:  CT OF THE CHEST AND ABDOMEN     INDICATION: 48 years old Female with lung nodule and adrenal cortical  nodule     COMPARISON STUDIES: CT of the abdomen and pelvis on 06/21/2022; CT of the  chest on 06/18/2022.     TECHNIQUE:  Multidetector helical CT of the chest and abdomen was performed  without intravenous or oral contrast.  Images are displayed in the axial,  coronal and sagittal planes. Adjustment of the mA and/or kV was done  according to the patient's size.  3D axial MIPS reformatted images of the  chest were generated at the CT scanner, as a more sensitive sequence for  detection of pulmonary nodules.     FINDINGS:     Lungs and pleura: There is a moderate degree of left apical and left upper  lobe fibrosis, with mild patchy groundglass opacification.  Minimal right  apical  fibrosis is present, and mild right middle lobe fibrosis.  There are  mild patchy groundglass opacities throughout the lungs, as well as multiple  scattered sub-6 mm pulmonary nodules..  There is a trace left pleural  effusion with atelectasis.  No pneumothorax.     Airways: The trachea and mainstem bronchi are patent.     Heart/pericardium/great vessels: Limited evaluation of the mediastinum  secondary to lack of intravenous contrast.  The heart is normal in size,  without pericardial effusion. There is normal caliber of the thoracic aorta  and main pulmonary artery. There are minimal atherosclerotic  calcifications.     Mediastinum/Genna: Limited evaluation of the mediastinum and genna secondary  to lack of intravenous contrast. Within this limitation, there are no  pathologically enlarged mediastinal lymph nodes. The esophagus is normal.     Chest wall and soft tissues: Minimal degenerative changes are present,  without acute fracture, dislocation, or suspicious osseous lesion.  Prominent bilateral axillary lymph nodes are likely reactive.  No  pathologically enlarged lymph nodes. Stable thyroid nodularity.     Limited evaluation of the solid organs secondary to lack of intravenous  contrast.      Liver: The liver demonstrates normal size, contour and attenuation. There  is no focal hepatic lesion.     Gallbladder and biliary system: The gallbladder is surgically absent. The  biliary tree is normal, without intrahepatic or extrahepatic ductal  dilation.     Pancreas: The pancreas is normal in size, contour and attenuation. No focal  abnormality is present.     Spleen: The spleen demonstrates normal size, contour and attenuation. No  focal abnormality is present.     Adrenal glands: The right adrenal gland is normal.  Stable left adrenal  gland hypodense nodule, measuring 1.8 cm.     Kidneys: There is no obstructive uropathy or hydronephrosis.   Bilateral  nonobstructing renal calculi are present.  There is also a  cluster of  calculi along the proximal left ureter, measuring 1.1 cm in length, without  obstructive uropathy.  Bilateral renal cysts are present.     Gastrointestinal tract: The stomach is normal. The visualized small  intestine is unremarkable, without evidence for obstruction. The large  intestine within the field-of-view is normal. Moderate stool burden is  present.     Peritoneum/mesentery/lymph nodes: No ascites, pneumoperitoneum, or  mesenteric inflammatory changes. There are no pathologically enlarged lymph  nodes.     Vasculature: Normal caliber of the abdominal aorta.     Soft tissues: The soft tissues are unremarkable.     Osseous structures: Minimal degenerative changes are present, without acute  fracture, dislocation, or suspicious osseous lesion.     IMPRESSION:  1.   Mild patchy groundglass opacities throughout the lungs, as well as  multiple scattered, sub-6 mm pulmonary nodules.  Recommend follow-up CT of  the chest in 12 months to document stability.  2.   Moderate left apical and left upper lobe fibrosis and mild patchy  groundglass opacification.  Additional minimal to mild fibrosis in the  right lung apex and right middle lobe.  Trace left pleural effusion with  atelectasis.  3.   Stable left adrenal gland hypodense nodule measuring 1.8 cm,  indeterminate.  This can be further characterized with CT of the adrenal  glands or multiphasic MRI of the abdomen.  4.   Bilateral nonobstructing nephrolithiasis.  Cluster of nonobstructing  calculi along the proximal left ureter, measuring 1.1 cm in length.  5.   Other nonacute findings, as above.       Assessment and plan  #SLE with LN class IV  #Sjogrens syndrome  #Long term use of HCQ  #On prednisone therapy  #Rheumatoid arthritis  #End-stage kidney disease on HD  #History of mycobacterium avium infection    RECOMMENDATIONS  -Check labs today  -Continue hydroxychloroquine 200 mg daily  -Continue prednisone 5 Mg daily.  Okay to increase to 10 mg daily  for 5 to 7 days during flares  -Rec follow-up with ophthalmology prior to next follow-up appointment  -Would possibly try to avoid addition of DMARD therapy or Biologics given patient's ongoing treatment for MAC infection which is to be extended for another year  -Start physical therapy for shoulder and knee pain  -Discussed the possibility of doing steroid injection but patient like to hold off for now    RTC -3-4 months    The patient and her  indicate understanding of these issues and agrees with the plan.  All of patients questions were answered.    Pako Moreno MD

## 2023-10-11 ENCOUNTER — TELEPHONE (OUTPATIENT)
Dept: PSYCHIATRY | Facility: CLINIC | Age: 53
End: 2023-10-11
Payer: COMMERCIAL

## 2023-10-11 NOTE — TELEPHONE ENCOUNTER
Amish has had a family situation come up and he needs your advice. He said he would really appreciate a call back if possible.

## 2023-10-12 ENCOUNTER — TELEMEDICINE (OUTPATIENT)
Dept: PSYCHIATRY | Facility: CLINIC | Age: 53
End: 2023-10-12
Payer: COMMERCIAL

## 2023-10-12 DIAGNOSIS — F41.1 GENERALIZED ANXIETY DISORDER: Primary | ICD-10-CM

## 2023-10-12 DIAGNOSIS — F33.40 RECURRENT MAJOR DEPRESSIVE DISORDER, IN REMISSION: ICD-10-CM

## 2023-10-12 DIAGNOSIS — F41.0 PANIC DISORDER: ICD-10-CM

## 2023-10-12 RX ORDER — IMIPRAMINE HCL 50 MG
TABLET ORAL
Qty: 180 TABLET | Refills: 0 | Status: SHIPPED | OUTPATIENT
Start: 2023-10-12

## 2023-10-12 RX ORDER — QUETIAPINE FUMARATE 25 MG/1
25 TABLET, FILM COATED ORAL 2 TIMES DAILY
Qty: 180 TABLET | Refills: 0 | Status: SHIPPED | OUTPATIENT
Start: 2023-10-12

## 2023-10-12 RX ORDER — METOPROLOL TARTRATE 50 MG/1
50 TABLET, FILM COATED ORAL 2 TIMES DAILY WITH MEALS
Qty: 90 TABLET | Refills: 0 | Status: SHIPPED | OUTPATIENT
Start: 2023-10-12

## 2023-10-12 RX ORDER — IMIPRAMINE HCL 50 MG
TABLET ORAL
Qty: 120 TABLET | Refills: 0 | Status: SHIPPED | OUTPATIENT
Start: 2023-10-12 | End: 2023-10-12 | Stop reason: SDUPTHER

## 2023-10-12 RX ORDER — BUSPIRONE HYDROCHLORIDE 15 MG/1
15 TABLET ORAL 2 TIMES DAILY
Qty: 180 TABLET | Refills: 0 | Status: SHIPPED | OUTPATIENT
Start: 2023-10-12

## 2023-10-12 RX ORDER — METOPROLOL TARTRATE 50 MG/1
50 TABLET, FILM COATED ORAL 2 TIMES DAILY WITH MEALS
Qty: 30 TABLET | Refills: 0 | Status: SHIPPED | OUTPATIENT
Start: 2023-10-12 | End: 2023-10-12 | Stop reason: SDUPTHER
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
## 2023-10-18 ENCOUNTER — TELEPHONE (OUTPATIENT)
Dept: PSYCHIATRY | Facility: CLINIC | Age: 53
End: 2023-10-18
Payer: COMMERCIAL

## 2023-10-18 DIAGNOSIS — F41.0 PANIC DISORDER: ICD-10-CM

## 2023-10-18 RX ORDER — METOPROLOL TARTRATE 50 MG/1
50 TABLET, FILM COATED ORAL 2 TIMES DAILY WITH MEALS
Qty: 180 TABLET | Refills: 0 | Status: SHIPPED | OUTPATIENT
Start: 2023-10-18

## 2023-10-18 NOTE — TELEPHONE ENCOUNTER
Pt stated that during last visit that Metoprolol was suppose to have been sent in for a 90 day supply, but wasn't. He is requesting for them to be sent in.

## 2023-11-06 DIAGNOSIS — F33.40 RECURRENT MAJOR DEPRESSIVE DISORDER, IN REMISSION: ICD-10-CM

## 2023-11-06 DIAGNOSIS — F41.0 PANIC DISORDER: ICD-10-CM

## 2023-11-06 DIAGNOSIS — F41.1 GENERALIZED ANXIETY DISORDER: ICD-10-CM

## 2023-11-06 RX ORDER — IMIPRAMINE HCL 50 MG
TABLET ORAL
Qty: 120 TABLET | Refills: 0 | OUTPATIENT
Start: 2023-11-06

## 2023-11-06 NOTE — TELEPHONE ENCOUNTER
3 month Rx 10/12  
Patient does not need medication refilled at this time   
Refill requested   
Refill requested   
no dysuria, no frequency, and no hematuria.

## 2023-11-24 DIAGNOSIS — F41.1 GENERALIZED ANXIETY DISORDER: ICD-10-CM

## 2023-11-24 DIAGNOSIS — F33.40 RECURRENT MAJOR DEPRESSIVE DISORDER, IN REMISSION: ICD-10-CM

## 2023-11-24 DIAGNOSIS — F41.0 PANIC DISORDER: ICD-10-CM

## 2023-11-27 ENCOUNTER — TELEMEDICINE (OUTPATIENT)
Dept: PSYCHIATRY | Facility: CLINIC | Age: 53
End: 2023-11-27
Payer: COMMERCIAL

## 2023-11-27 DIAGNOSIS — F41.1 GENERALIZED ANXIETY DISORDER: Primary | ICD-10-CM

## 2023-11-27 DIAGNOSIS — Z63.0 MARITAL/PARTNER RELATIONAL PROBLEM: ICD-10-CM

## 2023-11-27 DIAGNOSIS — Z63.79 STRESSFUL LIFE EVENT AFFECTING FAMILY: ICD-10-CM

## 2023-11-27 DIAGNOSIS — F41.0 PANIC DISORDER: ICD-10-CM

## 2023-11-27 DIAGNOSIS — F33.41 RECURRENT MAJOR DEPRESSIVE DISORDER, IN PARTIAL REMISSION: ICD-10-CM

## 2023-11-27 RX ORDER — QUETIAPINE FUMARATE 25 MG/1
25 TABLET, FILM COATED ORAL 2 TIMES DAILY
Qty: 180 TABLET | Refills: 0 | Status: SHIPPED | OUTPATIENT
Start: 2023-11-27

## 2023-11-27 RX ORDER — BUSPIRONE HYDROCHLORIDE 15 MG/1
15 TABLET ORAL 2 TIMES DAILY
Qty: 180 TABLET | Refills: 0 | Status: SHIPPED | OUTPATIENT
Start: 2023-11-27

## 2023-11-27 SDOH — SOCIAL STABILITY - SOCIAL INSECURITY: PROBLEMS IN RELATIONSHIP WITH SPOUSE OR PARTNER: Z63.0

## 2023-11-29 ENCOUNTER — TELEPHONE (OUTPATIENT)
Dept: PSYCHIATRY | Facility: CLINIC | Age: 53
End: 2023-11-29
Payer: COMMERCIAL
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
## 2023-12-04 ENCOUNTER — TELEPHONE (OUTPATIENT)
Dept: PSYCHIATRY | Facility: CLINIC | Age: 53
End: 2023-12-04
Payer: COMMERCIAL

## 2023-12-06 ENCOUNTER — TELEMEDICINE (OUTPATIENT)
Dept: PSYCHIATRY | Facility: CLINIC | Age: 53
End: 2023-12-06
Payer: COMMERCIAL

## 2023-12-06 DIAGNOSIS — Z63.79 STRESSFUL LIFE EVENT AFFECTING FAMILY: ICD-10-CM

## 2023-12-06 DIAGNOSIS — F33.1 MAJOR DEPRESSIVE DISORDER, RECURRENT EPISODE, MODERATE: ICD-10-CM

## 2023-12-06 DIAGNOSIS — F41.1 GENERALIZED ANXIETY DISORDER: Primary | ICD-10-CM

## 2023-12-06 DIAGNOSIS — F41.0 PANIC DISORDER: ICD-10-CM

## 2023-12-06 DIAGNOSIS — F43.22 ADJUSTMENT DISORDER WITH ANXIOUS MOOD: ICD-10-CM

## 2023-12-06 DIAGNOSIS — Z63.0 MARITAL/PARTNER RELATIONAL PROBLEM: ICD-10-CM

## 2023-12-06 SDOH — SOCIAL STABILITY - SOCIAL INSECURITY: PROBLEMS IN RELATIONSHIP WITH SPOUSE OR PARTNER: Z63.0

## 2023-12-13 ENCOUNTER — TELEMEDICINE (OUTPATIENT)
Dept: PSYCHIATRY | Facility: CLINIC | Age: 53
End: 2023-12-13
Payer: COMMERCIAL

## 2023-12-13 DIAGNOSIS — F33.1 MAJOR DEPRESSIVE DISORDER, RECURRENT EPISODE, MODERATE: ICD-10-CM

## 2023-12-13 DIAGNOSIS — F43.22 ADJUSTMENT DISORDER WITH ANXIOUS MOOD: ICD-10-CM

## 2023-12-13 DIAGNOSIS — Z63.79 STRESSFUL LIFE EVENT AFFECTING FAMILY: ICD-10-CM

## 2023-12-13 DIAGNOSIS — F41.1 GENERALIZED ANXIETY DISORDER: Primary | ICD-10-CM

## 2023-12-13 DIAGNOSIS — F41.0 PANIC DISORDER: ICD-10-CM

## 2023-12-13 DIAGNOSIS — Z63.0 MARITAL/PARTNER RELATIONAL PROBLEM: ICD-10-CM

## 2023-12-13 SDOH — SOCIAL STABILITY - SOCIAL INSECURITY: PROBLEMS IN RELATIONSHIP WITH SPOUSE OR PARTNER: Z63.0

## 2023-12-14 ENCOUNTER — TELEMEDICINE (OUTPATIENT)
Dept: PSYCHIATRY | Facility: CLINIC | Age: 53
End: 2023-12-14
Payer: COMMERCIAL

## 2023-12-14 DIAGNOSIS — F41.0 PANIC DISORDER: ICD-10-CM

## 2023-12-14 DIAGNOSIS — F33.40 RECURRENT MAJOR DEPRESSIVE DISORDER, IN REMISSION: ICD-10-CM

## 2023-12-14 DIAGNOSIS — F41.1 GENERALIZED ANXIETY DISORDER: Primary | ICD-10-CM

## 2023-12-14 RX ORDER — BUSPIRONE HYDROCHLORIDE 15 MG/1
15 TABLET ORAL 2 TIMES DAILY
Qty: 180 TABLET | Refills: 0 | Status: SHIPPED | OUTPATIENT
Start: 2023-12-14

## 2023-12-14 RX ORDER — QUETIAPINE FUMARATE 25 MG/1
25 TABLET, FILM COATED ORAL 2 TIMES DAILY
Qty: 180 TABLET | Refills: 0 | Status: SHIPPED | OUTPATIENT
Start: 2023-12-14

## 2023-12-14 RX ORDER — IMIPRAMINE HCL 50 MG
TABLET ORAL
Qty: 180 TABLET | Refills: 0 | Status: SHIPPED | OUTPATIENT
Start: 2023-12-14
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
## 2023-12-20 NOTE — PROGRESS NOTES
Date of Service: December 6, 2023  Time In: 7:35 AM  Time Out: 8:00 AM          PROGRESS NOTE  Data:  Amish Win is a 53 y.o. male who met 1: 1 with the undersigned for scheduled MyChart video visit follow-up of anxiety and panic disorder.    This was an audio and video enabled telemedicine encounter.    This provider is located at [facility], [address] [place] [address]. The provider identified himself as well as his credentials.   The Patient is at his home using his device with video connection.  The patient's condition being diagnosed/treated is appropriate for telemedicine. The patient gave consent to be seen remotely, and when consent is given they understand that the consent allows for patient identifiable information to be sent to a third party as needed.   They may refuse to be seen remotely at any time. The electronic data is encrypted and password protected, and the patient has been advised of the potential risks to privacy not withstanding such measures    Chief Complaint: Depression; anxiety; marital discord; stressful event affecting family, adjustment disorder    HPI: Patient states he continues to feel overwhelmed and states his wife continues to state they have no other choice than to assume for responsibility for the care of her 4 nieces and nephews.  Patient reports he continues to feel as though he simply cannot cope with current situation and states he feels mentally and physically exhausted.  Patient reports he feels as though his symptomology has exacerbated significantly and he simply feels overwhelmed.  Patient reports ongoing symptoms of anxiety as evidenced by sense of uncertainty and impending doom along with increased heart rate, mind goes blank, feeling overwhelmed, and a distinct sense of fear. Patient rates current anxiety at a 7 on a scale 1-10 with 10 being most severe.  Patient also reports ongoing symptoms of depression including sad mood, anhedonia, anergia, and  feelings of hopelessness.  Patient rates current symptoms of depression at a 7 on a scale of 1-10 with 10 being most severe.    Patient adamantly and convincingly denies suicidal ideation vehemently denies any substance use.  Patient appears to have returned to having an anxious tic which is visible during the session where he abruptly moves his shoulder and taps himself on his chest.  Patient has a history of this behavior but had been able to discontinue it as he had improved.  This appears to be simply due to increased stress and symptoms of anxiety.          Clinical Maneuvering/Intervention:  Assisted patient in processing above session content; acknowledged and normalized patient’s thoughts, feelings, and concerns.   Allow the patient to discuss/process his feelings concerning the current situation at home and validated his feelings.  Also utilized solution focused therapy to assist the patient in verbalizing the fact that he does have a right to consider his options and whether or not he is willing to take on this significant responsibility.  Also agreed with the patient to assist him with finding couples therapy within this office and encouraged him to follow through.  Also encouraged the patient to at least accept that he also has a right to refuse to accept responsibility for raising small children.  Also continue to assist the patient in identifying and developing appropriate coping mechanisms to decrease in severity and frequency of symptoms including thought blocking techniques and challenging faulty, irrational thoughts of factual counter arguments.  Also led the patient through a brief session centering techniques including paying attention to his breathing and identifying items in the room to assist with decreasing symptomology.  Provided unconditional positive regard and a safe, supportive environment.    Allowed patient to freely discuss issues without interruption or judgment. Provided safe,  confidential environment to facilitate the development of positive therapeutic relationship and encourage open, honest communication. Assisted patient in identifying risk factors which would indicate the need for higher level of care including thoughts to harm self or others and/or self-harming behavior and encouraged patient to contact this office, call 911, or present to the nearest emergency room should any of these events occur. Discussed crisis intervention services and means to access.  Patient adamantly and convincingly denies current suicidal or homicidal ideation or perceptual disturbance.    Assessment    Patient appears to be struggling with significantly increased symptomology which appears to be exacerbated by the current situation at home and his wife's insistence that they assume responsibility for raising 3 small children.  As a result, he can be reasonably expected to continue to benefit from treatment likely be at increased risk for decompensation otherwise.    Diagnoses and all orders for this visit:    1. Generalized anxiety disorder (Primary)    2. Panic disorder    3. Major depressive disorder, recurrent episode, moderate    4. Marital/partner relational problem    5. Stressful life event affecting family    6. Adjustment disorder with anxious mood               Mental Status Exam  Hygiene: Not applicable due to telemedicine  Dress:  casual  Attitude:  Cooperative  Motor Activity:  Appropriate  Speech:  Normal and Pressured  Mood:  anxious   Affect: Anxious/tired  Thought Processes:  Linear  Thought Content:  normal  Suicidal Thoughts:  denies  Homicidal Thoughts:  denies  Crisis Safety Plan: yes, to come to the emergency room.  Hallucinations:  denies    Patient's Support Network Includes:  children and extended family    Progress toward goal: Not at goal    Functional Status: Severe impairment    Prognosis: Guarded with Ongoing Treatment    Plan         Patient will continue in individual  outpatient therapy session at the [facility] in 2 weeks.  Patient will continue in pharmacotherapy as scheduled with Dr. Villareal.  Patient will contact this office, call 911 or present to the nearest emergency room should suicidal or homicidal ideations occur. Provide Cognitive Behavioral Therapy and Integrative Therapy to improve functioning, maintain stability, and avoid decompensation and the need for higher level of care.          Return in about 2 weeks (around 12/20/2023) for Next scheduled follow up.      This document signed by Izaiah Tinoco LCSW, [facility] December 20, 2023 06:32 EST

## 2023-12-27 ENCOUNTER — TELEMEDICINE (OUTPATIENT)
Dept: PSYCHIATRY | Facility: CLINIC | Age: 53
End: 2023-12-27
Payer: COMMERCIAL

## 2023-12-27 DIAGNOSIS — Z63.79 STRESSFUL LIFE EVENT AFFECTING FAMILY: ICD-10-CM

## 2023-12-27 DIAGNOSIS — F43.22 ADJUSTMENT DISORDER WITH ANXIOUS MOOD: ICD-10-CM

## 2023-12-27 DIAGNOSIS — F33.40 RECURRENT MAJOR DEPRESSIVE DISORDER, IN REMISSION: ICD-10-CM

## 2023-12-27 DIAGNOSIS — Z63.0 MARITAL/PARTNER RELATIONAL PROBLEM: ICD-10-CM

## 2023-12-27 DIAGNOSIS — F41.1 GENERALIZED ANXIETY DISORDER: Primary | ICD-10-CM

## 2023-12-27 DIAGNOSIS — F41.0 PANIC DISORDER: ICD-10-CM

## 2023-12-27 SDOH — SOCIAL STABILITY - SOCIAL INSECURITY: PROBLEMS IN RELATIONSHIP WITH SPOUSE OR PARTNER: Z63.0

## 2023-12-31 NOTE — PROGRESS NOTES
Date of Service: December 13, 2023  Time In: 7:30 AM  Time Out: 8:00 AM          PROGRESS NOTE  Data:  Amish Win is a 53 y.o. male who met 1: 1 with the undersigned for scheduled MyChart video visit follow-up of anxiety and panic disorder.    This was an audio and video enabled telemedicine encounter.    This provider is located at [facility], [address] [place] [address]. The provider identified himself as well as his credentials.   The Patient is at his home using his device with video connection.  The patient's condition being diagnosed/treated is appropriate for telemedicine. The patient gave consent to be seen remotely, and when consent is given they understand that the consent allows for patient identifiable information to be sent to a third party as needed.   They may refuse to be seen remotely at any time. The electronic data is encrypted and password protected, and the patient has been advised of the potential risks to privacy not withstanding such measures    Chief Complaint: Depression; anxiety; marital discord; stressful event affecting family, adjustment disorder    HPI: Patient immediately states he continues to feel extremely overwhelmed attempting to care for his 4 nieces and nephews and states although his wife tells him they have to raise them he is not sure if he is capable or willing.  Patient continues to state he would like to at least attempt couples therapy and request the undersigned begin the process for a referral.   Patient reports he feels as though his symptomology has exacerbated significantly and he simply feels overwhelmed.  Patient reports ongoing symptoms of anxiety as evidenced by sense of uncertainty and impending doom along with increased heart rate, mind goes blank, feeling overwhelmed, and a distinct sense of fear. Patient rates current anxiety at a 7/8 on a scale 1-10 with 10 being most severe.  Patient also reports ongoing symptoms of depression including sad  mood, anhedonia, anergia, and feelings of hopelessness.  Patient rates current symptoms of depression at a 7 on a scale of 1-10 with 10 being most severe.    Patient adamantly and convincingly denies suicidal ideation vehemently denies any substance use.         Clinical Maneuvering/Intervention:  Assisted patient in processing above session content; acknowledged and normalized patient’s thoughts, feelings, and concerns.   Allow the patient to discuss/process his feelings concerning the current situation at home and validated his feelings.  Utilize motivational reviewing techniques including complex reflections to assist the patient in recognizing he has a right to decide whether or not he wishes to take on the responsibility of raising his 4 young nieces and nephews.  Also validated the patient's attempt to schedule couples therapy but also encouraged him to remind himself he cannot control how his wife will respond.  Also continue to assist the patient in identifying and developing appropriate coping mechanisms to decrease in severity and frequency of symptoms including thought blocking techniques and challenging faulty, irrational thoughts of factual counter arguments.  Also led the patient through a brief session centering techniques including paying attention to his breathing and identifying items in the room to assist with decreasing symptomology.  Provided unconditional positive regard and a safe, supportive environment.    Allowed patient to freely discuss issues without interruption or judgment. Provided safe, confidential environment to facilitate the development of positive therapeutic relationship and encourage open, honest communication. Assisted patient in identifying risk factors which would indicate the need for higher level of care including thoughts to harm self or others and/or self-harming behavior and encouraged patient to contact this office, call 911, or present to the nearest emergency room  should any of these events occur. Discussed crisis intervention services and means to access.  Patient adamantly and convincingly denies current suicidal or homicidal ideation or perceptual disturbance.    Assessment    Patient appears to be struggling with significantly increased symptomology which appears to be exacerbated by the current situation at home and his wife's insistence that they assume responsibility for raising 3 small children.  As a result, he can be reasonably expected to continue to benefit from treatment likely be at increased risk for decompensation otherwise.    Diagnoses and all orders for this visit:    1. Generalized anxiety disorder (Primary)    2. Panic disorder    3. Major depressive disorder, recurrent episode, moderate    4. Marital/partner relational problem    5. Stressful life event affecting family    6. Adjustment disorder with anxious mood               Mental Status Exam  Hygiene: Not applicable due to telemedicine  Dress:  casual  Attitude:  Cooperative  Motor Activity:  Appropriate  Speech:  Normal and Pressured  Mood:  anxious   Affect: Anxious/tired  Thought Processes:  Linear  Thought Content:  normal  Suicidal Thoughts:  denies  Homicidal Thoughts:  denies  Crisis Safety Plan: yes, to come to the emergency room.  Hallucinations:  denies    Patient's Support Network Includes:  children and extended family    Progress toward goal: Not at goal    Functional Status: Severe impairment    Prognosis: Guarded with Ongoing Treatment    Plan         Patient will continue in individual outpatient therapy session at the [facility] in 2 weeks.  The undersigned contacted support staff and begin the process of assisting the patient with getting a referral for couples therapy and instructed the patient to contact the front staff and give them dates and times of availability.  Patient will continue in pharmacotherapy as scheduled with Dr. Villareal.  Patient will contact this office, call  911 or present to the nearest emergency room should suicidal or homicidal ideations occur. Provide Cognitive Behavioral Therapy and Integrative Therapy to improve functioning, maintain stability, and avoid decompensation and the need for higher level of care.          Return in about 2 weeks (around 12/27/2023) for Next scheduled follow up.      This document signed by Izaiah Tinoco LCSW, [facility] December 31, 2023 09:39 EST

## 2024-01-07 NOTE — PROGRESS NOTES
Date of Service: December 27, 2023  Time In: 4:00 PM  Time Out: 4:46 PM          PROGRESS NOTE  Data:  Amish Win is a 53 y.o. male who met 1: 1 with the undersigned for scheduled MyChart video visit follow-up of anxiety and panic disorder.    This was an audio and video enabled telemedicine encounter.    This provider is located at [facility], [address] [place] [address]. The provider identified himself as well as his credentials.   The Patient is at his home using his device with video connection.  The patient's condition being diagnosed/treated is appropriate for telemedicine. The patient gave consent to be seen remotely, and when consent is given they understand that the consent allows for patient identifiable information to be sent to a third party as needed.   They may refuse to be seen remotely at any time. The electronic data is encrypted and password protected, and the patient has been advised of the potential risks to privacy not withstanding such measures    Chief Complaint: Depression; anxiety; marital discord; stressful event affecting family, adjustment disorder    HPI: Patient immediately states he continues to feel extremely overwhelmed with attempting to care for his wife's nieces and nephews and states he continues to struggle with taking on this responsibility at his age.  Patient reports his wife simply has been unwilling to even discuss anything other than them completely Izaiah syndrome and responsibility for the care of her nieces and nephews.  Patient continues to state he would like to at least attempt couples therapy and request the undersigned begin the process for a referral.   Patient reports he feels as though his symptomology has exacerbated significantly and he simply feels overwhelmed.  Patient reports ongoing symptoms of anxiety as evidenced by sense of uncertainty and impending doom along with increased heart rate, mind goes blank, feeling overwhelmed, and a distinct  sense of fear. Patient rates current anxiety at a 8 on a scale 1-10 with 10 being most severe.  Patient also reports ongoing symptoms of depression including sad mood, anhedonia, anergia, and feelings of hopelessness.  Patient rates current symptoms of depression at a 7 on a scale of 1-10 with 10 being most severe.    Patient adamantly and convincingly denies suicidal ideation vehemently denies any substance use.         Clinical Maneuvering/Intervention:  Assisted patient in processing above session content; acknowledged and normalized patient’s thoughts, feelings, and concerns.   Allow the patient to discuss/process his feelings concerning the current situation at home and validated his feelings.  Utilize motivational reviewing techniques including complex reflections to assist the patient in recognizing he has a right to decide whether or not he wishes to take on the responsibility of raising his 4 young nieces and nephews.  Also validated the patient's attempt to schedule couples therapy but also encouraged him to remind himself he cannot control how his wife will respond.  Also continue to assist the patient in identifying and developing appropriate coping mechanisms to decrease in severity and frequency of symptoms including thought blocking techniques and challenging faulty, irrational thoughts of factual counter arguments.  Also led the patient through a brief session centering techniques including paying attention to his breathing and identifying items in the room to assist with decreasing symptomology.  Provided unconditional positive regard and a safe, supportive environment.    Allowed patient to freely discuss issues without interruption or judgment. Provided safe, confidential environment to facilitate the development of positive therapeutic relationship and encourage open, honest communication. Assisted patient in identifying risk factors which would indicate the need for higher level of care  including thoughts to harm self or others and/or self-harming behavior and encouraged patient to contact this office, call 911, or present to the nearest emergency room should any of these events occur. Discussed crisis intervention services and means to access.  Patient adamantly and convincingly denies current suicidal or homicidal ideation or perceptual disturbance.    Assessment    Patient appears to be struggling with significantly increased symptomology which appears to be exacerbated by the current situation at home and his wife's insistence that they assume responsibility for raising 3 small children.  As a result, he can be reasonably expected to continue to benefit from treatment likely be at increased risk for decompensation otherwise.    Diagnoses and all orders for this visit:    1. Generalized anxiety disorder (Primary)    2. Recurrent major depressive disorder, in remission    3. Panic disorder    4. Marital/partner relational problem    5. Stressful life event affecting family    6. Adjustment disorder with anxious mood               Mental Status Exam  Hygiene: Not applicable due to telemedicine  Dress:  casual  Attitude:  Cooperative  Motor Activity:  Appropriate  Speech:  Normal and Pressured  Mood:  anxious   Affect: Anxious/tired  Thought Processes:  Linear  Thought Content:  normal  Suicidal Thoughts:  denies  Homicidal Thoughts:  denies  Crisis Safety Plan: yes, to come to the emergency room.  Hallucinations:  denies    Patient's Support Network Includes:  children and extended family    Progress toward goal: Not at goal    Functional Status: Severe impairment    Prognosis: Guarded with Ongoing Treatment    Plan         Patient will continue in individual outpatient therapy session at the [facility] in 2 weeks.  The undersigned contacted support staff and begin the process of assisting the patient with getting a referral for couples therapy and instructed the patient to contact the front  staff and give them dates and times of availability.  Patient will continue in pharmacotherapy as scheduled with Dr. Villareal.  Patient will contact this office, call 911 or present to the nearest emergency room should suicidal or homicidal ideations occur. Provide Cognitive Behavioral Therapy and Integrative Therapy to improve functioning, maintain stability, and avoid decompensation and the need for higher level of care.          Return in about 2 weeks (around 1/10/2024) for Next scheduled follow up.      This document signed by Izaiah Tinoco, FAUSTO, [facility] January 7, 2024 15:40 EST

## 2024-01-17 ENCOUNTER — TELEMEDICINE (OUTPATIENT)
Dept: PSYCHIATRY | Facility: CLINIC | Age: 54
End: 2024-01-17
Payer: COMMERCIAL

## 2024-01-17 DIAGNOSIS — F33.40 RECURRENT MAJOR DEPRESSIVE DISORDER, IN REMISSION: ICD-10-CM

## 2024-01-17 DIAGNOSIS — F43.22 ADJUSTMENT DISORDER WITH ANXIOUS MOOD: ICD-10-CM

## 2024-01-17 DIAGNOSIS — Z63.0 MARITAL/PARTNER RELATIONAL PROBLEM: ICD-10-CM

## 2024-01-17 DIAGNOSIS — F41.0 PANIC DISORDER: ICD-10-CM

## 2024-01-17 DIAGNOSIS — F41.1 GENERALIZED ANXIETY DISORDER: Primary | ICD-10-CM

## 2024-01-17 DIAGNOSIS — Z63.79 STRESSFUL LIFE EVENT AFFECTING FAMILY: ICD-10-CM

## 2024-01-17 SDOH — SOCIAL STABILITY - SOCIAL INSECURITY: PROBLEMS IN RELATIONSHIP WITH SPOUSE OR PARTNER: Z63.0

## 2024-01-19 DIAGNOSIS — F41.0 PANIC DISORDER: ICD-10-CM

## 2024-01-22 RX ORDER — METOPROLOL TARTRATE 50 MG/1
50 TABLET, FILM COATED ORAL 2 TIMES DAILY WITH MEALS
Qty: 180 TABLET | Refills: 0 | Status: SHIPPED | OUTPATIENT
Start: 2024-01-22

## 2024-03-20 ENCOUNTER — TELEMEDICINE (OUTPATIENT)
Dept: PSYCHIATRY | Facility: CLINIC | Age: 54
End: 2024-03-20
Payer: COMMERCIAL

## 2024-03-20 DIAGNOSIS — F41.1 GENERALIZED ANXIETY DISORDER: Primary | ICD-10-CM

## 2024-03-20 DIAGNOSIS — F33.1 MAJOR DEPRESSIVE DISORDER, RECURRENT EPISODE, MODERATE: ICD-10-CM

## 2024-03-20 DIAGNOSIS — Z63.0 MARITAL/PARTNER RELATIONAL PROBLEM: ICD-10-CM

## 2024-03-20 DIAGNOSIS — F41.0 PANIC DISORDER: ICD-10-CM

## 2024-03-20 SDOH — SOCIAL STABILITY - SOCIAL INSECURITY: PROBLEMS IN RELATIONSHIP WITH SPOUSE OR PARTNER: Z63.0

## 2024-03-21 NOTE — PROGRESS NOTES
Date of Service: March 20, 2024  Time In: 2:15 PM  Time Out: 3:00 PM          PROGRESS NOTE  Data:  Amish Win is a 53 y.o. male who met 1: 1 with the undersigned for scheduled MyChart video visit follow-up of anxiety and panic disorder.    This was an audio and video enabled telemedicine encounter.    This provider is located at [facility], [address] [place] [address]. The provider identified himself as well as his credentials.   The Patient is sitting in his vehicle in a parking lot parked in a safe place using his device with video connection.  The patient's condition being diagnosed/treated is appropriate for telemedicine. The patient gave consent to be seen remotely, and when consent is given they understand that the consent allows for patient identifiable information to be sent to a third party as needed.   They may refuse to be seen remotely at any time. The electronic data is encrypted and password protected, and the patient has been advised of the potential risks to privacy not withstanding such measures    Chief Complaint: Depression; anxiety; marital discord; stressful event affecting family, adjustment disorder    HPI: Patient immediately states things have not improved with the marriage and states on Saturday his wife became upset with him because he was tired after only getting 2 hours of sleep and made him leave the home.  The patient reports he has been staying in his car and in the essentially abandoned home of his mother-in-law after she moved in with them.  The patient states 1 night after going into the home to attempt to get some sleep a rat crawled across his head.  Patient states he is not sure what to do at this point states he is becoming hopeless that the relationship can be repaired or salvaged at this point.  Patient reports he feels as though his symptomology has exacerbated significantly and he simply feels overwhelmed.  Patient reports ongoing symptoms of anxiety as  evidenced by sense of uncertainty and impending doom along with increased heart rate, mind goes blank, feeling overwhelmed, and a distinct sense of fear. Patient rates current anxiety at a 8 on a scale 1-10 with 10 being most severe.  Patient also reports ongoing symptoms of depression including sad mood, anhedonia, anergia, and feelings of hopelessness.  Patient rates current symptoms of depression at a 7 on a scale of 1-10 with 10 being most severe.    Patient adamantly and convincingly denies suicidal ideation vehemently denies any substance use.         Clinical Maneuvering/Intervention:  Assisted patient in processing above session content; acknowledged and normalized patient’s thoughts, feelings, and concerns.   Allow the patient to discuss/process his feelings concerning the current situation at home and validated his feelings.  Utilize motivational interviewing techniques including complex reflections to assist the patient in recognizing he cannot control behaviors or decisions of others and encouraged him to consider he has a right to do what is best for him.  Also discussed the process of a rational decision making process and encouraged the patient to consider the need for him to be away from the situation for some period of time to give himself a space to think clearly and to make appropriate decisions.  Also led the patient through a brief session centering techniques including paying attention to his breathing and identifying items in the room to assist with decreasing symptomology.  Provided unconditional positive regard and a safe, supportive environment.    Allowed patient to freely discuss issues without interruption or judgment. Provided safe, confidential environment to facilitate the development of positive therapeutic relationship and encourage open, honest communication. Assisted patient in identifying risk factors which would indicate the need for higher level of care including thoughts to  harm self or others and/or self-harming behavior and encouraged patient to contact this office, call 911, or present to the nearest emergency room should any of these events occur. Discussed crisis intervention services and means to access.  Patient adamantly and convincingly denies current suicidal or homicidal ideation or perceptual disturbance.    Assessment    Patient appears to be struggling with significantly increased symptomology which appears to be exacerbated by the current situation at home and his wife's insistence that they assume responsibility for raising 3 small children.  As a result, he can be reasonably expected to continue to benefit from treatment likely be at increased risk for decompensation otherwise.    Diagnoses and all orders for this visit:    1. Generalized anxiety disorder (Primary)    2. Panic disorder    3. Major depressive disorder, recurrent episode, moderate    4. Marital/partner relational problem               Mental Status Exam  Hygiene: Not applicable due to telemedicine  Dress:  casual  Attitude:  Cooperative  Motor Activity:  Appropriate  Speech:  Normal and Pressured  Mood:  anxious   Affect: Anxious/tired  Thought Processes:  Linear  Thought Content:  normal  Suicidal Thoughts:  denies  Homicidal Thoughts:  denies  Crisis Safety Plan: yes, to come to the emergency room.  Hallucinations:  denies    Patient's Support Network Includes:  children and extended family    Progress toward goal: Not at goal    Functional Status: Severe impairment    Prognosis: Guarded with Ongoing Treatment    Plan         Patient will continue in individual outpatient therapy session at the [facility] in 2 weeks.  The undersigned contacted support staff and begin the process of assisting the patient with getting a referral for couples therapy and instructed the patient to contact the front staff and give them dates and times of availability.  Patient will continue in pharmacotherapy as  scheduled with Dr. Villareal.  Patient will contact this office, call 911 or present to the nearest emergency room should suicidal or homicidal ideations occur. Provide Cognitive Behavioral Therapy and Integrative Therapy to improve functioning, maintain stability, and avoid decompensation and the need for higher level of care.          Return in about 1 week (around 3/27/2024) for Next scheduled follow up.      This document signed by Izaiah Tinoco LCSW, [facility] March 21, 2024 07:03 EDT

## 2024-03-27 ENCOUNTER — TELEMEDICINE (OUTPATIENT)
Dept: PSYCHIATRY | Facility: CLINIC | Age: 54
End: 2024-03-27
Payer: COMMERCIAL

## 2024-03-27 DIAGNOSIS — F41.1 GENERALIZED ANXIETY DISORDER: Primary | ICD-10-CM

## 2024-03-27 DIAGNOSIS — F43.22 ADJUSTMENT DISORDER WITH ANXIOUS MOOD: ICD-10-CM

## 2024-03-27 DIAGNOSIS — F33.1 MAJOR DEPRESSIVE DISORDER, RECURRENT EPISODE, MODERATE: ICD-10-CM

## 2024-03-27 DIAGNOSIS — Z63.0 MARITAL/PARTNER RELATIONAL PROBLEM: ICD-10-CM

## 2024-03-27 DIAGNOSIS — F41.0 PANIC DISORDER: ICD-10-CM

## 2024-03-27 SDOH — SOCIAL STABILITY - SOCIAL INSECURITY: PROBLEMS IN RELATIONSHIP WITH SPOUSE OR PARTNER: Z63.0

## 2024-03-28 NOTE — PROGRESS NOTES
Date of Service: March 27, 2024  Time In: 7:34 AM  Time Out: 7:55 AM          PROGRESS NOTE  Data:  Amish Win is a 53 y.o. male who met 1: 1 with the undersigned for scheduled MyChart video visit follow-up of anxiety and panic disorder.    This was an audio and video enabled telemedicine encounter.    This provider is located at [facility], [address] [place] [address]. The provider identified himself as well as his credentials.   The Patient is sitting in his vehicle in a parking lot parked in a safe place using his device with video connection.  The patient's condition being diagnosed/treated is appropriate for telemedicine. The patient gave consent to be seen remotely, and when consent is given they understand that the consent allows for patient identifiable information to be sent to a third party as needed.   They may refuse to be seen remotely at any time. The electronic data is encrypted and password protected, and the patient has been advised of the potential risks to privacy not withstanding such measures    Chief Complaint: Depression; anxiety; marital discord; stressful event affecting family, adjustment disorder    HPI: Patient immediately states things have not improved with the marriage and states  he has returned home only to have a place to sleep.  He also reports he is still taking the children back and forth to school and  and states the home environment continues to be very stressful.  Patient reports he continues to feel overwhelmed and states although he has difficulty making the final move of leaving the relationship, he fully believes it is not salvageable.  Patient reports ongoing symptoms of anxiety as evidenced by sense of uncertainty and impending doom along with increased heart rate, mind goes blank, feeling overwhelmed, and a distinct sense of fear. Patient rates current anxiety at a 7 on a scale 1-10 with 10 being most severe.  Patient also reports ongoing symptoms of  "depression including sad mood, anhedonia, anergia, and feelings of hopelessness.  Patient rates current symptoms of depression at a 7 on a scale of 1-10 with 10 being most severe.    Patient adamantly and convincingly denies suicidal ideation vehemently denies any substance use.       It is notable the patient does state that next week he and his teenage son and daughter are going to [place] to attend a hockey game and states \"I do not care what she says I am going.\"      Clinical Maneuvering/Intervention:  Assisted patient in processing above session content; acknowledged and normalized patient’s thoughts, feelings, and concerns.   Allow the patient to discuss/process his feelings concerning the current situation at home and validated his feelings.   Continue to the process of a rational decision making process and encouraged the patient to consider the need for him to be away from the situation for some period of time to give himself a space to think clearly and to make appropriate decisions.  Also led the patient through a brief session centering techniques including paying attention to his breathing and identifying items in the room to assist with decreasing symptomology.  Provided unconditional positive regard and a safe, supportive environment.    Allowed patient to freely discuss issues without interruption or judgment. Provided safe, confidential environment to facilitate the development of positive therapeutic relationship and encourage open, honest communication. Assisted patient in identifying risk factors which would indicate the need for higher level of care including thoughts to harm self or others and/or self-harming behavior and encouraged patient to contact this office, call 911, or present to the nearest emergency room should any of these events occur. Discussed crisis intervention services and means to access.  Patient adamantly and convincingly denies current suicidal or homicidal ideation or " perceptual disturbance.    Assessment    Patient appears to be struggling with significantly increased symptomology which appears to be exacerbated by the current situation at home and his wife's insistence that they assume responsibility for raising 3 small children.  As a result, he can be reasonably expected to continue to benefit from treatment likely be at increased risk for decompensation otherwise.    Diagnoses and all orders for this visit:    1. Generalized anxiety disorder (Primary)    2. Panic disorder    3. Major depressive disorder, recurrent episode, moderate    4. Marital/partner relational problem    5. Adjustment disorder with anxious mood               Mental Status Exam  Hygiene: Not applicable due to telemedicine  Dress:  casual  Attitude:  Cooperative  Motor Activity:  Appropriate  Speech:  Normal and Pressured  Mood:  anxious   Affect: Anxious/tired  Thought Processes:  Linear  Thought Content:  normal  Suicidal Thoughts:  denies  Homicidal Thoughts:  denies  Crisis Safety Plan: yes, to come to the emergency room.  Hallucinations:  denies    Patient's Support Network Includes:  children and extended family    Progress toward goal: Not at goal    Functional Status: Severe impairment    Prognosis: Guarded with Ongoing Treatment    Plan         Patient will continue in individual outpatient therapy session at the [facility] in 2 weeks.  The undersigned contacted support staff and begin the process of assisting the patient with getting a referral for couples therapy and instructed the patient to contact the front staff and give them dates and times of availability.  Patient will continue in pharmacotherapy as scheduled with Dr. Villareal.  Patient will contact this office, call 911 or present to the nearest emergency room should suicidal or homicidal ideations occur. Provide Cognitive Behavioral Therapy and Integrative Therapy to improve functioning, maintain stability, and avoid decompensation  and the need for higher level of care.          Return in about 2 weeks (around 4/10/2024) for Next scheduled follow up.      This document signed by Izaiah Tinoco LCSW, [facility] March 28, 2024 06:27 EDT

## 2024-05-27 DIAGNOSIS — F41.0 PANIC DISORDER: ICD-10-CM

## 2024-05-28 RX ORDER — METOPROLOL TARTRATE 50 MG/1
50 TABLET, FILM COATED ORAL 2 TIMES DAILY WITH MEALS
Qty: 180 TABLET | Refills: 0 | Status: SHIPPED | OUTPATIENT
Start: 2024-05-28

## 2024-08-23 DIAGNOSIS — F41.0 PANIC DISORDER: ICD-10-CM

## 2024-08-23 RX ORDER — METOPROLOL TARTRATE 50 MG/1
50 TABLET, FILM COATED ORAL 2 TIMES DAILY WITH MEALS
Qty: 180 TABLET | Refills: 0 | Status: SHIPPED | OUTPATIENT
Start: 2024-08-23

## 2024-12-19 DIAGNOSIS — F41.0 PANIC DISORDER: ICD-10-CM

## 2024-12-19 RX ORDER — METOPROLOL TARTRATE 50 MG
50 TABLET ORAL 2 TIMES DAILY WITH MEALS
Qty: 180 TABLET | Refills: 0 | Status: SHIPPED | OUTPATIENT
Start: 2024-12-19

## 2025-03-05 DIAGNOSIS — F41.0 PANIC DISORDER: ICD-10-CM

## 2025-03-05 RX ORDER — METOPROLOL TARTRATE 50 MG
50 TABLET ORAL 2 TIMES DAILY WITH MEALS
Qty: 180 TABLET | Refills: 0 | Status: SHIPPED | OUTPATIENT
Start: 2025-03-05

## 2025-06-11 DIAGNOSIS — F41.0 PANIC DISORDER: ICD-10-CM

## 2025-06-12 RX ORDER — METOPROLOL TARTRATE 50 MG
50 TABLET ORAL 2 TIMES DAILY WITH MEALS
Qty: 180 TABLET | Refills: 0 | Status: SHIPPED | OUTPATIENT
Start: 2025-06-12